# Patient Record
Sex: FEMALE | Race: WHITE | Employment: OTHER
[De-identification: names, ages, dates, MRNs, and addresses within clinical notes are randomized per-mention and may not be internally consistent; named-entity substitution may affect disease eponyms.]

---

## 2017-01-05 ENCOUNTER — OFFICE VISIT (OUTPATIENT)
Dept: CARDIOLOGY | Facility: CLINIC | Age: 77
End: 2017-01-05

## 2017-01-05 VITALS
OXYGEN SATURATION: 97 % | DIASTOLIC BLOOD PRESSURE: 80 MMHG | SYSTOLIC BLOOD PRESSURE: 120 MMHG | BODY MASS INDEX: 25.37 KG/M2 | HEART RATE: 90 BPM | WEIGHT: 162 LBS

## 2017-01-05 DIAGNOSIS — R10.30 GROIN PAIN, UNSPECIFIED LATERALITY: Primary | ICD-10-CM

## 2017-01-05 DIAGNOSIS — I25.10 CORONARY ARTERY DISEASE INVOLVING NATIVE HEART, ANGINA PRESENCE UNSPECIFIED, UNSPECIFIED VESSEL OR LESION TYPE: ICD-10-CM

## 2017-01-05 DIAGNOSIS — E55.9 VITAMIN D DEFICIENCY DISEASE: ICD-10-CM

## 2017-01-05 DIAGNOSIS — E03.8 OTHER SPECIFIED HYPOTHYROIDISM: ICD-10-CM

## 2017-01-05 DIAGNOSIS — E78.49 OTHER HYPERLIPIDEMIA: ICD-10-CM

## 2017-01-05 DIAGNOSIS — Z98.61 POST PTCA: ICD-10-CM

## 2017-01-05 DIAGNOSIS — I10 ESSENTIAL HYPERTENSION: ICD-10-CM

## 2017-01-05 PROCEDURE — G8420 CALC BMI NORM PARAMETERS: HCPCS | Performed by: INTERNAL MEDICINE

## 2017-01-05 PROCEDURE — 1123F ACP DISCUSS/DSCN MKR DOCD: CPT | Performed by: INTERNAL MEDICINE

## 2017-01-05 PROCEDURE — 1036F TOBACCO NON-USER: CPT | Performed by: INTERNAL MEDICINE

## 2017-01-05 PROCEDURE — G8598 ASA/ANTIPLAT THER USED: HCPCS | Performed by: INTERNAL MEDICINE

## 2017-01-05 PROCEDURE — G8482 FLU IMMUNIZE ORDER/ADMIN: HCPCS | Performed by: INTERNAL MEDICINE

## 2017-01-05 PROCEDURE — G8428 CUR MEDS NOT DOCUMENT: HCPCS | Performed by: INTERNAL MEDICINE

## 2017-01-05 PROCEDURE — 4040F PNEUMOC VAC/ADMIN/RCVD: CPT | Performed by: INTERNAL MEDICINE

## 2017-01-05 PROCEDURE — G8399 PT W/DXA RESULTS DOCUMENT: HCPCS | Performed by: INTERNAL MEDICINE

## 2017-01-05 PROCEDURE — 1090F PRES/ABSN URINE INCON ASSESS: CPT | Performed by: INTERNAL MEDICINE

## 2017-01-05 PROCEDURE — 99213 OFFICE O/P EST LOW 20 MIN: CPT | Performed by: INTERNAL MEDICINE

## 2017-01-05 RX ORDER — TRAMADOL HYDROCHLORIDE 50 MG/1
50 TABLET ORAL EVERY 8 HOURS PRN
Qty: 25 TABLET | Refills: 0 | Status: SHIPPED | OUTPATIENT
Start: 2017-01-05 | End: 2017-10-10 | Stop reason: SDUPTHER

## 2017-01-30 DIAGNOSIS — E78.49 OTHER HYPERLIPIDEMIA: ICD-10-CM

## 2017-01-30 DIAGNOSIS — E55.9 VITAMIN D DEFICIENCY DISEASE: ICD-10-CM

## 2017-01-30 DIAGNOSIS — I25.10 CORONARY ARTERY DISEASE INVOLVING NATIVE HEART, ANGINA PRESENCE UNSPECIFIED, UNSPECIFIED VESSEL OR LESION TYPE: Primary | ICD-10-CM

## 2017-01-30 DIAGNOSIS — Z98.61 POST PTCA: ICD-10-CM

## 2017-01-30 DIAGNOSIS — I10 ESSENTIAL HYPERTENSION: ICD-10-CM

## 2017-01-31 ENCOUNTER — OFFICE VISIT (OUTPATIENT)
Dept: CARDIOLOGY | Facility: CLINIC | Age: 77
End: 2017-01-31

## 2017-01-31 VITALS
RESPIRATION RATE: 16 BRPM | DIASTOLIC BLOOD PRESSURE: 70 MMHG | HEIGHT: 68 IN | WEIGHT: 156 LBS | OXYGEN SATURATION: 97 % | SYSTOLIC BLOOD PRESSURE: 120 MMHG | BODY MASS INDEX: 23.64 KG/M2 | HEART RATE: 108 BPM

## 2017-01-31 DIAGNOSIS — I10 ESSENTIAL HYPERTENSION: Primary | ICD-10-CM

## 2017-01-31 PROCEDURE — 1036F TOBACCO NON-USER: CPT | Performed by: INTERNAL MEDICINE

## 2017-01-31 PROCEDURE — G8598 ASA/ANTIPLAT THER USED: HCPCS | Performed by: INTERNAL MEDICINE

## 2017-01-31 PROCEDURE — G8420 CALC BMI NORM PARAMETERS: HCPCS | Performed by: INTERNAL MEDICINE

## 2017-01-31 PROCEDURE — 4040F PNEUMOC VAC/ADMIN/RCVD: CPT | Performed by: INTERNAL MEDICINE

## 2017-01-31 PROCEDURE — 1123F ACP DISCUSS/DSCN MKR DOCD: CPT | Performed by: INTERNAL MEDICINE

## 2017-01-31 PROCEDURE — G8427 DOCREV CUR MEDS BY ELIG CLIN: HCPCS | Performed by: INTERNAL MEDICINE

## 2017-01-31 PROCEDURE — 1090F PRES/ABSN URINE INCON ASSESS: CPT | Performed by: INTERNAL MEDICINE

## 2017-01-31 PROCEDURE — 99213 OFFICE O/P EST LOW 20 MIN: CPT | Performed by: INTERNAL MEDICINE

## 2017-01-31 PROCEDURE — G8482 FLU IMMUNIZE ORDER/ADMIN: HCPCS | Performed by: INTERNAL MEDICINE

## 2017-01-31 PROCEDURE — G8399 PT W/DXA RESULTS DOCUMENT: HCPCS | Performed by: INTERNAL MEDICINE

## 2017-02-03 ENCOUNTER — OFFICE VISIT (OUTPATIENT)
Dept: FAMILY MEDICINE CLINIC | Facility: CLINIC | Age: 77
End: 2017-02-03

## 2017-02-03 VITALS
BODY MASS INDEX: 24.1 KG/M2 | SYSTOLIC BLOOD PRESSURE: 120 MMHG | WEIGHT: 159 LBS | DIASTOLIC BLOOD PRESSURE: 60 MMHG | HEIGHT: 68 IN

## 2017-02-03 DIAGNOSIS — I25.10 CORONARY ARTERY DISEASE INVOLVING NATIVE CORONARY ARTERY OF NATIVE HEART WITHOUT ANGINA PECTORIS: ICD-10-CM

## 2017-02-03 DIAGNOSIS — I10 ESSENTIAL HYPERTENSION: ICD-10-CM

## 2017-02-03 DIAGNOSIS — F33.0 MAJOR DEPRESSIVE DISORDER, RECURRENT EPISODE, MILD (HCC): ICD-10-CM

## 2017-02-03 DIAGNOSIS — E03.9 ACQUIRED HYPOTHYROIDISM: Primary | ICD-10-CM

## 2017-02-03 PROCEDURE — G8420 CALC BMI NORM PARAMETERS: HCPCS | Performed by: FAMILY MEDICINE

## 2017-02-03 PROCEDURE — G8399 PT W/DXA RESULTS DOCUMENT: HCPCS | Performed by: FAMILY MEDICINE

## 2017-02-03 PROCEDURE — 1090F PRES/ABSN URINE INCON ASSESS: CPT | Performed by: FAMILY MEDICINE

## 2017-02-03 PROCEDURE — 4040F PNEUMOC VAC/ADMIN/RCVD: CPT | Performed by: FAMILY MEDICINE

## 2017-02-03 PROCEDURE — G8427 DOCREV CUR MEDS BY ELIG CLIN: HCPCS | Performed by: FAMILY MEDICINE

## 2017-02-03 PROCEDURE — 1036F TOBACCO NON-USER: CPT | Performed by: FAMILY MEDICINE

## 2017-02-03 PROCEDURE — 1123F ACP DISCUSS/DSCN MKR DOCD: CPT | Performed by: FAMILY MEDICINE

## 2017-02-03 PROCEDURE — G8482 FLU IMMUNIZE ORDER/ADMIN: HCPCS | Performed by: FAMILY MEDICINE

## 2017-02-03 PROCEDURE — 99214 OFFICE O/P EST MOD 30 MIN: CPT | Performed by: FAMILY MEDICINE

## 2017-02-03 PROCEDURE — G8598 ASA/ANTIPLAT THER USED: HCPCS | Performed by: FAMILY MEDICINE

## 2017-02-03 RX ORDER — LEVOTHYROXINE SODIUM 88 UG/1
88 TABLET ORAL DAILY
Qty: 30 TABLET | Refills: 1 | Status: SHIPPED | OUTPATIENT
Start: 2017-02-03 | End: 2017-03-03 | Stop reason: SDUPTHER

## 2017-02-03 RX ORDER — LEVOTHYROXINE SODIUM 88 UG/1
88 TABLET ORAL
Qty: 90 TABLET | Refills: 1 | Status: CANCELLED | OUTPATIENT
Start: 2017-02-03

## 2017-02-03 RX ORDER — FLUOXETINE HYDROCHLORIDE 40 MG/1
40 CAPSULE ORAL DAILY
Qty: 90 CAPSULE | Refills: 1 | Status: SHIPPED | OUTPATIENT
Start: 2017-02-03 | End: 2017-08-07 | Stop reason: SDUPTHER

## 2017-02-03 ASSESSMENT — PATIENT HEALTH QUESTIONNAIRE - PHQ9
2. FEELING DOWN, DEPRESSED OR HOPELESS: 0
SUM OF ALL RESPONSES TO PHQ9 QUESTIONS 1 & 2: 0
SUM OF ALL RESPONSES TO PHQ QUESTIONS 1-9: 0
1. LITTLE INTEREST OR PLEASURE IN DOING THINGS: 0

## 2017-02-03 ASSESSMENT — ENCOUNTER SYMPTOMS
SHORTNESS OF BREATH: 0
ORTHOPNEA: 0
BLURRED VISION: 0

## 2017-03-03 ENCOUNTER — TELEPHONE (OUTPATIENT)
Dept: FAMILY MEDICINE CLINIC | Facility: CLINIC | Age: 77
End: 2017-03-03

## 2017-03-03 RX ORDER — LEVOTHYROXINE SODIUM 0.05 MG/1
50 TABLET ORAL DAILY
Qty: 30 TABLET | Refills: 2 | Status: SHIPPED | OUTPATIENT
Start: 2017-03-03 | End: 2017-05-09 | Stop reason: SDUPTHER

## 2017-03-13 ENCOUNTER — HOSPITAL ENCOUNTER (OUTPATIENT)
Dept: CARDIAC REHAB | Age: 77
Setting detail: THERAPIES SERIES
Discharge: HOME | End: 2017-03-13
Payer: MEDICARE

## 2017-03-13 PROCEDURE — 93798 PHYS/QHP OP CAR RHAB W/ECG: CPT

## 2017-03-15 ENCOUNTER — HOSPITAL ENCOUNTER (OUTPATIENT)
Dept: CARDIAC REHAB | Age: 77
Setting detail: THERAPIES SERIES
Discharge: HOME | End: 2017-03-15
Payer: MEDICARE

## 2017-03-15 PROCEDURE — 93798 PHYS/QHP OP CAR RHAB W/ECG: CPT

## 2017-03-17 ENCOUNTER — HOSPITAL ENCOUNTER (OUTPATIENT)
Dept: CARDIAC REHAB | Age: 77
Setting detail: THERAPIES SERIES
Discharge: HOME | End: 2017-03-17
Payer: MEDICARE

## 2017-03-17 PROCEDURE — 93798 PHYS/QHP OP CAR RHAB W/ECG: CPT

## 2017-03-20 ENCOUNTER — HOSPITAL ENCOUNTER (OUTPATIENT)
Dept: CARDIAC REHAB | Age: 77
Setting detail: THERAPIES SERIES
Discharge: HOME | End: 2017-03-20
Payer: MEDICARE

## 2017-03-20 PROCEDURE — 93798 PHYS/QHP OP CAR RHAB W/ECG: CPT

## 2017-03-22 ENCOUNTER — HOSPITAL ENCOUNTER (OUTPATIENT)
Dept: CARDIAC REHAB | Age: 77
Setting detail: THERAPIES SERIES
Discharge: HOME | End: 2017-03-22
Payer: MEDICARE

## 2017-03-22 PROCEDURE — 93798 PHYS/QHP OP CAR RHAB W/ECG: CPT

## 2017-03-30 ENCOUNTER — HOSPITAL ENCOUNTER (OUTPATIENT)
Age: 77
Discharge: HOME | End: 2017-03-30
Payer: MEDICARE

## 2017-03-30 ENCOUNTER — TELEPHONE (OUTPATIENT)
Dept: FAMILY MEDICINE CLINIC | Age: 77
End: 2017-03-30

## 2017-03-30 DIAGNOSIS — R10.30 GROIN PAIN, UNSPECIFIED LATERALITY: ICD-10-CM

## 2017-03-30 DIAGNOSIS — E55.9 VITAMIN D DEFICIENCY DISEASE: ICD-10-CM

## 2017-03-30 DIAGNOSIS — Z98.61 POST PTCA: ICD-10-CM

## 2017-03-30 DIAGNOSIS — I10 ESSENTIAL HYPERTENSION: ICD-10-CM

## 2017-03-30 DIAGNOSIS — E78.49 OTHER HYPERLIPIDEMIA: ICD-10-CM

## 2017-03-30 DIAGNOSIS — E03.9 ACQUIRED HYPOTHYROIDISM: ICD-10-CM

## 2017-03-30 DIAGNOSIS — E03.8 OTHER SPECIFIED HYPOTHYROIDISM: ICD-10-CM

## 2017-03-30 DIAGNOSIS — I25.10 CORONARY ARTERY DISEASE INVOLVING NATIVE HEART, ANGINA PRESENCE UNSPECIFIED, UNSPECIFIED VESSEL OR LESION TYPE: ICD-10-CM

## 2017-03-30 LAB
ABSOLUTE EOS #: 0.3 K/UL (ref 0–0.4)
ABSOLUTE LYMPH #: 1.4 K/UL (ref 1.1–2.7)
ABSOLUTE MONO #: 0.4 K/UL (ref 0–1)
ALBUMIN SERPL-MCNC: 3.9 G/DL (ref 3.5–5.2)
ALBUMIN/GLOBULIN RATIO: ABNORMAL (ref 1–2.5)
ALP BLD-CCNC: 71 U/L (ref 35–104)
ALT SERPL-CCNC: 11 U/L (ref 5–33)
ANION GAP SERPL CALCULATED.3IONS-SCNC: 13 MMOL/L (ref 9–17)
AST SERPL-CCNC: 18 U/L
BASOPHILS # BLD: 1 % (ref 0–2)
BASOPHILS ABSOLUTE: 0 K/UL (ref 0–0.2)
BILIRUB SERPL-MCNC: 0.3 MG/DL (ref 0.3–1.2)
BUN BLDV-MCNC: 25 MG/DL (ref 8–23)
BUN/CREAT BLD: 21 (ref 9–20)
CALCIUM SERPL-MCNC: 8.9 MG/DL (ref 8.6–10.4)
CHLORIDE BLD-SCNC: 108 MMOL/L (ref 98–107)
CHOLESTEROL/HDL RATIO: 2.3
CHOLESTEROL: 219 MG/DL
CO2: 20 MMOL/L (ref 20–31)
CREAT SERPL-MCNC: 1.21 MG/DL (ref 0.5–0.9)
DIFFERENTIAL TYPE: YES
EOSINOPHILS RELATIVE PERCENT: 5 % (ref 0–5)
GFR AFRICAN AMERICAN: 52 ML/MIN
GFR NON-AFRICAN AMERICAN: 43 ML/MIN
GFR SERPL CREATININE-BSD FRML MDRD: ABNORMAL ML/MIN/{1.73_M2}
GFR SERPL CREATININE-BSD FRML MDRD: ABNORMAL ML/MIN/{1.73_M2}
GLUCOSE BLD-MCNC: 101 MG/DL (ref 70–99)
HCT VFR BLD CALC: 33.3 % (ref 36–46)
HDLC SERPL-MCNC: 94 MG/DL
HEMOGLOBIN: 10.6 G/DL (ref 12–16)
LDL CHOLESTEROL: 105 MG/DL (ref 0–130)
LYMPHOCYTES # BLD: 22 % (ref 15–40)
MAGNESIUM: 2.2 MG/DL (ref 1.6–2.6)
MCH RBC QN AUTO: 26.7 PG (ref 26–34)
MCHC RBC AUTO-ENTMCNC: 31.8 G/DL (ref 31–37)
MCV RBC AUTO: 83.7 FL (ref 80–100)
MONOCYTES # BLD: 7 % (ref 4–8)
PATIENT FASTING?: YES
PDW BLD-RTO: 17.3 % (ref 12.1–15.2)
PLATELET # BLD: 284 K/UL (ref 140–450)
PLATELET ESTIMATE: ABNORMAL
PMV BLD AUTO: ABNORMAL FL (ref 6–12)
POTASSIUM SERPL-SCNC: 4 MMOL/L (ref 3.7–5.3)
RBC # BLD: 3.97 M/UL (ref 4–5.2)
RBC # BLD: ABNORMAL 10*6/UL
SEG NEUTROPHILS: 65 % (ref 47–75)
SEGMENTED NEUTROPHILS ABSOLUTE COUNT: 4.2 K/UL (ref 2.5–7)
SODIUM BLD-SCNC: 141 MMOL/L (ref 135–144)
TOTAL PROTEIN: 6.4 G/DL (ref 6.4–8.3)
TRIGL SERPL-MCNC: 101 MG/DL
TSH SERPL DL<=0.05 MIU/L-ACNC: 3.26 MIU/L (ref 0.3–5)
VITAMIN D 25-HYDROXY: 22.1 NG/ML (ref 30–100)
VLDLC SERPL CALC-MCNC: 20 MG/DL (ref 1–30)
WBC # BLD: 6.3 K/UL (ref 3.5–11)
WBC # BLD: ABNORMAL 10*3/UL

## 2017-03-30 PROCEDURE — 82306 VITAMIN D 25 HYDROXY: CPT

## 2017-03-30 PROCEDURE — 84443 ASSAY THYROID STIM HORMONE: CPT

## 2017-03-30 PROCEDURE — 93005 ELECTROCARDIOGRAM TRACING: CPT

## 2017-03-30 PROCEDURE — 80061 LIPID PANEL: CPT

## 2017-03-30 PROCEDURE — 85025 COMPLETE CBC W/AUTO DIFF WBC: CPT

## 2017-03-30 PROCEDURE — 36415 COLL VENOUS BLD VENIPUNCTURE: CPT

## 2017-03-30 PROCEDURE — 83735 ASSAY OF MAGNESIUM: CPT

## 2017-03-30 PROCEDURE — 80053 COMPREHEN METABOLIC PANEL: CPT

## 2017-04-03 LAB
EKG ATRIAL RATE: 71 BPM
EKG P AXIS: 56 DEGREES
EKG P-R INTERVAL: 140 MS
EKG Q-T INTERVAL: 428 MS
EKG QRS DURATION: 78 MS
EKG QTC CALCULATION (BAZETT): 465 MS
EKG R AXIS: 74 DEGREES
EKG T AXIS: 58 DEGREES
EKG VENTRICULAR RATE: 71 BPM

## 2017-04-06 ENCOUNTER — OFFICE VISIT (OUTPATIENT)
Dept: CARDIOLOGY CLINIC | Age: 77
End: 2017-04-06
Payer: MEDICARE

## 2017-04-06 VITALS
DIASTOLIC BLOOD PRESSURE: 70 MMHG | BODY MASS INDEX: 24.33 KG/M2 | OXYGEN SATURATION: 98 % | WEIGHT: 160 LBS | HEART RATE: 90 BPM | SYSTOLIC BLOOD PRESSURE: 130 MMHG

## 2017-04-06 DIAGNOSIS — E03.9 ACQUIRED HYPOTHYROIDISM: ICD-10-CM

## 2017-04-06 DIAGNOSIS — I10 ESSENTIAL HYPERTENSION: ICD-10-CM

## 2017-04-06 DIAGNOSIS — E55.9 VITAMIN D DEFICIENCY DISEASE: ICD-10-CM

## 2017-04-06 DIAGNOSIS — E78.49 OTHER HYPERLIPIDEMIA: ICD-10-CM

## 2017-04-06 DIAGNOSIS — I25.10 CORONARY ARTERY DISEASE INVOLVING NATIVE CORONARY ARTERY OF NATIVE HEART WITHOUT ANGINA PECTORIS: Primary | ICD-10-CM

## 2017-04-06 DIAGNOSIS — Z98.61 POST PTCA: ICD-10-CM

## 2017-04-06 PROCEDURE — G8420 CALC BMI NORM PARAMETERS: HCPCS | Performed by: INTERNAL MEDICINE

## 2017-04-06 PROCEDURE — G8427 DOCREV CUR MEDS BY ELIG CLIN: HCPCS | Performed by: INTERNAL MEDICINE

## 2017-04-06 PROCEDURE — G8399 PT W/DXA RESULTS DOCUMENT: HCPCS | Performed by: INTERNAL MEDICINE

## 2017-04-06 PROCEDURE — 4040F PNEUMOC VAC/ADMIN/RCVD: CPT | Performed by: INTERNAL MEDICINE

## 2017-04-06 PROCEDURE — 1036F TOBACCO NON-USER: CPT | Performed by: INTERNAL MEDICINE

## 2017-04-06 PROCEDURE — 1123F ACP DISCUSS/DSCN MKR DOCD: CPT | Performed by: INTERNAL MEDICINE

## 2017-04-06 PROCEDURE — 99214 OFFICE O/P EST MOD 30 MIN: CPT | Performed by: INTERNAL MEDICINE

## 2017-04-06 PROCEDURE — 1090F PRES/ABSN URINE INCON ASSESS: CPT | Performed by: INTERNAL MEDICINE

## 2017-04-06 PROCEDURE — G8598 ASA/ANTIPLAT THER USED: HCPCS | Performed by: INTERNAL MEDICINE

## 2017-04-06 RX ORDER — POTASSIUM CHLORIDE 20 MEQ/1
20 TABLET, EXTENDED RELEASE ORAL DAILY
Qty: 90 TABLET | Refills: 3 | Status: SHIPPED | OUTPATIENT
Start: 2017-04-06 | End: 2018-03-27 | Stop reason: SDUPTHER

## 2017-04-06 RX ORDER — FUROSEMIDE 20 MG/1
20 TABLET ORAL DAILY
Qty: 90 TABLET | Refills: 3 | Status: SHIPPED | OUTPATIENT
Start: 2017-04-06 | End: 2018-03-27 | Stop reason: SDUPTHER

## 2017-04-10 ENCOUNTER — TELEPHONE (OUTPATIENT)
Dept: FAMILY MEDICINE CLINIC | Age: 77
End: 2017-04-10

## 2017-04-11 ENCOUNTER — TELEPHONE (OUTPATIENT)
Dept: FAMILY MEDICINE CLINIC | Age: 77
End: 2017-04-11

## 2017-04-11 ENCOUNTER — HOSPITAL ENCOUNTER (OUTPATIENT)
Age: 77
Setting detail: SPECIMEN
Discharge: HOME | End: 2017-04-11
Payer: MEDICARE

## 2017-04-11 ENCOUNTER — NURSE ONLY (OUTPATIENT)
Dept: FAMILY MEDICINE CLINIC | Age: 77
End: 2017-04-11
Payer: MEDICARE

## 2017-04-11 DIAGNOSIS — R30.0 DYSURIA: ICD-10-CM

## 2017-04-11 DIAGNOSIS — N30.01 ACUTE CYSTITIS WITH HEMATURIA: ICD-10-CM

## 2017-04-11 DIAGNOSIS — R30.0 DYSURIA: Primary | ICD-10-CM

## 2017-04-11 LAB
BILIRUBIN, POC: NORMAL
BLOOD URINE, POC: NORMAL
CLARITY, POC: CLEAR
COLOR, POC: YELLOW
GLUCOSE URINE, POC: NORMAL
KETONES, POC: NORMAL
LEUKOCYTE EST, POC: NORMAL
NITRITE, POC: NORMAL
PH, POC: 6
PROTEIN, POC: 100
SPECIFIC GRAVITY, POC: 1.03
UROBILINOGEN, POC: 0.2

## 2017-04-11 PROCEDURE — 87077 CULTURE AEROBIC IDENTIFY: CPT

## 2017-04-11 PROCEDURE — 81002 URINALYSIS NONAUTO W/O SCOPE: CPT | Performed by: FAMILY MEDICINE

## 2017-04-11 PROCEDURE — 87186 SC STD MICRODIL/AGAR DIL: CPT

## 2017-04-11 PROCEDURE — 87086 URINE CULTURE/COLONY COUNT: CPT

## 2017-04-11 RX ORDER — TRIMETHOPRIM 100 MG/1
100 TABLET ORAL 2 TIMES DAILY
Qty: 14 TABLET | Refills: 0 | Status: SHIPPED | OUTPATIENT
Start: 2017-04-11 | End: 2017-04-13 | Stop reason: ALTCHOICE

## 2017-04-12 LAB
CULTURE: ABNORMAL
CULTURE: ABNORMAL
Lab: ABNORMAL
ORGANISM: ABNORMAL
SPECIMEN DESCRIPTION: ABNORMAL
SPECIMEN DESCRIPTION: ABNORMAL
STATUS: ABNORMAL

## 2017-04-13 ENCOUNTER — TELEPHONE (OUTPATIENT)
Dept: FAMILY MEDICINE CLINIC | Age: 77
End: 2017-04-13

## 2017-04-13 RX ORDER — CIPROFLOXACIN 500 MG/1
500 TABLET, FILM COATED ORAL 2 TIMES DAILY
Qty: 14 TABLET | Refills: 0 | Status: SHIPPED | OUTPATIENT
Start: 2017-04-13 | End: 2017-04-13 | Stop reason: SDUPTHER

## 2017-04-13 RX ORDER — FLUCONAZOLE 150 MG/1
TABLET ORAL
Qty: 2 TABLET | Refills: 0 | Status: SHIPPED | OUTPATIENT
Start: 2017-04-13 | End: 2017-08-07 | Stop reason: ALTCHOICE

## 2017-04-13 RX ORDER — CIPROFLOXACIN 500 MG/1
500 TABLET, FILM COATED ORAL 2 TIMES DAILY
Qty: 14 TABLET | Refills: 0 | Status: SHIPPED | OUTPATIENT
Start: 2017-04-13 | End: 2017-04-20

## 2017-04-19 ENCOUNTER — OFFICE VISIT (OUTPATIENT)
Dept: FAMILY MEDICINE CLINIC | Age: 77
End: 2017-04-19
Payer: MEDICARE

## 2017-04-19 VITALS
BODY MASS INDEX: 24.25 KG/M2 | WEIGHT: 160 LBS | DIASTOLIC BLOOD PRESSURE: 72 MMHG | HEIGHT: 68 IN | SYSTOLIC BLOOD PRESSURE: 138 MMHG

## 2017-04-19 DIAGNOSIS — B02.8 HERPES ZOSTER COMPLICATED: Primary | ICD-10-CM

## 2017-04-19 PROCEDURE — G8428 CUR MEDS NOT DOCUMENT: HCPCS | Performed by: FAMILY MEDICINE

## 2017-04-19 PROCEDURE — 1123F ACP DISCUSS/DSCN MKR DOCD: CPT | Performed by: FAMILY MEDICINE

## 2017-04-19 PROCEDURE — G8399 PT W/DXA RESULTS DOCUMENT: HCPCS | Performed by: FAMILY MEDICINE

## 2017-04-19 PROCEDURE — 1036F TOBACCO NON-USER: CPT | Performed by: FAMILY MEDICINE

## 2017-04-19 PROCEDURE — G8598 ASA/ANTIPLAT THER USED: HCPCS | Performed by: FAMILY MEDICINE

## 2017-04-19 PROCEDURE — 99213 OFFICE O/P EST LOW 20 MIN: CPT | Performed by: FAMILY MEDICINE

## 2017-04-19 PROCEDURE — 4040F PNEUMOC VAC/ADMIN/RCVD: CPT | Performed by: FAMILY MEDICINE

## 2017-04-19 PROCEDURE — 1090F PRES/ABSN URINE INCON ASSESS: CPT | Performed by: FAMILY MEDICINE

## 2017-04-19 PROCEDURE — G8420 CALC BMI NORM PARAMETERS: HCPCS | Performed by: FAMILY MEDICINE

## 2017-04-19 RX ORDER — VALACYCLOVIR HYDROCHLORIDE 1 G/1
1000 TABLET, FILM COATED ORAL 3 TIMES DAILY
Qty: 21 TABLET | Refills: 0 | Status: SHIPPED | OUTPATIENT
Start: 2017-04-19 | End: 2017-04-26

## 2017-04-19 RX ORDER — TRIAMCINOLONE ACETONIDE 1 MG/G
CREAM TOPICAL
Qty: 30 G | Refills: 0 | Status: SHIPPED | OUTPATIENT
Start: 2017-04-19 | End: 2018-04-09 | Stop reason: SDUPTHER

## 2017-04-19 RX ORDER — PREDNISONE 20 MG/1
40 TABLET ORAL DAILY
Qty: 14 TABLET | Refills: 0 | Status: SHIPPED | OUTPATIENT
Start: 2017-04-19 | End: 2017-04-26

## 2017-04-19 ASSESSMENT — ENCOUNTER SYMPTOMS: GASTROINTESTINAL NEGATIVE: 1

## 2017-04-28 ENCOUNTER — TELEPHONE (OUTPATIENT)
Dept: FAMILY MEDICINE CLINIC | Age: 77
End: 2017-04-28

## 2017-04-28 RX ORDER — VALACYCLOVIR HYDROCHLORIDE 1 G/1
1000 TABLET, FILM COATED ORAL 3 TIMES DAILY
Qty: 21 TABLET | Refills: 0 | Status: SHIPPED | OUTPATIENT
Start: 2017-04-28 | End: 2017-05-05

## 2017-05-17 ENCOUNTER — OFFICE VISIT (OUTPATIENT)
Dept: FAMILY MEDICINE CLINIC | Age: 77
End: 2017-05-17
Payer: MEDICARE

## 2017-05-17 VITALS — WEIGHT: 164 LBS | BODY MASS INDEX: 24.86 KG/M2 | HEIGHT: 68 IN

## 2017-05-17 DIAGNOSIS — B02.8 HERPES ZOSTER COMPLICATED: Primary | ICD-10-CM

## 2017-05-17 PROCEDURE — 1090F PRES/ABSN URINE INCON ASSESS: CPT | Performed by: FAMILY MEDICINE

## 2017-05-17 PROCEDURE — 1123F ACP DISCUSS/DSCN MKR DOCD: CPT | Performed by: FAMILY MEDICINE

## 2017-05-17 PROCEDURE — 4040F PNEUMOC VAC/ADMIN/RCVD: CPT | Performed by: FAMILY MEDICINE

## 2017-05-17 PROCEDURE — G8420 CALC BMI NORM PARAMETERS: HCPCS | Performed by: FAMILY MEDICINE

## 2017-05-17 PROCEDURE — 1036F TOBACCO NON-USER: CPT | Performed by: FAMILY MEDICINE

## 2017-05-17 PROCEDURE — 99213 OFFICE O/P EST LOW 20 MIN: CPT | Performed by: FAMILY MEDICINE

## 2017-05-17 PROCEDURE — G8428 CUR MEDS NOT DOCUMENT: HCPCS | Performed by: FAMILY MEDICINE

## 2017-05-17 PROCEDURE — G8399 PT W/DXA RESULTS DOCUMENT: HCPCS | Performed by: FAMILY MEDICINE

## 2017-05-17 PROCEDURE — G8598 ASA/ANTIPLAT THER USED: HCPCS | Performed by: FAMILY MEDICINE

## 2017-05-17 RX ORDER — VALACYCLOVIR HYDROCHLORIDE 500 MG/1
500 TABLET, FILM COATED ORAL DAILY
Qty: 30 TABLET | Refills: 2 | Status: SHIPPED | OUTPATIENT
Start: 2017-05-17 | End: 2018-03-27 | Stop reason: ALTCHOICE

## 2017-05-17 RX ORDER — FLUCONAZOLE 150 MG/1
TABLET ORAL
Qty: 2 TABLET | Refills: 0 | Status: SHIPPED | OUTPATIENT
Start: 2017-05-17 | End: 2017-08-07 | Stop reason: ALTCHOICE

## 2017-05-21 ASSESSMENT — ENCOUNTER SYMPTOMS: RESPIRATORY NEGATIVE: 1

## 2017-06-19 ENCOUNTER — OFFICE VISIT (OUTPATIENT)
Dept: FAMILY MEDICINE CLINIC | Age: 77
End: 2017-06-19
Payer: MEDICARE

## 2017-06-19 ENCOUNTER — HOSPITAL ENCOUNTER (OUTPATIENT)
Age: 77
Discharge: HOME | End: 2017-06-19
Payer: MEDICARE

## 2017-06-19 VITALS
WEIGHT: 162 LBS | HEIGHT: 68 IN | DIASTOLIC BLOOD PRESSURE: 70 MMHG | BODY MASS INDEX: 24.55 KG/M2 | SYSTOLIC BLOOD PRESSURE: 130 MMHG

## 2017-06-19 DIAGNOSIS — D64.9 NORMOCYTIC ANEMIA: ICD-10-CM

## 2017-06-19 DIAGNOSIS — K62.5 RECTAL BLEEDING: Primary | ICD-10-CM

## 2017-06-19 DIAGNOSIS — E03.9 ACQUIRED HYPOTHYROIDISM: ICD-10-CM

## 2017-06-19 DIAGNOSIS — R06.09 DYSPNEA ON EXERTION: ICD-10-CM

## 2017-06-19 LAB
ABSOLUTE EOS #: 0.4 K/UL (ref 0–0.4)
ABSOLUTE LYMPH #: 1.5 K/UL (ref 1.1–2.7)
ABSOLUTE MONO #: 0.5 K/UL (ref 0–1)
ANION GAP SERPL CALCULATED.3IONS-SCNC: 16 MMOL/L (ref 9–17)
BASOPHILS # BLD: 1 %
BASOPHILS ABSOLUTE: 0 K/UL (ref 0–0.2)
BUN BLDV-MCNC: 22 MG/DL (ref 8–23)
BUN/CREAT BLD: 18 (ref 9–20)
CALCIUM SERPL-MCNC: 9.3 MG/DL (ref 8.6–10.4)
CHLORIDE BLD-SCNC: 107 MMOL/L (ref 98–107)
CO2: 18 MMOL/L (ref 20–31)
CREAT SERPL-MCNC: 1.22 MG/DL (ref 0.5–0.9)
DIFFERENTIAL TYPE: ABNORMAL
EOSINOPHILS RELATIVE PERCENT: 5 %
FERRITIN: 35 UG/L (ref 13–150)
FOLATE: >20 NG/ML
GFR AFRICAN AMERICAN: 52 ML/MIN
GFR NON-AFRICAN AMERICAN: 43 ML/MIN
GFR SERPL CREATININE-BSD FRML MDRD: ABNORMAL ML/MIN/{1.73_M2}
GFR SERPL CREATININE-BSD FRML MDRD: ABNORMAL ML/MIN/{1.73_M2}
GLUCOSE BLD-MCNC: 104 MG/DL (ref 70–99)
HCT VFR BLD CALC: 33 % (ref 36–46)
HEMOGLOBIN: 10.5 G/DL (ref 12–16)
IRON SATURATION: 9 % (ref 20–55)
IRON: 36 UG/DL (ref 37–145)
LYMPHOCYTES # BLD: 19 %
MCH RBC QN AUTO: 27.4 PG (ref 26–34)
MCHC RBC AUTO-ENTMCNC: 31.9 G/DL (ref 31–37)
MCV RBC AUTO: 85.9 FL (ref 80–100)
MONOCYTES # BLD: 6 %
MORPHOLOGY: ABNORMAL
PDW BLD-RTO: 19.5 % (ref 12.1–15.2)
PLATELET # BLD: 340 K/UL (ref 140–450)
PLATELET ESTIMATE: ABNORMAL
PMV BLD AUTO: ABNORMAL FL (ref 6–12)
POTASSIUM SERPL-SCNC: 4.4 MMOL/L (ref 3.7–5.3)
RBC # BLD: 3.84 M/UL (ref 4–5.2)
RBC # BLD: ABNORMAL 10*6/UL
SEG NEUTROPHILS: 69 %
SEGMENTED NEUTROPHILS ABSOLUTE COUNT: 5.5 K/UL (ref 2.5–7)
SODIUM BLD-SCNC: 141 MMOL/L (ref 135–144)
TOTAL IRON BINDING CAPACITY: 416 UG/DL (ref 250–450)
TSH SERPL DL<=0.05 MIU/L-ACNC: 1.92 MIU/L (ref 0.3–5)
UNSATURATED IRON BINDING CAPACITY: 380 UG/DL (ref 112–347)
VITAMIN B-12: >2000 PG/ML (ref 211–946)
WBC # BLD: 8 K/UL (ref 3.5–11)
WBC # BLD: ABNORMAL 10*3/UL

## 2017-06-19 PROCEDURE — G8420 CALC BMI NORM PARAMETERS: HCPCS | Performed by: FAMILY MEDICINE

## 2017-06-19 PROCEDURE — 99214 OFFICE O/P EST MOD 30 MIN: CPT | Performed by: FAMILY MEDICINE

## 2017-06-19 PROCEDURE — 82607 VITAMIN B-12: CPT

## 2017-06-19 PROCEDURE — 36415 COLL VENOUS BLD VENIPUNCTURE: CPT

## 2017-06-19 PROCEDURE — 82728 ASSAY OF FERRITIN: CPT

## 2017-06-19 PROCEDURE — 1090F PRES/ABSN URINE INCON ASSESS: CPT | Performed by: FAMILY MEDICINE

## 2017-06-19 PROCEDURE — G8598 ASA/ANTIPLAT THER USED: HCPCS | Performed by: FAMILY MEDICINE

## 2017-06-19 PROCEDURE — 83540 ASSAY OF IRON: CPT

## 2017-06-19 PROCEDURE — 82746 ASSAY OF FOLIC ACID SERUM: CPT

## 2017-06-19 PROCEDURE — 1036F TOBACCO NON-USER: CPT | Performed by: FAMILY MEDICINE

## 2017-06-19 PROCEDURE — 1123F ACP DISCUSS/DSCN MKR DOCD: CPT | Performed by: FAMILY MEDICINE

## 2017-06-19 PROCEDURE — 84443 ASSAY THYROID STIM HORMONE: CPT

## 2017-06-19 PROCEDURE — G8399 PT W/DXA RESULTS DOCUMENT: HCPCS | Performed by: FAMILY MEDICINE

## 2017-06-19 PROCEDURE — 85025 COMPLETE CBC W/AUTO DIFF WBC: CPT

## 2017-06-19 PROCEDURE — 4040F PNEUMOC VAC/ADMIN/RCVD: CPT | Performed by: FAMILY MEDICINE

## 2017-06-19 PROCEDURE — G8427 DOCREV CUR MEDS BY ELIG CLIN: HCPCS | Performed by: FAMILY MEDICINE

## 2017-06-19 PROCEDURE — 83550 IRON BINDING TEST: CPT

## 2017-06-19 PROCEDURE — 80048 BASIC METABOLIC PNL TOTAL CA: CPT

## 2017-06-19 RX ORDER — FLUCONAZOLE 150 MG/1
TABLET ORAL
Qty: 2 TABLET | Refills: 0 | Status: SHIPPED | OUTPATIENT
Start: 2017-06-19 | End: 2017-10-10 | Stop reason: ALTCHOICE

## 2017-06-19 ASSESSMENT — ENCOUNTER SYMPTOMS
COUGH: 0
SHORTNESS OF BREATH: 1

## 2017-06-21 ENCOUNTER — INITIAL CONSULT (OUTPATIENT)
Dept: SURGERY | Age: 77
End: 2017-06-21
Payer: MEDICARE

## 2017-06-21 VITALS
HEART RATE: 88 BPM | WEIGHT: 163 LBS | HEIGHT: 68 IN | DIASTOLIC BLOOD PRESSURE: 70 MMHG | RESPIRATION RATE: 18 BRPM | BODY MASS INDEX: 24.71 KG/M2 | SYSTOLIC BLOOD PRESSURE: 128 MMHG

## 2017-06-21 DIAGNOSIS — K62.5 RECTAL BLEEDING: Primary | ICD-10-CM

## 2017-06-21 DIAGNOSIS — D50.9 IRON DEFICIENCY ANEMIA, UNSPECIFIED IRON DEFICIENCY ANEMIA TYPE: ICD-10-CM

## 2017-06-21 PROCEDURE — 4040F PNEUMOC VAC/ADMIN/RCVD: CPT | Performed by: SURGERY

## 2017-06-21 PROCEDURE — G8399 PT W/DXA RESULTS DOCUMENT: HCPCS | Performed by: SURGERY

## 2017-06-21 PROCEDURE — 1123F ACP DISCUSS/DSCN MKR DOCD: CPT | Performed by: SURGERY

## 2017-06-21 PROCEDURE — 99204 OFFICE O/P NEW MOD 45 MIN: CPT | Performed by: SURGERY

## 2017-06-21 PROCEDURE — 1036F TOBACCO NON-USER: CPT | Performed by: SURGERY

## 2017-06-21 PROCEDURE — G8598 ASA/ANTIPLAT THER USED: HCPCS | Performed by: SURGERY

## 2017-06-21 PROCEDURE — 1090F PRES/ABSN URINE INCON ASSESS: CPT | Performed by: SURGERY

## 2017-06-21 PROCEDURE — G8419 CALC BMI OUT NRM PARAM NOF/U: HCPCS | Performed by: SURGERY

## 2017-06-21 PROCEDURE — G8427 DOCREV CUR MEDS BY ELIG CLIN: HCPCS | Performed by: SURGERY

## 2017-06-24 ASSESSMENT — ENCOUNTER SYMPTOMS
COUGH: 0
CHOKING: 0
BLOOD IN STOOL: 0
ANAL BLEEDING: 1
SHORTNESS OF BREATH: 0
BACK PAIN: 0
NAUSEA: 0
ABDOMINAL PAIN: 0
TROUBLE SWALLOWING: 0
VOMITING: 0
SORE THROAT: 0

## 2017-07-10 ENCOUNTER — TELEPHONE (OUTPATIENT)
Dept: CARDIOLOGY CLINIC | Age: 77
End: 2017-07-10

## 2017-08-07 DIAGNOSIS — F33.0 MAJOR DEPRESSIVE DISORDER, RECURRENT EPISODE, MILD (HCC): ICD-10-CM

## 2017-08-07 RX ORDER — FLUOXETINE HYDROCHLORIDE 40 MG/1
40 CAPSULE ORAL DAILY
Qty: 90 CAPSULE | Refills: 1 | Status: SHIPPED | OUTPATIENT
Start: 2017-08-07 | End: 2017-10-10 | Stop reason: SDUPTHER

## 2017-08-31 RX ORDER — LEVOTHYROXINE SODIUM 0.05 MG/1
TABLET ORAL
Qty: 90 TABLET | Refills: 1 | Status: SHIPPED | OUTPATIENT
Start: 2017-08-31 | End: 2017-10-10 | Stop reason: SDUPTHER

## 2017-10-02 RX ORDER — CLOPIDOGREL BISULFATE 75 MG/1
TABLET ORAL
Qty: 90 TABLET | Refills: 3 | Status: SHIPPED | OUTPATIENT
Start: 2017-10-02 | End: 2018-03-27 | Stop reason: SDUPTHER

## 2017-10-10 ENCOUNTER — HOSPITAL ENCOUNTER (OUTPATIENT)
Age: 77
Discharge: HOME | End: 2017-10-10
Payer: MEDICARE

## 2017-10-10 ENCOUNTER — OFFICE VISIT (OUTPATIENT)
Dept: FAMILY MEDICINE CLINIC | Age: 77
End: 2017-10-10
Payer: MEDICARE

## 2017-10-10 VITALS
WEIGHT: 156 LBS | DIASTOLIC BLOOD PRESSURE: 70 MMHG | HEIGHT: 68 IN | BODY MASS INDEX: 23.64 KG/M2 | SYSTOLIC BLOOD PRESSURE: 114 MMHG

## 2017-10-10 DIAGNOSIS — M54.50 CHRONIC BILATERAL LOW BACK PAIN WITHOUT SCIATICA: ICD-10-CM

## 2017-10-10 DIAGNOSIS — G89.29 CHRONIC BILATERAL LOW BACK PAIN WITHOUT SCIATICA: ICD-10-CM

## 2017-10-10 DIAGNOSIS — D50.9 IRON DEFICIENCY ANEMIA, UNSPECIFIED IRON DEFICIENCY ANEMIA TYPE: ICD-10-CM

## 2017-10-10 DIAGNOSIS — E03.9 ACQUIRED HYPOTHYROIDISM: ICD-10-CM

## 2017-10-10 DIAGNOSIS — E03.9 ACQUIRED HYPOTHYROIDISM: Primary | ICD-10-CM

## 2017-10-10 DIAGNOSIS — L98.9 SKIN LESION: ICD-10-CM

## 2017-10-10 LAB
ABSOLUTE EOS #: 0.2 K/UL (ref 0–0.4)
ABSOLUTE LYMPH #: 1.4 K/UL (ref 1–4.8)
ABSOLUTE MONO #: 0.5 K/UL (ref 0–1)
BASOPHILS # BLD: 0 %
BASOPHILS ABSOLUTE: 0 K/UL (ref 0–0.2)
DIFFERENTIAL TYPE: YES
EOSINOPHILS RELATIVE PERCENT: 3 %
HCT VFR BLD CALC: 34.5 % (ref 36–46)
HEMOGLOBIN: 10.6 G/DL (ref 12–16)
LYMPHOCYTES # BLD: 16 %
MCH RBC QN AUTO: 25.1 PG (ref 26–34)
MCHC RBC AUTO-ENTMCNC: 30.7 G/DL (ref 31–37)
MCV RBC AUTO: 81.8 FL (ref 80–100)
MONOCYTES # BLD: 6 %
PDW BLD-RTO: 16.6 % (ref 12.1–15.2)
PLATELET # BLD: 363 K/UL (ref 140–450)
PLATELET ESTIMATE: ABNORMAL
PMV BLD AUTO: ABNORMAL FL (ref 6–12)
RBC # BLD: 4.22 M/UL (ref 4–5.2)
RBC # BLD: ABNORMAL 10*6/UL
SEG NEUTROPHILS: 75 %
SEGMENTED NEUTROPHILS ABSOLUTE COUNT: 6.5 K/UL (ref 2.5–7)
TSH SERPL DL<=0.05 MIU/L-ACNC: 0.78 MIU/L (ref 0.3–5)
WBC # BLD: 8.7 K/UL (ref 3.5–11)
WBC # BLD: ABNORMAL 10*3/UL

## 2017-10-10 PROCEDURE — 1123F ACP DISCUSS/DSCN MKR DOCD: CPT | Performed by: FAMILY MEDICINE

## 2017-10-10 PROCEDURE — 4040F PNEUMOC VAC/ADMIN/RCVD: CPT | Performed by: FAMILY MEDICINE

## 2017-10-10 PROCEDURE — G8482 FLU IMMUNIZE ORDER/ADMIN: HCPCS | Performed by: FAMILY MEDICINE

## 2017-10-10 PROCEDURE — G8427 DOCREV CUR MEDS BY ELIG CLIN: HCPCS | Performed by: FAMILY MEDICINE

## 2017-10-10 PROCEDURE — 99214 OFFICE O/P EST MOD 30 MIN: CPT | Performed by: FAMILY MEDICINE

## 2017-10-10 PROCEDURE — G8399 PT W/DXA RESULTS DOCUMENT: HCPCS | Performed by: FAMILY MEDICINE

## 2017-10-10 PROCEDURE — 85025 COMPLETE CBC W/AUTO DIFF WBC: CPT

## 2017-10-10 PROCEDURE — G8598 ASA/ANTIPLAT THER USED: HCPCS | Performed by: FAMILY MEDICINE

## 2017-10-10 PROCEDURE — 1090F PRES/ABSN URINE INCON ASSESS: CPT | Performed by: FAMILY MEDICINE

## 2017-10-10 PROCEDURE — 84443 ASSAY THYROID STIM HORMONE: CPT

## 2017-10-10 PROCEDURE — G8420 CALC BMI NORM PARAMETERS: HCPCS | Performed by: FAMILY MEDICINE

## 2017-10-10 PROCEDURE — 1036F TOBACCO NON-USER: CPT | Performed by: FAMILY MEDICINE

## 2017-10-10 PROCEDURE — 36415 COLL VENOUS BLD VENIPUNCTURE: CPT

## 2017-10-10 RX ORDER — METOPROLOL SUCCINATE 25 MG/1
25 TABLET, EXTENDED RELEASE ORAL DAILY
Qty: 90 TABLET | Refills: 1 | Status: SHIPPED | OUTPATIENT
Start: 2017-10-10 | End: 2018-03-05 | Stop reason: SDUPTHER

## 2017-10-10 RX ORDER — LEVOTHYROXINE SODIUM 0.07 MG/1
TABLET ORAL
Qty: 90 TABLET | Refills: 1 | Status: SHIPPED | OUTPATIENT
Start: 2017-10-10 | End: 2018-03-05 | Stop reason: SDUPTHER

## 2017-10-10 RX ORDER — OMEPRAZOLE 20 MG/1
20 CAPSULE, DELAYED RELEASE ORAL DAILY PRN
Qty: 90 CAPSULE | Refills: 1 | Status: SHIPPED | OUTPATIENT
Start: 2017-10-10 | End: 2018-03-05 | Stop reason: SDUPTHER

## 2017-10-10 RX ORDER — MELOXICAM 7.5 MG/1
7.5 TABLET ORAL DAILY PRN
Qty: 90 TABLET | Refills: 1 | Status: SHIPPED | OUTPATIENT
Start: 2017-10-10 | End: 2018-05-15

## 2017-10-10 RX ORDER — TRAMADOL HYDROCHLORIDE 50 MG/1
50 TABLET ORAL EVERY 8 HOURS PRN
Qty: 30 TABLET | Refills: 0 | Status: SHIPPED | OUTPATIENT
Start: 2017-10-10 | End: 2018-03-27 | Stop reason: ALTCHOICE

## 2017-10-10 RX ORDER — FLUOXETINE HYDROCHLORIDE 40 MG/1
40 CAPSULE ORAL DAILY
Qty: 90 CAPSULE | Refills: 1 | Status: SHIPPED | OUTPATIENT
Start: 2017-10-10 | End: 2018-03-05 | Stop reason: SDUPTHER

## 2017-10-10 ASSESSMENT — ENCOUNTER SYMPTOMS
GASTROINTESTINAL NEGATIVE: 1
RESPIRATORY NEGATIVE: 1

## 2017-10-10 NOTE — PROGRESS NOTES
Name: Fartun Argueta  : 1940         Chief Complaint:     Chief Complaint   Patient presents with    Hypertension    Hypothyroidism     patient was changed to levothyroxine 50 mcg. she has increased herself to 75 mcg. History of Present Illness:      Fartun Argueta is a 76 y.o.  female who presents with Hypertension and Hypothyroidism (patient was changed to levothyroxine 50 mcg. she has increased herself to 75 mcg. )      HPI     Pt was feeling sluggish, gaining weight, hair falling out, so increased synthroid from 50 to 75 mcg on her own and is feeling better. In the past pt had been on 88 mcg and also on estrogen per [facility]. Was put on progestin then and started having vag bleeding, so then went off HRT altogether. Was taking 100 mcg at this time and felt really well. Started feeling \"crummy\" after decreasing dose to 50 mcg daily. Chronic SOB seems better - figured out it was d/t tight bras. Something painful beneath L ear that swells up at times, tender to the touch. Present for perhaps a month. L ear gets a little crusty at times but the lesion itself hasn't drained pus or fluid. Pt believes it's an infection deep to the skin. No fever or chills. Had had rectal bleeding and saw Dr Shital Simon, considered colonoscopy but was recommended not to go off antiplatelets for any procedure. No recent rectal bleeding. If she feels like hemorrhoid is starting to bother her, uses a little anusol which helps. Has 2 rods in back as placed by Dr Marene Lombard some yrs ago. Having pain in low back when she sits a lot. Pt hasn't taken any tramadol for a long time, but  thinks she could take it without it hurting her. She had taken a few pills a while ago while doing cardiac rehab.      Past Medical History:     Past Medical History:   Diagnosis Date    CAD (coronary artery disease)     Chronic back pain     Hyperlipidemia     Hypertension     Hypothyroidism     Post PTCA     Transfusion history         Past Surgical History:     Past Surgical History:   Procedure Laterality Date    ANGIOPLASTY  12/07/2016    Dr. Amber Medrano @ [facility] x 1     COLONOSCOPY  6/11/2013    S[facility] GERONIMO    CORONARY ANGIOPLASTY WITH STENT PLACEMENT  2006,11/14/2012    CORONARY ARTERY BYPASS GRAFT  2006    HEMORRHOID SURGERY      OVARY REMOVAL Right     SPINE SURGERY  2010    laminectomy    UPPER GASTROINTESTINAL ENDOSCOPY  6/11/2013        Medications:       Prior to Admission medications    Medication Sig Start Date End Date Taking? Authorizing Provider   FLUoxetine (PROZAC) 40 MG capsule Take 1 capsule by mouth daily 10/10/17  Yes Charli Reusing, DO   omeprazole (PRILOSEC) 20 MG delayed release capsule Take 1 capsule by mouth daily as needed (GERD) 10/10/17  Yes Charli Reusing, DO   levothyroxine (SYNTHROID) 75 MCG tablet Take 1 by mouth daily 10/10/17  Yes Charli Reusing, DO   metoprolol succinate (TOPROL XL) 25 MG extended release tablet Take 1 tablet by mouth daily 10/10/17  Yes Charli Reusing, DO   meloxicam (MOBIC) 7.5 MG tablet Take 1 tablet by mouth daily as needed for Pain 10/10/17  Yes Charli Reusing, DO   traMADol (ULTRAM) 50 MG tablet Take 1 tablet by mouth every 8 hours as needed for Pain 10/10/17  Yes Charli Reusing, DO   clopidogrel (PLAVIX) 75 MG tablet Take 1 tablet by mouth daily One daily 10/2/17  Yes Cortes Hubbard MD   hydrocortisone (ANUSOL-HC) 2.5 % rectal cream Place rectally 2 times daily for up to a week 6/19/17  Yes Charli Reusing, DO   valACYclovir (VALTREX) 500 MG tablet Take 1 tablet by mouth daily 5/17/17  Yes Charli Reusing, DO   triamcinolone (KENALOG) 0.1 % cream Apply topically 2 times daily.  4/19/17  Yes Charli Reusing, DO   furosemide (LASIX) 20 MG tablet Take 1 tablet by mouth daily 4/6/17  Yes Cortes Hubbard MD   potassium chloride (KLOR-CON M) 20 MEQ extended release tablet Take 1 tablet by mouth daily 4/6/17  Yes Cortes Hubbard MD   pitavastatin (LIVALO) 4 MG TABS tablet Take 2 mg by mouth nightly   Yes Historical Provider, MD   aspirin 81 MG tablet Take 81 mg by mouth daily   Yes Historical Provider, MD   Handicap Placard MISC Expiration date four years from application date. 36/0/60  Yes Estrellita Roberson NP   Vitamin D (CHOLECALCIFEROL) 1000 UNITS CAPS capsule Take 1,000 Units by mouth daily. Yes Historical Provider, MD   b complex vitamins capsule Take 1 capsule by mouth daily. Yes Historical Provider, MD   Cyanocobalamin (VITAMIN B-12 CR) 1000 MCG TBCR Take  by mouth daily. Yes Historical Provider, MD   folic acid (FOLVITE) 562 MCG tablet Take 800 mcg by mouth daily. Yes Historical Provider, MD   bisacodyl (DULCOLAX) 5 MG EC tablet Take 5 mg by mouth daily as needed. Take three daily      Yes Historical Provider, MD   Calcium Carbonate-Vitamin D (CALCIUM + D) 600-200 MG-UNIT TABS Take  by mouth daily. Yes Historical Provider, MD        Allergies:       Sulfa antibiotics    Social History:     Tobacco:    reports that she has quit smoking. She has never used smokeless tobacco.  Alcohol:      reports that she does not drink alcohol. Drug Use:  reports that she does not use drugs. Family History:     Family History   Problem Relation Age of Onset    Heart Disease Mother     Cancer Father      prostate    Dementia Father        Review of Systems:     Positive and Negative as described in HPI    Review of Systems   Constitutional: Negative. Respiratory: Negative. Gastrointestinal: Negative. Physical Exam:     Vitals:  /70   Ht 5' 7.5\" (1.715 m)   Wt 156 lb (70.8 kg)   BMI 24.07 kg/m²   Physical Exam   Constitutional: She is oriented to person, place, and time. She appears well-developed and well-nourished. No distress. HENT:   Head: Normocephalic and atraumatic. Eyes: Conjunctivae and EOM are normal.   Cardiovascular: Normal rate, regular rhythm and normal heart sounds. No peripheral edema.    Pulmonary/Chest:

## 2017-10-11 ENCOUNTER — TELEPHONE (OUTPATIENT)
Dept: FAMILY MEDICINE CLINIC | Age: 77
End: 2017-10-11

## 2017-10-11 ENCOUNTER — PATIENT MESSAGE (OUTPATIENT)
Dept: FAMILY MEDICINE CLINIC | Age: 77
End: 2017-10-11

## 2017-10-11 RX ORDER — PREDNISONE 20 MG/1
40 TABLET ORAL DAILY
Qty: 10 TABLET | Refills: 0 | Status: SHIPPED | OUTPATIENT
Start: 2017-10-11 | End: 2017-11-10 | Stop reason: SDUPTHER

## 2017-11-10 ENCOUNTER — TELEPHONE (OUTPATIENT)
Dept: FAMILY MEDICINE CLINIC | Age: 77
End: 2017-11-10

## 2017-11-10 RX ORDER — PREDNISONE 20 MG/1
40 TABLET ORAL DAILY
Qty: 10 TABLET | Refills: 0 | Status: SHIPPED | OUTPATIENT
Start: 2017-11-10 | End: 2017-11-15

## 2018-03-05 RX ORDER — LEVOTHYROXINE SODIUM 0.07 MG/1
TABLET ORAL
Qty: 90 TABLET | Refills: 1 | Status: SHIPPED | OUTPATIENT
Start: 2018-03-05 | End: 2019-02-05 | Stop reason: SDUPTHER

## 2018-03-05 RX ORDER — FLUOXETINE HYDROCHLORIDE 40 MG/1
40 CAPSULE ORAL DAILY
Qty: 90 CAPSULE | Refills: 1 | Status: SHIPPED | OUTPATIENT
Start: 2018-03-05 | End: 2018-10-11 | Stop reason: SDUPTHER

## 2018-03-05 RX ORDER — METOPROLOL SUCCINATE 25 MG/1
25 TABLET, EXTENDED RELEASE ORAL DAILY
Qty: 90 TABLET | Refills: 1 | Status: SHIPPED | OUTPATIENT
Start: 2018-03-05 | End: 2018-11-26 | Stop reason: SDUPTHER

## 2018-03-05 RX ORDER — OMEPRAZOLE 20 MG/1
20 CAPSULE, DELAYED RELEASE ORAL DAILY PRN
Qty: 90 CAPSULE | Refills: 1 | Status: SHIPPED | OUTPATIENT
Start: 2018-03-05 | End: 2018-09-14 | Stop reason: SDUPTHER

## 2018-03-05 NOTE — TELEPHONE ENCOUNTER
From: Alessio Koehler  Sent: 3/4/2018 10:28 AM EST  Subject: Medication Renewal Request    Megan Smith would like a refill of the following medications:     omeprazole (PRILOSEC) 20 MG delayed release capsule Sherif Elizondo DO]     levothyroxine (SYNTHROID) 75 MCG tablet Sherif Elizondo DO]     metoprolol succinate (TOPROL XL) 25 MG extended release tablet Sherif Elizondo DO]    Preferred pharmacy: [facility] Jarad Veloz, [address] [place] [address] [phone] - [phone]    Comment:

## 2018-03-19 ENCOUNTER — HOSPITAL ENCOUNTER (OUTPATIENT)
Age: 78
Discharge: HOME | End: 2018-03-21
Payer: MEDICARE

## 2018-03-19 ENCOUNTER — HOSPITAL ENCOUNTER (OUTPATIENT)
Dept: GENERAL RADIOLOGY | Age: 78
Discharge: HOME | End: 2018-03-21
Payer: MEDICARE

## 2018-03-19 ENCOUNTER — HOSPITAL ENCOUNTER (OUTPATIENT)
Age: 78
Discharge: HOME | End: 2018-03-19
Payer: MEDICARE

## 2018-03-19 DIAGNOSIS — E03.9 ACQUIRED HYPOTHYROIDISM: ICD-10-CM

## 2018-03-19 DIAGNOSIS — I10 ESSENTIAL HYPERTENSION: ICD-10-CM

## 2018-03-19 DIAGNOSIS — E78.5 HYPERLIPIDEMIA, UNSPECIFIED: ICD-10-CM

## 2018-03-19 DIAGNOSIS — Z98.61 POST PTCA: ICD-10-CM

## 2018-03-19 DIAGNOSIS — I25.10 CORONARY ARTERY DISEASE INVOLVING NATIVE CORONARY ARTERY OF NATIVE HEART WITHOUT ANGINA PECTORIS: ICD-10-CM

## 2018-03-19 DIAGNOSIS — E55.9 VITAMIN D DEFICIENCY DISEASE: ICD-10-CM

## 2018-03-19 LAB
ABSOLUTE EOS #: 0.4 K/UL (ref 0–0.4)
ABSOLUTE IMMATURE GRANULOCYTE: ABNORMAL K/UL (ref 0–0.3)
ABSOLUTE LYMPH #: 1.5 K/UL (ref 1–4.8)
ABSOLUTE MONO #: 0.4 K/UL (ref 0–1)
ALBUMIN SERPL-MCNC: 4.1 G/DL (ref 3.5–5.2)
ALBUMIN/GLOBULIN RATIO: ABNORMAL (ref 1–2.5)
ALP BLD-CCNC: 81 U/L (ref 35–104)
ALT SERPL-CCNC: 11 U/L (ref 5–33)
ANION GAP SERPL CALCULATED.3IONS-SCNC: 15 MMOL/L (ref 9–17)
AST SERPL-CCNC: 18 U/L
BASOPHILS # BLD: 1 % (ref 0–2)
BASOPHILS ABSOLUTE: 0 K/UL (ref 0–0.2)
BILIRUB SERPL-MCNC: 0.25 MG/DL (ref 0.3–1.2)
BUN BLDV-MCNC: 29 MG/DL (ref 8–23)
BUN/CREAT BLD: 24 (ref 9–20)
CALCIUM SERPL-MCNC: 9.4 MG/DL (ref 8.6–10.4)
CHLORIDE BLD-SCNC: 104 MMOL/L (ref 98–107)
CHOLESTEROL/HDL RATIO: 2.7
CHOLESTEROL: 228 MG/DL
CO2: 21 MMOL/L (ref 20–31)
CREAT SERPL-MCNC: 1.19 MG/DL (ref 0.5–0.9)
DIFFERENTIAL TYPE: YES
EKG ATRIAL RATE: 88 BPM
EKG P AXIS: 77 DEGREES
EKG P-R INTERVAL: 144 MS
EKG Q-T INTERVAL: 392 MS
EKG QRS DURATION: 78 MS
EKG QTC CALCULATION (BAZETT): 474 MS
EKG R AXIS: 82 DEGREES
EKG T AXIS: 11 DEGREES
EKG VENTRICULAR RATE: 88 BPM
EOSINOPHILS RELATIVE PERCENT: 5 % (ref 0–5)
GFR AFRICAN AMERICAN: 53 ML/MIN
GFR NON-AFRICAN AMERICAN: 44 ML/MIN
GFR SERPL CREATININE-BSD FRML MDRD: ABNORMAL ML/MIN/{1.73_M2}
GFR SERPL CREATININE-BSD FRML MDRD: ABNORMAL ML/MIN/{1.73_M2}
GLUCOSE BLD-MCNC: 97 MG/DL (ref 70–99)
HCT VFR BLD CALC: 33.1 % (ref 36–46)
HDLC SERPL-MCNC: 85 MG/DL
HEMOGLOBIN: 10.2 G/DL (ref 12–16)
IMMATURE GRANULOCYTES: ABNORMAL %
LDL CHOLESTEROL: 124 MG/DL (ref 0–130)
LYMPHOCYTES # BLD: 22 % (ref 15–40)
MAGNESIUM: 2.2 MG/DL (ref 1.6–2.6)
MCH RBC QN AUTO: 24.6 PG (ref 26–34)
MCHC RBC AUTO-ENTMCNC: 31 G/DL (ref 31–37)
MCV RBC AUTO: 79.3 FL (ref 80–100)
MONOCYTES # BLD: 6 % (ref 4–8)
NRBC AUTOMATED: ABNORMAL PER 100 WBC
PATIENT FASTING?: YES
PDW BLD-RTO: 17.8 % (ref 12.1–15.2)
PLATELET # BLD: 385 K/UL (ref 140–450)
PLATELET ESTIMATE: ABNORMAL
PMV BLD AUTO: ABNORMAL FL (ref 6–12)
POTASSIUM SERPL-SCNC: 3.5 MMOL/L (ref 3.7–5.3)
RBC # BLD: 4.17 M/UL (ref 4–5.2)
RBC # BLD: ABNORMAL 10*6/UL
SEG NEUTROPHILS: 66 % (ref 47–75)
SEGMENTED NEUTROPHILS ABSOLUTE COUNT: 4.5 K/UL (ref 2.5–7)
SODIUM BLD-SCNC: 140 MMOL/L (ref 135–144)
TOTAL PROTEIN: 6.6 G/DL (ref 6.4–8.3)
TRIGL SERPL-MCNC: 94 MG/DL
TSH SERPL DL<=0.05 MIU/L-ACNC: 0.39 MIU/L (ref 0.3–5)
VITAMIN D 25-HYDROXY: 45.1 NG/ML (ref 30–100)
VLDLC SERPL CALC-MCNC: ABNORMAL MG/DL (ref 1–30)
WBC # BLD: 6.7 K/UL (ref 3.5–11)
WBC # BLD: ABNORMAL 10*3/UL

## 2018-03-19 PROCEDURE — 84443 ASSAY THYROID STIM HORMONE: CPT

## 2018-03-19 PROCEDURE — 71046 X-RAY EXAM CHEST 2 VIEWS: CPT

## 2018-03-19 PROCEDURE — 36415 COLL VENOUS BLD VENIPUNCTURE: CPT

## 2018-03-19 PROCEDURE — 80061 LIPID PANEL: CPT

## 2018-03-19 PROCEDURE — 85025 COMPLETE CBC W/AUTO DIFF WBC: CPT

## 2018-03-19 PROCEDURE — 93005 ELECTROCARDIOGRAM TRACING: CPT

## 2018-03-19 PROCEDURE — 80053 COMPREHEN METABOLIC PANEL: CPT

## 2018-03-19 PROCEDURE — 83735 ASSAY OF MAGNESIUM: CPT

## 2018-03-19 PROCEDURE — 82306 VITAMIN D 25 HYDROXY: CPT

## 2018-03-27 ENCOUNTER — OFFICE VISIT (OUTPATIENT)
Dept: CARDIOLOGY CLINIC | Age: 78
End: 2018-03-27
Payer: MEDICARE

## 2018-03-27 VITALS
DIASTOLIC BLOOD PRESSURE: 70 MMHG | SYSTOLIC BLOOD PRESSURE: 130 MMHG | BODY MASS INDEX: 24.07 KG/M2 | OXYGEN SATURATION: 100 % | WEIGHT: 156 LBS | HEART RATE: 107 BPM

## 2018-03-27 DIAGNOSIS — E03.9 ACQUIRED HYPOTHYROIDISM: ICD-10-CM

## 2018-03-27 DIAGNOSIS — E78.49 OTHER HYPERLIPIDEMIA: ICD-10-CM

## 2018-03-27 DIAGNOSIS — E55.9 VITAMIN D DEFICIENCY DISEASE: ICD-10-CM

## 2018-03-27 DIAGNOSIS — I10 ESSENTIAL HYPERTENSION: ICD-10-CM

## 2018-03-27 DIAGNOSIS — I25.10 CORONARY ARTERY DISEASE INVOLVING NATIVE CORONARY ARTERY OF NATIVE HEART WITHOUT ANGINA PECTORIS: Primary | ICD-10-CM

## 2018-03-27 PROCEDURE — 1036F TOBACCO NON-USER: CPT | Performed by: INTERNAL MEDICINE

## 2018-03-27 PROCEDURE — 1123F ACP DISCUSS/DSCN MKR DOCD: CPT | Performed by: INTERNAL MEDICINE

## 2018-03-27 PROCEDURE — 99214 OFFICE O/P EST MOD 30 MIN: CPT | Performed by: INTERNAL MEDICINE

## 2018-03-27 PROCEDURE — G8598 ASA/ANTIPLAT THER USED: HCPCS | Performed by: INTERNAL MEDICINE

## 2018-03-27 PROCEDURE — G8482 FLU IMMUNIZE ORDER/ADMIN: HCPCS | Performed by: INTERNAL MEDICINE

## 2018-03-27 PROCEDURE — G8420 CALC BMI NORM PARAMETERS: HCPCS | Performed by: INTERNAL MEDICINE

## 2018-03-27 PROCEDURE — G8427 DOCREV CUR MEDS BY ELIG CLIN: HCPCS | Performed by: INTERNAL MEDICINE

## 2018-03-27 PROCEDURE — G8399 PT W/DXA RESULTS DOCUMENT: HCPCS | Performed by: INTERNAL MEDICINE

## 2018-03-27 PROCEDURE — 4040F PNEUMOC VAC/ADMIN/RCVD: CPT | Performed by: INTERNAL MEDICINE

## 2018-03-27 PROCEDURE — 1090F PRES/ABSN URINE INCON ASSESS: CPT | Performed by: INTERNAL MEDICINE

## 2018-03-27 RX ORDER — POTASSIUM CHLORIDE 20 MEQ/1
20 TABLET, EXTENDED RELEASE ORAL DAILY
Qty: 90 TABLET | Refills: 3 | Status: SHIPPED | OUTPATIENT
Start: 2018-03-27 | End: 2019-03-25 | Stop reason: SDUPTHER

## 2018-03-27 RX ORDER — FUROSEMIDE 20 MG/1
20 TABLET ORAL DAILY
Qty: 90 TABLET | Refills: 3 | Status: SHIPPED | OUTPATIENT
Start: 2018-03-27 | End: 2018-07-05 | Stop reason: SDUPTHER

## 2018-03-27 RX ORDER — CLOPIDOGREL BISULFATE 75 MG/1
TABLET ORAL
Qty: 90 TABLET | Refills: 3 | Status: SHIPPED | OUTPATIENT
Start: 2018-03-27 | End: 2018-10-11 | Stop reason: ALTCHOICE

## 2018-04-09 RX ORDER — TRIAMCINOLONE ACETONIDE 1 MG/G
CREAM TOPICAL
Qty: 30 G | Refills: 0 | Status: SHIPPED | OUTPATIENT
Start: 2018-04-09 | End: 2018-07-05 | Stop reason: SDUPTHER

## 2018-05-15 ENCOUNTER — HOSPITAL ENCOUNTER (OUTPATIENT)
Dept: GENERAL RADIOLOGY | Age: 78
Discharge: HOME | End: 2018-05-17
Payer: MEDICARE

## 2018-05-15 ENCOUNTER — HOSPITAL ENCOUNTER (OUTPATIENT)
Age: 78
Discharge: HOME | End: 2018-05-15
Payer: MEDICARE

## 2018-05-15 ENCOUNTER — HOSPITAL ENCOUNTER (OUTPATIENT)
Age: 78
Discharge: HOME | End: 2018-05-17
Payer: MEDICARE

## 2018-05-15 ENCOUNTER — OFFICE VISIT (OUTPATIENT)
Dept: FAMILY MEDICINE CLINIC | Age: 78
End: 2018-05-15
Payer: MEDICARE

## 2018-05-15 VITALS
BODY MASS INDEX: 24.25 KG/M2 | DIASTOLIC BLOOD PRESSURE: 70 MMHG | SYSTOLIC BLOOD PRESSURE: 128 MMHG | HEART RATE: 75 BPM | HEIGHT: 68 IN | OXYGEN SATURATION: 98 % | WEIGHT: 160 LBS

## 2018-05-15 DIAGNOSIS — D50.9 MICROCYTIC ANEMIA: ICD-10-CM

## 2018-05-15 DIAGNOSIS — M19.049 HAND ARTHRITIS: ICD-10-CM

## 2018-05-15 DIAGNOSIS — N18.30 CKD (CHRONIC KIDNEY DISEASE), STAGE III (HCC): ICD-10-CM

## 2018-05-15 DIAGNOSIS — M25.50 POLYARTHRALGIA: Primary | ICD-10-CM

## 2018-05-15 DIAGNOSIS — M25.50 POLYARTHRALGIA: ICD-10-CM

## 2018-05-15 DIAGNOSIS — M16.12 PRIMARY OSTEOARTHRITIS OF LEFT HIP: ICD-10-CM

## 2018-05-15 LAB
ABSOLUTE EOS #: 0.7 K/UL (ref 0–0.4)
ABSOLUTE IMMATURE GRANULOCYTE: ABNORMAL K/UL (ref 0–0.3)
ABSOLUTE LYMPH #: 1.8 K/UL (ref 1–4.8)
ABSOLUTE MONO #: 0.6 K/UL (ref 0–1)
ANION GAP SERPL CALCULATED.3IONS-SCNC: 14 MMOL/L (ref 9–17)
BASOPHILS # BLD: 1 % (ref 0–2)
BASOPHILS ABSOLUTE: 0 K/UL (ref 0–0.2)
BUN BLDV-MCNC: 22 MG/DL (ref 8–23)
BUN/CREAT BLD: 20 (ref 9–20)
CALCIUM SERPL-MCNC: 9.1 MG/DL (ref 8.6–10.4)
CHLORIDE BLD-SCNC: 105 MMOL/L (ref 98–107)
CO2: 20 MMOL/L (ref 20–31)
CREAT SERPL-MCNC: 1.11 MG/DL (ref 0.5–0.9)
DIFFERENTIAL TYPE: YES
EOSINOPHILS RELATIVE PERCENT: 8 % (ref 0–5)
GFR AFRICAN AMERICAN: 57 ML/MIN
GFR NON-AFRICAN AMERICAN: 47 ML/MIN
GFR SERPL CREATININE-BSD FRML MDRD: ABNORMAL ML/MIN/{1.73_M2}
GFR SERPL CREATININE-BSD FRML MDRD: ABNORMAL ML/MIN/{1.73_M2}
GLUCOSE BLD-MCNC: 100 MG/DL (ref 70–99)
HCT VFR BLD CALC: 31.9 % (ref 36–46)
HEMOGLOBIN: 9.9 G/DL (ref 12–16)
IMMATURE GRANULOCYTES: ABNORMAL %
IRON: 47 UG/DL (ref 37–145)
LYMPHOCYTES # BLD: 21 % (ref 15–40)
MCH RBC QN AUTO: 24.7 PG (ref 26–34)
MCHC RBC AUTO-ENTMCNC: 31.2 G/DL (ref 31–37)
MCV RBC AUTO: 79.1 FL (ref 80–100)
MONOCYTES # BLD: 7 % (ref 4–8)
NRBC AUTOMATED: ABNORMAL PER 100 WBC
PATIENT FASTING?: YES
PDW BLD-RTO: 18.7 % (ref 12.1–15.2)
PLATELET # BLD: 419 K/UL (ref 140–450)
PLATELET ESTIMATE: ABNORMAL
PMV BLD AUTO: ABNORMAL FL (ref 6–12)
POTASSIUM SERPL-SCNC: 4.4 MMOL/L (ref 3.7–5.3)
RBC # BLD: 4.03 M/UL (ref 4–5.2)
RBC # BLD: ABNORMAL 10*6/UL
SEDIMENTATION RATE, ERYTHROCYTE: 10 MM (ref 0–30)
SEG NEUTROPHILS: 63 % (ref 47–75)
SEGMENTED NEUTROPHILS ABSOLUTE COUNT: 5.7 K/UL (ref 2.5–7)
SODIUM BLD-SCNC: 139 MMOL/L (ref 135–144)
WBC # BLD: 8.9 K/UL (ref 3.5–11)
WBC # BLD: ABNORMAL 10*3/UL

## 2018-05-15 PROCEDURE — 99214 OFFICE O/P EST MOD 30 MIN: CPT | Performed by: FAMILY MEDICINE

## 2018-05-15 PROCEDURE — 85651 RBC SED RATE NONAUTOMATED: CPT

## 2018-05-15 PROCEDURE — 1123F ACP DISCUSS/DSCN MKR DOCD: CPT | Performed by: FAMILY MEDICINE

## 2018-05-15 PROCEDURE — 73130 X-RAY EXAM OF HAND: CPT

## 2018-05-15 PROCEDURE — G8420 CALC BMI NORM PARAMETERS: HCPCS | Performed by: FAMILY MEDICINE

## 2018-05-15 PROCEDURE — 36415 COLL VENOUS BLD VENIPUNCTURE: CPT

## 2018-05-15 PROCEDURE — 83540 ASSAY OF IRON: CPT

## 2018-05-15 PROCEDURE — 1036F TOBACCO NON-USER: CPT | Performed by: FAMILY MEDICINE

## 2018-05-15 PROCEDURE — 85025 COMPLETE CBC W/AUTO DIFF WBC: CPT

## 2018-05-15 PROCEDURE — 1090F PRES/ABSN URINE INCON ASSESS: CPT | Performed by: FAMILY MEDICINE

## 2018-05-15 PROCEDURE — G8427 DOCREV CUR MEDS BY ELIG CLIN: HCPCS | Performed by: FAMILY MEDICINE

## 2018-05-15 PROCEDURE — 80048 BASIC METABOLIC PNL TOTAL CA: CPT

## 2018-05-15 PROCEDURE — 4040F PNEUMOC VAC/ADMIN/RCVD: CPT | Performed by: FAMILY MEDICINE

## 2018-05-15 PROCEDURE — G8598 ASA/ANTIPLAT THER USED: HCPCS | Performed by: FAMILY MEDICINE

## 2018-05-15 PROCEDURE — G8399 PT W/DXA RESULTS DOCUMENT: HCPCS | Performed by: FAMILY MEDICINE

## 2018-05-15 RX ORDER — PREDNISONE 20 MG/1
20 TABLET ORAL DAILY PRN
Qty: 30 TABLET | Refills: 0 | Status: SHIPPED | OUTPATIENT
Start: 2018-05-15 | End: 2018-12-11 | Stop reason: SDUPTHER

## 2018-05-15 ASSESSMENT — PATIENT HEALTH QUESTIONNAIRE - PHQ9
SUM OF ALL RESPONSES TO PHQ QUESTIONS 1-9: 0
2. FEELING DOWN, DEPRESSED OR HOPELESS: 0
1. LITTLE INTEREST OR PLEASURE IN DOING THINGS: 0
SUM OF ALL RESPONSES TO PHQ9 QUESTIONS 1 & 2: 0

## 2018-05-16 ENCOUNTER — TELEPHONE (OUTPATIENT)
Dept: FAMILY MEDICINE CLINIC | Age: 78
End: 2018-05-16

## 2018-05-16 DIAGNOSIS — D50.9 MICROCYTIC ANEMIA: ICD-10-CM

## 2018-05-16 DIAGNOSIS — M19.049 HAND ARTHRITIS: ICD-10-CM

## 2018-05-16 DIAGNOSIS — M16.12 PRIMARY LOCALIZED OSTEOARTHRITIS OF LEFT HIP: Primary | ICD-10-CM

## 2018-05-19 ASSESSMENT — ENCOUNTER SYMPTOMS: GASTROINTESTINAL NEGATIVE: 1

## 2018-06-14 ENCOUNTER — HOSPITAL ENCOUNTER (OUTPATIENT)
Dept: GENERAL RADIOLOGY | Age: 78
Discharge: HOME | End: 2018-06-16
Payer: MEDICARE

## 2018-06-14 ENCOUNTER — HOSPITAL ENCOUNTER (OUTPATIENT)
Age: 78
Discharge: HOME | End: 2018-06-16
Payer: MEDICARE

## 2018-06-14 DIAGNOSIS — M16.12 OSTEOARTHRITIS OF LEFT HIP, UNSPECIFIED OSTEOARTHRITIS TYPE: ICD-10-CM

## 2018-06-14 PROCEDURE — 73502 X-RAY EXAM HIP UNI 2-3 VIEWS: CPT

## 2018-06-14 PROCEDURE — 72110 X-RAY EXAM L-2 SPINE 4/>VWS: CPT

## 2018-07-05 ENCOUNTER — HOSPITAL ENCOUNTER (OUTPATIENT)
Dept: MRI IMAGING | Age: 78
Discharge: HOME | End: 2018-07-07
Payer: MEDICARE

## 2018-07-05 ENCOUNTER — HOSPITAL ENCOUNTER (OUTPATIENT)
Dept: BONE DENSITY | Age: 78
Discharge: HOME | End: 2018-07-07
Payer: MEDICARE

## 2018-07-05 DIAGNOSIS — M54.9 CHRONIC BACK PAIN: ICD-10-CM

## 2018-07-05 DIAGNOSIS — M51.36 DEGENERATION OF INTERVERTEBRAL DISC BETWEEN FOURTH AND FIFTH LUMBAR VERTEBRAE: ICD-10-CM

## 2018-07-05 DIAGNOSIS — M81.0 SENILE OSTEOPOROSIS: ICD-10-CM

## 2018-07-05 DIAGNOSIS — Z78.0 ASYMPTOMATIC AGE-RELATED POSTMENOPAUSAL STATE: ICD-10-CM

## 2018-07-05 DIAGNOSIS — M47.816 SPONDYLOSIS OF LUMBAR REGION WITHOUT MYELOPATHY OR RADICULOPATHY: ICD-10-CM

## 2018-07-05 DIAGNOSIS — G89.29 CHRONIC BACK PAIN: ICD-10-CM

## 2018-07-05 PROCEDURE — 77080 DXA BONE DENSITY AXIAL: CPT

## 2018-07-05 PROCEDURE — 72148 MRI LUMBAR SPINE W/O DYE: CPT

## 2018-07-05 RX ORDER — TRIAMCINOLONE ACETONIDE 1 MG/G
CREAM TOPICAL
Qty: 30 G | Refills: 2 | Status: SHIPPED | OUTPATIENT
Start: 2018-07-05 | End: 2018-10-08 | Stop reason: SDUPTHER

## 2018-07-05 RX ORDER — FUROSEMIDE 20 MG/1
20 TABLET ORAL DAILY
Qty: 90 TABLET | Refills: 3 | Status: SHIPPED | OUTPATIENT
Start: 2018-07-05 | End: 2019-03-25 | Stop reason: SDUPTHER

## 2018-07-05 NOTE — TELEPHONE ENCOUNTER
From: Jesu Guy  Sent: 7/4/2018 12:24 PM EDT  Subject: Medication Renewal Request    Megan Smith would like a refill of the following medications:     Handicap Placard MISC Jakub JIMÉNEZ, JUAN - CNP]    Preferred pharmacy: [facility], [address] [facility] - F [phone]        Medication renewals requested in this message routed separately:     triamcinolone (KENALOG) 0.1 % cream [Jefferson Saul, DO]   Patient Comment: [facility]       furosemide (LASIX) 20 MG tablet Sabina Escobar MD]   Patient Comment: I do better taking 2 a day [facility]

## 2018-08-22 ENCOUNTER — HOSPITAL ENCOUNTER (OUTPATIENT)
Age: 78
Discharge: HOME | End: 2018-08-22
Payer: MEDICARE

## 2018-08-22 DIAGNOSIS — D50.9 MICROCYTIC ANEMIA: ICD-10-CM

## 2018-08-22 LAB
ABSOLUTE EOS #: 1 K/UL (ref 0–0.4)
ABSOLUTE IMMATURE GRANULOCYTE: ABNORMAL K/UL (ref 0–0.3)
ABSOLUTE LYMPH #: 1.8 K/UL (ref 1–4.8)
ABSOLUTE MONO #: 0.6 K/UL (ref 0–1)
BASOPHILS # BLD: 1 % (ref 0–2)
BASOPHILS ABSOLUTE: 0.1 K/UL (ref 0–0.2)
DIFFERENTIAL TYPE: ABNORMAL
EOSINOPHILS RELATIVE PERCENT: 10 % (ref 0–5)
HCT VFR BLD CALC: 34.8 % (ref 36–46)
HEMOGLOBIN: 10.8 G/DL (ref 12–16)
IMMATURE GRANULOCYTES: ABNORMAL %
LYMPHOCYTES # BLD: 17 % (ref 15–40)
MCH RBC QN AUTO: 24.3 PG (ref 26–34)
MCHC RBC AUTO-ENTMCNC: 31 G/DL (ref 31–37)
MCV RBC AUTO: 78.5 FL (ref 80–100)
MONOCYTES # BLD: 6 % (ref 4–8)
MORPHOLOGY: ABNORMAL
NRBC AUTOMATED: ABNORMAL PER 100 WBC
PDW BLD-RTO: 19.1 % (ref 12.1–15.2)
PLATELET # BLD: 415 K/UL (ref 140–450)
PLATELET ESTIMATE: ABNORMAL
PMV BLD AUTO: ABNORMAL FL (ref 6–12)
RBC # BLD: 4.43 M/UL (ref 4–5.2)
RBC # BLD: ABNORMAL 10*6/UL
SEG NEUTROPHILS: 66 % (ref 47–75)
SEGMENTED NEUTROPHILS ABSOLUTE COUNT: 6.8 K/UL (ref 2.5–7)
WBC # BLD: 10.3 K/UL (ref 3.5–11)
WBC # BLD: ABNORMAL 10*3/UL

## 2018-08-22 PROCEDURE — 36415 COLL VENOUS BLD VENIPUNCTURE: CPT

## 2018-08-22 PROCEDURE — 85025 COMPLETE CBC W/AUTO DIFF WBC: CPT

## 2018-08-23 ENCOUNTER — TELEPHONE (OUTPATIENT)
Dept: FAMILY MEDICINE CLINIC | Age: 78
End: 2018-08-23

## 2018-08-23 DIAGNOSIS — D64.9 ANEMIA, UNSPECIFIED TYPE: Primary | ICD-10-CM

## 2018-08-28 ENCOUNTER — APPOINTMENT (OUTPATIENT)
Dept: GENERAL RADIOLOGY | Age: 78
End: 2018-08-28
Attending: ANESTHESIOLOGY
Payer: MEDICARE

## 2018-08-28 ENCOUNTER — HOSPITAL ENCOUNTER (OUTPATIENT)
Age: 78
Setting detail: OUTPATIENT SURGERY
Discharge: HOME | End: 2018-08-28
Attending: ANESTHESIOLOGY | Admitting: ANESTHESIOLOGY
Payer: MEDICARE

## 2018-08-28 VITALS
RESPIRATION RATE: 18 BRPM | HEIGHT: 68 IN | OXYGEN SATURATION: 99 % | SYSTOLIC BLOOD PRESSURE: 133 MMHG | TEMPERATURE: 97.7 F | DIASTOLIC BLOOD PRESSURE: 71 MMHG | HEART RATE: 73 BPM | BODY MASS INDEX: 22.73 KG/M2 | WEIGHT: 150 LBS

## 2018-08-28 PROBLEM — M96.1 POSTLAMINECTOMY SYNDROME OF LUMBAR REGION: Chronic | Status: ACTIVE | Noted: 2018-08-28

## 2018-08-28 PROCEDURE — 2500000003 HC RX 250 WO HCPCS: Performed by: ANESTHESIOLOGY

## 2018-08-28 PROCEDURE — 3600000002 HC SURGERY LEVEL 2 BASE: Performed by: ANESTHESIOLOGY

## 2018-08-28 PROCEDURE — 6360000002 HC RX W HCPCS: Performed by: ANESTHESIOLOGY

## 2018-08-28 PROCEDURE — 6360000004 HC RX CONTRAST MEDICATION: Performed by: ANESTHESIOLOGY

## 2018-08-28 PROCEDURE — 2580000003 HC RX 258: Performed by: ANESTHESIOLOGY

## 2018-08-28 PROCEDURE — 3209999900 FLUORO FOR SURGICAL PROCEDURES

## 2018-08-28 RX ORDER — BUPIVACAINE HYDROCHLORIDE 2.5 MG/ML
INJECTION, SOLUTION EPIDURAL; INFILTRATION; INTRACAUDAL PRN
Status: DISCONTINUED | OUTPATIENT
Start: 2018-08-28 | End: 2018-08-28 | Stop reason: HOSPADM

## 2018-08-28 RX ORDER — SODIUM CHLORIDE 0.9 % (FLUSH) 0.9 %
10 SYRINGE (ML) INJECTION PRN
Status: DISCONTINUED | OUTPATIENT
Start: 2018-08-28 | End: 2018-08-28 | Stop reason: HOSPADM

## 2018-08-28 RX ORDER — SODIUM CHLORIDE, SODIUM LACTATE, POTASSIUM CHLORIDE, CALCIUM CHLORIDE 600; 310; 30; 20 MG/100ML; MG/100ML; MG/100ML; MG/100ML
INJECTION, SOLUTION INTRAVENOUS CONTINUOUS
Status: DISCONTINUED | OUTPATIENT
Start: 2018-08-28 | End: 2018-08-28 | Stop reason: HOSPADM

## 2018-08-28 RX ORDER — SODIUM CHLORIDE 0.9 % (FLUSH) 0.9 %
10 SYRINGE (ML) INJECTION EVERY 12 HOURS SCHEDULED
Status: DISCONTINUED | OUTPATIENT
Start: 2018-08-28 | End: 2018-08-28 | Stop reason: HOSPADM

## 2018-08-28 RX ORDER — METHYLPREDNISOLONE ACETATE 40 MG/ML
INJECTION, SUSPENSION INTRA-ARTICULAR; INTRALESIONAL; INTRAMUSCULAR; SOFT TISSUE PRN
Status: DISCONTINUED | OUTPATIENT
Start: 2018-08-28 | End: 2018-08-28 | Stop reason: HOSPADM

## 2018-08-28 RX ORDER — LIDOCAINE HYDROCHLORIDE 10 MG/ML
INJECTION, SOLUTION EPIDURAL; INFILTRATION; INTRACAUDAL; PERINEURAL PRN
Status: DISCONTINUED | OUTPATIENT
Start: 2018-08-28 | End: 2018-08-28 | Stop reason: HOSPADM

## 2018-08-28 RX ORDER — DEXAMETHASONE SODIUM PHOSPHATE 4 MG/ML
INJECTION, SOLUTION INTRA-ARTICULAR; INTRALESIONAL; INTRAMUSCULAR; INTRAVENOUS; SOFT TISSUE PRN
Status: DISCONTINUED | OUTPATIENT
Start: 2018-08-28 | End: 2018-08-28 | Stop reason: HOSPADM

## 2018-08-28 ASSESSMENT — PAIN - FUNCTIONAL ASSESSMENT: PAIN_FUNCTIONAL_ASSESSMENT: 0-10

## 2018-08-28 NOTE — OP NOTE
Procedure Note    Patient Name: Dat Sequeira   YOB: 1940  Room/Bed: 80 [facility]  Medical Record Number: [id number]  Date: 8/28/2018       Mallampati Airway Assessment:  Mallampati Class II - (soft palate, fauces & uvula are visible)    ASA Classification:  Class 2 - A normal healthy patient with mild systemic disease      Preoperative Diagnosis:  Postlaminectomy syndrome lumbar spine. Postoperative Diagnosis: Same as pre-op diagnosis. Procedure Performed: Caudal epidural steroid injection under fluoroscopy guidance  without IV sedation    Indication for the Procedure:  Patient failed conservative management for pain in low back radiating to lower extremities. As the patient is not responding to conservative management and interfering with activities of daily living we decided to proceed with caudal epidural steroid injection. The procedure and risks were discussed with the patient and an informed consent was obtained    Procedure: The patient's vital signs including BP, EKG and SaO2 were monitored by the RN and they remained stable during the procedure. A meaningful communication was kept up with the patient throughout  the procedure. The patient is placed in prone position. The skin over the back and buttock was prepped and draped in sterile manner. Then using fluoroscopy in a lateral position, the sacral hiatus was observed, and the skin and deep tissues in the midline area were  infiltrated with 5 ml of 2% lidocaine. The #23-gauge 3-1/2 inch spinal needle with slightly curved tip was inserted through the skin wheal and passed through the sacral hiatus into the sacral canal.  This was confirmed with AP and lateral views using fluoroscopy after injecting about 1 ml of Omnipaque and observing the spread of the contrast in the epidural space.  Then after negative aspiration a total of 40 mg of Depo-Medrol, 4 mg of Dexamethasone, 2 mL of 0.25% Bupivacaine preservative free with 6 ml of normal saline was injected into the epidural space. The needle is removed and a Band-Aid was placed over the needle insertion site. Patient's vital signs remained stable and the patient tolerated the procedure well. Patient was discharged home in stable condition and will be followed in the pain clinic in the next few weeks for further planning.     EBL: None

## 2018-08-28 NOTE — PROGRESS NOTES
Discharge instructions given to patient and patients . Both verbalize understanding and denies any questions at this time. Pt had no sedation.

## 2018-08-29 ENCOUNTER — NURSE ONLY (OUTPATIENT)
Dept: FAMILY MEDICINE CLINIC | Age: 78
End: 2018-08-29
Payer: MEDICARE

## 2018-08-29 ENCOUNTER — TELEPHONE (OUTPATIENT)
Dept: FAMILY MEDICINE CLINIC | Age: 78
End: 2018-08-29

## 2018-08-29 DIAGNOSIS — R35.0 FREQUENCY OF URINATION: Primary | ICD-10-CM

## 2018-08-29 LAB
BILIRUBIN, POC: NORMAL
BLOOD URINE, POC: NORMAL
CLARITY, POC: CLEAR
COLOR, POC: YELLOW
GLUCOSE URINE, POC: NORMAL
KETONES, POC: NORMAL
LEUKOCYTE EST, POC: NORMAL
NITRITE, POC: NORMAL
PH, POC: 5.5
PROTEIN, POC: NORMAL
SPECIFIC GRAVITY, POC: 1.01
UROBILINOGEN, POC: 0.2

## 2018-08-29 PROCEDURE — 81002 URINALYSIS NONAUTO W/O SCOPE: CPT | Performed by: FAMILY MEDICINE

## 2018-08-29 NOTE — PROGRESS NOTES
Pt called in and asked to bring urine   Urine dipped in office and result sent to provider  Patient c/o urinary frequency but then when she does go she only gets a few drops   Pt states that she had to bring in a urine sample because if she would have done it in the office she would have not gotten enough for a test

## 2018-08-29 NOTE — TELEPHONE ENCOUNTER
Ok to drop off and please get history re specific symptoms
disease), stage III     Postlaminectomy syndrome of lumbar region

## 2018-09-14 RX ORDER — OMEPRAZOLE 20 MG/1
CAPSULE, DELAYED RELEASE ORAL
Qty: 90 CAPSULE | Refills: 1 | Status: SHIPPED | OUTPATIENT
Start: 2018-09-14 | End: 2019-02-05 | Stop reason: SDUPTHER

## 2018-10-08 RX ORDER — TRIAMCINOLONE ACETONIDE 1 MG/G
CREAM TOPICAL
Qty: 30 G | Refills: 2 | Status: SHIPPED | OUTPATIENT
Start: 2018-10-08 | End: 2019-03-25 | Stop reason: ALTCHOICE

## 2018-10-11 ENCOUNTER — HOSPITAL ENCOUNTER (OUTPATIENT)
Age: 78
Discharge: HOME | End: 2018-10-11
Payer: MEDICARE

## 2018-10-11 ENCOUNTER — OFFICE VISIT (OUTPATIENT)
Dept: FAMILY MEDICINE CLINIC | Age: 78
End: 2018-10-11
Payer: MEDICARE

## 2018-10-11 ENCOUNTER — TELEPHONE (OUTPATIENT)
Dept: FAMILY MEDICINE CLINIC | Age: 78
End: 2018-10-11

## 2018-10-11 VITALS
TEMPERATURE: 98.1 F | OXYGEN SATURATION: 96 % | BODY MASS INDEX: 22.43 KG/M2 | SYSTOLIC BLOOD PRESSURE: 110 MMHG | HEART RATE: 86 BPM | HEIGHT: 68 IN | WEIGHT: 148 LBS | DIASTOLIC BLOOD PRESSURE: 62 MMHG

## 2018-10-11 DIAGNOSIS — R50.9 FEBRILE ILLNESS: ICD-10-CM

## 2018-10-11 DIAGNOSIS — J98.8 RESPIRATORY INFECTION: ICD-10-CM

## 2018-10-11 DIAGNOSIS — R34 DECREASED URINE OUTPUT: ICD-10-CM

## 2018-10-11 DIAGNOSIS — R34 DECREASED URINE OUTPUT: Primary | ICD-10-CM

## 2018-10-11 LAB
ABSOLUTE EOS #: 0.9 K/UL (ref 0–0.4)
ABSOLUTE IMMATURE GRANULOCYTE: ABNORMAL K/UL (ref 0–0.3)
ABSOLUTE LYMPH #: 1.5 K/UL (ref 1–4.8)
ABSOLUTE MONO #: 0.5 K/UL (ref 0–1)
ANION GAP SERPL CALCULATED.3IONS-SCNC: 13 MMOL/L (ref 9–17)
BASOPHILS # BLD: 1 % (ref 0–2)
BASOPHILS ABSOLUTE: 0 K/UL (ref 0–0.2)
BILIRUBIN, POC: NORMAL
BLOOD URINE, POC: NORMAL
BUN BLDV-MCNC: 23 MG/DL (ref 8–23)
BUN/CREAT BLD: 17 (ref 9–20)
CALCIUM SERPL-MCNC: 9.2 MG/DL (ref 8.6–10.4)
CHLORIDE BLD-SCNC: 108 MMOL/L (ref 98–107)
CLARITY, POC: CLEAR
CO2: 20 MMOL/L (ref 20–31)
COLOR, POC: NORMAL
CREAT SERPL-MCNC: 1.36 MG/DL (ref 0.5–0.9)
DIFFERENTIAL TYPE: ABNORMAL
EOSINOPHILS RELATIVE PERCENT: 12 % (ref 0–5)
GFR AFRICAN AMERICAN: 45 ML/MIN
GFR NON-AFRICAN AMERICAN: 37 ML/MIN
GFR SERPL CREATININE-BSD FRML MDRD: ABNORMAL ML/MIN/{1.73_M2}
GFR SERPL CREATININE-BSD FRML MDRD: ABNORMAL ML/MIN/{1.73_M2}
GLUCOSE BLD-MCNC: 99 MG/DL (ref 70–99)
GLUCOSE URINE, POC: NORMAL
HCT VFR BLD CALC: 33.7 % (ref 36–46)
HEMOGLOBIN: 10.6 G/DL (ref 12–16)
IMMATURE GRANULOCYTES: ABNORMAL %
KETONES, POC: NORMAL
LEUKOCYTE EST, POC: NORMAL
LYMPHOCYTES # BLD: 22 % (ref 15–40)
MCH RBC QN AUTO: 24.6 PG (ref 26–34)
MCHC RBC AUTO-ENTMCNC: 31.3 G/DL (ref 31–37)
MCV RBC AUTO: 78.6 FL (ref 80–100)
MONOCYTES # BLD: 7 % (ref 4–8)
MORPHOLOGY: SLIGHT
NITRITE, POC: NORMAL
NRBC AUTOMATED: ABNORMAL PER 100 WBC
PDW BLD-RTO: 19.6 % (ref 12.1–15.2)
PH, POC: 5.5
PLATELET # BLD: 336 K/UL (ref 140–450)
PLATELET ESTIMATE: ABNORMAL
PMV BLD AUTO: ABNORMAL FL (ref 6–12)
POTASSIUM SERPL-SCNC: 3.5 MMOL/L (ref 3.7–5.3)
PROTEIN, POC: NORMAL
RBC # BLD: 4.29 M/UL (ref 4–5.2)
RBC # BLD: ABNORMAL 10*6/UL
SEG NEUTROPHILS: 58 % (ref 47–75)
SEGMENTED NEUTROPHILS ABSOLUTE COUNT: 4.2 K/UL (ref 2.5–7)
SODIUM BLD-SCNC: 141 MMOL/L (ref 135–144)
SPECIFIC GRAVITY, POC: 1.02
UROBILINOGEN, POC: 0.2
WBC # BLD: 7.1 K/UL (ref 3.5–11)
WBC # BLD: ABNORMAL 10*3/UL

## 2018-10-11 PROCEDURE — 99214 OFFICE O/P EST MOD 30 MIN: CPT | Performed by: FAMILY MEDICINE

## 2018-10-11 PROCEDURE — 81002 URINALYSIS NONAUTO W/O SCOPE: CPT | Performed by: FAMILY MEDICINE

## 2018-10-11 PROCEDURE — G8484 FLU IMMUNIZE NO ADMIN: HCPCS | Performed by: FAMILY MEDICINE

## 2018-10-11 PROCEDURE — 36415 COLL VENOUS BLD VENIPUNCTURE: CPT

## 2018-10-11 PROCEDURE — 1090F PRES/ABSN URINE INCON ASSESS: CPT | Performed by: FAMILY MEDICINE

## 2018-10-11 PROCEDURE — G8598 ASA/ANTIPLAT THER USED: HCPCS | Performed by: FAMILY MEDICINE

## 2018-10-11 PROCEDURE — 1036F TOBACCO NON-USER: CPT | Performed by: FAMILY MEDICINE

## 2018-10-11 PROCEDURE — G8427 DOCREV CUR MEDS BY ELIG CLIN: HCPCS | Performed by: FAMILY MEDICINE

## 2018-10-11 PROCEDURE — 1123F ACP DISCUSS/DSCN MKR DOCD: CPT | Performed by: FAMILY MEDICINE

## 2018-10-11 PROCEDURE — 85025 COMPLETE CBC W/AUTO DIFF WBC: CPT

## 2018-10-11 PROCEDURE — 80048 BASIC METABOLIC PNL TOTAL CA: CPT

## 2018-10-11 PROCEDURE — 4040F PNEUMOC VAC/ADMIN/RCVD: CPT | Performed by: FAMILY MEDICINE

## 2018-10-11 PROCEDURE — G8420 CALC BMI NORM PARAMETERS: HCPCS | Performed by: FAMILY MEDICINE

## 2018-10-11 PROCEDURE — 1101F PT FALLS ASSESS-DOCD LE1/YR: CPT | Performed by: FAMILY MEDICINE

## 2018-10-11 PROCEDURE — G8399 PT W/DXA RESULTS DOCUMENT: HCPCS | Performed by: FAMILY MEDICINE

## 2018-10-11 RX ORDER — LEVOFLOXACIN 750 MG/1
750 TABLET ORAL
Qty: 5 TABLET | Refills: 0 | Status: SHIPPED | OUTPATIENT
Start: 2018-10-11 | End: 2018-10-21

## 2018-10-11 RX ORDER — FLUOXETINE HYDROCHLORIDE 40 MG/1
40 CAPSULE ORAL DAILY
Qty: 90 CAPSULE | Refills: 1 | Status: SHIPPED | OUTPATIENT
Start: 2018-10-11 | End: 2019-06-03 | Stop reason: SDUPTHER

## 2018-10-11 ASSESSMENT — PATIENT HEALTH QUESTIONNAIRE - PHQ9
1. LITTLE INTEREST OR PLEASURE IN DOING THINGS: 0
2. FEELING DOWN, DEPRESSED OR HOPELESS: 0
SUM OF ALL RESPONSES TO PHQ9 QUESTIONS 1 & 2: 0
SUM OF ALL RESPONSES TO PHQ QUESTIONS 1-9: 0
SUM OF ALL RESPONSES TO PHQ QUESTIONS 1-9: 0

## 2018-10-11 NOTE — PROGRESS NOTES
Diagnosis Orders   1. Decreased urine output  CBC Auto Differential    Basic Metabolic Panel    POCT Urinalysis no Micro   2. Febrile illness     3. Respiratory infection     decreased urine outpt (only gave a couple drops for UA) and may/may not have UTI. Just small leuk on UA and insufficient amt to culture. Also has had resp infection, clear breath sounds, but appears ill and has had fever. Treating with levaquin for empiric coverage of UTI, upper and lower resp bacteria. Checking labs for renal dosing of levaquin, kasey with complaint of the decreased urine output. If GFR WNL will consider urology referral.      Requested Prescriptions     Signed Prescriptions Disp Refills    FLUoxetine (PROZAC) 40 MG capsule 90 capsule 1     Sig: Take 1 capsule by mouth daily    levofloxacin (LEVAQUIN) 750 MG tablet 5 tablet 0     Sig: Take 1 tablet by mouth every 48 hours for 10 days         Patient Instructions     SURVEY:    You may be receiving a survey from [employer] regarding your visit today. Please complete the survey to enable us to provide the highest quality of care to you and your family. If you cannot score us as very good on any question, please call the office to discuss how we could have made your experience exceptional.     Thank you. Lala Marlow received counseling on the following healthy behaviors: medication adherence  Reviewed prior labs and health maintenance. Continue current medications, diet and exercise. Discussed use, benefit, and side effects of prescribed medications. Barriers to medication compliance addressed. Patient given educational materials - see patient instructions. All patient questions answered. Patient voiced understanding.        Electronically signed by Samson Long DO on 10/12/2018 at 9:40 PM  [address]  [address] [place] [address] [address] [place] [id number]  Dept: [phone]

## 2018-10-11 NOTE — TELEPHONE ENCOUNTER
----- Message from Mago Ramirez DO sent at 10/11/2018 10:02 AM EDT -----  And please culture urine if we have a sufficient amount to culture.

## 2018-10-11 NOTE — PATIENT INSTRUCTIONS
SURVEY:    You may be receiving a survey from [employer] regarding your visit today. Please complete the survey to enable us to provide the highest quality of care to you and your family. If you cannot score us as very good on any question, please call the office to discuss how we could have made your experience exceptional.     Thank you.

## 2018-10-12 ASSESSMENT — ENCOUNTER SYMPTOMS: GASTROINTESTINAL NEGATIVE: 1

## 2018-10-17 ENCOUNTER — PATIENT MESSAGE (OUTPATIENT)
Dept: FAMILY MEDICINE CLINIC | Age: 78
End: 2018-10-17

## 2018-10-17 RX ORDER — FLUCONAZOLE 150 MG/1
TABLET ORAL
Qty: 2 TABLET | Refills: 0 | Status: SHIPPED | OUTPATIENT
Start: 2018-10-17 | End: 2018-12-12 | Stop reason: ALTCHOICE

## 2018-10-29 NOTE — TELEPHONE ENCOUNTER
From: Caro Wang  To: Deirdre Stubbs DO  Sent: 10/11/2017 7:41 AM EDT  Subject: RE:Thank you for your visit! My lower back is really hurting. Is it possible for you to give me a cortisone shot? I read in my chart that the shot could sometimes help? My visit to your office was pleasant. Thank George Medrano  ----- Message -----  From: Deirdre Stubbs DO  Sent: 10/10/2017 9:47 PM EDT  To: Keiko Smith  Subject: Thank you for your visit!  10/10/2017   Deirdre Stubbs DO   [place] [id number]  Dept: [phone]  Dept Fax: [phone]     Dear Keiko Adamson,  Thank you for your recent visit. We at [facility] are committed to providing amazing patient care, and would like to make sure we were successful in providing you a great experience at our office. In the coming days, you may receive a survey via mail or email about your experience with my office. We ask that you please take a couple of minutes to complete and return it. We use our patients feedback to make improvements within the office that will make the experience even better for all of our patients. Thank you, and be well!   Deirdre Stubbs DO
0 = independent

## 2018-11-26 RX ORDER — METOPROLOL SUCCINATE 25 MG/1
25 TABLET, EXTENDED RELEASE ORAL DAILY
Qty: 90 TABLET | Refills: 1 | Status: SHIPPED | OUTPATIENT
Start: 2018-11-26 | End: 2019-06-03 | Stop reason: SDUPTHER

## 2018-12-11 RX ORDER — PREDNISONE 20 MG/1
20 TABLET ORAL DAILY PRN
Qty: 30 TABLET | Refills: 0 | Status: SHIPPED | OUTPATIENT
Start: 2018-12-11 | End: 2019-06-03 | Stop reason: SDUPTHER

## 2018-12-11 NOTE — TELEPHONE ENCOUNTER
Requesting prednisone      Last visit:  10/11/2018  Next Visit Date:  Future Appointments  Date Time Provider Joshua Patricki   3/25/2019 10:00 AM MD López Cabrera [facility] [place]     Last Med refill:    Medication List:  Prior to Admission medications    Medication Sig Start Date End Date Taking? Authorizing Provider   metoprolol succinate (TOPROL XL) 25 MG extended release tablet TAKE 1 TABLET BY MOUTH DAILY 11/26/18   Spenser Levi DO   fluconazole (DIFLUCAN) 150 MG tablet One tab po at onset of symptoms. Repeat in 3 days if symptoms persist. 10/17/18   Spenser Levi DO   FLUoxetine (PROZAC) 40 MG capsule Take 1 capsule by mouth daily 10/11/18   Spenser Levi DO   triamcinolone (KENALOG) 0.1 % cream Apply topically 2 times daily. 10/8/18   Spenser Levi DO   omeprazole (PRILOSEC) 20 MG delayed release capsule TAKE 1 CAPSULE BY MOUTH DAILY AS NEEDED gerd 9/14/18   Spenser Levi DO   furosemide (LASIX) 20 MG tablet Take 1 tablet by mouth daily 7/5/18   Bhavani Garcia MD   predniSONE (DELTASONE) 20 MG tablet Take 1 tablet by mouth daily as needed (arthritis) 5/15/18   Spenser Levi DO   potassium chloride (KLOR-CON M) 20 MEQ extended release tablet Take 1 tablet by mouth daily 3/27/18   Bhavani Garcia MD   levothyroxine (SYNTHROID) 75 MCG tablet Take 1 by mouth daily  Patient taking differently: Take 75 mcg by mouth daily Take 1 by mouth daily 3/5/18   Spenser Levi DO   hydrocortisone (ANUSOL-HC) 2.5 % rectal cream Place rectally 2 times daily for up to a week 6/19/17   Spenser Levi DO   aspirin 81 MG tablet Take 81 mg by mouth daily    Historical Provider, MD   Handicap Placard MISC Expiration date four years from application date. 57/6/03   Katy Roberson, APRN - CNP   b complex vitamins capsule Take 1 capsule by mouth daily. Historical Provider, MD   folic acid (FOLVITE) 093 MCG tablet Take 800 mcg by mouth daily.       Historical Provider, MD   bisacodyl (DULCOLAX) 5 MG EC tablet Take 5 mg by mouth daily as needed. Take three daily       Historical Provider, MD   Calcium Carbonate-Vitamin D (CALCIUM + D) 600-200 MG-UNIT TABS Take  by mouth daily.       Historical Provider, MD       Allergies:  Sulfa antibiotics    No results found for: LABA1C          ( goal A1C is < 7)   No results found for: LABMICR  LDL Cholesterol (mg/dL)   Date Value   03/19/2018 124   03/30/2017 105       (goal LDL is <100)   AST (U/L)   Date Value   03/19/2018 18     ALT (U/L)   Date Value   03/19/2018 11     BUN (mg/dL)   Date Value   10/11/2018 23     BP Readings from Last 3 Encounters:   10/11/18 110/62   08/28/18 133/71   05/15/18 128/70          (goal 120/80)

## 2018-12-12 ENCOUNTER — TELEPHONE (OUTPATIENT)
Dept: FAMILY MEDICINE CLINIC | Age: 78
End: 2018-12-12

## 2018-12-12 ENCOUNTER — HOSPITAL ENCOUNTER (OUTPATIENT)
Age: 78
Discharge: HOME | End: 2018-12-12
Payer: MEDICARE

## 2018-12-12 ENCOUNTER — OFFICE VISIT (OUTPATIENT)
Dept: FAMILY MEDICINE CLINIC | Age: 78
End: 2018-12-12
Payer: MEDICARE

## 2018-12-12 ENCOUNTER — HOSPITAL ENCOUNTER (OUTPATIENT)
Dept: CT IMAGING | Age: 78
Discharge: HOME | End: 2018-12-14
Payer: MEDICARE

## 2018-12-12 VITALS
WEIGHT: 162 LBS | DIASTOLIC BLOOD PRESSURE: 64 MMHG | BODY MASS INDEX: 24.55 KG/M2 | HEIGHT: 68 IN | SYSTOLIC BLOOD PRESSURE: 120 MMHG

## 2018-12-12 DIAGNOSIS — K11.8 PAROTID MASS: ICD-10-CM

## 2018-12-12 DIAGNOSIS — Z01.812 PRE-PROCEDURE LAB EXAM: ICD-10-CM

## 2018-12-12 DIAGNOSIS — R68.84 JAW PAIN: Primary | ICD-10-CM

## 2018-12-12 DIAGNOSIS — K11.8 PAROTID MASS: Primary | ICD-10-CM

## 2018-12-12 LAB
BUN BLDV-MCNC: 31 MG/DL (ref 8–23)
CREAT SERPL-MCNC: 1.15 MG/DL (ref 0.5–0.9)
GFR AFRICAN AMERICAN: 55 ML/MIN
GFR NON-AFRICAN AMERICAN: 45 ML/MIN
GFR SERPL CREATININE-BSD FRML MDRD: ABNORMAL ML/MIN/{1.73_M2}
GFR SERPL CREATININE-BSD FRML MDRD: ABNORMAL ML/MIN/{1.73_M2}

## 2018-12-12 PROCEDURE — G8399 PT W/DXA RESULTS DOCUMENT: HCPCS | Performed by: FAMILY MEDICINE

## 2018-12-12 PROCEDURE — 4040F PNEUMOC VAC/ADMIN/RCVD: CPT | Performed by: FAMILY MEDICINE

## 2018-12-12 PROCEDURE — 1101F PT FALLS ASSESS-DOCD LE1/YR: CPT | Performed by: FAMILY MEDICINE

## 2018-12-12 PROCEDURE — 99213 OFFICE O/P EST LOW 20 MIN: CPT | Performed by: FAMILY MEDICINE

## 2018-12-12 PROCEDURE — 84520 ASSAY OF UREA NITROGEN: CPT

## 2018-12-12 PROCEDURE — 6360000004 HC RX CONTRAST MEDICATION: Performed by: FAMILY MEDICINE

## 2018-12-12 PROCEDURE — G8484 FLU IMMUNIZE NO ADMIN: HCPCS | Performed by: FAMILY MEDICINE

## 2018-12-12 PROCEDURE — 82565 ASSAY OF CREATININE: CPT

## 2018-12-12 PROCEDURE — G8420 CALC BMI NORM PARAMETERS: HCPCS | Performed by: FAMILY MEDICINE

## 2018-12-12 PROCEDURE — 36415 COLL VENOUS BLD VENIPUNCTURE: CPT

## 2018-12-12 PROCEDURE — G8598 ASA/ANTIPLAT THER USED: HCPCS | Performed by: FAMILY MEDICINE

## 2018-12-12 PROCEDURE — 70492 CT SFT TSUE NCK W/O & W/DYE: CPT

## 2018-12-12 PROCEDURE — 1123F ACP DISCUSS/DSCN MKR DOCD: CPT | Performed by: FAMILY MEDICINE

## 2018-12-12 PROCEDURE — G8427 DOCREV CUR MEDS BY ELIG CLIN: HCPCS | Performed by: FAMILY MEDICINE

## 2018-12-12 PROCEDURE — 1036F TOBACCO NON-USER: CPT | Performed by: FAMILY MEDICINE

## 2018-12-12 PROCEDURE — 1090F PRES/ABSN URINE INCON ASSESS: CPT | Performed by: FAMILY MEDICINE

## 2018-12-12 RX ADMIN — IOPAMIDOL 50 ML: 755 INJECTION, SOLUTION INTRAVENOUS at 11:17

## 2018-12-12 NOTE — PROGRESS NOTES
Name: Wally Quinteros  : 1940         Chief Complaint:     Chief Complaint   Patient presents with    Mass     left side under ear. getting larger. painful to open mouth and eat. History of Present Illness:      Wally Quinteros is a [de-identified] y.o.  female who presents with Mass (left side under ear. getting larger. painful to open mouth and eat.)      HPI     Pt c/o growth of lump on L posterior jaw. This has been bothering her for about a year, but it's gotten much larger recently and is causing more pain. She cannot open her mouth the whole way due to the pain. No difficulty swallowing or trouble with her teeth. Past Medical History:     Past Medical History:   Diagnosis Date    CAD (coronary artery disease)     Chronic back pain     Hyperlipidemia     Hypertension     Hypothyroidism     Post PTCA     Transfusion history         Past Surgical History:     Past Surgical History:   Procedure Laterality Date    ANGIOPLASTY  2016    Dr. Pedro Sánchez @ [facility] 19 x 1     COLONOSCOPY  2013    S[facility] GERONIMO    CORONARY ANGIOPLASTY WITH STENT PLACEMENT  ,2012    CORONARY ARTERY BYPASS GRAFT  2006    HEMORRHOID SURGERY      OVARY REMOVAL Right     MO Ace Gray [place] 82 DX/THER SBST INTRLMNR LMBR/SAC W/IMG GDN N/A 2018    EPIDURAL STEROID INJECTION-CUADAL performed by Jagruti Jimenez MD at [facility]      laminectomy    UPPER GASTROINTESTINAL ENDOSCOPY  2013        Medications:       Prior to Admission medications    Medication Sig Start Date End Date Taking?  Authorizing Provider   predniSONE (DELTASONE) 20 MG tablet Take 1 tablet by mouth daily as needed (arthritis) 18  Yes Li Morales, DO   metoprolol succinate (TOPROL XL) 25 MG extended release tablet TAKE 1 TABLET BY MOUTH DAILY 18  Yes Li Brisk, DO   FLUoxetine (PROZAC) 40 MG capsule Take 1 capsule by mouth daily 10/11/18  Yes Li Morales, DO   omeprazole (PRILOSEC) 20 MG delayed

## 2018-12-13 ASSESSMENT — ENCOUNTER SYMPTOMS: RESPIRATORY NEGATIVE: 1

## 2019-02-05 RX ORDER — LEVOTHYROXINE SODIUM 0.07 MG/1
TABLET ORAL
Qty: 90 TABLET | Refills: 1 | Status: SHIPPED | OUTPATIENT
Start: 2019-02-05 | End: 2019-08-27 | Stop reason: SDUPTHER

## 2019-02-05 RX ORDER — OMEPRAZOLE 20 MG/1
CAPSULE, DELAYED RELEASE ORAL
Qty: 90 CAPSULE | Refills: 1 | Status: SHIPPED | OUTPATIENT
Start: 2019-02-05 | End: 2019-08-27 | Stop reason: SDUPTHER

## 2019-03-18 ENCOUNTER — HOSPITAL ENCOUNTER (OUTPATIENT)
Age: 79
Discharge: HOME | End: 2019-03-18
Payer: MEDICARE

## 2019-03-18 ENCOUNTER — HOSPITAL ENCOUNTER (OUTPATIENT)
Age: 79
Discharge: HOME | End: 2019-03-20
Payer: MEDICARE

## 2019-03-18 ENCOUNTER — HOSPITAL ENCOUNTER (OUTPATIENT)
Dept: GENERAL RADIOLOGY | Age: 79
Discharge: HOME | End: 2019-03-20
Payer: MEDICARE

## 2019-03-18 DIAGNOSIS — I10 ESSENTIAL HYPERTENSION: ICD-10-CM

## 2019-03-18 DIAGNOSIS — E03.9 ACQUIRED HYPOTHYROIDISM: ICD-10-CM

## 2019-03-18 DIAGNOSIS — I25.10 CORONARY ARTERY DISEASE INVOLVING NATIVE CORONARY ARTERY OF NATIVE HEART WITHOUT ANGINA PECTORIS: ICD-10-CM

## 2019-03-18 DIAGNOSIS — E55.9 VITAMIN D DEFICIENCY DISEASE: ICD-10-CM

## 2019-03-18 DIAGNOSIS — E78.49 OTHER HYPERLIPIDEMIA: ICD-10-CM

## 2019-03-18 LAB
ABSOLUTE EOS #: 0.2 K/UL (ref 0–0.4)
ABSOLUTE IMMATURE GRANULOCYTE: ABNORMAL K/UL (ref 0–0.3)
ABSOLUTE LYMPH #: 1.5 K/UL (ref 1–4.8)
ABSOLUTE MONO #: 0.5 K/UL (ref 0–1)
ALBUMIN SERPL-MCNC: 4.5 G/DL (ref 3.5–5.2)
ALBUMIN/GLOBULIN RATIO: ABNORMAL (ref 1–2.5)
ALP BLD-CCNC: 64 U/L (ref 35–104)
ALT SERPL-CCNC: 11 U/L (ref 5–33)
ANION GAP SERPL CALCULATED.3IONS-SCNC: 12 MMOL/L (ref 9–17)
AST SERPL-CCNC: 15 U/L
BASOPHILS # BLD: 0 % (ref 0–2)
BASOPHILS ABSOLUTE: 0 K/UL (ref 0–0.2)
BILIRUB SERPL-MCNC: 0.24 MG/DL (ref 0.3–1.2)
BUN BLDV-MCNC: 21 MG/DL (ref 8–23)
BUN/CREAT BLD: 17 (ref 9–20)
CALCIUM SERPL-MCNC: 9 MG/DL (ref 8.6–10.4)
CHLORIDE BLD-SCNC: 108 MMOL/L (ref 98–107)
CHOLESTEROL/HDL RATIO: 2.7
CHOLESTEROL: 255 MG/DL
CO2: 21 MMOL/L (ref 20–31)
CREAT SERPL-MCNC: 1.21 MG/DL (ref 0.5–0.9)
DIFFERENTIAL TYPE: YES
EOSINOPHILS RELATIVE PERCENT: 3 % (ref 0–5)
GFR AFRICAN AMERICAN: 52 ML/MIN
GFR NON-AFRICAN AMERICAN: 43 ML/MIN
GFR SERPL CREATININE-BSD FRML MDRD: ABNORMAL ML/MIN/{1.73_M2}
GFR SERPL CREATININE-BSD FRML MDRD: ABNORMAL ML/MIN/{1.73_M2}
GLUCOSE BLD-MCNC: 102 MG/DL (ref 70–99)
HCT VFR BLD CALC: 33.6 % (ref 36–46)
HDLC SERPL-MCNC: 93 MG/DL
HEMOGLOBIN: 10.4 G/DL (ref 12–16)
IMMATURE GRANULOCYTES: ABNORMAL %
LDL CHOLESTEROL: 141 MG/DL (ref 0–130)
LYMPHOCYTES # BLD: 23 % (ref 15–40)
MAGNESIUM: 2.2 MG/DL (ref 1.6–2.6)
MCH RBC QN AUTO: 24.9 PG (ref 26–34)
MCHC RBC AUTO-ENTMCNC: 30.9 G/DL (ref 31–37)
MCV RBC AUTO: 80.7 FL (ref 80–100)
MONOCYTES # BLD: 7 % (ref 4–8)
NRBC AUTOMATED: ABNORMAL PER 100 WBC
PATIENT FASTING?: YES
PDW BLD-RTO: 17.9 % (ref 12.1–15.2)
PLATELET # BLD: 342 K/UL (ref 140–450)
PLATELET ESTIMATE: ABNORMAL
PMV BLD AUTO: ABNORMAL FL (ref 6–12)
POTASSIUM SERPL-SCNC: 3.8 MMOL/L (ref 3.7–5.3)
RBC # BLD: 4.16 M/UL (ref 4–5.2)
RBC # BLD: ABNORMAL 10*6/UL
SEG NEUTROPHILS: 67 % (ref 47–75)
SEGMENTED NEUTROPHILS ABSOLUTE COUNT: 4.4 K/UL (ref 2.5–7)
SODIUM BLD-SCNC: 141 MMOL/L (ref 135–144)
TOTAL PROTEIN: 6.7 G/DL (ref 6.4–8.3)
TRIGL SERPL-MCNC: 103 MG/DL
TSH SERPL DL<=0.05 MIU/L-ACNC: 1.72 MIU/L (ref 0.3–5)
VITAMIN D 25-HYDROXY: 40.4 NG/ML (ref 30–100)
VLDLC SERPL CALC-MCNC: ABNORMAL MG/DL (ref 1–30)
WBC # BLD: 6.6 K/UL (ref 3.5–11)
WBC # BLD: ABNORMAL 10*3/UL

## 2019-03-18 PROCEDURE — 83735 ASSAY OF MAGNESIUM: CPT

## 2019-03-18 PROCEDURE — 71046 X-RAY EXAM CHEST 2 VIEWS: CPT

## 2019-03-18 PROCEDURE — 36415 COLL VENOUS BLD VENIPUNCTURE: CPT

## 2019-03-18 PROCEDURE — 82306 VITAMIN D 25 HYDROXY: CPT

## 2019-03-18 PROCEDURE — 80061 LIPID PANEL: CPT

## 2019-03-18 PROCEDURE — 84443 ASSAY THYROID STIM HORMONE: CPT

## 2019-03-18 PROCEDURE — 93005 ELECTROCARDIOGRAM TRACING: CPT

## 2019-03-18 PROCEDURE — 80053 COMPREHEN METABOLIC PANEL: CPT

## 2019-03-18 PROCEDURE — 85025 COMPLETE CBC W/AUTO DIFF WBC: CPT

## 2019-03-19 LAB
EKG ATRIAL RATE: 73 BPM
EKG P AXIS: 71 DEGREES
EKG P-R INTERVAL: 138 MS
EKG Q-T INTERVAL: 406 MS
EKG QRS DURATION: 82 MS
EKG QTC CALCULATION (BAZETT): 447 MS
EKG R AXIS: 74 DEGREES
EKG T AXIS: 37 DEGREES
EKG VENTRICULAR RATE: 73 BPM

## 2019-03-25 ENCOUNTER — OFFICE VISIT (OUTPATIENT)
Dept: CARDIOLOGY CLINIC | Age: 79
End: 2019-03-25
Payer: MEDICARE

## 2019-03-25 VITALS
DIASTOLIC BLOOD PRESSURE: 60 MMHG | BODY MASS INDEX: 24.18 KG/M2 | SYSTOLIC BLOOD PRESSURE: 130 MMHG | OXYGEN SATURATION: 97 % | HEART RATE: 110 BPM | WEIGHT: 159 LBS

## 2019-03-25 DIAGNOSIS — I25.10 CORONARY ARTERY DISEASE INVOLVING NATIVE CORONARY ARTERY OF NATIVE HEART WITHOUT ANGINA PECTORIS: ICD-10-CM

## 2019-03-25 DIAGNOSIS — I10 ESSENTIAL HYPERTENSION: Primary | ICD-10-CM

## 2019-03-25 DIAGNOSIS — E78.49 OTHER HYPERLIPIDEMIA: ICD-10-CM

## 2019-03-25 DIAGNOSIS — E03.9 ACQUIRED HYPOTHYROIDISM: ICD-10-CM

## 2019-03-25 DIAGNOSIS — E55.9 VITAMIN D DEFICIENCY DISEASE: ICD-10-CM

## 2019-03-25 PROCEDURE — G8420 CALC BMI NORM PARAMETERS: HCPCS | Performed by: INTERNAL MEDICINE

## 2019-03-25 PROCEDURE — G8484 FLU IMMUNIZE NO ADMIN: HCPCS | Performed by: INTERNAL MEDICINE

## 2019-03-25 PROCEDURE — G8598 ASA/ANTIPLAT THER USED: HCPCS | Performed by: INTERNAL MEDICINE

## 2019-03-25 PROCEDURE — 1123F ACP DISCUSS/DSCN MKR DOCD: CPT | Performed by: INTERNAL MEDICINE

## 2019-03-25 PROCEDURE — G8427 DOCREV CUR MEDS BY ELIG CLIN: HCPCS | Performed by: INTERNAL MEDICINE

## 2019-03-25 PROCEDURE — 1090F PRES/ABSN URINE INCON ASSESS: CPT | Performed by: INTERNAL MEDICINE

## 2019-03-25 PROCEDURE — G8399 PT W/DXA RESULTS DOCUMENT: HCPCS | Performed by: INTERNAL MEDICINE

## 2019-03-25 PROCEDURE — 4040F PNEUMOC VAC/ADMIN/RCVD: CPT | Performed by: INTERNAL MEDICINE

## 2019-03-25 PROCEDURE — 1101F PT FALLS ASSESS-DOCD LE1/YR: CPT | Performed by: INTERNAL MEDICINE

## 2019-03-25 PROCEDURE — 99214 OFFICE O/P EST MOD 30 MIN: CPT | Performed by: INTERNAL MEDICINE

## 2019-03-25 PROCEDURE — 1036F TOBACCO NON-USER: CPT | Performed by: INTERNAL MEDICINE

## 2019-03-25 RX ORDER — MELOXICAM 7.5 MG/1
7.5 TABLET ORAL DAILY
COMMUNITY
End: 2019-09-06 | Stop reason: ALTCHOICE

## 2019-03-25 RX ORDER — FUROSEMIDE 20 MG/1
20 TABLET ORAL DAILY
Qty: 90 TABLET | Refills: 3 | Status: SHIPPED | OUTPATIENT
Start: 2019-03-25 | End: 2019-06-03 | Stop reason: SDUPTHER

## 2019-03-25 RX ORDER — POTASSIUM CHLORIDE 20 MEQ/1
20 TABLET, EXTENDED RELEASE ORAL DAILY
Qty: 90 TABLET | Refills: 3 | Status: SHIPPED | OUTPATIENT
Start: 2019-03-25 | End: 2019-11-12 | Stop reason: SDUPTHER

## 2019-04-02 ENCOUNTER — TELEPHONE (OUTPATIENT)
Dept: FAMILY MEDICINE CLINIC | Age: 79
End: 2019-04-02

## 2019-04-08 RX ORDER — FLUCONAZOLE 150 MG/1
TABLET ORAL
Qty: 2 TABLET | Refills: 0 | Status: SHIPPED | OUTPATIENT
Start: 2019-04-08 | End: 2019-05-14 | Stop reason: SDUPTHER

## 2019-05-14 RX ORDER — FLUCONAZOLE 150 MG/1
TABLET ORAL
Qty: 2 TABLET | Refills: 0 | Status: SHIPPED | OUTPATIENT
Start: 2019-05-14 | End: 2019-09-06 | Stop reason: ALTCHOICE

## 2019-05-14 NOTE — PROGRESS NOTES
Last visit:  12/12/2018  Next Visit Date:    Future Appointments   Date Time Provider Joshua Patricki   3/16/2020 10:00 AM MD Madeline Almazan [facility]     Last Med refill:    Medication List:  Prior to Admission medications    Medication Sig Start Date End Date Taking? Authorizing Provider   fluconazole (DIFLUCAN) 150 MG tablet One tab po at onset of symptoms. Repeat in 3 days if symptoms persist. 4/8/19   Juan Diego Ysabel,    meloxicam (MOBIC) 7.5 MG tablet Take 7.5 mg by mouth daily    Historical Provider, MD   potassium chloride (KLOR-CON M) 20 MEQ extended release tablet Take 1 tablet by mouth daily 3/25/19   George Schroeder MD   furosemide (LASIX) 20 MG tablet Take 1 tablet by mouth daily 3/25/19   George Schroeder MD   omeprazole (PRILOSEC) 20 MG delayed release capsule TAKE 1 CAPSULE BY MOUTH EVERY DAY AS NEEDED for gerd 2/5/19   [facility], DO   levothyroxine (SYNTHROID) 75 MCG tablet TAKE 1 TABLET BY MOUTH DAILY 2/5/19   [facility], DO   predniSONE (DELTASONE) 20 MG tablet Take 1 tablet by mouth daily as needed (arthritis) 12/11/18   [facility], DO   metoprolol succinate (TOPROL XL) 25 MG extended release tablet TAKE 1 TABLET BY MOUTH DAILY 11/26/18   [facility], DO   FLUoxetine (PROZAC) 40 MG capsule Take 1 capsule by mouth daily 10/11/18   [facility], DO   aspirin 81 MG tablet Take 81 mg by mouth daily    Historical Provider, MD   Handicap Placard MISC Expiration date four years from application date. 22/5/98   JUAN Rock - CNP   b complex vitamins capsule Take 1 capsule by mouth daily. Historical Provider, MD   bisacodyl (DULCOLAX) 5 MG EC tablet Take 5 mg by mouth daily as needed. Take three daily       Historical Provider, MD   Calcium Carbonate-Vitamin D (CALCIUM + D) 600-200 MG-UNIT TABS Take  by mouth daily.       Historical Provider, MD       Allergies:  Sulfa antibiotics    No results found for: LABA1C          ( goal A1C is < 7)   No

## 2019-06-03 RX ORDER — FUROSEMIDE 20 MG/1
20 TABLET ORAL DAILY
Qty: 90 TABLET | Refills: 3 | Status: SHIPPED | OUTPATIENT
Start: 2019-06-03 | End: 2020-03-16 | Stop reason: SDUPTHER

## 2019-06-03 RX ORDER — FLUOXETINE HYDROCHLORIDE 40 MG/1
40 CAPSULE ORAL DAILY
Qty: 90 CAPSULE | Refills: 1 | Status: SHIPPED | OUTPATIENT
Start: 2019-06-03 | End: 2019-12-02 | Stop reason: SDUPTHER

## 2019-06-03 RX ORDER — METOPROLOL SUCCINATE 25 MG/1
25 TABLET, EXTENDED RELEASE ORAL DAILY
Qty: 90 TABLET | Refills: 1 | Status: SHIPPED | OUTPATIENT
Start: 2019-06-03 | End: 2019-12-02 | Stop reason: SDUPTHER

## 2019-06-03 RX ORDER — PREDNISONE 20 MG/1
20 TABLET ORAL DAILY PRN
Qty: 30 TABLET | Refills: 0 | Status: SHIPPED | OUTPATIENT
Start: 2019-06-03 | End: 2019-08-27 | Stop reason: SDUPTHER

## 2019-06-03 NOTE — TELEPHONE ENCOUNTER
Last OV: 12/12/2018  Last RX: 11/26/18   Next scheduled apt: Visit date not found  Medication pending

## 2019-08-27 RX ORDER — PREDNISONE 20 MG/1
20 TABLET ORAL DAILY PRN
Qty: 30 TABLET | Refills: 0 | Status: CANCELLED | OUTPATIENT
Start: 2019-08-27

## 2019-08-27 RX ORDER — LEVOTHYROXINE SODIUM 0.07 MG/1
75 TABLET ORAL DAILY
Qty: 90 TABLET | Refills: 1 | Status: SHIPPED | OUTPATIENT
Start: 2019-08-27 | End: 2020-05-29 | Stop reason: SDUPTHER

## 2019-08-27 RX ORDER — PREDNISONE 20 MG/1
20 TABLET ORAL DAILY PRN
Qty: 30 TABLET | Refills: 0 | Status: SHIPPED | OUTPATIENT
Start: 2019-08-27 | End: 2019-12-18 | Stop reason: SDUPTHER

## 2019-08-27 RX ORDER — OMEPRAZOLE 20 MG/1
CAPSULE, DELAYED RELEASE ORAL
Qty: 90 CAPSULE | Refills: 1 | Status: CANCELLED | OUTPATIENT
Start: 2019-08-27

## 2019-08-27 RX ORDER — LEVOTHYROXINE SODIUM 0.07 MG/1
TABLET ORAL
Qty: 90 TABLET | Refills: 1 | Status: CANCELLED | OUTPATIENT
Start: 2019-08-27

## 2019-08-27 RX ORDER — OMEPRAZOLE 20 MG/1
20 CAPSULE, DELAYED RELEASE ORAL DAILY
Qty: 90 CAPSULE | Refills: 1 | Status: SHIPPED | OUTPATIENT
Start: 2019-08-27 | End: 2020-02-17 | Stop reason: SDUPTHER

## 2019-09-03 LAB
T3 TOTAL: 0.97
TSH SERPL DL<=0.05 MIU/L-ACNC: 0.61 UIU/ML

## 2019-09-06 ENCOUNTER — OFFICE VISIT (OUTPATIENT)
Dept: FAMILY MEDICINE CLINIC | Age: 79
End: 2019-09-06
Payer: MEDICARE

## 2019-09-06 VITALS
DIASTOLIC BLOOD PRESSURE: 70 MMHG | BODY MASS INDEX: 22.58 KG/M2 | HEIGHT: 68 IN | WEIGHT: 149 LBS | SYSTOLIC BLOOD PRESSURE: 120 MMHG | HEART RATE: 72 BPM

## 2019-09-06 DIAGNOSIS — C07 PRIMARY PAROTID GLAND SQUAMOUS CELL CARCINOMA (HCC): ICD-10-CM

## 2019-09-06 DIAGNOSIS — F34.1 DYSTHYMIA: ICD-10-CM

## 2019-09-06 DIAGNOSIS — E03.9 ACQUIRED HYPOTHYROIDISM: ICD-10-CM

## 2019-09-06 DIAGNOSIS — R53.82 CHRONIC FATIGUE: Primary | ICD-10-CM

## 2019-09-06 DIAGNOSIS — N18.30 CHRONIC KIDNEY DISEASE, STAGE III (MODERATE) (HCC): ICD-10-CM

## 2019-09-06 DIAGNOSIS — R73.09 ABNORMAL GLUCOSE: ICD-10-CM

## 2019-09-06 PROCEDURE — 99214 OFFICE O/P EST MOD 30 MIN: CPT | Performed by: FAMILY MEDICINE

## 2019-09-06 PROCEDURE — 1090F PRES/ABSN URINE INCON ASSESS: CPT | Performed by: FAMILY MEDICINE

## 2019-09-06 PROCEDURE — 1036F TOBACCO NON-USER: CPT | Performed by: FAMILY MEDICINE

## 2019-09-06 PROCEDURE — G8598 ASA/ANTIPLAT THER USED: HCPCS | Performed by: FAMILY MEDICINE

## 2019-09-06 PROCEDURE — G8427 DOCREV CUR MEDS BY ELIG CLIN: HCPCS | Performed by: FAMILY MEDICINE

## 2019-09-06 PROCEDURE — G8399 PT W/DXA RESULTS DOCUMENT: HCPCS | Performed by: FAMILY MEDICINE

## 2019-09-06 PROCEDURE — 4040F PNEUMOC VAC/ADMIN/RCVD: CPT | Performed by: FAMILY MEDICINE

## 2019-09-06 PROCEDURE — G8420 CALC BMI NORM PARAMETERS: HCPCS | Performed by: FAMILY MEDICINE

## 2019-09-06 PROCEDURE — 1123F ACP DISCUSS/DSCN MKR DOCD: CPT | Performed by: FAMILY MEDICINE

## 2019-09-06 RX ORDER — BUPROPION HYDROCHLORIDE 150 MG/1
150 TABLET ORAL EVERY MORNING
Qty: 30 TABLET | Refills: 1 | Status: SHIPPED | OUTPATIENT
Start: 2019-09-06 | End: 2019-10-08 | Stop reason: SDUPTHER

## 2019-09-06 RX ORDER — ACETAMINOPHEN AND CODEINE PHOSPHATE 300; 30 MG/1; MG/1
TABLET ORAL
Refills: 1 | COMMUNITY
Start: 2019-08-23 | End: 2021-05-07

## 2019-09-06 NOTE — PROGRESS NOTES
Name: Guillermina Camacho  : 1940         Chief Complaint:     Chief Complaint   Patient presents with    Fatigue       History of Present Illness:      Guillermina Camacho is a 78 y.o.  female who presents with Fatigue      HPI     C/o fatigue, kasey since finishing radiation mid  for L parotid cancer. Tired and wants to sleep all the time. Feels her mood is down also. She does continue Prozac 40 mg daily, which has typically helped with her mood but does not seem to be enough anymore. Patient frequently falls asleep on the couch and then comes to bed later.  has observed her sleeping peacefully and says she does not appear to snore or have apneic episodes. Feels really well for a couple days after taking prednisone. Takes maybe once every 2 wks. Last took it a couple wks ago, took one so she'd have energy to keep up with family member who was visiting. No history of diabetes. Patient cannot give a great history regarding how easily she gets worn out with activity. Parotid cancer, again status post surgery and radiation. She is very happy with the facial strength that she has regained. He does notice that on waking in the morning L side of face is swollen and L eye is swollen shut. Clears up after being up and around for a while and taking her daily lasix. She does not think that she sleeps on the left side, does not need to. Hypothyroid present for many years, had not been checked in quite a while after surgery but was checked a few days ago by ENT. Continue Synthroid 75 mcg daily. Complains of extensive hair loss, still wearing a wig.     Past Medical History:     Past Medical History:   Diagnosis Date    CAD (coronary artery disease)     Chronic back pain     Hyperlipidemia     Hypertension     Hypothyroidism     Post PTCA     Transfusion history         Past Surgical History:     Past Surgical History:   Procedure Laterality Date    ANGIOPLASTY  2016    Dr. Delroy Roman @ [facility] x 1     COLONOSCOPY  6/11/2013    [facility] GERONIMO    CORONARY ANGIOPLASTY WITH STENT PLACEMENT  2006,11/14/2012    CORONARY ARTERY BYPASS GRAFT  2006    HEMORRHOID SURGERY      OVARY REMOVAL Right     PA Ace Gray [place] 84 DX/THER SBST INTRLMNR LMBR/SAC W/IMG GDN N/A 8/28/2018    EPIDURAL STEROID INJECTION-CUADAL performed by Merlene Siegel MD at [facility]  2010    laminectomy    UPPER GASTROINTESTINAL ENDOSCOPY  6/11/2013        Medications:       Prior to Admission medications    Medication Sig Start Date End Date Taking? Authorizing Provider   buPROPion (WELLBUTRIN XL) 150 MG extended release tablet Take 1 tablet by mouth every morning 9/6/19  Yes Alondra Rik, DO   acetaminophen-codeine (TYLENOL #3) 300-30 MG per tablet TAKE 1 TABLET BY MOUTH EVERY 4 TO 6 HOURS AS NEEDED FOR PAIN 8/23/19   Historical Provider, MD   omeprazole (PRILOSEC) 20 MG delayed release capsule Take 1 capsule by mouth Daily 8/27/19   Alondra Rik, DO   levothyroxine (SYNTHROID) 75 MCG tablet Take 1 tablet by mouth Daily 8/27/19   Alondra Rik, DO   predniSONE (DELTASONE) 20 MG tablet Take 1 tablet by mouth daily as needed (arthritis) 8/27/19   Alondra Rik, DO   FLUoxetine (PROZAC) 40 MG capsule Take 1 capsule by mouth daily 6/3/19   Alondra Rik, DO   metoprolol succinate (TOPROL XL) 25 MG extended release tablet Take 1 tablet by mouth daily 6/3/19   Alondra [place], DO   furosemide (LASIX) 20 MG tablet Take 1 tablet by mouth daily 6/3/19   Lady Nichols MD   potassium chloride (KLOR-CON M) 20 MEQ extended release tablet Take 1 tablet by mouth daily 3/25/19   Lady Nichols MD   aspirin 81 MG tablet Take 81 mg by mouth daily    Historical Provider, MD   b complex vitamins capsule Take 1 capsule by mouth daily. Historical Provider, MD   bisacodyl (DULCOLAX) 5 MG EC tablet Take 5 mg by mouth daily as needed.  Take three daily       Historical Provider, MD   Calcium Carbonate-Vitamin D (CALCIUM + D) exam.  No particular patches of alopecia. Psychiatric: She has a normal mood and affect. Judgment normal.   Nursing note and vitals reviewed. Data:     Lab Results   Component Value Date     03/18/2019    K 3.8 03/18/2019     03/18/2019    CO2 21 03/18/2019    BUN 21 03/18/2019    CREATININE 1.21 03/18/2019    GLUCOSE 102 03/18/2019    PROT 6.7 03/18/2019    LABALBU 4.5 03/18/2019    BILITOT 0.24 03/18/2019    ALKPHOS 64 03/18/2019    AST 15 03/18/2019    ALT 11 03/18/2019     Lab Results   Component Value Date    WBC 6.6 03/18/2019    RBC 4.16 03/18/2019    HGB 10.4 03/18/2019    HCT 33.6 03/18/2019    MCV 80.7 03/18/2019    MCH 24.9 03/18/2019    MCHC 30.9 03/18/2019    RDW 17.9 03/18/2019     03/18/2019    MPV NOT REPORTED 03/18/2019     Lab Results   Component Value Date    TSH 0.61 09/03/2019     Lab Results   Component Value Date    CHOL 255 03/18/2019    HDL 93 03/18/2019         Assessment & Plan:        Diagnosis Orders   1. Chronic fatigue  Hemoglobin L6G    Basic Metabolic Panel    CBC Auto Differential    Cortisol   2. Primary parotid gland squamous cell carcinoma (HCC)     3. Dysthymia     4. Chronic kidney disease, stage III (moderate) (HCC)     5. Abnormal glucose  Hemoglobin A1C   6. Acquired hypothyroidism     Fatigue for the last several months, differential diagnosis malignancy related fatigue, adrenal insufficiency, effects of depression, sleep disorder, coronary ischemia. She does respond very well to prednisone. Checking labs as above, advised to wait about a week before getting them drawn, since she did take prednisone within the last couple of weeks. 8 AM labs. Depression is uncontrolled, so I am adding Wellbutrin to Prozac. CKD has been stable, rechecking. Continue good hydration. Left parotid cancer status post surgery and radiation, actually doing quite well. Continue following with ENT. Hypothyroid controlled per recent labs.   Fatigue differential

## 2019-09-09 PROBLEM — C07: Status: ACTIVE | Noted: 2019-09-09

## 2019-09-09 PROBLEM — F34.1 DYSTHYMIA: Status: ACTIVE | Noted: 2019-09-09

## 2019-09-09 ASSESSMENT — ENCOUNTER SYMPTOMS
GASTROINTESTINAL NEGATIVE: 1
RESPIRATORY NEGATIVE: 1

## 2019-09-12 ENCOUNTER — HOSPITAL ENCOUNTER (OUTPATIENT)
Age: 79
Discharge: HOME | End: 2019-09-12
Payer: MEDICARE

## 2019-09-12 DIAGNOSIS — E87.6 HYPOKALEMIA: Primary | ICD-10-CM

## 2019-09-12 DIAGNOSIS — R53.82 CHRONIC FATIGUE: ICD-10-CM

## 2019-09-12 DIAGNOSIS — R73.09 ABNORMAL GLUCOSE: ICD-10-CM

## 2019-09-12 LAB
ABSOLUTE EOS #: 0.3 K/UL (ref 0–0.4)
ABSOLUTE IMMATURE GRANULOCYTE: ABNORMAL K/UL (ref 0–0.3)
ABSOLUTE LYMPH #: 0.8 K/UL (ref 1–4.8)
ABSOLUTE MONO #: 0.4 K/UL (ref 0–1)
ANION GAP SERPL CALCULATED.3IONS-SCNC: 12 MMOL/L (ref 9–17)
BASOPHILS # BLD: 0 % (ref 0–2)
BASOPHILS ABSOLUTE: 0 K/UL (ref 0–0.2)
BUN BLDV-MCNC: 15 MG/DL (ref 8–23)
BUN/CREAT BLD: 13 (ref 9–20)
CALCIUM SERPL-MCNC: 9.6 MG/DL (ref 8.6–10.4)
CHLORIDE BLD-SCNC: 110 MMOL/L (ref 98–107)
CO2: 22 MMOL/L (ref 20–31)
CORTISOL COLLECTION INFO: 800
CORTISOL: 17.6 UG/DL (ref 2.7–18.4)
CREAT SERPL-MCNC: 1.13 MG/DL (ref 0.5–0.9)
DIFFERENTIAL TYPE: YES
EOSINOPHILS RELATIVE PERCENT: 5 % (ref 0–5)
ESTIMATED AVERAGE GLUCOSE: 117 MG/DL
GFR AFRICAN AMERICAN: 56 ML/MIN
GFR NON-AFRICAN AMERICAN: 46 ML/MIN
GFR SERPL CREATININE-BSD FRML MDRD: ABNORMAL ML/MIN/{1.73_M2}
GFR SERPL CREATININE-BSD FRML MDRD: ABNORMAL ML/MIN/{1.73_M2}
GLUCOSE BLD-MCNC: 118 MG/DL (ref 70–99)
HBA1C MFR BLD: 5.7 % (ref 4.8–5.9)
HCT VFR BLD CALC: 36.3 % (ref 36–46)
HEMOGLOBIN: 11.8 G/DL (ref 12–16)
IMMATURE GRANULOCYTES: ABNORMAL %
LYMPHOCYTES # BLD: 15 % (ref 15–40)
MCH RBC QN AUTO: 30.2 PG (ref 26–34)
MCHC RBC AUTO-ENTMCNC: 32.6 G/DL (ref 31–37)
MCV RBC AUTO: 92.7 FL (ref 80–100)
MONOCYTES # BLD: 7 % (ref 4–8)
NRBC AUTOMATED: ABNORMAL PER 100 WBC
PDW BLD-RTO: 14.9 % (ref 12.1–15.2)
PLATELET # BLD: 298 K/UL (ref 140–450)
PLATELET ESTIMATE: ABNORMAL
PMV BLD AUTO: ABNORMAL FL (ref 6–12)
POTASSIUM SERPL-SCNC: 3.2 MMOL/L (ref 3.7–5.3)
RBC # BLD: 3.92 M/UL (ref 4–5.2)
RBC # BLD: ABNORMAL 10*6/UL
SEG NEUTROPHILS: 73 % (ref 47–75)
SEGMENTED NEUTROPHILS ABSOLUTE COUNT: 4 K/UL (ref 2.5–7)
SODIUM BLD-SCNC: 144 MMOL/L (ref 135–144)
WBC # BLD: 5.4 K/UL (ref 3.5–11)
WBC # BLD: ABNORMAL 10*3/UL

## 2019-09-12 PROCEDURE — 83036 HEMOGLOBIN GLYCOSYLATED A1C: CPT

## 2019-09-12 PROCEDURE — 85025 COMPLETE CBC W/AUTO DIFF WBC: CPT

## 2019-09-12 PROCEDURE — 80048 BASIC METABOLIC PNL TOTAL CA: CPT

## 2019-09-12 PROCEDURE — 82533 TOTAL CORTISOL: CPT

## 2019-09-12 PROCEDURE — 36415 COLL VENOUS BLD VENIPUNCTURE: CPT

## 2019-09-12 RX ORDER — FLUCONAZOLE 150 MG/1
TABLET ORAL
Qty: 2 TABLET | Refills: 0 | Status: SHIPPED | OUTPATIENT
Start: 2019-09-12 | End: 2020-05-29 | Stop reason: ALTCHOICE

## 2019-09-12 RX ORDER — TRIAMCINOLONE ACETONIDE 1 MG/G
CREAM TOPICAL
Qty: 30 G | Refills: 0 | Status: SHIPPED | OUTPATIENT
Start: 2019-09-12 | End: 2019-12-18 | Stop reason: SDUPTHER

## 2019-09-12 RX ORDER — POTASSIUM CHLORIDE 20 MEQ/1
40 TABLET, EXTENDED RELEASE ORAL DAILY
Qty: 14 TABLET | Refills: 0 | Status: SHIPPED | OUTPATIENT
Start: 2019-09-12 | End: 2019-09-17

## 2019-09-13 ENCOUNTER — NURSE ONLY (OUTPATIENT)
Dept: FAMILY MEDICINE CLINIC | Age: 79
End: 2019-09-13
Payer: MEDICARE

## 2019-09-13 DIAGNOSIS — R30.0 DYSURIA: Primary | ICD-10-CM

## 2019-09-13 PROCEDURE — 81002 URINALYSIS NONAUTO W/O SCOPE: CPT | Performed by: FAMILY MEDICINE

## 2019-09-17 ENCOUNTER — TELEPHONE (OUTPATIENT)
Dept: FAMILY MEDICINE CLINIC | Age: 79
End: 2019-09-17

## 2019-09-17 ENCOUNTER — HOSPITAL ENCOUNTER (OUTPATIENT)
Age: 79
Discharge: HOME | End: 2019-09-17
Payer: MEDICARE

## 2019-09-17 DIAGNOSIS — R35.0 URINE FREQUENCY: ICD-10-CM

## 2019-09-17 DIAGNOSIS — M54.50 ACUTE BILATERAL LOW BACK PAIN WITHOUT SCIATICA: Primary | ICD-10-CM

## 2019-09-17 DIAGNOSIS — E87.6 HYPOKALEMIA: ICD-10-CM

## 2019-09-17 LAB — POTASSIUM SERPL-SCNC: 4 MMOL/L (ref 3.7–5.3)

## 2019-09-17 PROCEDURE — 84132 ASSAY OF SERUM POTASSIUM: CPT

## 2019-09-17 PROCEDURE — 36415 COLL VENOUS BLD VENIPUNCTURE: CPT

## 2019-09-17 RX ORDER — POTASSIUM CHLORIDE 20MEQ/15ML
40 LIQUID (ML) ORAL DAILY
Qty: 900 ML | Refills: 2 | Status: SHIPPED | OUTPATIENT
Start: 2019-09-17 | End: 2019-11-12 | Stop reason: CLARIF

## 2019-09-17 NOTE — TELEPHONE ENCOUNTER
----- Message from Alondra Jolly DO sent at 9/17/2019 10:52 AM EDT -----  K normal, cont same supplementation

## 2019-09-20 ENCOUNTER — HOSPITAL ENCOUNTER (OUTPATIENT)
Age: 79
Discharge: HOME | End: 2019-09-20
Payer: MEDICARE

## 2019-09-20 ENCOUNTER — TELEPHONE (OUTPATIENT)
Dept: FAMILY MEDICINE CLINIC | Age: 79
End: 2019-09-20

## 2019-09-20 DIAGNOSIS — M54.50 ACUTE BILATERAL LOW BACK PAIN WITHOUT SCIATICA: ICD-10-CM

## 2019-09-20 DIAGNOSIS — R35.0 URINE FREQUENCY: ICD-10-CM

## 2019-09-20 LAB
-: ABNORMAL
AMORPHOUS: ABNORMAL
BACTERIA: ABNORMAL
BILIRUBIN URINE: NEGATIVE
CASTS UA: ABNORMAL /LPF
COLOR: YELLOW
COMMENT UA: ABNORMAL
CRYSTALS, UA: ABNORMAL /HPF
EPITHELIAL CELLS UA: ABNORMAL /HPF
GLUCOSE URINE: NEGATIVE
KETONES, URINE: NEGATIVE
LEUKOCYTE ESTERASE, URINE: NEGATIVE
MUCUS: ABNORMAL
NITRITE, URINE: NEGATIVE
OTHER OBSERVATIONS UA: ABNORMAL
PH UA: 5 (ref 5–8)
PROTEIN UA: NEGATIVE
RBC UA: ABNORMAL /HPF (ref 0–2)
RENAL EPITHELIAL, UA: ABNORMAL /HPF
SPECIFIC GRAVITY UA: 1.01 (ref 1–1.03)
TRICHOMONAS: ABNORMAL
TURBIDITY: CLEAR
URINE HGB: ABNORMAL
UROBILINOGEN, URINE: NORMAL
WBC UA: ABNORMAL /HPF
YEAST: ABNORMAL

## 2019-09-20 PROCEDURE — 81001 URINALYSIS AUTO W/SCOPE: CPT

## 2019-09-20 PROCEDURE — 87086 URINE CULTURE/COLONY COUNT: CPT

## 2019-09-20 RX ORDER — CEPHALEXIN 500 MG/1
500 CAPSULE ORAL 2 TIMES DAILY
Qty: 14 CAPSULE | Refills: 0 | Status: SHIPPED | OUTPATIENT
Start: 2019-09-20 | End: 2019-09-27

## 2019-09-20 NOTE — TELEPHONE ENCOUNTER
----- Message from Kemar Zambrano DO sent at 9/20/2019 10:37 AM EDT -----  Small chance it could be a uti.  May try keflex 500 bid x 7 days

## 2019-09-21 LAB
CULTURE: NORMAL
Lab: NORMAL
SPECIMEN DESCRIPTION: NORMAL

## 2019-10-08 RX ORDER — BUPROPION HYDROCHLORIDE 150 MG/1
150 TABLET ORAL EVERY MORNING
Qty: 90 TABLET | Refills: 1 | Status: SHIPPED | OUTPATIENT
Start: 2019-10-08 | End: 2020-05-04 | Stop reason: SDUPTHER

## 2019-11-12 ENCOUNTER — TELEPHONE (OUTPATIENT)
Dept: FAMILY MEDICINE CLINIC | Age: 79
End: 2019-11-12

## 2019-11-12 DIAGNOSIS — E87.6 HYPOKALEMIA: Primary | ICD-10-CM

## 2019-11-12 RX ORDER — POTASSIUM CHLORIDE 20 MEQ/1
40 TABLET, EXTENDED RELEASE ORAL 2 TIMES DAILY
Qty: 360 TABLET | Refills: 3 | Status: SHIPPED | OUTPATIENT
Start: 2019-11-12 | End: 2020-11-18

## 2019-11-18 ENCOUNTER — HOSPITAL ENCOUNTER (OUTPATIENT)
Age: 79
Discharge: HOME | End: 2019-11-18
Payer: MEDICARE

## 2019-11-18 DIAGNOSIS — E87.6 HYPOKALEMIA: ICD-10-CM

## 2019-11-18 LAB — POTASSIUM SERPL-SCNC: 4.2 MMOL/L (ref 3.7–5.3)

## 2019-11-18 PROCEDURE — 36415 COLL VENOUS BLD VENIPUNCTURE: CPT

## 2019-11-18 PROCEDURE — 84132 ASSAY OF SERUM POTASSIUM: CPT

## 2019-12-02 RX ORDER — FLUOXETINE HYDROCHLORIDE 40 MG/1
40 CAPSULE ORAL DAILY
Qty: 90 CAPSULE | Refills: 1 | Status: SHIPPED | OUTPATIENT
Start: 2019-12-02 | End: 2020-05-29 | Stop reason: SDUPTHER

## 2019-12-02 RX ORDER — METOPROLOL SUCCINATE 25 MG/1
25 TABLET, EXTENDED RELEASE ORAL DAILY
Qty: 90 TABLET | Refills: 1 | Status: SHIPPED | OUTPATIENT
Start: 2019-12-02 | End: 2020-05-29 | Stop reason: SDUPTHER

## 2019-12-18 RX ORDER — TRIAMCINOLONE ACETONIDE 1 MG/G
CREAM TOPICAL
Qty: 30 G | Refills: 0 | Status: SHIPPED | OUTPATIENT
Start: 2019-12-18 | End: 2020-05-04 | Stop reason: SDUPTHER

## 2019-12-18 RX ORDER — PREDNISONE 20 MG/1
20 TABLET ORAL DAILY PRN
Qty: 30 TABLET | Refills: 0 | Status: SHIPPED | OUTPATIENT
Start: 2019-12-18 | End: 2020-05-04 | Stop reason: SDUPTHER

## 2020-02-17 RX ORDER — OMEPRAZOLE 20 MG/1
20 CAPSULE, DELAYED RELEASE ORAL DAILY
Qty: 90 CAPSULE | Refills: 1 | Status: SHIPPED | OUTPATIENT
Start: 2020-02-17 | End: 2020-05-29 | Stop reason: SDUPTHER

## 2020-03-09 ENCOUNTER — HOSPITAL ENCOUNTER (OUTPATIENT)
Age: 80
Discharge: HOME | End: 2020-03-11
Payer: MEDICARE

## 2020-03-09 ENCOUNTER — HOSPITAL ENCOUNTER (OUTPATIENT)
Age: 80
Discharge: HOME | End: 2020-03-09
Payer: MEDICARE

## 2020-03-09 ENCOUNTER — HOSPITAL ENCOUNTER (OUTPATIENT)
Dept: GENERAL RADIOLOGY | Age: 80
Discharge: HOME | End: 2020-03-11
Payer: MEDICARE

## 2020-03-09 LAB
ABSOLUTE EOS #: 0.6 K/UL (ref 0–0.4)
ABSOLUTE IMMATURE GRANULOCYTE: ABNORMAL K/UL (ref 0–0.3)
ABSOLUTE LYMPH #: 1.1 K/UL (ref 1–4.8)
ABSOLUTE MONO #: 0.5 K/UL (ref 0–1)
ALBUMIN SERPL-MCNC: 4.1 G/DL (ref 3.5–5.2)
ALBUMIN/GLOBULIN RATIO: ABNORMAL (ref 1–2.5)
ALP BLD-CCNC: 77 U/L (ref 35–104)
ALT SERPL-CCNC: 19 U/L (ref 5–33)
ANION GAP SERPL CALCULATED.3IONS-SCNC: 11 MMOL/L (ref 9–17)
AST SERPL-CCNC: 21 U/L
BASOPHILS # BLD: 1 % (ref 0–2)
BASOPHILS ABSOLUTE: 0 K/UL (ref 0–0.2)
BILIRUB SERPL-MCNC: 0.23 MG/DL (ref 0.3–1.2)
BUN BLDV-MCNC: 23 MG/DL (ref 8–23)
BUN/CREAT BLD: 18 (ref 9–20)
CALCIUM SERPL-MCNC: 9.8 MG/DL (ref 8.6–10.4)
CHLORIDE BLD-SCNC: 111 MMOL/L (ref 98–107)
CHOLESTEROL/HDL RATIO: 2.7
CHOLESTEROL: 228 MG/DL
CO2: 21 MMOL/L (ref 20–31)
CREAT SERPL-MCNC: 1.31 MG/DL (ref 0.5–0.9)
DIFFERENTIAL TYPE: YES
EOSINOPHILS RELATIVE PERCENT: 8 % (ref 0–5)
GFR AFRICAN AMERICAN: 47 ML/MIN
GFR NON-AFRICAN AMERICAN: 39 ML/MIN
GFR SERPL CREATININE-BSD FRML MDRD: ABNORMAL ML/MIN/{1.73_M2}
GFR SERPL CREATININE-BSD FRML MDRD: ABNORMAL ML/MIN/{1.73_M2}
GLUCOSE BLD-MCNC: 102 MG/DL (ref 70–99)
HCT VFR BLD CALC: 37.7 % (ref 36–46)
HDLC SERPL-MCNC: 85 MG/DL
HEMOGLOBIN: 12.1 G/DL (ref 12–16)
IMMATURE GRANULOCYTES: ABNORMAL %
LDL CHOLESTEROL: 125 MG/DL (ref 0–130)
LYMPHOCYTES # BLD: 17 % (ref 15–40)
MAGNESIUM: 2 MG/DL (ref 1.6–2.6)
MCH RBC QN AUTO: 29.8 PG (ref 26–34)
MCHC RBC AUTO-ENTMCNC: 32.1 G/DL (ref 31–37)
MCV RBC AUTO: 92.7 FL (ref 80–100)
MONOCYTES # BLD: 8 % (ref 4–8)
NRBC AUTOMATED: ABNORMAL PER 100 WBC
PATIENT FASTING?: YES
PDW BLD-RTO: 16.3 % (ref 12.1–15.2)
PLATELET # BLD: 333 K/UL (ref 140–450)
PLATELET ESTIMATE: ABNORMAL
PMV BLD AUTO: ABNORMAL FL (ref 6–12)
POTASSIUM SERPL-SCNC: 4.3 MMOL/L (ref 3.7–5.3)
RBC # BLD: 4.07 M/UL (ref 4–5.2)
RBC # BLD: ABNORMAL 10*6/UL
SEG NEUTROPHILS: 66 % (ref 47–75)
SEGMENTED NEUTROPHILS ABSOLUTE COUNT: 4.4 K/UL (ref 2.5–7)
SODIUM BLD-SCNC: 143 MMOL/L (ref 135–144)
TOTAL PROTEIN: 6.5 G/DL (ref 6.4–8.3)
TRIGL SERPL-MCNC: 89 MG/DL
TSH SERPL DL<=0.05 MIU/L-ACNC: 2.27 MIU/L (ref 0.3–5)
VITAMIN D 25-HYDROXY: 51.6 NG/ML (ref 30–100)
VLDLC SERPL CALC-MCNC: ABNORMAL MG/DL (ref 1–30)
WBC # BLD: 6.7 K/UL (ref 3.5–11)
WBC # BLD: ABNORMAL 10*3/UL

## 2020-03-09 PROCEDURE — 85025 COMPLETE CBC W/AUTO DIFF WBC: CPT

## 2020-03-09 PROCEDURE — 93005 ELECTROCARDIOGRAM TRACING: CPT

## 2020-03-09 PROCEDURE — 80061 LIPID PANEL: CPT

## 2020-03-09 PROCEDURE — 82306 VITAMIN D 25 HYDROXY: CPT

## 2020-03-09 PROCEDURE — 36415 COLL VENOUS BLD VENIPUNCTURE: CPT

## 2020-03-09 PROCEDURE — 80053 COMPREHEN METABOLIC PANEL: CPT

## 2020-03-09 PROCEDURE — 71046 X-RAY EXAM CHEST 2 VIEWS: CPT

## 2020-03-09 PROCEDURE — 83735 ASSAY OF MAGNESIUM: CPT

## 2020-03-09 PROCEDURE — 84443 ASSAY THYROID STIM HORMONE: CPT

## 2020-03-10 LAB
EKG ATRIAL RATE: 71 BPM
EKG P AXIS: 64 DEGREES
EKG P-R INTERVAL: 134 MS
EKG Q-T INTERVAL: 392 MS
EKG QRS DURATION: 76 MS
EKG QTC CALCULATION (BAZETT): 425 MS
EKG R AXIS: 74 DEGREES
EKG T AXIS: 37 DEGREES
EKG VENTRICULAR RATE: 71 BPM

## 2020-03-10 PROCEDURE — 93010 ELECTROCARDIOGRAM REPORT: CPT | Performed by: INTERNAL MEDICINE

## 2020-03-16 ENCOUNTER — OFFICE VISIT (OUTPATIENT)
Dept: CARDIOLOGY CLINIC | Age: 80
End: 2020-03-16
Payer: MEDICARE

## 2020-03-16 VITALS
DIASTOLIC BLOOD PRESSURE: 80 MMHG | OXYGEN SATURATION: 98 % | HEART RATE: 83 BPM | WEIGHT: 142 LBS | SYSTOLIC BLOOD PRESSURE: 150 MMHG | BODY MASS INDEX: 21.59 KG/M2

## 2020-03-16 PROCEDURE — G8428 CUR MEDS NOT DOCUMENT: HCPCS | Performed by: INTERNAL MEDICINE

## 2020-03-16 PROCEDURE — 1090F PRES/ABSN URINE INCON ASSESS: CPT | Performed by: INTERNAL MEDICINE

## 2020-03-16 PROCEDURE — G8420 CALC BMI NORM PARAMETERS: HCPCS | Performed by: INTERNAL MEDICINE

## 2020-03-16 PROCEDURE — G8482 FLU IMMUNIZE ORDER/ADMIN: HCPCS | Performed by: INTERNAL MEDICINE

## 2020-03-16 PROCEDURE — 1123F ACP DISCUSS/DSCN MKR DOCD: CPT | Performed by: INTERNAL MEDICINE

## 2020-03-16 PROCEDURE — 1036F TOBACCO NON-USER: CPT | Performed by: INTERNAL MEDICINE

## 2020-03-16 PROCEDURE — G8399 PT W/DXA RESULTS DOCUMENT: HCPCS | Performed by: INTERNAL MEDICINE

## 2020-03-16 PROCEDURE — 99214 OFFICE O/P EST MOD 30 MIN: CPT | Performed by: INTERNAL MEDICINE

## 2020-03-16 PROCEDURE — 4040F PNEUMOC VAC/ADMIN/RCVD: CPT | Performed by: INTERNAL MEDICINE

## 2020-03-16 RX ORDER — FUROSEMIDE 20 MG/1
20 TABLET ORAL DAILY
Qty: 90 TABLET | Refills: 3 | Status: SHIPPED | OUTPATIENT
Start: 2020-03-16 | End: 2020-05-29 | Stop reason: SDUPTHER

## 2020-03-16 NOTE — LETTER
Torie Mann M.D. [address] [place] [address]  [phone]          2020          Jeanne Alvarado, DO  [address] [address]      RE:   Aurelio Lundy. Nirajr  :  1940      Dear Dr. Davian Reed:    CHIEF COMPLAINT:  1. Coronary artery disease. 2.  Hypertension. HISTORY OF PRESENT ILLNESS:  I had the pleasure of seeing Mrs. Kristine Sen in our office on 2020. She is a pleasant 42-year-old female who had chest pain and a catheterization by Dr. Stafford in  that showed 80% disease in the ostial right coronary artery with unremarkable  circumflex, LAD had 80% disease, EF was 45% to 50%. She had subsequent open heart surgery by Dr. Constantino Cabral on 2006, with a LIMA to the LAD, a vein graft to the diagonal, and a vein graft to the right coronary artery. In 2006, she had a catheterization that showed EF of 50%, with an occluded vein graft to the right coronary artery, occluded vein graft to the diagonal, the LIMA was patent to the LAD, and we dilated her right coronary artery placing a 3.0 x 16 mm drug-eluting Taxus stent. I did a catheterization on 2012 that showed diminutive left internal mammary artery to the LAD with 90% disease in the LAD and diagonal bifurcation, with an FFR of 0.79, and 90% disease in the ostium of the right coronary artery, with 40% circumflex, the vein grafts were all occluded, EF of 60%. We did staged angioplasty and stenting of the LAD and diagonal, placing a 3.5 x 24 mm Promus stent in the proximal LAD and dilated the diagonal through the stent struts. We brought her back on 2012, and did complex angioplasty of the ostium of the right coronary artery, placing a 3.0 x 20 mm Promus stent.     On 11/15/2016, she had shortness of breath and loss of energy and we tried conservative management but this continued to worsen, and therefore, I did a catheterization on 12/07/2016, that showed 95% to 99% disease in the proximal right coronary artery, which we dilated with a  3.25 x 15 mm Trek balloon within the stent, and 90% disease in the LAD beyond the stent, in which I placed a 2.75 x 18 mm Xience stent. She had 70% disease in the OM branch of the circumflex, EF was 50% to 55%. She gets myalgias with all statins and gets nausea with Livalo, and therefore, she does not tolerate any statins and her hyperlipidemia is untreated. 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 This was followed by radiation therapy. She has done well since that time. There is no evidence of reoccurrence. She has had no PND, orthopnea or pedal edema. No syncope or near syncope. Denies any unusual chest pain or any unusual shortness of breath. She really has no complaints as I see her today. Her appetite is slowly improving and her weight is improving. RISK FACTORS FOR CORONARY ARTERY DISEASE:  Known CAD:  Positive. Hypertension:  Positive. Hyperlipidemia:  Positive. PTCA:  Positive. Bypass Surgery:  Positive. Other Family Members:  Positive. Peripheral Vascular Disease:  Positive. Diabetes:  Negative. MEDICATIONS AT HOME:  She is currently on aspirin 81 mg daily, Wellbutrin  mg daily, calcium daily, Diflucan 150 mg p.r.n., Prozac 40 mg daily, Lasix 20 mg daily, Synthroid 75 mcg daily, Toprol-XL 25 mg daily, Prilosec 20 mg daily, potassium 20 mEq b.i.d., Deltasone 20 mg p.r.n. for arthritis. PAST MEDICAL HISTORY:  1.  Back surgery and spinal fusion on 10/01/2010.  2.  Peripheral vascular disease with 40% disease in the right internal carotid artery and with no disease in the left internal carotid artery. 3.  Severe arthritis. 4.  Status post oophorectomy. 5.  Depression. 6.  Euthyroid, on treatment. 7.  Cardiac as described above. 8.  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 9.  Bypass surgery and stenting, as stated previously. FAMILY HISTORY:  Mother had MI. Father had MI.    SOCIAL HISTORY:  She is 78years old, 2 children. Does not smoke or drink alcohol. She and her  lived in [place] for 20 years where he was stationed at [employer] base and came back 26 years ago. They have 2 sons, one in [place] and one in [place]. She and her  would usually go to [place] in winter, which they did not do this year. She remains active. REVIEW OF SYSTEMS:  Cardiac as above. Other systems reviewed including constitutional, eyes, ears, nose and throat, cardiovascular, respiratory, GI, , musculoskeletal, integumentary, neurologic, endocrine, hematologic and allergic/immunologic are negative except for what is described above. No weight loss or weight gain. No change in bowel habits. No blood in stools. No fevers, sweats or chills. PHYSICAL EXAMINATION:  VITAL SIGNS:  Her blood pressure was 150/80 with a heart rate of 83 and regular. Respiratory rate 18. O2 saturation 98%. Weight 142 pounds. GENERAL:  She is a pleasant 22-year-old female. Denied pain. She was oriented to person, place and time. Answered questions appropriately. SKIN:  No unusual skin changes. HEENT:  The pupils are equally round and intact. Mucous membranes were dry. NECK:  No JVD. Good carotid pulses. No carotid bruits. No lymphadenopathy or thyromegaly. CARDIOVASCULAR EXAM:  S1 and S2 were normal.  No S3 or S4. Soft systolic blowing type murmur. No diastolic murmur. PMI was normal.  No lift, thrust, or pericardial friction rub. LUNGS:  Quite clear to auscultation and percussion. ABDOMEN:  Soft and nontender. Good bowel sounds. EXTREMITIES:  Good femoral pulses. Good pedal pulses. No pedal edema. Skin was warm and dry. No calf tenderness. Nail beds pink. Good cap refill. PULSES:  Bilateral symmetrical radial, brachial and carotid pulses. No carotid bruits. Good femoral and pedal pulses. NEUROLOGIC EXAM:  Within normal limits. PSYCHIATRIC EXAM:  Within normal limits. LABORATORY DATA:  From 03/09/2020, sodium was 143, potassium 4.3, BUN 23, creatinine 1.3. Her GFR was 39. Cholesterol 228 with an HDL of 85, , triglycerides 89. ALT was 19, AST was 21. TSH 2.27. Vitamin D was 51.6. White count 6.7, hemoglobin 12.1 with a platelet count of 589,861. EKG showed normal sinus rhythm and was normal.    Chest x-ray was unremarkable. Bedside echocardiogram showed normal LV function, EF in the 50% range. IMPRESSION:  1. Coronary artery disease. 2.  Asymptomatic from a cardiac standpoint. 3.  Catheterization in 2005 showing 80% LAD and diagonal, unremarkable circumflex, 80% right coronary artery, EF of 45% to 50%. 4.  Open heart surgery on 05/12/2006 by Dr. Jhon Wynn, with a LIMA to the LAD, a vein graft to the diagonal, and a vein graft to the right coronary artery. 5.  Catheterization in 07/2006, showing occluded vein graft to the right coronary artery and diagonal, patent LIMA to the LAD, with angioplasty of the ostium of the right coronary artery, placing a 3.0 x 16 mm Taxus stent. 6.  Catheterization on 11/07/2012, showing nonfunctional LIMA to the LAD with occluded vein grafts, with 90% disease in the LAD and 90% ostium of the right coronary artery with in-stent stenosis, mild disease in the circumflex, EF of 60%, with angioplasty of the LAD placing a 3.5 x 24 mm Promus stent. 7.  Staged angioplasty of the right coronary artery on 11/14/2012, placing a 3.0 x 20 mm Promus stent in the ostium.   8.  Last catheterization on 12/07/2016 after angina that showed 95% disease in the proximal right coronary artery with in-stent stenosis that was intervened upon with angioplasty alone, using 3.25 x 15 mm Trek balloon and 90% disease in the proximal LAD beyond the previous stent, which was stented with a 2.75 x 18 mm Xience stent, EF of 55%. 9.  Hyperlipidemia, unable to take any statins, untreated, with Praluent being not feasible financially. 10.  Peripheral vascular disease with 40% to 60% disease in the right internal carotid artery. 11.  Chronic renal insufficiency with a creatinine at 1.3, about at baseline. 12.  Euthyroid, on treatment. 13.  Squamous cell carcinoma of the parotid gland, status post left parotidectomy along with neck dissection, with no reoccurrence. PLAN:  1. See in 1 year. 2.  Keep same medications. DISCUSSION:  Mrs. Mariah Angela is doing amazingly well. She has made a good recovery from her surgery and there is no evidence that there has been any reoccurrence of her squamous cell carcinoma. I made no change in medications. It is reasonable for me to see her in a year. If she would start developing any unusual shortness of breath, loss of energy, of course, I would want to see her earlier. At this time, she is doing well. Thank you very much for allowing me the privilege of seeing Mrs. Smith. If you have any questions on my thoughts, please do not hesitate to contact me.      Sincerely,        Virgie Jarrell    D: 03/16/2020 10:29:25     T: 03/16/2020 10:33:50     [id number]  Job#: [id number]   Doc#: [id number]

## 2020-03-16 NOTE — PROGRESS NOTES
Ov Dr. Blanca Ferrell for one year follow up  No chest pain heaviness or palpitations  No sob  Has had radiation for cancer left check   Done now and doing better   Dr. Maggie Callaway in [place] [facility]   Lost 25 pounds since jaw surgery   Bedside echo done    No changes  Follow up in one year

## 2020-05-04 RX ORDER — BUPROPION HYDROCHLORIDE 150 MG/1
150 TABLET ORAL EVERY MORNING
Qty: 90 TABLET | Refills: 1 | Status: SHIPPED | OUTPATIENT
Start: 2020-05-04 | End: 2020-10-23 | Stop reason: SDUPTHER

## 2020-05-04 RX ORDER — PREDNISONE 20 MG/1
20 TABLET ORAL DAILY PRN
Qty: 30 TABLET | Refills: 0 | Status: SHIPPED | OUTPATIENT
Start: 2020-05-04 | End: 2021-01-01 | Stop reason: SDUPTHER

## 2020-05-04 RX ORDER — TRIAMCINOLONE ACETONIDE 1 MG/G
CREAM TOPICAL
Qty: 30 G | Refills: 0 | Status: SHIPPED | OUTPATIENT
Start: 2020-05-04 | End: 2021-07-30

## 2020-05-29 ENCOUNTER — OFFICE VISIT (OUTPATIENT)
Dept: FAMILY MEDICINE CLINIC | Age: 80
End: 2020-05-29
Payer: MEDICARE

## 2020-05-29 VITALS
DIASTOLIC BLOOD PRESSURE: 78 MMHG | SYSTOLIC BLOOD PRESSURE: 132 MMHG | BODY MASS INDEX: 21.67 KG/M2 | HEIGHT: 68 IN | WEIGHT: 143 LBS | HEART RATE: 64 BPM | OXYGEN SATURATION: 99 %

## 2020-05-29 PROCEDURE — G0438 PPPS, INITIAL VISIT: HCPCS | Performed by: FAMILY MEDICINE

## 2020-05-29 PROCEDURE — 4040F PNEUMOC VAC/ADMIN/RCVD: CPT | Performed by: FAMILY MEDICINE

## 2020-05-29 PROCEDURE — 1123F ACP DISCUSS/DSCN MKR DOCD: CPT | Performed by: FAMILY MEDICINE

## 2020-05-29 RX ORDER — HYDROXYZINE PAMOATE 25 MG/1
CAPSULE ORAL
COMMUNITY
Start: 2020-04-21 | End: 2021-05-07

## 2020-05-29 RX ORDER — FLUOXETINE HYDROCHLORIDE 40 MG/1
40 CAPSULE ORAL DAILY
Qty: 90 CAPSULE | Refills: 1 | Status: SHIPPED | OUTPATIENT
Start: 2020-05-29 | End: 2020-10-26

## 2020-05-29 RX ORDER — OMEPRAZOLE 20 MG/1
20 CAPSULE, DELAYED RELEASE ORAL DAILY
Qty: 90 CAPSULE | Refills: 1 | Status: SHIPPED | OUTPATIENT
Start: 2020-05-29 | End: 2020-11-18

## 2020-05-29 RX ORDER — FAMCICLOVIR 500 MG/1
TABLET, FILM COATED ORAL
COMMUNITY
Start: 2020-04-21 | End: 2021-05-07

## 2020-05-29 RX ORDER — LEVOTHYROXINE SODIUM 0.07 MG/1
75 TABLET ORAL DAILY
Qty: 90 TABLET | Refills: 1 | Status: SHIPPED | OUTPATIENT
Start: 2020-05-29 | End: 2020-10-26

## 2020-05-29 RX ORDER — METOPROLOL SUCCINATE 25 MG/1
25 TABLET, EXTENDED RELEASE ORAL DAILY
Qty: 90 TABLET | Refills: 1 | Status: SHIPPED | OUTPATIENT
Start: 2020-05-29 | End: 2020-10-26

## 2020-05-29 RX ORDER — FUROSEMIDE 20 MG/1
20 TABLET ORAL DAILY
Qty: 90 TABLET | Refills: 1 | Status: SHIPPED | OUTPATIENT
Start: 2020-05-29 | End: 2020-10-26

## 2020-05-29 ASSESSMENT — PATIENT HEALTH QUESTIONNAIRE - PHQ9
SUM OF ALL RESPONSES TO PHQ QUESTIONS 1-9: 0
SUM OF ALL RESPONSES TO PHQ QUESTIONS 1-9: 0

## 2020-05-29 ASSESSMENT — LIFESTYLE VARIABLES: HOW OFTEN DO YOU HAVE A DRINK CONTAINING ALCOHOL: 0

## 2020-05-29 NOTE — PROGRESS NOTES
by mouth daily. Yes Historical Provider, MD   bisacodyl (DULCOLAX) 5 MG EC tablet Take 5 mg by mouth daily as needed. Take three daily    Yes Historical Provider, MD   Calcium Carbonate-Vitamin D (CALCIUM + D) 600-200 MG-UNIT TABS Take  by mouth daily. Yes Historical Provider, MD   hydrOXYzine (VISTARIL) 25 MG capsule TAKE 1 CAPSULE BY MOUTH EVERY 8 HOURS AS NEEDED  Historical Provider, MD   famciclovir (FAMVIR) 500 MG tablet TAKE 1 TABLET BY MOUTH THREE TIMES DAILY FOR 7 DAYS  Historical Provider, MD       Past Medical History:   Diagnosis Date    CAD (coronary artery disease)     Chronic back pain     Hyperlipidemia     Hypertension     Hypothyroidism     Post PTCA     Transfusion history        Past Surgical History:   Procedure Laterality Date    ANGIOPLASTY  12/07/2016    [facility] Madisyn Collado @ [facility] x 1     COLONOSCOPY  6/11/2013    [facility] GERONIMO    CORONARY ANGIOPLASTY WITH STENT PLACEMENT  2006,11/14/2012    CORONARY ARTERY BYPASS GRAFT  2006    HEMORRHOID SURGERY      OVARY REMOVAL Right     NV NJX DX/THER SBST INTRLMNR LMBR/SAC W/IMG GDN N/A 8/28/2018    EPIDURAL STEROID INJECTION-CUADAL performed by Olivia Tejada MD at [address] [place] [address]    laminectomy    UPPER GASTROINTESTINAL ENDOSCOPY  6/11/2013       Family History   Problem Relation Age of Onset    Heart Disease Mother     Cancer Father         prostate    Dementia Father        CareTeam (Including outside providers/suppliers regularly involved in providing care):   Patient Care Team:  India Arzate DO as PCP - [address] [place] [address] as PCP - [facility] Empaneled Provider    Wt Readings from Last 3 Encounters:   05/29/20 143 lb (64.9 kg)   03/16/20 142 lb (64.4 kg)   09/06/19 149 lb (67.6 kg)     Vitals:    05/29/20 1004   BP: 132/78   Pulse: 64   SpO2: 99%   Weight: 143 lb (64.9 kg)   Height: 5' 8\" (1.727 m)     Body mass index is 21.74 kg/m².     Based upon direct observation of the patient, evaluation of cognition 09/13/2019    Pneumococcal Conjugate 13-valent ([id number]) 10/27/2015    Pneumococcal Polysaccharide ([id number]) 10/08/2012    Tdap (Boostrix, Adacel) 10/30/2012    Zoster Live (Zostavax) 05/21/2013    Zoster Recombinant (Shingrix) 04/12/2018, 06/26/2018        Health Maintenance   Topic Date Due    Annual Wellness Visit (AWV)  06/19/2019    TSH testing  03/09/2021    Potassium monitoring  03/09/2021    Creatinine monitoring  03/09/2021    DTaP/Tdap/Td vaccine (2 - Td) 10/30/2022    DEXA (modify frequency per FRAX score)  Completed    Flu vaccine  Completed    Shingles Vaccine  Completed    Pneumococcal 65+ years Vaccine  Completed    Hepatitis A vaccine  Aged Out    Hepatitis B vaccine  Aged Out    Hib vaccine  Aged Out    Meningococcal (ACWY) vaccine  Aged Out     Recommendations for Carma Due: see orders and patient instructions/AVS.  . Recommended screening schedule for the next 5-10 years is provided to the patient in written form: see Patient Opal Shah was seen today for medicare awv. Diagnoses and all orders for this visit:    Routine general medical examination at a health care facility          Still takes prednisone here and there if arthritic pains get really bad. No nsaid's. Declines mammogram. Interested in colonoscopy. Reviewed records showing she had EGD and colonoscopy June 2013 and no polyps were found. Advised we can consider this in 6461 or certainly if she starts having any problems.

## 2020-05-29 NOTE — PROGRESS NOTES
reveals {MEMORY:41299}. {OPTIONAL FOR THIS VISIT TYPE - GENERAL PHYSICAL EXAM (THIS WILL AUTO-DELETE IF NOT IVQW):[id number]}    Patient's complete Health Risk Assessment and screening values have been reviewed and are found in Flowsheets. The following problems were reviewed today and where indicated follow up appointments were made and/or referrals ordered. Positive Risk Factor Screenings with Interventions:     No Positive Risk Factors identified today. Personalized Preventive Plan   Current Health Maintenance Status  Immunization History   Administered Date(s) Administered    Influenza Vaccine, unspecified formulation 09/19/2018    Influenza Virus Vaccine 09/20/2012, 09/19/2013, 09/17/2015, 10/04/2016, 09/27/2017, 09/13/2019    Influenza Whole 09/19/2013, 09/17/2015    Influenza, High Dose (Fluzone 65 yrs and older) 10/04/2016, 09/27/2017, 09/14/2018, 09/13/2019    Pneumococcal Conjugate 13-valent ([id number]) 10/27/2015    Pneumococcal Polysaccharide ([id number]) 10/08/2012    Tdap (Boostrix, Adacel) 10/30/2012    Zoster Live (Zostavax) 05/21/2013    Zoster Recombinant (Shingrix) 04/12/2018, 06/26/2018        Health Maintenance   Topic Date Due    Annual Wellness Visit (AWV)  06/19/2019    TSH testing  03/09/2021    Potassium monitoring  03/09/2021    Creatinine monitoring  03/09/2021    DTaP/Tdap/Td vaccine (2 - Td) 10/30/2022    DEXA (modify frequency per FRAX score)  Completed    Flu vaccine  Completed    Shingles Vaccine  Completed    Pneumococcal 65+ years Vaccine  Completed    Hepatitis A vaccine  Aged Out    Hepatitis B vaccine  Aged Out    Hib vaccine  Aged Out    Meningococcal (ACWY) vaccine  Aged Out     Recommendations for [employer] Due: see orders and patient instructions/AVS.  .   Recommended screening schedule for the next 5-10 years is provided to the patient in written form: see Patient Malina Woods was seen today for medicare awv.    Diagnoses and all orders for this visit:    Routine general medical examination at a health care facility

## 2020-10-23 RX ORDER — BUPROPION HYDROCHLORIDE 150 MG/1
150 TABLET ORAL EVERY MORNING
Qty: 90 TABLET | Refills: 1 | Status: SHIPPED | OUTPATIENT
Start: 2020-10-23 | End: 2021-02-17

## 2020-10-26 RX ORDER — FUROSEMIDE 20 MG/1
TABLET ORAL
Qty: 90 TABLET | Refills: 1 | Status: SHIPPED | OUTPATIENT
Start: 2020-10-26 | End: 2021-02-12

## 2020-10-26 RX ORDER — FLUOXETINE HYDROCHLORIDE 40 MG/1
CAPSULE ORAL
Qty: 90 CAPSULE | Refills: 1 | Status: SHIPPED | OUTPATIENT
Start: 2020-10-26 | End: 2021-02-12

## 2020-10-26 RX ORDER — LEVOTHYROXINE SODIUM 0.07 MG/1
TABLET ORAL
Qty: 90 TABLET | Refills: 1 | Status: SHIPPED | OUTPATIENT
Start: 2020-10-26 | End: 2021-02-12

## 2020-10-26 RX ORDER — METOPROLOL SUCCINATE 25 MG/1
TABLET, EXTENDED RELEASE ORAL
Qty: 90 TABLET | Refills: 1 | Status: SHIPPED | OUTPATIENT
Start: 2020-10-26 | End: 2021-02-12

## 2020-10-26 NOTE — TELEPHONE ENCOUNTER
Last OV: 5/29/2020 Medicare annual wellness    Next scheduled apt: Visit date not found    Patient is requesting refill of Lasix, Prozac,Levothyroxine, and Metoprolol tartrate thru [facility].        Rx's Pending

## 2020-11-18 RX ORDER — POTASSIUM CHLORIDE 20 MEQ/1
TABLET, EXTENDED RELEASE ORAL
Qty: 360 TABLET | Refills: 3 | Status: SHIPPED | OUTPATIENT
Start: 2020-11-18 | End: 2022-01-24

## 2020-11-18 RX ORDER — OMEPRAZOLE 20 MG/1
CAPSULE, DELAYED RELEASE ORAL
Qty: 90 CAPSULE | Refills: 1 | Status: SHIPPED | OUTPATIENT
Start: 2020-11-18 | End: 2021-02-08 | Stop reason: SDUPTHER

## 2020-11-18 NOTE — TELEPHONE ENCOUNTER
Last visit:  5/29/2020  Next Visit Date:    Future Appointments   Date Time Provider Joshua Aguirre   3/15/2021 10:00 AM MD Samara Hodges [facility] [[email or web address]]     Last Med refill:    Medication List:  Prior to Admission medications    Medication Sig Start Date End Date Taking? Authorizing Provider   metoprolol succinate (TOPROL XL) 25 MG extended release tablet TAKE 1 TABLET BY MOUTH EVERY DAY 10/26/20   Kirstin Meléndez, DO   levothyroxine (SYNTHROID) 75 MCG tablet TAKE 1 TABLET BY MOUTH EVERY DAY 10/26/20   Kirstin Meléndez, DO   FLUoxetine (PROZAC) 40 MG capsule TAKE 1 CAPSULE BY MOUTH EVERY DAY 10/26/20   Kirstin Meléndez, DO   furosemide (LASIX) 20 MG tablet TAKE 1 TABLET BY MOUTH EVERY DAY 10/26/20   Kirstin Meléndez, DO   buPROPion (WELLBUTRIN XL) 150 MG extended release tablet Take 1 tablet by mouth every morning 10/23/20   Kirstin Meléndez, DO   omeprazole (PRILOSEC) 20 MG delayed release capsule Take 1 capsule by mouth Daily 5/29/20   Kirstin Melédnez, DO   hydrOXYzine (VISTARIL) 25 MG capsule TAKE 1 CAPSULE BY MOUTH EVERY 8 HOURS AS NEEDED 4/21/20   Historical Provider, MD   famciclovir (FAMVIR) 500 MG tablet TAKE 1 TABLET BY MOUTH THREE TIMES DAILY FOR 7 DAYS 4/21/20   Historical Provider, MD   predniSONE (DELTASONE) 20 MG tablet Take 1 tablet by mouth daily as needed (arthritis) 5/4/20   Kirstin Meléndez, DO   triamcinolone (KENALOG) 0.1 % cream Apply topically 2 times daily. 5/4/20   Kirstin Meléndez, DO   potassium chloride (KLOR-CON M) 20 MEQ extended release tablet Take 2 tablets by mouth 2 times daily  Patient taking differently: Take 20 mEq by mouth 2 times daily  11/12/19   Kirstin Meléndez DO   acetaminophen-codeine (TYLENOL #3) 300-30 MG per tablet TAKE 1 TABLET BY MOUTH EVERY 4 TO 6 HOURS AS NEEDED FOR PAIN 8/23/19   Historical Provider, MD   aspirin 81 MG tablet Take 81 mg by mouth daily    Historical Provider, MD   b complex vitamins capsule Take 1 capsule by mouth daily. Historical Provider, MD   bisacodyl (DULCOLAX) 5 MG EC tablet Take 5 mg by mouth daily as needed. Take three daily       Historical Provider, MD   Calcium Carbonate-Vitamin D (CALCIUM + D) 600-200 MG-UNIT TABS Take  by mouth daily.       Historical Provider, MD       Allergies:  Sulfa antibiotics    Hemoglobin A1C (%)   Date Value   09/12/2019 5.7             ( goal A1C is < 7)   No results found for: LABMICR  LDL Cholesterol (mg/dL)   Date Value   03/09/2020 125   03/18/2019 141 (H)       (goal LDL is <100)   AST (U/L)   Date Value   03/09/2020 21     ALT (U/L)   Date Value   03/09/2020 19     BUN (mg/dL)   Date Value   03/09/2020 23     BP Readings from Last 3 Encounters:   05/29/20 132/78   03/16/20 (!) 150/80   09/06/19 120/70          (goal 120/80)

## 2021-01-04 RX ORDER — PREDNISONE 20 MG/1
20 TABLET ORAL DAILY PRN
Qty: 30 TABLET | Refills: 0 | Status: SHIPPED | OUTPATIENT
Start: 2021-01-04 | End: 2021-02-09

## 2021-01-06 RX ORDER — PREDNISONE 20 MG/1
TABLET ORAL
Qty: 30 TABLET | Refills: 0 | OUTPATIENT
Start: 2021-01-06

## 2021-02-09 RX ORDER — PREDNISONE 20 MG/1
TABLET ORAL
Qty: 30 TABLET | Refills: 0 | Status: SHIPPED | OUTPATIENT
Start: 2021-02-09 | End: 2021-05-07

## 2021-02-17 RX ORDER — BUPROPION HYDROCHLORIDE 150 MG/1
150 TABLET ORAL EVERY MORNING
Qty: 90 TABLET | Refills: 1 | Status: CANCELLED | OUTPATIENT
Start: 2021-02-17

## 2021-02-17 RX ORDER — BUPROPION HYDROCHLORIDE 150 MG/1
TABLET ORAL
Qty: 90 TABLET | Refills: 0 | Status: SHIPPED | OUTPATIENT
Start: 2021-02-17 | End: 2021-06-01 | Stop reason: SDUPTHER

## 2021-03-08 ENCOUNTER — HOSPITAL ENCOUNTER (OUTPATIENT)
Age: 81
Discharge: HOME | End: 2021-03-08
Payer: MEDICARE

## 2021-03-08 ENCOUNTER — HOSPITAL ENCOUNTER (OUTPATIENT)
Age: 81
Discharge: HOME | End: 2021-03-10
Payer: MEDICARE

## 2021-03-08 ENCOUNTER — HOSPITAL ENCOUNTER (OUTPATIENT)
Dept: GENERAL RADIOLOGY | Age: 81
Discharge: HOME | End: 2021-03-10
Payer: MEDICARE

## 2021-03-08 DIAGNOSIS — E78.49 OTHER HYPERLIPIDEMIA: ICD-10-CM

## 2021-03-08 DIAGNOSIS — I25.10 CORONARY ARTERY DISEASE INVOLVING NATIVE CORONARY ARTERY OF NATIVE HEART WITHOUT ANGINA PECTORIS: ICD-10-CM

## 2021-03-08 DIAGNOSIS — I10 ESSENTIAL HYPERTENSION: ICD-10-CM

## 2021-03-08 DIAGNOSIS — E55.9 VITAMIN D DEFICIENCY DISEASE: ICD-10-CM

## 2021-03-08 LAB
ABSOLUTE EOS #: 0.3 K/UL (ref 0–0.4)
ABSOLUTE IMMATURE GRANULOCYTE: ABNORMAL K/UL (ref 0–0.3)
ABSOLUTE LYMPH #: 1 K/UL (ref 1–4.8)
ABSOLUTE MONO #: 0.5 K/UL (ref 0–1)
ALBUMIN SERPL-MCNC: 4.3 G/DL (ref 3.5–5.2)
ALBUMIN/GLOBULIN RATIO: ABNORMAL (ref 1–2.5)
ALP BLD-CCNC: 79 U/L (ref 35–104)
ALT SERPL-CCNC: 15 U/L (ref 5–33)
ANION GAP SERPL CALCULATED.3IONS-SCNC: 10 MMOL/L (ref 9–17)
AST SERPL-CCNC: 20 U/L
BASOPHILS # BLD: 1 % (ref 0–2)
BASOPHILS ABSOLUTE: 0 K/UL (ref 0–0.2)
BILIRUB SERPL-MCNC: 0.22 MG/DL (ref 0.3–1.2)
BUN BLDV-MCNC: 22 MG/DL (ref 8–23)
BUN/CREAT BLD: 18 (ref 9–20)
CALCIUM SERPL-MCNC: 9.2 MG/DL (ref 8.6–10.4)
CHLORIDE BLD-SCNC: 110 MMOL/L (ref 98–107)
CHOLESTEROL/HDL RATIO: 3
CHOLESTEROL: 233 MG/DL
CO2: 23 MMOL/L (ref 20–31)
CREAT SERPL-MCNC: 1.24 MG/DL (ref 0.5–0.9)
DIFFERENTIAL TYPE: YES
EKG ATRIAL RATE: 68 BPM
EKG P AXIS: 57 DEGREES
EKG P-R INTERVAL: 144 MS
EKG Q-T INTERVAL: 422 MS
EKG QRS DURATION: 84 MS
EKG QTC CALCULATION (BAZETT): 448 MS
EKG R AXIS: 79 DEGREES
EKG T AXIS: 68 DEGREES
EKG VENTRICULAR RATE: 68 BPM
EOSINOPHILS RELATIVE PERCENT: 6 % (ref 0–5)
GFR AFRICAN AMERICAN: 50 ML/MIN
GFR NON-AFRICAN AMERICAN: 41 ML/MIN
GFR SERPL CREATININE-BSD FRML MDRD: ABNORMAL ML/MIN/{1.73_M2}
GFR SERPL CREATININE-BSD FRML MDRD: ABNORMAL ML/MIN/{1.73_M2}
GLUCOSE BLD-MCNC: 96 MG/DL (ref 70–99)
HCT VFR BLD CALC: 39.8 % (ref 36–46)
HDLC SERPL-MCNC: 77 MG/DL
HEMOGLOBIN: 12.9 G/DL (ref 12–16)
IMMATURE GRANULOCYTES: ABNORMAL %
LDL CHOLESTEROL: 138 MG/DL (ref 0–130)
LYMPHOCYTES # BLD: 16 % (ref 15–40)
MAGNESIUM: 2 MG/DL (ref 1.6–2.6)
MCH RBC QN AUTO: 30.9 PG (ref 26–34)
MCHC RBC AUTO-ENTMCNC: 32.5 G/DL (ref 31–37)
MCV RBC AUTO: 95.1 FL (ref 80–100)
MONOCYTES # BLD: 8 % (ref 4–8)
NRBC AUTOMATED: ABNORMAL PER 100 WBC
PATIENT FASTING?: YES
PDW BLD-RTO: 15 % (ref 12.1–15.2)
PLATELET # BLD: 272 K/UL (ref 140–450)
PLATELET ESTIMATE: ABNORMAL
PMV BLD AUTO: ABNORMAL FL (ref 6–12)
POTASSIUM SERPL-SCNC: 4.3 MMOL/L (ref 3.7–5.3)
RBC # BLD: 4.19 M/UL (ref 4–5.2)
RBC # BLD: ABNORMAL 10*6/UL
SEG NEUTROPHILS: 69 % (ref 47–75)
SEGMENTED NEUTROPHILS ABSOLUTE COUNT: 4.3 K/UL (ref 2.5–7)
SODIUM BLD-SCNC: 143 MMOL/L (ref 135–144)
TOTAL PROTEIN: 6.4 G/DL (ref 6.4–8.3)
TRIGL SERPL-MCNC: 88 MG/DL
TSH SERPL DL<=0.05 MIU/L-ACNC: 2.57 MIU/L (ref 0.3–5)
VITAMIN D 25-HYDROXY: 59.1 NG/ML (ref 30–100)
VLDLC SERPL CALC-MCNC: ABNORMAL MG/DL (ref 1–30)
WBC # BLD: 6.2 K/UL (ref 3.5–11)
WBC # BLD: ABNORMAL 10*3/UL

## 2021-03-08 PROCEDURE — 84443 ASSAY THYROID STIM HORMONE: CPT

## 2021-03-08 PROCEDURE — 36415 COLL VENOUS BLD VENIPUNCTURE: CPT

## 2021-03-08 PROCEDURE — 71046 X-RAY EXAM CHEST 2 VIEWS: CPT

## 2021-03-08 PROCEDURE — 83735 ASSAY OF MAGNESIUM: CPT

## 2021-03-08 PROCEDURE — 82306 VITAMIN D 25 HYDROXY: CPT

## 2021-03-08 PROCEDURE — 93010 ELECTROCARDIOGRAM REPORT: CPT | Performed by: INTERNAL MEDICINE

## 2021-03-08 PROCEDURE — 93005 ELECTROCARDIOGRAM TRACING: CPT

## 2021-03-08 PROCEDURE — 85025 COMPLETE CBC W/AUTO DIFF WBC: CPT

## 2021-03-08 PROCEDURE — 80061 LIPID PANEL: CPT

## 2021-03-08 PROCEDURE — 80053 COMPREHEN METABOLIC PANEL: CPT

## 2021-03-15 ENCOUNTER — OFFICE VISIT (OUTPATIENT)
Dept: CARDIOLOGY CLINIC | Age: 81
End: 2021-03-15
Payer: MEDICARE

## 2021-03-15 VITALS
OXYGEN SATURATION: 98 % | DIASTOLIC BLOOD PRESSURE: 80 MMHG | SYSTOLIC BLOOD PRESSURE: 140 MMHG | BODY MASS INDEX: 21.9 KG/M2 | HEART RATE: 84 BPM | WEIGHT: 144 LBS

## 2021-03-15 DIAGNOSIS — E55.9 VITAMIN D DEFICIENCY DISEASE: ICD-10-CM

## 2021-03-15 DIAGNOSIS — E78.49 OTHER HYPERLIPIDEMIA: ICD-10-CM

## 2021-03-15 DIAGNOSIS — I25.10 CORONARY ARTERY DISEASE INVOLVING NATIVE CORONARY ARTERY OF NATIVE HEART WITHOUT ANGINA PECTORIS: ICD-10-CM

## 2021-03-15 DIAGNOSIS — I10 ESSENTIAL HYPERTENSION: Primary | ICD-10-CM

## 2021-03-15 PROCEDURE — G8399 PT W/DXA RESULTS DOCUMENT: HCPCS | Performed by: INTERNAL MEDICINE

## 2021-03-15 PROCEDURE — 99213 OFFICE O/P EST LOW 20 MIN: CPT | Performed by: INTERNAL MEDICINE

## 2021-03-15 PROCEDURE — G8427 DOCREV CUR MEDS BY ELIG CLIN: HCPCS | Performed by: INTERNAL MEDICINE

## 2021-03-15 PROCEDURE — G8420 CALC BMI NORM PARAMETERS: HCPCS | Performed by: INTERNAL MEDICINE

## 2021-03-15 PROCEDURE — 1090F PRES/ABSN URINE INCON ASSESS: CPT | Performed by: INTERNAL MEDICINE

## 2021-03-15 PROCEDURE — 1036F TOBACCO NON-USER: CPT | Performed by: INTERNAL MEDICINE

## 2021-03-15 PROCEDURE — G8484 FLU IMMUNIZE NO ADMIN: HCPCS | Performed by: INTERNAL MEDICINE

## 2021-03-15 PROCEDURE — 1123F ACP DISCUSS/DSCN MKR DOCD: CPT | Performed by: INTERNAL MEDICINE

## 2021-03-15 PROCEDURE — 4040F PNEUMOC VAC/ADMIN/RCVD: CPT | Performed by: INTERNAL MEDICINE

## 2021-03-15 NOTE — LETTER
Kandis Clay M.D. [address] [place] [address]  [phone]        March 15, 2021        Shira Crocker DO [address]  [place] [address]    RE:   Idalmis Eli  :  1940    Dear Dr. Mica Gallegos:    CHIEF COMPLAINT:  1. Coronary artery disease. 2.  Status post open heart surgery on 2006. 3.  Status post angioplasties with the last angioplasty on 2016. HISTORY OF PRESENT ILLNESS:  I had the pleasure of seeing Mrs. Ajit Perez in our office on 03/15/2021. She is a very pleasant 25-year-old female who has severe coronary artery disease. She had a catheterization by Dr. Sulema Price in  that showed 80% disease in the ostium of the right coronary artery with unremarkable circumflex, the LAD had 80% disease, EF was 45% to 50%. She had open heart surgery by Dr. Tahir Almonte on 2006 with a LIMA to the LAD, a vein graft to the diagonal, and a vein graft to the right coronary artery. In 2006, she had a catheterization that showed an EF of 50% with an occluded vein graft to the right coronary artery, occluded vein graft to the diagonal with a patent LIMA to the LAD. We stented her right coronary artery placing a 3.0 x 16 mm drug-eluting Taxus stent. On 2012, she had a catheterization that showed diminutive left internal mammary artery to the LAD with 90% disease in the LAD and diagonal bifurcation with an FFR of 0.79, 90% disease in the ostium of the right coronary artery, 40% circumflex, the vein grafts were all occluded, EF 60%. We did staged angioplasty and stents in the LAD and diagonal, placing a 3.5 x 24 mm Promus stent in the proximal LAD, dilating the diagonal through the stent struts. We brought her back on 2012 and did complex angioplasty of the ostium of the right coronary artery placing a 3.0 x 20 mm Promus stent. On 11/15/2016, she had shortness of breath.   We did proceed with no reoccurrence after radiation therapy. FAMILY HISTORY:  Mother had MI. Father had MI.    SOCIAL HISTORY:  She is 78years old, 2 children. Does not smoke or drink alcohol. She and her  lived in [facility] and came back 27 years ago. Two sons, one in [facility] and one in [place]. She remains active within the limits of her arthritis. REVIEW OF SYSTEMS:  Cardiac as above. Other systems reviewed including constitutional, eyes, ears, nose and throat, cardiovascular, respiratory, GI, , musculoskeletal, integumentary, neurologic, endocrine, hematologic and allergic/immunologic are negative except for what is described above. No weight loss or weight gain. No change in bowel habits. No blood in stools. No fevers, sweats or chills. PHYSICAL EXAMINATION:  VITAL SIGNS:  Her blood pressure was 140/80 with a heart rate of 84 and regular. Respiratory rate 18. O2 saturation 98%. Weight 144 pounds. GENERAL:  She is a pleasant 78-year-old female. Denied pain. She was oriented to person, place and time. Answered questions appropriately. SKIN:  No unusual skin changes. HEENT:  The pupils are equally round and reactive to light and accommodation. Extraocular movements were intact. Mucous membranes were dry. NECK:  No JVD. Good carotid pulses. No carotid bruits. No lymphadenopathy or thyromegaly. CARDIOVASCULAR EXAM:  S1 and S2 were normal.  No S3 or S4. Soft systolic blowing type murmur. No diastolic murmur. PMI was normal.  No lift, thrust, or pericardial friction rub. LUNGS:  Fairly clear to auscultation and percussion. ABDOMEN:  Soft and nontender. Good bowel sounds. EXTREMITIES:  Good femoral pulses. Good pedal pulses. No pedal edema. Skin was warm and dry. No calf tenderness. Nail beds pink. Good cap refill. PULSES:  Bilateral symmetrical radial, brachial and carotid pulses. No carotid bruits. Good femoral and pedal pulses. NEUROLOGIC EXAM:  Within normal limits. PSYCHIATRIC EXAM:  Within normal limits. LABORATORY DATA:  Sodium was 143, potassium 4.3, BUN 22, creatinine 1.24. GFR was 41. Cholesterol 233 with an HDL of 77, , triglycerides 88. ALT was 15, AST was 20. TSH 2.57. Vitamin D 59.1. White count 6.2, hemoglobin 12.9 with a platelet count of 085,575. EKG showed normal sinus rhythm with PACs, otherwise normal.    Chest x-ray was stable. IMPRESSION:  1. Coronary artery disease. 2.  Catheterization in 2005 showing 80% LAD and diagonal, unremarkable circumflex, 80% RCA with an EF of 45% to 50%. 3.  Open heart surgery on 05/12/2006 by Dr. Florencio Garcia, with a LIMA to the LAD, a vein graft to the diagonal, and a vein graft to the right coronary artery. 4.  Catheterization in 07/2006, showing occluded vein graft to the right coronary artery and diagonal, patent LIMA to the LAD, with angioplasty of the ostium of the right coronary artery, placing a 3.0 x 16 mm Taxus stent. 5.  Catheterization on 11/07/2012, showing nonfunctional LIMA to the LAD with occluded vein grafts, with 90% disease in the LAD and 90% disease in the ostium of the right coronary artery with in-stent stenosis, mild disease in the circumflex, EF of 60%, with angioplasty of the LAD placing a 3.5 x 24 mm Promus stent. 6.  Staged angioplasty of the right coronary artery on 11/14/2012, placing a 3.0 x 20 mm Promus stent in the ostium. 7.  Last catheterization on 12/07/2016 that showed 95% disease in the proximal right coronary artery with in-stent stenosis, with angioplasty alone without stent, using a 3.25 x 15 mm Trek balloon and 90% disease in the proximal LAD beyond the previous stent, which was stented with a 2.75 x 18 mm Promus stent, EF of 55%. 8.  Hyperlipidemia, unable to take any statins and Praluent being not feasible financially. 9.  Peripheral vascular disease with 40% to 60% disease in the right internal carotid artery.   10.  Chronic renal insufficiency with a creatinine at 1.3 to 1.4, about at baseline with a GFR of 41.  11.  Euthyroid, on treatment. 12.  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 PLAN:  1. No change in medications. 2.  See in 1 year. DISCUSSION:  Mrs. Amelia Montano overall is doing well. She stays active within the limits of her arthritis. There is no reason to do any testing such as stress test, etc.    Her risks are as modified as it can be. She is unable to take any statins including Livalo, and the PCSK9 inhibitors are financially not feasible at this time. We will watch for symptoms. If she would have any chest pain or chest discomfort, we will try medical therapy first and do catheterization only if we cannot control her symptoms. Thank you very much for allowing me the privilege of seeing Mrs. Smith. If you have any questions on my thoughts, please do not hesitate to contact me.     Sincerely,        Shamir Limon    D: 03/15/2021 10:31:46     T: 03/15/2021 10:36:17     [id number]  Job#: [id number]   Doc#: [id number]

## 2021-03-16 NOTE — PROGRESS NOTES
Suzan Madison M.D. [address] [place] [address]  [phone]        March 15, 2021        Lisbeth Antunez [place] [address]    RE:   Luis Fernando Bragg  :  1940    Dear Dr. Alvena Mcardle:    CHIEF COMPLAINT:  1. Coronary artery disease. 2.  Status post open heart surgery on 2006. 3.  Status post angioplasties with the last angioplasty on 2016. HISTORY OF PRESENT ILLNESS:  I had the pleasure of seeing Mrs. Amelia Montano in our office on 03/15/2021. She is a very pleasant 78-year-old female who has severe coronary artery disease. She had a catheterization by Dr. Amadou Roldan in  that showed 80% disease in the ostium of the right coronary artery with unremarkable circumflex, the LAD had 80% disease, EF was 45% to 50%. She had open heart surgery by Dr. Jared Rico on 2006 with a LIMA to the LAD, a vein graft to the diagonal, and a vein graft to the right coronary artery. In 2006, she had a catheterization that showed an EF of 50% with an occluded vein graft to the right coronary artery, occluded vein graft to the diagonal with a patent LIMA to the LAD. We stented her right coronary artery placing a 3.0 x 16 mm drug-eluting Taxus stent. On 2012, she had a catheterization that showed diminutive left internal mammary artery to the LAD with 90% disease in the LAD and diagonal bifurcation with an FFR of 0.79, 90% disease in the ostium of the right coronary artery, 40% circumflex, the vein grafts were all occluded, EF 60%. We did staged angioplasty and stents in the LAD and diagonal, placing a 3.5 x 24 mm Promus stent in the proximal LAD, dilating the diagonal through the stent struts. We brought her back on 2012 and did complex angioplasty of the ostium of the right coronary artery placing a 3.0 x 20 mm Promus stent. On 11/15/2016, she had shortness of breath.   We did proceed with no reoccurrence after radiation therapy. FAMILY HISTORY:  Mother had MI. Father had MI.    SOCIAL HISTORY:  She is 78years old, 2 children. Does not smoke or drink alcohol. She and her  lived in [facility] and came back 27 years ago. Two sons, one in [facility] and one in [place]. She remains active within the limits of her arthritis. REVIEW OF SYSTEMS:  Cardiac as above. Other systems reviewed including constitutional, eyes, ears, nose and throat, cardiovascular, respiratory, GI, , musculoskeletal, integumentary, neurologic, endocrine, hematologic and allergic/immunologic are negative except for what is described above. No weight loss or weight gain. No change in bowel habits. No blood in stools. No fevers, sweats or chills. PHYSICAL EXAMINATION:  VITAL SIGNS:  Her blood pressure was 140/80 with a heart rate of 84 and regular. Respiratory rate 18. O2 saturation 98%. Weight 144 pounds. GENERAL:  She is a pleasant 52-year-old female. Denied pain. She was oriented to person, place and time. Answered questions appropriately. SKIN:  No unusual skin changes. HEENT:  The pupils are equally round and reactive to light and accommodation. Extraocular movements were intact. Mucous membranes were dry. NECK:  No JVD. Good carotid pulses. No carotid bruits. No lymphadenopathy or thyromegaly. CARDIOVASCULAR EXAM:  S1 and S2 were normal.  No S3 or S4. Soft systolic blowing type murmur. No diastolic murmur. PMI was normal.  No lift, thrust, or pericardial friction rub. LUNGS:  Fairly clear to auscultation and percussion. ABDOMEN:  Soft and nontender. Good bowel sounds. EXTREMITIES:  Good femoral pulses. Good pedal pulses. No pedal edema. Skin was warm and dry. No calf tenderness. Nail beds pink. Good cap refill. PULSES:  Bilateral symmetrical radial, brachial and carotid pulses. No carotid bruits. Good femoral and pedal pulses.   NEUROLOGIC EXAM:  Within normal limits. PSYCHIATRIC EXAM:  Within normal limits. LABORATORY DATA:  Sodium was 143, potassium 4.3, BUN 22, creatinine 1.24. GFR was 41. Cholesterol 233 with an HDL of 77, , triglycerides 88. ALT was 15, AST was 20. TSH 2.57. Vitamin D 59.1. White count 6.2, hemoglobin 12.9 with a platelet count of 473,808. EKG showed normal sinus rhythm with PACs, otherwise normal.    Chest x-ray was stable. IMPRESSION:  1. Coronary artery disease. 2.  Catheterization in 2005 showing 80% LAD and diagonal, unremarkable circumflex, 80% RCA with an EF of 45% to 50%. 3.  Open heart surgery on 05/12/2006 by Dr. Sylvie Maya, with a LIMA to the LAD, a vein graft to the diagonal, and a vein graft to the right coronary artery. 4.  Catheterization in 07/2006, showing occluded vein graft to the right coronary artery and diagonal, patent LIMA to the LAD, with angioplasty of the ostium of the right coronary artery, placing a 3.0 x 16 mm Taxus stent. 5.  Catheterization on 11/07/2012, showing nonfunctional LIMA to the LAD with occluded vein grafts, with 90% disease in the LAD and 90% disease in the ostium of the right coronary artery with in-stent stenosis, mild disease in the circumflex, EF of 60%, with angioplasty of the LAD placing a 3.5 x 24 mm Promus stent. 6.  Staged angioplasty of the right coronary artery on 11/14/2012, placing a 3.0 x 20 mm Promus stent in the ostium. 7.  Last catheterization on 12/07/2016 that showed 95% disease in the proximal right coronary artery with in-stent stenosis, with angioplasty alone without stent, using a 3.25 x 15 mm Trek balloon and 90% disease in the proximal LAD beyond the previous stent, which was stented with a 2.75 x 18 mm Promus stent, EF of 55%. 8.  Hyperlipidemia, unable to take any statins and Praluent being not feasible financially. 9.  Peripheral vascular disease with 40% to 60% disease in the right internal carotid artery.   10.  Chronic renal insufficiency with a creatinine at 1.3 to 1.4, about at baseline with a GFR of 41.  11.  Euthyroid, on treatment. 12.  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 PLAN:  1. No change in medications. 2.  See in 1 year. DISCUSSION:  Mrs. Reynaldo Rosa overall is doing well. She stays active within the limits of her arthritis. There is no reason to do any testing such as stress test, etc.    Her risks are as modified as it can be. She is unable to take any statins including Livalo, and the PCSK9 inhibitors are financially not feasible at this time. We will watch for symptoms. If she would have any chest pain or chest discomfort, we will try medical therapy first and do catheterization only if we cannot control her symptoms. Thank you very much for allowing me the privilege of seeing Mrs. Smith. If you have any questions on my thoughts, please do not hesitate to contact me.     Sincerely,        Joya Ham    D: 03/15/2021 10:31:46     T: 03/15/2021 10:36:17     [id number]  Job#: [id number]   Doc#: [id number]

## 2021-05-07 ENCOUNTER — HOSPITAL ENCOUNTER (OUTPATIENT)
Age: 81
Discharge: HOME | End: 2021-05-09
Payer: MEDICARE

## 2021-05-07 ENCOUNTER — HOSPITAL ENCOUNTER (OUTPATIENT)
Dept: GENERAL RADIOLOGY | Age: 81
Discharge: HOME | End: 2021-05-09
Payer: MEDICARE

## 2021-05-07 ENCOUNTER — OFFICE VISIT (OUTPATIENT)
Dept: FAMILY MEDICINE CLINIC | Age: 81
End: 2021-05-07
Payer: MEDICARE

## 2021-05-07 VITALS
OXYGEN SATURATION: 97 % | HEIGHT: 68 IN | DIASTOLIC BLOOD PRESSURE: 80 MMHG | WEIGHT: 149 LBS | BODY MASS INDEX: 22.58 KG/M2 | HEART RATE: 77 BPM | SYSTOLIC BLOOD PRESSURE: 130 MMHG

## 2021-05-07 DIAGNOSIS — E03.9 ACQUIRED HYPOTHYROIDISM: ICD-10-CM

## 2021-05-07 DIAGNOSIS — M25.562 POSTERIOR KNEE PAIN, LEFT: Primary | ICD-10-CM

## 2021-05-07 DIAGNOSIS — C07 PRIMARY PAROTID GLAND SQUAMOUS CELL CARCINOMA (HCC): ICD-10-CM

## 2021-05-07 DIAGNOSIS — M25.562 POSTERIOR KNEE PAIN, LEFT: ICD-10-CM

## 2021-05-07 DIAGNOSIS — I25.10 CORONARY ARTERY DISEASE INVOLVING NATIVE CORONARY ARTERY OF NATIVE HEART WITHOUT ANGINA PECTORIS: ICD-10-CM

## 2021-05-07 PROCEDURE — 1090F PRES/ABSN URINE INCON ASSESS: CPT | Performed by: FAMILY MEDICINE

## 2021-05-07 PROCEDURE — G8399 PT W/DXA RESULTS DOCUMENT: HCPCS | Performed by: FAMILY MEDICINE

## 2021-05-07 PROCEDURE — G8420 CALC BMI NORM PARAMETERS: HCPCS | Performed by: FAMILY MEDICINE

## 2021-05-07 PROCEDURE — 1123F ACP DISCUSS/DSCN MKR DOCD: CPT | Performed by: FAMILY MEDICINE

## 2021-05-07 PROCEDURE — 99214 OFFICE O/P EST MOD 30 MIN: CPT | Performed by: FAMILY MEDICINE

## 2021-05-07 PROCEDURE — G8427 DOCREV CUR MEDS BY ELIG CLIN: HCPCS | Performed by: FAMILY MEDICINE

## 2021-05-07 PROCEDURE — 73564 X-RAY EXAM KNEE 4 OR MORE: CPT

## 2021-05-07 PROCEDURE — 1036F TOBACCO NON-USER: CPT | Performed by: FAMILY MEDICINE

## 2021-05-07 PROCEDURE — 4040F PNEUMOC VAC/ADMIN/RCVD: CPT | Performed by: FAMILY MEDICINE

## 2021-05-07 SDOH — ECONOMIC STABILITY: TRANSPORTATION INSECURITY
IN THE PAST 12 MONTHS, HAS LACK OF TRANSPORTATION KEPT YOU FROM MEETINGS, WORK, OR FROM GETTING THINGS NEEDED FOR DAILY LIVING?: NOT ASKED

## 2021-05-07 NOTE — PROGRESS NOTES
Name: Roberto Cantu  : 1940         Chief Complaint:     Chief Complaint   Patient presents with    Knee Pain     left knee pain and swelling, pain behind knee       History of Present Illness:      Roberto Cantu is a 78 y.o.  female who presents with Knee Pain (left knee pain and swelling, pain behind knee)      HPI    Patient complains of left knee pain and swelling for the past few weeks. Puffy spot on the proximal shin, swelling present posteriorly but pain is all around the knee. Bothers with standing, walking. No catching, locking, or giving out. No recent injury. Patient has been feeling well otherwise, taking meds as directed. 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 Medical History:     Patient Active Problem List   Diagnosis    CAD (coronary artery disease)    Chronic back pain    Hypothyroidism    Hypertension    Post PTCA    Hot flashes, menopausal    Hyperlipidemia    CKD (chronic kidney disease), stage III    Postlaminectomy syndrome of lumbar region    Primary parotid gland squamous cell carcinoma (HCC)    Dysthymia       Medications:       Prior to Admission medications    Medication Sig Start Date End Date Taking?  Authorizing Provider   buPROPion (WELLBUTRIN XL) 150 MG extended release tablet TAKE 1 TABLET BY MOUTH EVERY DAY IN THE MORNING 21  Yes Mana Mesa,    metoprolol succinate (TOPROL XL) 25 MG extended release tablet TAKE 1 TABLET BY MOUTH EVERY DAY 21  Yes Mana Mesa DO   furosemide (LASIX) 20 MG tablet TAKE 1 TABLET BY MOUTH EVERY DAY 21  Yes Mana Mesa DO   FLUoxetine (PROZAC) 40 MG capsule TAKE 1 CAPSULE BY MOUTH EVERY DAY 21  Yes Mnaa Mesa DO   levothyroxine (SYNTHROID) 75 MCG tablet TAKE 1 TABLET BY MOUTH EVERY DAY 21  Yes Mana Mesa DO   omeprazole (PRILOSEC) 20 MG delayed release capsule Take 1 capsule by mouth Daily 21  Yes Mana Mesa, DO   potassium chloride (KLOR-CON M) 20 MEQ extended release tablet TAKE 2 TABLETS BY MOUTH TWICE DAILY 11/18/20  Yes Bandar Cr, DO   triamcinolone (KENALOG) 0.1 % cream Apply topically 2 times daily. 5/4/20   Bandar Shaye DO   aspirin 81 MG tablet Take 81 mg by mouth daily    Historical Provider, MD   b complex vitamins capsule Take 1 capsule by mouth daily. Historical Provider, MD   bisacodyl (DULCOLAX) 5 MG EC tablet Take 5 mg by mouth daily as needed. Take three daily       Historical Provider, MD   Calcium Carbonate-Vitamin D (CALCIUM + D) 600-200 MG-UNIT TABS Take  by mouth daily. Historical Provider, MD        Allergies:       Sulfa antibiotics    Review ofSystems:     Positive and Negative as described in HPI    Review of Systems    Physical Exam:     Vitals:  /80   Pulse 77   Ht 5' 8\" (1.727 m)   Wt 149 lb (67.6 kg)   SpO2 97%   BMI 22.66 kg/m²   Physical Exam  Constitutional:       Appearance: Normal appearance. She is not ill-appearing. Cardiovascular:      Rate and Rhythm: Normal rate. Rhythm irregular. Heart sounds: No murmur. Comments: Premature beat after every 3rd beat  Pulmonary:      Effort: Pulmonary effort is normal.      Breath sounds: Normal breath sounds. Musculoskeletal:      Comments: L knee moderate varus deformity. Soft tissue prominence prox anterior tibia. Swelling posteriorly, fluctuant but nontender, no overlying skin change. Negative anterior and posterior drawer, varus and valgus testing, meniscal testing. Medial and lateral joint line tenderness present.    Psychiatric:         Mood and Affect: Mood normal.         Behavior: Behavior normal.         Data:     Lab Results   Component Value Date     03/08/2021    K 4.3 03/08/2021     03/08/2021    CO2 23 03/08/2021    BUN 22 03/08/2021    CREATININE 1.24 03/08/2021    GLUCOSE 96 03/08/2021    PROT 6.4 03/08/2021    LABALBU 4.3 03/08/2021    BILITOT 0.22 03/08/2021    ALKPHOS 79 03/08/2021    AST 20 03/08/2021 ALT 15 03/08/2021     Lab Results   Component Value Date    WBC 6.2 03/08/2021    RBC 4.19 03/08/2021    HGB 12.9 03/08/2021    HCT 39.8 03/08/2021    MCV 95.1 03/08/2021    MCH 30.9 03/08/2021    MCHC 32.5 03/08/2021    RDW 15.0 03/08/2021     03/08/2021    MPV NOT REPORTED 03/08/2021     Lab Results   Component Value Date    TSH 2.57 03/08/2021     Lab Results   Component Value Date    CHOL 233 03/08/2021    HDL 77 03/08/2021    LABA1C 5.7 09/12/2019         Assessment & Plan:        Diagnosis Orders   1. Posterior knee pain, left  XR KNEE LEFT (MIN 4 VIEWS)   2. Primary parotid gland squamous cell carcinoma (HCC)     3. Coronary artery disease involving native coronary artery of native heart without angina pectoris     4. Acquired hypothyroidism     L knee pain with varus deformity, posterior swelling and pain. Suspect OA with baker's cyst. xr ordered. Advised exercises as below regardless. Non-pitting edema just distal to the knee which appears benign, nonspecific. 2. Parotid cancer in remission after surgery and radiation. Reviewed records showing it was stage III. Continue surveillance with ENT and radiation oncology. CAD and hypothyroid stable, continue current Rx. Follow-up for wellness visit in the near future. Requested Prescriptions      No prescriptions requested or ordered in this encounter         Patient Instructions     SURVEY:    You may be receiving a survey from [employer] regarding your visit today. You may get this in the mail, through your MyChart or in your email. Please complete the survey to enable us to provide the highest quality of care to you and your family. If you cannot score us as very good ( 5 Stars) on any question, please feel free to call the office to discuss how we could have made your experience exceptional.     Thank you.     Clinical Care Team:  Dr. Kael Zimmerman, DO Rosi Mcmillan, [address] [place] [address] Team: [facility]    Patient Education        Knee: Exercises  Introduction  Here are some examples of exercises for you to try. The exercises may be suggested for a condition or for rehabilitation. Start each exercise slowly. Ease off the exercises if you start to have pain. You will be told when to start these exercises and which ones will work best for you. How to do the exercises  Quad sets   1. Sit with your leg straight and supported on the floor or a firm bed. (If you feel discomfort in the front or back of your knee, place a small towel roll under your knee.)  2. Tighten the muscles on top of your thigh by pressing the back of your knee flat down to the floor. (If you feel discomfort under your kneecap, place a small towel roll under your knee.)  3. Hold for about 6 seconds, then rest for up to 10 seconds. 4. Do 8 to 12 repetitions several times a day. Straight-leg raises to the front   1. Lie on your back with your good knee bent so that your foot rests flat on the floor. Your injured leg should be straight. Make sure that your low back has a normal curve. You should be able to slip your flat hand in between the floor and the small of your back, with your palm touching the floor and your back touching the back of your hand. 2. Tighten the thigh muscles in the injured leg by pressing the back of your knee flat down to the floor. Hold your knee straight. 3. Keeping the thigh muscles tight, lift your injured leg up so that your heel is about 12 inches off the floor. Hold for about 6 seconds and then lower slowly. 4. Do 8 to 12 repetitions, 3 times a day. Straight-leg raises to the outside   1. Lie on your side, with your injured leg on top. 2. Tighten the front thigh muscles of your injured leg to keep your knee straight.   3. Keep your hip and your leg straight in line with the rest of your body, and keep your knee pointing forward. Do not drop your hip back. 4. Lift your injured leg straight up toward the ceiling, about 12 inches off the floor. Hold for about 6 seconds, then slowly lower your leg. 5. Do 8 to 12 repetitions. Straight-leg raises to the back   1. Lie on your stomach, and lift your leg straight up behind you (toward the ceiling). 2. Lift your toes about 6 inches off the floor, hold for about 6 seconds, then lower slowly. 3. Do 8 to 12 repetitions. Straight-leg raises to the inside   1. Lie on the side of your body with the injured leg. 2. You can either prop your other (good) leg up on a chair, or you can bend your good knee and put that foot in front of your injured knee. Do not drop your hip back. 3. Tighten the muscles on the front of your thigh to straighten your injured knee. 4. Keep your kneecap pointing forward, and lift your whole leg up toward the ceiling about 6 inches. Hold for about 6 seconds, then lower slowly. 5. Do 8 to 12 repetitions. Heel dig bridging   1. Lie on your back with both knees bent and your ankles bent so that only your heels are digging into the floor. Your knees should be bent about 90 degrees. 2. Then push your heels into the floor, squeeze your buttocks, and lift your hips off the floor until your shoulders, hips, and knees are all in a straight line. 3. Hold for about 6 seconds as you continue to breathe normally, and then slowly lower your hips back down to the floor and rest for up to 10 seconds. 4. Do 8 to 12 repetitions. Hamstring curls   1. Lie on your stomach with your knees straight. If your kneecap is uncomfortable, roll up a washcloth and put it under your leg just above your kneecap. 2. Lift the foot of your injured leg by bending the knee so that you bring the foot up toward your buttock. If this motion hurts, try it without bending your knee quite as far. This may help you avoid any painful motion. 3. Slowly lower your leg back to the floor.   4. Do 8 to 12 repetitions. 5. With permission from your doctor or physical therapist, you may also want to add a cuff weight to your ankle (not more than 5 pounds). With weight, you do not have to lift your leg more than 12 inches to get a hamstring workout. Shallow standing knee bends   Do this exercise only if you have very little pain; if you have no clicking, locking, or giving way if you have an injured knee; and if it does not hurt while you are doing 8 to 12 repetitions. 1. Stand with your hands lightly resting on a counter or chair in front of you. Put your feet shoulder-width apart. 2. Slowly bend your knees so that you squat down like you are going to sit in a chair. Make sure your knees do not go in front of your toes. 3. Lower yourself about 6 inches. Your heels should remain on the floor at all times. 4. Rise slowly to a standing position. Heel raises   1. Stand with your feet a few inches apart, with your hands lightly resting on a counter or chair in front of you. 2. Slowly raise your heels off the floor while keeping your knees straight. 3. Hold for about 6 seconds, then slowly lower your heels to the floor. 4. Do 8 to 12 repetitions several times during the day. Follow-up care is a key part of your treatment and safety. Be sure to make and go to all appointments, and call your doctor if you are having problems. It's also a good idea to know your test results and keep a list of the medicines you take. Where can you learn more? Go to https://VauntearieXunlei.[employer]. org and sign in to your [employer] account. Enter C000 in the [facility] box to learn more about \"Knee: Exercises. \"     If you do not have an account, please click on the \"Sign Up Now\" link. Current as of: November 16, 2020               Content Version: 12.8  © 3982-1519 Healthwise, Incorporated. Care instructions adapted under license by [facility] ([facility]).  If you have questions about a medical condition or this instruction, always ask your healthcare professional. [place] [address] any warranty or liability for your use of this information. Anne Olsen received counseling on the following healthy behaviors: medication adherence  Reviewed prior labs and health maintenance. Continue current medications, diet and exercise. Discussed use, benefit, and side effects of prescribed medications. Barriers to medication compliance addressed. Patient given educational materials - see patient instructions. All patient questions answered. Patient voiced understanding.        Electronically signed by William Alexander DO on 5/10/2021 at 5:50 PM  [address]  Dept: [phone]

## 2021-05-07 NOTE — PATIENT INSTRUCTIONS
injured leg by pressing the back of your knee flat down to the floor. Hold your knee straight. 3. Keeping the thigh muscles tight, lift your injured leg up so that your heel is about 12 inches off the floor. Hold for about 6 seconds and then lower slowly. 4. Do 8 to 12 repetitions, 3 times a day. Straight-leg raises to the outside   1. Lie on your side, with your injured leg on top. 2. Tighten the front thigh muscles of your injured leg to keep your knee straight. 3. Keep your hip and your leg straight in line with the rest of your body, and keep your knee pointing forward. Do not drop your hip back. 4. Lift your injured leg straight up toward the ceiling, about 12 inches off the floor. Hold for about 6 seconds, then slowly lower your leg. 5. Do 8 to 12 repetitions. Straight-leg raises to the back   1. Lie on your stomach, and lift your leg straight up behind you (toward the ceiling). 2. Lift your toes about 6 inches off the floor, hold for about 6 seconds, then lower slowly. 3. Do 8 to 12 repetitions. Straight-leg raises to the inside   1. Lie on the side of your body with the injured leg. 2. You can either prop your other (good) leg up on a chair, or you can bend your good knee and put that foot in front of your injured knee. Do not drop your hip back. 3. Tighten the muscles on the front of your thigh to straighten your injured knee. 4. Keep your kneecap pointing forward, and lift your whole leg up toward the ceiling about 6 inches. Hold for about 6 seconds, then lower slowly. 5. Do 8 to 12 repetitions. Heel dig bridging   1. Lie on your back with both knees bent and your ankles bent so that only your heels are digging into the floor. Your knees should be bent about 90 degrees. 2. Then push your heels into the floor, squeeze your buttocks, and lift your hips off the floor until your shoulders, hips, and knees are all in a straight line.   3. Hold for about 6 seconds as you continue to breathe normally, and then slowly lower your hips back down to the floor and rest for up to 10 seconds. 4. Do 8 to 12 repetitions. Hamstring curls   1. Lie on your stomach with your knees straight. If your kneecap is uncomfortable, roll up a washcloth and put it under your leg just above your kneecap. 2. Lift the foot of your injured leg by bending the knee so that you bring the foot up toward your buttock. If this motion hurts, try it without bending your knee quite as far. This may help you avoid any painful motion. 3. Slowly lower your leg back to the floor. 4. Do 8 to 12 repetitions. 5. With permission from your doctor or physical therapist, you may also want to add a cuff weight to your ankle (not more than 5 pounds). With weight, you do not have to lift your leg more than 12 inches to get a hamstring workout. Shallow standing knee bends   Do this exercise only if you have very little pain; if you have no clicking, locking, or giving way if you have an injured knee; and if it does not hurt while you are doing 8 to 12 repetitions. 1. Stand with your hands lightly resting on a counter or chair in front of you. Put your feet shoulder-width apart. 2. Slowly bend your knees so that you squat down like you are going to sit in a chair. Make sure your knees do not go in front of your toes. 3. Lower yourself about 6 inches. Your heels should remain on the floor at all times. 4. Rise slowly to a standing position. Heel raises   1. Stand with your feet a few inches apart, with your hands lightly resting on a counter or chair in front of you. 2. Slowly raise your heels off the floor while keeping your knees straight. 3. Hold for about 6 seconds, then slowly lower your heels to the floor. 4. Do 8 to 12 repetitions several times during the day. Follow-up care is a key part of your treatment and safety. Be sure to make and go to all appointments, and call your doctor if you are having problems.  It's also a good idea to know your test results and keep a list of the medicines you take. Where can you learn more? Go to https://[email or web address]. org and sign in to your [employer] account. Enter K892 in the [facility] box to learn more about \"Knee: Exercises. \"     If you do not have an account, please click on the \"Sign Up Now\" link. Current as of: November 16, 2020               Content Version: 12.8  © 2006-2021 Healthwise, Incorporated. Care instructions adapted under license by [facility] ([facility]). If you have questions about a medical condition or this instruction, always ask your healthcare professional. [address] any warranty or liability for your use of this information.

## 2021-05-11 RX ORDER — OMEPRAZOLE 20 MG/1
CAPSULE, DELAYED RELEASE ORAL
Qty: 90 CAPSULE | Refills: 1 | Status: SHIPPED | OUTPATIENT
Start: 2021-05-11 | End: 2021-10-29

## 2021-05-11 NOTE — TELEPHONE ENCOUNTER
Last OV 5/7/21 for knee pain, check up  Requesting refill on omeprazole thru [facility] script  Next OV 6/1/21  You gave her omeprazole 20mg #90 x 1 on 2/8/21

## 2021-05-13 ENCOUNTER — OFFICE VISIT (OUTPATIENT)
Dept: FAMILY MEDICINE CLINIC | Age: 81
End: 2021-05-13
Payer: MEDICARE

## 2021-05-13 VITALS — HEIGHT: 68 IN | WEIGHT: 149 LBS | BODY MASS INDEX: 22.58 KG/M2

## 2021-05-13 DIAGNOSIS — M17.12 PRIMARY OSTEOARTHRITIS OF LEFT KNEE: Primary | ICD-10-CM

## 2021-05-13 PROCEDURE — 20610 DRAIN/INJ JOINT/BURSA W/O US: CPT | Performed by: FAMILY MEDICINE

## 2021-05-13 RX ORDER — PREDNISONE 20 MG/1
TABLET ORAL
Qty: 30 TABLET | Refills: 1 | Status: SHIPPED | OUTPATIENT
Start: 2021-05-13 | End: 2021-08-05

## 2021-05-13 RX ORDER — TRIAMCINOLONE ACETONIDE 40 MG/ML
40 INJECTION, SUSPENSION INTRA-ARTICULAR; INTRAMUSCULAR ONCE
Status: COMPLETED | OUTPATIENT
Start: 2021-05-13 | End: 2021-05-13

## 2021-05-13 RX ADMIN — TRIAMCINOLONE ACETONIDE 40 MG: 40 INJECTION, SUSPENSION INTRA-ARTICULAR; INTRAMUSCULAR at 11:32

## 2021-05-13 NOTE — PROGRESS NOTES
Patient presents for left knee steroid injection. I had seen her 5/7 for left knee pain. X-ray after the visit showed severe arthritis without any significant joint effusion. Offered her steroid injection and she does wish to proceed with that. She has been taking prednisone intermittently to help with pain. She takes it perhaps once or twice a week and is aware that she has been recommended not to take it every day. Verbal consent obtained for steroid injection. Patient in seated position with feet dangling from exam table. Area identified and marked for anterolateral approach. Prepped with Betadine and alcohol. Injected Kenalog 40 mg and 2 mL 1% plain lidocaine using 25-gauge, 1.5 inch needle. Tolerated well, no complications.

## 2021-06-01 ENCOUNTER — OFFICE VISIT (OUTPATIENT)
Dept: FAMILY MEDICINE CLINIC | Age: 81
End: 2021-06-01
Payer: MEDICARE

## 2021-06-01 VITALS
HEIGHT: 68 IN | HEART RATE: 70 BPM | DIASTOLIC BLOOD PRESSURE: 68 MMHG | OXYGEN SATURATION: 98 % | BODY MASS INDEX: 21.98 KG/M2 | SYSTOLIC BLOOD PRESSURE: 128 MMHG | WEIGHT: 145 LBS

## 2021-06-01 DIAGNOSIS — Z00.00 ROUTINE GENERAL MEDICAL EXAMINATION AT A HEALTH CARE FACILITY: Primary | ICD-10-CM

## 2021-06-01 PROCEDURE — 1123F ACP DISCUSS/DSCN MKR DOCD: CPT | Performed by: FAMILY MEDICINE

## 2021-06-01 PROCEDURE — 4040F PNEUMOC VAC/ADMIN/RCVD: CPT | Performed by: FAMILY MEDICINE

## 2021-06-01 PROCEDURE — G0439 PPPS, SUBSEQ VISIT: HCPCS | Performed by: FAMILY MEDICINE

## 2021-06-01 RX ORDER — BUPROPION HYDROCHLORIDE 150 MG/1
TABLET ORAL
Qty: 90 TABLET | Refills: 1 | Status: SHIPPED | OUTPATIENT
Start: 2021-06-01 | End: 2021-08-05

## 2021-06-01 ASSESSMENT — LIFESTYLE VARIABLES: HOW OFTEN DO YOU HAVE A DRINK CONTAINING ALCOHOL: 0

## 2021-06-01 ASSESSMENT — PATIENT HEALTH QUESTIONNAIRE - PHQ9
SUM OF ALL RESPONSES TO PHQ9 QUESTIONS 1 & 2: 0
2. FEELING DOWN, DEPRESSED OR HOPELESS: 0
SUM OF ALL RESPONSES TO PHQ QUESTIONS 1-9: 0
1. LITTLE INTEREST OR PLEASURE IN DOING THINGS: 0
SUM OF ALL RESPONSES TO PHQ QUESTIONS 1-9: 0
SUM OF ALL RESPONSES TO PHQ QUESTIONS 1-9: 0

## 2021-06-01 NOTE — PATIENT INSTRUCTIONS
SURVEY:    You may be receiving a survey from [employer] regarding your visit today. You may get this in the mail, through your MyChart or in your email. Please complete the survey to enable us to provide the highest quality of care to you and your family. If you cannot score us as very good ( 5 Stars) on any question, please feel free to call the office to discuss how we could have made your experience exceptional.     Thank you. Clinical Care Team:  Dr. Eze Rogers, DO Rosana Cope, [address] [place] [address] Team:  [address] [place] [address]    Personalized Preventive Plan for Kelsey Matthieu - 6/1/2021  Medicare offers a range of preventive health benefits. Some of the tests and screenings are paid in full while other may be subject to a deductible, co-insurance, and/or copay. Some of these benefits include a comprehensive review of your medical history including lifestyle, illnesses that may run in your family, and various assessments and screenings as appropriate. After reviewing your medical record and screening and assessments performed today your provider may have ordered immunizations, labs, imaging, and/or referrals for you. A list of these orders (if applicable) as well as your Preventive Care list are included within your After Visit Summary for your review. Other Preventive Recommendations:    · A preventive eye exam performed by an eye specialist is recommended every 1-2 years to screen for glaucoma; cataracts, macular degeneration, and other eye disorders. · A preventive dental visit is recommended every 6 months. · Try to get at least 150 minutes of exercise per week or 10,000 steps per day on a pedometer . · Order or download the FREE \"Exercise & Physical Activity: Your Everyday Guide\" from [employer].  Call 3-923.125.2365 or search [facility] [employer] online. · You need 5380-3477 mg of calcium and 3767-7824 IU of vitamin D per day. It is possible to meet your calcium requirement with diet alone, but a vitamin D supplement is usually necessary to meet this goal.  · When exposed to the sun, use a sunscreen that protects against both UVA and UVB radiation with an SPF of 30 or greater. Reapply every 2 to 3 hours or after sweating, drying off with a towel, or swimming. · Always wear a seat belt when traveling in a car. Always wear a helmet when riding a bicycle or motorcycle.

## 2021-06-01 NOTE — PROGRESS NOTES
normal bilaterally, nose without deformity, nasal mucosa and turbinates normal without polyps  Neck: supple and non-tender without mass, no thyromegaly or thyroid nodules, no cervical lymphadenopathy  Pulmonary/Chest: clear to auscultation bilaterally- no wheezes, rales or rhonchi, normal air movement, no respiratory distress  Cardiovascular: normal rate, regular rhythm, normal S1 and S2, no murmurs, rubs, clicks, or gallops, distal pulses intact, no carotid bruits  Abdomen: soft, non-tender, non-distended, normal bowel sounds, no masses or organomegaly  Extremities: no cyanosis, clubbing or edema  Musculoskeletal: normal range of motion, no joint swelling, deformity or tenderness  Neurologic: reflexes normal and symmetric, no cranial nerve deficit, gait, coordination and speech normal    Patient's complete Health Risk Assessment and screening values have been reviewed and are found in Flowsheets. The following problems were reviewed today and where indicated follow up appointments were made and/or referrals ordered. Positive Risk Factor Screenings with Interventions:      Cognitive: Words recalled: 2 Words Recalled  Clock Drawing Test (CDT) Score: Normal  Total Score Interpretation: Positive Mini-Cog  Did the patient refuse to take the cognition test?: No  Cognitive Impairment Interventions:  · Patient declines any further evaluation/treatment for cognitive impairment       General Health and ACP:  General  In general, how would you say your health is?: Very Good  In the past 7 days, have you experienced any of the following?  New or Increased Pain, New or Increased Fatigue, Loneliness, Social Isolation, Stress or Anger?: None of These  Do you get the social and emotional support that you need?: Yes  Do you have a Living Will?: Yes  Advance Directives     Power of [address] [place] [address] Will ACP-Advance Directive ACP-Power of     Not on File Not on File Not on File Not on [facility] Interventions:  · No Living Will: ACP documents already completed- patient asked to provide copy to the office     Hearing/Vision:  No exam data present  Hearing/Vision  Do you or your family notice any trouble with your hearing that hasn't been managed with hearing aids?: (!) Yes  Do you have difficulty driving, watching TV, or doing any of your daily activities because of your eyesight?: No  Have you had an eye exam within the past year?: Yes  Hearing/Vision Interventions:  · Hearing concerns:  patient declines any further evaluation/treatment for hearing issues      Personalized Preventive Plan   Current Health Maintenance Status  Immunization History   Administered Date(s) Administered    COVID-19, Mora Peter, PF, 30mcg/0.3mL 01/22/2021, 02/12/2021    Influenza Vaccine, unspecified formulation 09/19/2018    Influenza Virus Vaccine 09/20/2012, 09/19/2013, 09/17/2015, 10/04/2016, 09/27/2017, 09/13/2019, 09/15/2020    Influenza Whole 09/19/2013, 09/17/2015    Influenza, High Dose (Fluzone 65 yrs and older) 10/04/2016, 09/27/2017, 09/14/2018, 09/13/2019    Pneumococcal Conjugate 13-valent ([id number]) 10/27/2015    Pneumococcal Polysaccharide ([id number]) 10/08/2012    Tdap (Boostrix, Adacel) 10/30/2012    Zoster Live (Zostavax) 05/21/2013    Zoster Recombinant (Shingrix) 04/12/2018, 06/26/2018        Health Maintenance   Topic Date Due    Annual Wellness Visit (AWV)  Never done    TSH testing  03/08/2022    Potassium monitoring  03/08/2022    Creatinine monitoring  03/08/2022    DTaP/Tdap/Td vaccine (2 - Td) 10/30/2022    DEXA (modify frequency per FRAX score)  Completed    Flu vaccine  Completed    Shingles Vaccine  Completed    Pneumococcal 65+ years Vaccine  Completed    COVID-19 Vaccine  Completed    Hepatitis A vaccine  Aged Out    Hepatitis B vaccine  Aged Out    Hib vaccine  Aged Out    Meningococcal (ACWY) vaccine  Aged Out     Recommendations for [employer] Due: see orders and patient instructions/AVS.  . Recommended screening schedule for the next 5-10 years is provided to the patient in written form: see Patient Instructions/AVS.    There are no diagnoses linked to this encounter.

## 2021-07-29 NOTE — TELEPHONE ENCOUNTER
Last OV: 6/1/2021 medicare  Last RX:   Next scheduled apt: Visit date not found      Sure scripts request    RX pending

## 2021-07-30 RX ORDER — TRIAMCINOLONE ACETONIDE 1 MG/G
CREAM TOPICAL
Qty: 30 G | Refills: 0 | Status: SHIPPED | OUTPATIENT
Start: 2021-07-30 | End: 2021-12-01 | Stop reason: SDUPTHER

## 2021-08-05 RX ORDER — BUPROPION HYDROCHLORIDE 150 MG/1
TABLET ORAL
Qty: 90 TABLET | Refills: 1 | Status: SHIPPED | OUTPATIENT
Start: 2021-08-05 | End: 2022-01-28

## 2021-08-05 RX ORDER — FLUOXETINE HYDROCHLORIDE 40 MG/1
CAPSULE ORAL
Qty: 90 CAPSULE | Refills: 1 | Status: SHIPPED | OUTPATIENT
Start: 2021-08-05 | End: 2022-01-28

## 2021-08-05 RX ORDER — LEVOTHYROXINE SODIUM 0.07 MG/1
TABLET ORAL
Qty: 90 TABLET | Refills: 1 | Status: SHIPPED | OUTPATIENT
Start: 2021-08-05 | End: 2022-01-28

## 2021-08-05 RX ORDER — FUROSEMIDE 20 MG/1
TABLET ORAL
Qty: 90 TABLET | Refills: 1 | Status: SHIPPED | OUTPATIENT
Start: 2021-08-05 | End: 2022-01-28

## 2021-08-05 RX ORDER — PREDNISONE 20 MG/1
TABLET ORAL
Qty: 30 TABLET | Refills: 1 | Status: SHIPPED | OUTPATIENT
Start: 2021-08-05 | End: 2022-05-10

## 2021-08-05 RX ORDER — METOPROLOL SUCCINATE 25 MG/1
TABLET, EXTENDED RELEASE ORAL
Qty: 90 TABLET | Refills: 1 | Status: SHIPPED | OUTPATIENT
Start: 2021-08-05 | End: 2022-01-28

## 2021-10-04 ENCOUNTER — TELEPHONE (OUTPATIENT)
Dept: CARDIOLOGY CLINIC | Age: 81
End: 2021-10-04

## 2021-10-04 DIAGNOSIS — E55.9 VITAMIN D DEFICIENCY DISEASE: ICD-10-CM

## 2021-10-04 DIAGNOSIS — E78.49 OTHER HYPERLIPIDEMIA: ICD-10-CM

## 2021-10-04 DIAGNOSIS — I10 ESSENTIAL HYPERTENSION: Primary | ICD-10-CM

## 2021-10-04 DIAGNOSIS — I25.10 CORONARY ARTERY DISEASE INVOLVING NATIVE CORONARY ARTERY OF NATIVE HEART WITHOUT ANGINA PECTORIS: ICD-10-CM

## 2021-10-04 RX ORDER — ISOSORBIDE MONONITRATE 30 MG/1
30 TABLET, EXTENDED RELEASE ORAL DAILY
Qty: 30 TABLET | Refills: 11 | Status: SHIPPED | OUTPATIENT
Start: 2021-10-04 | End: 2021-11-01 | Stop reason: DRUGHIGH

## 2021-10-04 NOTE — TELEPHONE ENCOUNTER
called c/o she is having sob with exertion   1) Will ADD Imdur 30mg one daily   2) will get labs/ekg/cxr   3) will review test   4) call in one week with up date since starting Imdur   5) will follow up after testing   PER Dr Baldemar Lopez

## 2021-10-05 ENCOUNTER — HOSPITAL ENCOUNTER (OUTPATIENT)
Age: 81
Discharge: HOME | End: 2021-10-05
Payer: MEDICARE

## 2021-10-05 ENCOUNTER — HOSPITAL ENCOUNTER (OUTPATIENT)
Dept: GENERAL RADIOLOGY | Age: 81
Discharge: HOME | End: 2021-10-07
Payer: MEDICARE

## 2021-10-05 ENCOUNTER — HOSPITAL ENCOUNTER (OUTPATIENT)
Age: 81
Discharge: HOME | End: 2021-10-07
Payer: MEDICARE

## 2021-10-05 DIAGNOSIS — E78.49 OTHER HYPERLIPIDEMIA: ICD-10-CM

## 2021-10-05 DIAGNOSIS — E55.9 VITAMIN D DEFICIENCY DISEASE: ICD-10-CM

## 2021-10-05 DIAGNOSIS — I25.10 CORONARY ARTERY DISEASE INVOLVING NATIVE CORONARY ARTERY OF NATIVE HEART WITHOUT ANGINA PECTORIS: ICD-10-CM

## 2021-10-05 DIAGNOSIS — I10 ESSENTIAL HYPERTENSION: ICD-10-CM

## 2021-10-05 LAB
ABSOLUTE EOS #: 0.4 K/UL (ref 0–0.4)
ABSOLUTE IMMATURE GRANULOCYTE: ABNORMAL K/UL (ref 0–0.3)
ABSOLUTE LYMPH #: 1 K/UL (ref 1–4.8)
ABSOLUTE MONO #: 0.4 K/UL (ref 0–1)
ALBUMIN SERPL-MCNC: 3.8 G/DL (ref 3.5–5.2)
ALBUMIN/GLOBULIN RATIO: ABNORMAL (ref 1–2.5)
ALP BLD-CCNC: 88 U/L (ref 35–104)
ALT SERPL-CCNC: 16 U/L (ref 5–33)
ANION GAP SERPL CALCULATED.3IONS-SCNC: 10 MMOL/L (ref 9–17)
AST SERPL-CCNC: 18 U/L
BASOPHILS # BLD: 1 % (ref 0–2)
BASOPHILS ABSOLUTE: 0 K/UL (ref 0–0.2)
BILIRUB SERPL-MCNC: 0.2 MG/DL (ref 0.3–1.2)
BUN BLDV-MCNC: 21 MG/DL (ref 8–23)
BUN/CREAT BLD: 18 (ref 9–20)
CALCIUM SERPL-MCNC: 8.9 MG/DL (ref 8.6–10.4)
CHLORIDE BLD-SCNC: 109 MMOL/L (ref 98–107)
CHOLESTEROL/HDL RATIO: 3.1
CHOLESTEROL: 222 MG/DL
CO2: 20 MMOL/L (ref 20–31)
CREAT SERPL-MCNC: 1.19 MG/DL (ref 0.5–0.9)
DIFFERENTIAL TYPE: YES
EKG ATRIAL RATE: 72 BPM
EKG P AXIS: 75 DEGREES
EKG P-R INTERVAL: 140 MS
EKG Q-T INTERVAL: 418 MS
EKG QRS DURATION: 84 MS
EKG QTC CALCULATION (BAZETT): 457 MS
EKG R AXIS: 83 DEGREES
EKG T AXIS: 68 DEGREES
EKG VENTRICULAR RATE: 72 BPM
EOSINOPHILS RELATIVE PERCENT: 8 % (ref 0–5)
GFR AFRICAN AMERICAN: 53 ML/MIN
GFR NON-AFRICAN AMERICAN: 43 ML/MIN
GFR SERPL CREATININE-BSD FRML MDRD: ABNORMAL ML/MIN/{1.73_M2}
GFR SERPL CREATININE-BSD FRML MDRD: ABNORMAL ML/MIN/{1.73_M2}
GLUCOSE BLD-MCNC: 103 MG/DL (ref 70–99)
HCT VFR BLD CALC: 38.3 % (ref 36–46)
HDLC SERPL-MCNC: 72 MG/DL
HEMOGLOBIN: 12.6 G/DL (ref 12–16)
IMMATURE GRANULOCYTES: ABNORMAL %
LDL CHOLESTEROL: 130 MG/DL (ref 0–130)
LYMPHOCYTES # BLD: 21 % (ref 15–40)
MAGNESIUM: 2.2 MG/DL (ref 1.6–2.6)
MCH RBC QN AUTO: 30.9 PG (ref 26–34)
MCHC RBC AUTO-ENTMCNC: 33 G/DL (ref 31–37)
MCV RBC AUTO: 93.6 FL (ref 80–100)
MONOCYTES # BLD: 9 % (ref 4–8)
NRBC AUTOMATED: ABNORMAL PER 100 WBC
PATIENT FASTING?: YES
PDW BLD-RTO: 13.8 % (ref 12.1–15.2)
PLATELET # BLD: 265 K/UL (ref 140–450)
PLATELET ESTIMATE: ABNORMAL
PMV BLD AUTO: ABNORMAL FL (ref 6–12)
POTASSIUM SERPL-SCNC: 3.6 MMOL/L (ref 3.7–5.3)
RBC # BLD: 4.09 M/UL (ref 4–5.2)
RBC # BLD: ABNORMAL 10*6/UL
SEG NEUTROPHILS: 61 % (ref 47–75)
SEGMENTED NEUTROPHILS ABSOLUTE COUNT: 2.8 K/UL (ref 2.5–7)
SODIUM BLD-SCNC: 139 MMOL/L (ref 135–144)
TOTAL PROTEIN: 6.2 G/DL (ref 6.4–8.3)
TRIGL SERPL-MCNC: 101 MG/DL
TSH SERPL DL<=0.05 MIU/L-ACNC: 3.3 MIU/L (ref 0.3–5)
VITAMIN D 25-HYDROXY: 53 NG/ML (ref 30–100)
VLDLC SERPL CALC-MCNC: ABNORMAL MG/DL (ref 1–30)
WBC # BLD: 4.6 K/UL (ref 3.5–11)
WBC # BLD: ABNORMAL 10*3/UL

## 2021-10-05 PROCEDURE — 80053 COMPREHEN METABOLIC PANEL: CPT

## 2021-10-05 PROCEDURE — 36415 COLL VENOUS BLD VENIPUNCTURE: CPT

## 2021-10-05 PROCEDURE — 80061 LIPID PANEL: CPT

## 2021-10-05 PROCEDURE — 83735 ASSAY OF MAGNESIUM: CPT

## 2021-10-05 PROCEDURE — 82306 VITAMIN D 25 HYDROXY: CPT

## 2021-10-05 PROCEDURE — 84443 ASSAY THYROID STIM HORMONE: CPT

## 2021-10-05 PROCEDURE — 71046 X-RAY EXAM CHEST 2 VIEWS: CPT

## 2021-10-05 PROCEDURE — 93005 ELECTROCARDIOGRAM TRACING: CPT

## 2021-10-05 PROCEDURE — 85025 COMPLETE CBC W/AUTO DIFF WBC: CPT

## 2021-10-05 PROCEDURE — 93010 ELECTROCARDIOGRAM REPORT: CPT | Performed by: INTERNAL MEDICINE

## 2021-10-29 RX ORDER — OMEPRAZOLE 20 MG/1
CAPSULE, DELAYED RELEASE ORAL
Qty: 90 CAPSULE | Refills: 1 | Status: SHIPPED | OUTPATIENT
Start: 2021-10-29 | End: 2022-04-25

## 2021-11-01 ENCOUNTER — OFFICE VISIT (OUTPATIENT)
Dept: CARDIOLOGY CLINIC | Age: 81
End: 2021-11-01
Payer: MEDICARE

## 2021-11-01 VITALS — DIASTOLIC BLOOD PRESSURE: 70 MMHG | SYSTOLIC BLOOD PRESSURE: 160 MMHG | WEIGHT: 145 LBS | BODY MASS INDEX: 22.05 KG/M2

## 2021-11-01 DIAGNOSIS — R07.9 CHEST PAIN, UNSPECIFIED TYPE: Primary | ICD-10-CM

## 2021-11-01 DIAGNOSIS — R06.02 SOB (SHORTNESS OF BREATH): ICD-10-CM

## 2021-11-01 PROCEDURE — 1036F TOBACCO NON-USER: CPT | Performed by: INTERNAL MEDICINE

## 2021-11-01 PROCEDURE — 99214 OFFICE O/P EST MOD 30 MIN: CPT | Performed by: INTERNAL MEDICINE

## 2021-11-01 PROCEDURE — G8484 FLU IMMUNIZE NO ADMIN: HCPCS | Performed by: INTERNAL MEDICINE

## 2021-11-01 PROCEDURE — G8399 PT W/DXA RESULTS DOCUMENT: HCPCS | Performed by: INTERNAL MEDICINE

## 2021-11-01 PROCEDURE — 1090F PRES/ABSN URINE INCON ASSESS: CPT | Performed by: INTERNAL MEDICINE

## 2021-11-01 PROCEDURE — G8428 CUR MEDS NOT DOCUMENT: HCPCS | Performed by: INTERNAL MEDICINE

## 2021-11-01 PROCEDURE — 1123F ACP DISCUSS/DSCN MKR DOCD: CPT | Performed by: INTERNAL MEDICINE

## 2021-11-01 PROCEDURE — G8420 CALC BMI NORM PARAMETERS: HCPCS | Performed by: INTERNAL MEDICINE

## 2021-11-01 PROCEDURE — 4040F PNEUMOC VAC/ADMIN/RCVD: CPT | Performed by: INTERNAL MEDICINE

## 2021-11-01 RX ORDER — CLOPIDOGREL BISULFATE 75 MG/1
75 TABLET ORAL DAILY
COMMUNITY
End: 2021-11-01 | Stop reason: SDUPTHER

## 2021-11-01 RX ORDER — AMLODIPINE BESYLATE 2.5 MG/1
2.5 TABLET ORAL DAILY
Qty: 30 TABLET | Refills: 11 | Status: SHIPPED | OUTPATIENT
Start: 2021-11-01 | End: 2021-12-09 | Stop reason: SDUPTHER

## 2021-11-01 RX ORDER — ISOSORBIDE MONONITRATE 30 MG/1
30 TABLET, EXTENDED RELEASE ORAL 2 TIMES DAILY
COMMUNITY
End: 2021-11-01 | Stop reason: SDUPTHER

## 2021-11-01 RX ORDER — AMLODIPINE BESYLATE 2.5 MG/1
2.5 TABLET ORAL DAILY
COMMUNITY
End: 2021-11-01 | Stop reason: SDUPTHER

## 2021-11-01 RX ORDER — ISOSORBIDE MONONITRATE 30 MG/1
30 TABLET, EXTENDED RELEASE ORAL 2 TIMES DAILY
Qty: 60 TABLET | Refills: 11 | Status: SHIPPED | OUTPATIENT
Start: 2021-11-01 | End: 2021-12-09 | Stop reason: SDUPTHER

## 2021-11-01 RX ORDER — CLOPIDOGREL BISULFATE 75 MG/1
75 TABLET ORAL DAILY
Qty: 30 TABLET | Refills: 11 | Status: SHIPPED | OUTPATIENT
Start: 2021-11-01 | End: 2021-12-09 | Stop reason: SDUPTHER

## 2021-11-01 NOTE — PROGRESS NOTES
Ov Dr. Walter Edmondson for follow up   C/o sob with activity x 2 months  Wife states no help with Imdur    thinks helped a little  C/o back pain comes and goes   No syncope   No edema  Wt same      Will ADD Norvasc 2.5 mg one daily     Will INCREASE Imdur 30 mg to one tablet TWICE a day     Will ADD Plavix 75 mg one tablet daily     Will set up for Lexiscan stress test    Will follow up in 4-6 weeks

## 2021-11-01 NOTE — LETTER
Salty Miranda M.D. [address] [place] [address]  [phone]        2021        Kya Enrique, DO  [address] [facility] [address]    RE:   Francesca Pope  :  1940    Dear Dr. Jazmin Ngo:    CHIEF COMPLAINT:  1.  Marked increase in shortness of breath with activity over the last 2 months consistent with her previous angina. 2.  Status post open heart surgery on 2006. 3.  Status post angioplasty with the last one being 2016, where she had 95% to 99% disease in the proximal right coronary artery, which was stented and 90% disease in the LAD beyond the previous stent, which was also stented. HISTORY OF PRESENT ILLNESS:  I had the pleasure of seeing Mrs. Smith in the office on 2021. She is a pleasant 47-year-old female who had severe CAD. She had a catheterization by Dr. Rosina Dugan in  that showed 80% disease in the ostium of the right coronary artery with unremarkable circumflex, the LAD had 80% disease, EF was 45% to 50%. She had open heart surgery by Dr. Ralph Thorpe on 2006 with LIMA to the LAD, a vein graft to the diagonal, and a vein graft to the right coronary artery. In 2006, she had a catheterization that showed EF of 50% with occluded vein graft to the right coronary artery, occluded vein graft to the diagonal with a patent LIMA to the LAD and we stented her right coronary artery with a 3.0 x 16 mm drug-eluting Taxus stent. On 2012, she had a catheterization that showed diminutive left internal mammary artery to the LAD with 90% disease in the LAD and diagonal bifurcation with an FFR of 0.79, with 90% disease in the ostium of the right coronary artery, 40% circumflex, the vein grafts were all occluded, EF 60%.   We did staged angioplasty and stents in the LAD and diagonal, placing a 3.5 x 24 mm Promus stent in the LAD, dilating the diagonal through the stent MEDICAL AND SURGICAL HISTORY:  1.  Back surgery and spinal fusion on 10/01/2010.  2.  Peripheral vascular disease with 40% disease in the right internal carotid artery with no disease in the left internal carotid artery. 3.  Severe arthritis. 4.  Oophorectomy. 5.  Depression. 6.  Euthyroid, on treatment. 7.  Cardiac as described above. 8.  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 FAMILY HISTORY:  Mother had MI. Father had MI.    SOCIAL HISTORY:  She is 78years old, 2 children. Does not smoke or drink alcohol. She and her  lived in [facility] [place] and came back 28 years ago. Two sons, one in [place] and one in [place]. She remains active, although is limited in her activity because of shortness of breath. REVIEW OF SYSTEMS:  Cardiac as above. Other systems reviewed including constitutional, eyes, ears, nose and throat, cardiovascular, respiratory, GI, , musculoskeletal, integumentary, neurologic, endocrine, hematologic and allergic/immunologic are negative except for what is described above. No weight loss or weight gain. No change in bowel habits. No blood in stools. No fevers, sweats or chills. PHYSICAL EXAMINATION:  VITAL SIGNS:  Her blood pressure was 160/70 with a heart rate of 70 and regular. Respiratory rate 18. O2 saturation 97%. Weight 145 pounds. GENERAL:  She is a pleasant 75-year-old female. Denied pain. She was oriented to person, place and time. Answered questions appropriately. SKIN:  No unusual skin changes. HEENT:  The pupils are equally round and intact. Mucous membranes were dry. NECK:  No JVD. Good carotid pulses. No carotid bruits. No lymphadenopathy or thyromegaly. CARDIOVASCULAR EXAM:  S1 and S2 were normal.  No S3 or S4. Soft systolic blowing type murmur. No diastolic murmur. PMI was normal.  No lift, thrust, or pericardial friction rub.   LUNGS:  Quite clear to auscultation and percussion. ABDOMEN:  Soft and nontender. Good bowel sounds. EXTREMITIES:  Good femoral pulses. Good pedal pulses. No pedal edema. Skin was warm and dry. No calf tenderness. Nail beds pink. Good cap refill. PULSES:  Bilateral symmetrical radial, brachial and carotid pulses. No carotid bruits. Good femoral and pedal pulses. NEUROLOGIC EXAM:  Within normal limits. PSYCHIATRIC EXAM:  Within normal limits. LABORATORY DATA:  On 10/05/2021, sodium 139, potassium 3.6, BUN 21, creatinine 1.19. GFR was 43. Calcium was 8.9. Cholesterol 222 with an HDL of 72, LDL of 130, triglycerides 101. ALT was 16, AST was 18. Glucose 103. TSH was 3.30. Vitamin D 53. White count 4.6, hemoglobin 12.6 with a platelet count 890,672. EKG showed sinus rhythm and was normal.    Chest x-ray was unremarkable. Bedside echocardiogram showed preserved LV function. EF in the 50% to 55% range. IMPRESSION:  1.  New onset of shortness of breath with exertion 2 months ago consistent with her previous angina. 2.  Catheterization in 2005 showing 80% LAD and diagonal, unremarkable circumflex, 80% RCA with an EF of 45% to 50%. 3.  Open heart surgery on 05/12/2006 by Dr. Anali Martínez, with a LIMA to the LAD, vein graft to the diagonal, and a vein graft to the right coronary artery. 4.  Catheterization in 07/2006, showing occluded vein graft to the right coronary artery and diagonal, patent LIMA to the LAD, with angioplasty of the ostium of the right coronary artery, placing a 3.0 x 16 mm Taxus stent. 5.  Catheterization on 11/07/2012, showing nonfunctional LIMA to the LAD with occluded vein grafts, 90% disease in the LAD and 90% disease in the ostium of the right coronary artery with in-stent stenosis, mild disease in the circumflex, EF of 60%, with angioplasty of the LAD placing a 3.5 x 24 mm Promus stent.   6.  Staged angioplasty of the right coronary artery on 11/14/2012, placing a 3.0 x 20 mm Promus stent in the ostium. 7.  Last catheterization on 12/07/2016 that showed 95% disease in the proximal right coronary artery with in-stent stenosis, with angioplasty alone without a stent, using a 3.25 x 15 mm Trek balloon and 90% in the proximal LAD beyond the previous stent, which was stented with a 2.75 x 18 mm Promus stent, EF of 55%. 8.  Hyperlipidemia, unable to take any statins and Praluent being financially not feasible. 9.  Peripheral vascular disease with 40% to 60% disease in the right internal carotid artery. 10.  Chronic renal insufficiency, about her baseline, GFR of 43.  11.  Euthyroid, on treatment. 12.  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 PLAN:  1. Increase Imdur to 30 mg b.i.d.  2.  Add Norvasc 2.5 mg daily. 3.  Lexiscan stress test.  4.  Reevaluate to review her symptoms in 6 weeks. 5.  If she is still having shortness of breath unchanged, would consider proceeding with another cardiac catheterization. DISCUSSION:  Mrs. Magalie Ruiz has redeveloped her shortness of breath with activity which is concerning for her previous angina. We placed her on Imdur 30 mg with some improvement, although she is still quite short of breath. We did walk her in the hallway and her O2 saturation remains above 90%. We will increase her Imdur again to 30 mg b.i.d. and place her on Norvasc 2.5 mg daily for full medical therapy. We will then do a Lexiscan stress test and bring her back for reassessment. If she is still having her discomfort, then may consider a repeat cardiac catheterization. Thank you very much for allowing me the privilege of seeing Magalie Ruiz. If you have any questions on my thoughts, please do not hesitate to contact me.     Sincerely,        Fern Aase    D: 11/01/2021 8:15:18     T: 11/01/2021 13:17:36     [email or web address]  Job#: [id number]   Doc#: [id number]      <>

## 2021-11-01 NOTE — PATIENT INSTRUCTIONS
Will ADD Norvasc 2.5 mg one daily     Will INCREASE Imdur 30 mg to one tablet TWICE a day     Will ADD Plavix 75 mg one tablet daily     Will set up for Lexiscan stress test    Will follow up in 4-6 weeks

## 2021-11-03 NOTE — PROGRESS NOTES
Audie Rocha M.D. [address] [place] [address]  [phone]        2021        Luci Giles DO  [address] [facility] [address]    RE:   Robin Fajardo  :  1940    Dear Dr. Almas Garrett:    CHIEF COMPLAINT:  1.  Marked increase in shortness of breath with activity over the last 2 months consistent with her previous angina. 2.  Status post open heart surgery on 2006. 3.  Status post angioplasty with the last one being 2016, where she had 95% to 99% disease in the proximal right coronary artery, which was stented and 90% disease in the LAD beyond the previous stent, which was also stented. HISTORY OF PRESENT ILLNESS:  I had the pleasure of seeing Mrs. Smith in the office on 2021. She is a pleasant 68-year-old female who had severe CAD. She had a catheterization by Dr. Suzette Arias in  that showed 80% disease in the ostium of the right coronary artery with unremarkable circumflex, the LAD had 80% disease, EF was 45% to 50%. She had open heart surgery by Dr. Erlin Swartz on 2006 with LIMA to the LAD, a vein graft to the diagonal, and a vein graft to the right coronary artery. In 2006, she had a catheterization that showed EF of 50% with occluded vein graft to the right coronary artery, occluded vein graft to the diagonal with a patent LIMA to the LAD and we stented her right coronary artery with a 3.0 x 16 mm drug-eluting Taxus stent. On 2012, she had a catheterization that showed diminutive left internal mammary artery to the LAD with 90% disease in the LAD and diagonal bifurcation with an FFR of 0.79, with 90% disease in the ostium of the right coronary artery, 40% circumflex, the vein grafts were all occluded, EF 60%.   We did staged angioplasty and stents in the LAD and diagonal, placing a 3.5 x 24 mm Promus stent in the LAD, dilating the diagonal through the stent struts. We brought her back on 11/14/2012 and did complex angioplasty of the right coronary artery placing a 3.0 x 20 mm Promus stent. On 11/15/2016, she had shortness of breath. We did a catheterization on 12/07/2016, that showed 95% to 99% disease in the proximal right coronary artery, which we dilated with a balloon alone using a 3.25 x 15 mm Trek stent. She had 90% disease in the LAD beyond the stent in which I placed a 2.75 x 18 mm Xience stent. She had 70% disease in the OM branch of the circumflex and EF of 50% to 55%. She has had no further cardiac catheterization since that time. She gets myalgias with all statins including Livalo and therefore her hyperlipidemia is untreated. She does not wish to do PCSK9 inhibitor. She did have parotidectomy with neck dissection for primary squamous cell carcinoma of the left parotid in 03/2019, followed by radiation therapy with no reoccurrence. Two months ago, she began developing increasing shortness of breath. It was identical to her previous angina which was shortness of breath with no chest pain. We gave her Imdur 30 mg daily and there has been some improvement, although she still has shortness of breath with activity. She does have some back pain that comes and goes, not related to activity. She has had no syncope or near syncope, lightheadedness or dizziness. She has had no hospitalizations or procedures since I last saw her in 03/2021. CARDIAC RISK FACTORS:  Known CAD:  Positive. Hypertension:  Positive. PTCA:  Positive. Bypass Surgery:  Positive. Hyperlipidemia:  Positive. Other Family Members:  Positive. Peripheral Vascular Disease:  Positive. Diabetes:  Negative. MEDICATIONS AT THIS TIME:  She is on aspirin 81 mg daily, Wellbutrin  mg daily, Prozac 40 mg daily, Imdur 30 mg daily, Synthroid 75 mcg daily, Toprol-XL 25 mg daily, Prilosec 20 mg daily, potassium 20 mEq 2 tablets b.i.d., prednisone 20 mg p.r.n.     PAST clear to auscultation and percussion. ABDOMEN:  Soft and nontender. Good bowel sounds. EXTREMITIES:  Good femoral pulses. Good pedal pulses. No pedal edema. Skin was warm and dry. No calf tenderness. Nail beds pink. Good cap refill. PULSES:  Bilateral symmetrical radial, brachial and carotid pulses. No carotid bruits. Good femoral and pedal pulses. NEUROLOGIC EXAM:  Within normal limits. PSYCHIATRIC EXAM:  Within normal limits. LABORATORY DATA:  On 10/05/2021, sodium 139, potassium 3.6, BUN 21, creatinine 1.19. GFR was 43. Calcium was 8.9. Cholesterol 222 with an HDL of 72, LDL of 130, triglycerides 101. ALT was 16, AST was 18. Glucose 103. TSH was 3.30. Vitamin D 53. White count 4.6, hemoglobin 12.6 with a platelet count 832,567. EKG showed sinus rhythm and was normal.    Chest x-ray was unremarkable. Bedside echocardiogram showed preserved LV function. EF in the 50% to 55% range. IMPRESSION:  1.  New onset of shortness of breath with exertion 2 months ago consistent with her previous angina. 2.  Catheterization in 2005 showing 80% LAD and diagonal, unremarkable circumflex, 80% RCA with an EF of 45% to 50%. 3.  Open heart surgery on 05/12/2006 by Dr. Nilam Worthy, with a LIMA to the LAD, vein graft to the diagonal, and a vein graft to the right coronary artery. 4.  Catheterization in 07/2006, showing occluded vein graft to the right coronary artery and diagonal, patent LIMA to the LAD, with angioplasty of the ostium of the right coronary artery, placing a 3.0 x 16 mm Taxus stent. 5.  Catheterization on 11/07/2012, showing nonfunctional LIMA to the LAD with occluded vein grafts, 90% disease in the LAD and 90% disease in the ostium of the right coronary artery with in-stent stenosis, mild disease in the circumflex, EF of 60%, with angioplasty of the LAD placing a 3.5 x 24 mm Promus stent.   6.  Staged angioplasty of the right coronary artery on 11/14/2012, placing a 3.0 x 20 mm Promus stent in the ostium. 7.  Last catheterization on 12/07/2016 that showed 95% disease in the proximal right coronary artery with in-stent stenosis, with angioplasty alone without a stent, using a 3.25 x 15 mm Trek balloon and 90% in the proximal LAD beyond the previous stent, which was stented with a 2.75 x 18 mm Promus stent, EF of 55%. 8.  Hyperlipidemia, unable to take any statins and Praluent being financially not feasible. 9.  Peripheral vascular disease with 40% to 60% disease in the right internal carotid artery. 10.  Chronic renal insufficiency, about her baseline, GFR of 43.  11.  Euthyroid, on treatment. 12.  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 PLAN:  1. Increase Imdur to 30 mg b.i.d.  2.  Add Norvasc 2.5 mg daily. 3.  Lexiscan stress test.  4.  Reevaluate to review her symptoms in 6 weeks. 5.  If she is still having shortness of breath unchanged, would consider proceeding with another cardiac catheterization. DISCUSSION:  Mrs. Danika Cullen has redeveloped her shortness of breath with activity which is concerning for her previous angina. We placed her on Imdur 30 mg with some improvement, although she is still quite short of breath. We did walk her in the hallway and her O2 saturation remains above 90%. We will increase her Imdur again to 30 mg b.i.d. and place her on Norvasc 2.5 mg daily for full medical therapy. We will then do a Lexiscan stress test and bring her back for reassessment. If she is still having her discomfort, then may consider a repeat cardiac catheterization. Thank you very much for allowing me the privilege of seeing Danika Cullen. If you have any questions on my thoughts, please do not hesitate to contact me.     Sincerely,        Faiza Peña    D: 11/01/2021 8:15:18     T: 11/01/2021 13:17:36     [email or web address]  Job#: [id number]   Doc#: [id number]      <>

## 2021-11-04 ENCOUNTER — HOSPITAL ENCOUNTER (OUTPATIENT)
Dept: NUCLEAR MEDICINE | Age: 81
Discharge: HOME | End: 2021-11-06
Payer: MEDICARE

## 2021-11-04 ENCOUNTER — HOSPITAL ENCOUNTER (OUTPATIENT)
Dept: NON INVASIVE DIAGNOSTICS | Age: 81
Discharge: HOME | End: 2021-11-04
Payer: MEDICARE

## 2021-11-04 VITALS — HEART RATE: 73 BPM | DIASTOLIC BLOOD PRESSURE: 74 MMHG | SYSTOLIC BLOOD PRESSURE: 156 MMHG

## 2021-11-04 DIAGNOSIS — R06.02 SOB (SHORTNESS OF BREATH): ICD-10-CM

## 2021-11-04 DIAGNOSIS — R07.9 CHEST PAIN, UNSPECIFIED TYPE: ICD-10-CM

## 2021-11-04 PROCEDURE — 2580000003 HC RX 258: Performed by: INTERNAL MEDICINE

## 2021-11-04 PROCEDURE — 3430000000 HC RX DIAGNOSTIC RADIOPHARMACEUTICAL: Performed by: INTERNAL MEDICINE

## 2021-11-04 PROCEDURE — 93017 CV STRESS TEST TRACING ONLY: CPT

## 2021-11-04 PROCEDURE — 6360000002 HC RX W HCPCS: Performed by: INTERNAL MEDICINE

## 2021-11-04 PROCEDURE — 78452 HT MUSCLE IMAGE SPECT MULT: CPT

## 2021-11-04 PROCEDURE — A9500 TC99M SESTAMIBI: HCPCS | Performed by: INTERNAL MEDICINE

## 2021-11-04 RX ORDER — AMINOPHYLLINE DIHYDRATE 25 MG/ML
50 INJECTION, SOLUTION INTRAVENOUS
Status: COMPLETED | OUTPATIENT
Start: 2021-11-04 | End: 2021-11-04

## 2021-11-04 RX ORDER — AMINOPHYLLINE DIHYDRATE 25 MG/ML
100 INJECTION, SOLUTION INTRAVENOUS
Status: COMPLETED | OUTPATIENT
Start: 2021-11-04 | End: 2021-11-04

## 2021-11-04 RX ORDER — 0.9 % SODIUM CHLORIDE 0.9 %
10 VIAL (ML) INJECTION PRN
Status: DISCONTINUED | OUTPATIENT
Start: 2021-11-04 | End: 2021-11-05 | Stop reason: HOSPADM

## 2021-11-04 RX ADMIN — TETRAKIS(2-METHOXYISOBUTYLISOCYANIDE)COPPER(I) TETRAFLUOROBORATE 10 MILLICURIE: 1 INJECTION, POWDER, LYOPHILIZED, FOR SOLUTION INTRAVENOUS at 09:39

## 2021-11-04 RX ADMIN — REGADENOSON 0.4 MG: 0.08 INJECTION, SOLUTION INTRAVENOUS at 10:19

## 2021-11-04 RX ADMIN — AMINOPHYLLINE 50 MG: 25 INJECTION, SOLUTION INTRAVENOUS at 10:31

## 2021-11-04 RX ADMIN — TETRAKIS(2-METHOXYISOBUTYLISOCYANIDE)COPPER(I) TETRAFLUOROBORATE 30 MILLICURIE: 1 INJECTION, POWDER, LYOPHILIZED, FOR SOLUTION INTRAVENOUS at 09:39

## 2021-11-04 RX ADMIN — SODIUM CHLORIDE, PRESERVATIVE FREE 10 ML: 5 INJECTION INTRAVENOUS at 10:20

## 2021-11-04 NOTE — PROGRESS NOTES
Dr. Tim Enrique consulted during stress test and comes into room. RN t give reversal medication. Patient states, she feels much better after reversal. Patient ok to go to waiting room.

## 2021-11-04 NOTE — PROCEDURES
[place] [id number]                             CARDIAC STRESS TEST    PATIENT NAME: Bj Brito                   :         1940  MED REC NO:   [id number]                              ROOM:  ACCOUNT NO:   [de-identified]                           ADMIT DATE:  2021  PROVIDER:     Malgorzata Betancourt      DATE OF STUDY:  2021      Cardiovascular Diagnostics Department    Ordering Provider:  Lorna Jon MD    Primary Care Provider:  Elisa Arthur DO    Interpreting Physician:  Malgorzata Betancourt MD    MYOCARDIAL PERFUSION STRESS IMAGING    The stress ECG results are reported separately. NUCLEAR IMAGING RESULTS:  The overall quality of the study is  excellent. Mild attenuation artifact was seen. There is not evidence  of abnormal lung uptake. Additionally, the right ventricle appears  normal.  The left ventricular cavity is noted to be normal in size on  stress images. There is not evidence of transient ischemic dilatation  (TID) of the left ventricle. Gated SPECT imaging reveals normal myocardial thickening and wall  motion with a calculated left ventricular ejection fraction (EF) of  58%. The rest images demonstrated a small perfusion abnormality of mild  intensity in the inferolateral region which is most likely due to  artifact. On stress imaging, a small perfusion abnormality of mild intensity was  noted in the anterolateral region which is most likely due to  artifact. IMPRESSION:  1. Largely normal myocardial perfusion imaging with soft tissue  artifact, but without evidence of significant myocardial ischemia or  infarction. 2.  Global left ventricular systolic function was normal without  regional wall motion abnormalities. Overall these results are most consistent with a low risk for  significant coronary artery disease.                   Brodie Ndiaye CRISTIAN    D: 11/04/2021 12:52:57       T: 11/04/2021 14:01:15     [email or web address]  Job#: [id number]     Doc#: Unknown    CC:  Matteo Pappas DO

## 2021-11-08 NOTE — PROCEDURES
[place] [address]                              CARDIAC STRESS TEST    PATIENT NAME: Olesya Mckenna                   :        1940  MED REC NO:   [id number]                              ROOM:  ACCOUNT NO:   [de-identified]                           ADMIT DATE: 2021  PROVIDER:     Markell Causey      DATE OF STUDY:  2021    LEXISCAN CARDIOLITE STRESS TEST:    INDICATION:  Chest pain. IMPRESSION:  1. We gave 0.4 mg of Lexiscan intravenously. 2.  This was followed in 20 seconds by Cardiolite infusion. 3.  There was no chest pain. 4.  There was no ST depression. 5.  It was an overall negative Lexiscan stress test.  6.  Cardiolite to follow.         Manoj [place]    D: 2021 6:44:54       T: 2021 7:48:28     [email or web address]  Job#: [id number]     Doc#: [id number]    CC:  Sue Rosado DO

## 2021-12-01 RX ORDER — TRIAMCINOLONE ACETONIDE 1 MG/G
CREAM TOPICAL
Qty: 30 G | Refills: 2 | Status: SHIPPED | OUTPATIENT
Start: 2021-12-01

## 2021-12-01 NOTE — TELEPHONE ENCOUNTER
Renew Kenalog cream      Last visit:  6/1/2021  Next Visit Date:    Future Appointments   Date Time Provider Joshua Gaitanisti   12/9/2021 10:00 AM MD Brian Felix [facility]   3/14/2022 10:00 AM MD Brian Felix [facility]         Medication List:  Prior to Admission medications    Medication Sig Start Date End Date Taking? Authorizing Provider   isosorbide mononitrate (IMDUR) 30 MG extended release tablet Take 1 tablet by mouth 2 times daily 11/1/21   Celestino Littlejohn MD   clopidogrel (PLAVIX) 75 MG tablet Take 1 tablet by mouth daily 11/1/21   Celestino Littlejohn MD   amLODIPine (NORVASC) 2.5 MG tablet Take 1 tablet by mouth daily 11/1/21   Celestino Littlejohn MD   omeprazole (PRILOSEC) 20 MG delayed release capsule TAKE 1 CAPSULE BY MOUTH EVERY DAY 10/29/21   Steph Amen, DO   metoprolol succinate (TOPROL XL) 25 MG extended release tablet TAKE 1 TABLET BY MOUTH EVERY DAY 8/5/21   Steph Amen, DO   FLUoxetine (PROZAC) 40 MG capsule TAKE 1 CAPSULE BY MOUTH EVERY DAY 8/5/21   Steph Amen, DO   predniSONE (DELTASONE) 20 MG tablet TAKE 1 TABLET BY MOUTH DAILY AS NEEDED FOR ARTHRITIS 8/5/21   Steph Amen, DO   furosemide (LASIX) 20 MG tablet TAKE 1 TABLET BY MOUTH EVERY DAY 8/5/21   Steph Amen, DO   levothyroxine (SYNTHROID) 75 MCG tablet TAKE 1 TABLET BY MOUTH EVERY DAY 8/5/21   Steph Amen, DO   buPROPion (WELLBUTRIN XL) 150 MG extended release tablet TAKE 1 TABLET BY MOUTH EVERY DAY IN THE MORNING 8/5/21   Steph Amen, DO   triamcinolone (KENALOG) 0.1 % cream APPLY topically TWICE DAILY 7/30/21   Steph Amen, DO   potassium chloride (KLOR-CON M) 20 MEQ extended release tablet TAKE 2 TABLETS BY MOUTH TWICE DAILY 11/18/20   Steph Amen, DO   aspirin 81 MG tablet Take 81 mg by mouth daily    Historical Provider, MD   b complex vitamins capsule Take 1 capsule by mouth daily.       Historical Provider, MD   bisacodyl (DULCOLAX) 5 MG EC tablet Take 5 mg by mouth

## 2021-12-09 ENCOUNTER — OFFICE VISIT (OUTPATIENT)
Dept: CARDIOLOGY CLINIC | Age: 81
End: 2021-12-09
Payer: MEDICARE

## 2021-12-09 VITALS
DIASTOLIC BLOOD PRESSURE: 60 MMHG | SYSTOLIC BLOOD PRESSURE: 120 MMHG | BODY MASS INDEX: 22.2 KG/M2 | HEART RATE: 83 BPM | OXYGEN SATURATION: 96 % | WEIGHT: 146 LBS

## 2021-12-09 DIAGNOSIS — R07.9 CHEST PAIN, UNSPECIFIED TYPE: Primary | ICD-10-CM

## 2021-12-09 PROCEDURE — G8428 CUR MEDS NOT DOCUMENT: HCPCS | Performed by: INTERNAL MEDICINE

## 2021-12-09 PROCEDURE — 1036F TOBACCO NON-USER: CPT | Performed by: INTERNAL MEDICINE

## 2021-12-09 PROCEDURE — 1090F PRES/ABSN URINE INCON ASSESS: CPT | Performed by: INTERNAL MEDICINE

## 2021-12-09 PROCEDURE — G8484 FLU IMMUNIZE NO ADMIN: HCPCS | Performed by: INTERNAL MEDICINE

## 2021-12-09 PROCEDURE — 4040F PNEUMOC VAC/ADMIN/RCVD: CPT | Performed by: INTERNAL MEDICINE

## 2021-12-09 PROCEDURE — G8420 CALC BMI NORM PARAMETERS: HCPCS | Performed by: INTERNAL MEDICINE

## 2021-12-09 PROCEDURE — 1123F ACP DISCUSS/DSCN MKR DOCD: CPT | Performed by: INTERNAL MEDICINE

## 2021-12-09 PROCEDURE — G8399 PT W/DXA RESULTS DOCUMENT: HCPCS | Performed by: INTERNAL MEDICINE

## 2021-12-09 PROCEDURE — 99214 OFFICE O/P EST MOD 30 MIN: CPT | Performed by: INTERNAL MEDICINE

## 2021-12-09 RX ORDER — AMLODIPINE BESYLATE 2.5 MG/1
2.5 TABLET ORAL DAILY
Qty: 90 TABLET | Refills: 3 | Status: SHIPPED | OUTPATIENT
Start: 2021-12-09 | End: 2022-10-17

## 2021-12-09 RX ORDER — CLOPIDOGREL BISULFATE 75 MG/1
75 TABLET ORAL DAILY
Qty: 90 TABLET | Refills: 3 | Status: SHIPPED | OUTPATIENT
Start: 2021-12-09 | End: 2022-10-17

## 2021-12-09 RX ORDER — ISOSORBIDE MONONITRATE 30 MG/1
30 TABLET, EXTENDED RELEASE ORAL 2 TIMES DAILY
Qty: 180 TABLET | Refills: 3 | Status: SHIPPED | OUTPATIENT
Start: 2021-12-09 | End: 2022-07-27

## 2021-12-09 NOTE — LETTER
Selena Rodriguez M.D. [address] [place] [address]  [phone]          2021          Julianalisette Abdalla DO  [place] [address]      RE:   Izabel Key  :  1940      Dear Dr. Stanley Ada:    CHIEF COMPLAINT:  1. Shortness of breath with activity consistent with previous angina. 2.  On full medical therapy with improvement at this time. HISTORY OF PRESENT ILLNESS:  I had the pleasure of seeing Mrs. Izabel Key in the office on 2021. I trust you received my full H and P from 2021. At that time, she was having, what sounds like, increased angina for the last several months. We increased her Imdur to 30 mg b.i.d. and added Norvasc 2.5 mg daily. We also ordered a Lexiscan stress test.  I brought her back today for reevaluation of her symptoms. Her stress test actually looked good and it was read as largely normal with no evidence of any significant ischemia. She states that she is improved. She can do more at home, feels less fatigued. She has less shortness of breath and this has been noticed also by her . She has had no PND or orthopnea, no pedal edema. She has been pleased with how she has been feeling so far. MEDICATIONS:  Her medications are Norvasc 2.5 mg daily, aspirin 81 mg daily, Wellbutrin  mg daily, Plavix 75 mg daily, Prozac 40 mg daily, Lasix 40 mg daily, Imdur 30 mg b.i.d., Synthroid 75 mcg daily, Toprol-XL 25 mg daily, Prilosec 20 mg daily, potassium 20 mEq two tablets b.i.d., prednisone 20 mg p.r.n. PHYSICAL EXAMINATION:  VITAL SIGNS:  Her blood pressure was 120/70, heart rate was 70 and regular. Her exam was unremarkable. IMPRESSION:  1. Angina, which has improved since increasing her Imdur and adding Norvasc 2.5 mg daily. 2.  Largely normal cardiac stress test with no evidence of any large area of ischemia. PLAN:  1.   We will continue same medications. 2.  I will see her in two months in followup. 3.  If she would develop more shortness of breath, chest pain, or loss of energy, I would increase the dose of her Norvasc as we do have significant room for dosage adjustment. 4.  We will hold off on a cardiac catheterization unless her symptoms would worsen unrelenting with medications. Thank you very much for allowing me the privilege of seeing Mrs. Smith. If you have any questions on my thoughts, please do not hesitate to contact me.     Sincerely,        Wolf Hutson    D: 12/09/2021 10:31:44     T: 12/09/2021 10:35:00     [email or web address]  Job#: [id number]   Doc#: [id number]

## 2021-12-09 NOTE — PATIENT INSTRUCTIONS
No changes in medication  See in 2 months no testing  Will send Rx's for 90 days  To [facility] in [facility]

## 2021-12-09 NOTE — PROGRESS NOTES
Patient here to follow up   Stress test 1 month prior  No hospitalizations since last seen  When she wears tight clothes she  Feels chest pain-tightness but in her  Back directly behind her heart --like her lungs--feels better when with loose clothing  Some SOB when doing housework  Starts sweating only on head not on  Body---this has been going on a long time  SOB when walking-then when rested feels better  Isosorbide and amlodipine has made her feel better  Not as tired  No edema  Doing more activities  Stress test looked good  No heart cath needed   No changes in medication

## 2021-12-12 NOTE — PROGRESS NOTES
Lindsey Segura M.D. [address] [place] [address] [address] [facility], [place] [address]  [phone]          2021          Mariel Barnett DO  [facility] [address]      RE:   Ezequiel Maddox  :  1940      Dear Dr. Sulema Barrera:    CHIEF COMPLAINT:  1. Shortness of breath with activity consistent with previous angina. 2.  On full medical therapy with improvement at this time. HISTORY OF PRESENT ILLNESS:  I had the pleasure of seeing Mrs. Ezequiel Maddox in the office on 2021. I trust you received my full H and P from 2021. At that time, she was having, what sounds like, increased angina for the last several months. We increased her Imdur to 30 mg b.i.d. and added Norvasc 2.5 mg daily. We also ordered a Lexiscan stress test.  I brought her back today for reevaluation of her symptoms. Her stress test actually looked good and it was read as largely normal with no evidence of any significant ischemia. She states that she is improved. She can do more at home, feels less fatigued. She has less shortness of breath and this has been noticed also by her . She has had no PND or orthopnea, no pedal edema. She has been pleased with how she has been feeling so far. MEDICATIONS:  Her medications are Norvasc 2.5 mg daily, aspirin 81 mg daily, Wellbutrin  mg daily, Plavix 75 mg daily, Prozac 40 mg daily, Lasix 40 mg daily, Imdur 30 mg b.i.d., Synthroid 75 mcg daily, Toprol-XL 25 mg daily, Prilosec 20 mg daily, potassium 20 mEq two tablets b.i.d., prednisone 20 mg p.r.n. PHYSICAL EXAMINATION:  VITAL SIGNS:  Her blood pressure was 120/70, heart rate was 70 and regular. Her exam was unremarkable. IMPRESSION:  1. Angina, which has improved since increasing her Imdur and adding Norvasc 2.5 mg daily. 2.  Largely normal cardiac stress test with no evidence of any large area of ischemia. PLAN:  1.   We will continue same medications. 2.  I will see her in two months in followup. 3.  If she would develop more shortness of breath, chest pain, or loss of energy, I would increase the dose of her Norvasc as we do have significant room for dosage adjustment. 4.  We will hold off on a cardiac catheterization unless her symptoms would worsen unrelenting with medications. Thank you very much for allowing me the privilege of seeing Mrs. Smith. If you have any questions on my thoughts, please do not hesitate to contact me.     Sincerely,        Simin Myles    D: 12/09/2021 10:31:44     T: 12/09/2021 10:35:00     [email or web address]  Job#: [id number]   Doc#: [id number]

## 2022-01-24 RX ORDER — POTASSIUM CHLORIDE 20 MEQ/1
TABLET, EXTENDED RELEASE ORAL
Qty: 360 TABLET | Refills: 3 | Status: SHIPPED | OUTPATIENT
Start: 2022-01-24 | End: 2022-03-14 | Stop reason: DRUGHIGH

## 2022-01-24 NOTE — TELEPHONE ENCOUNTER
Last OV: 6/1/2021  Last RX:    Next scheduled apt: Visit date not found       Rx refill requested through [facility] for:  Potassium chloride 20meq ER    Rx pending.

## 2022-01-28 RX ORDER — FLUOXETINE HYDROCHLORIDE 40 MG/1
CAPSULE ORAL
Qty: 90 CAPSULE | Refills: 1 | Status: SHIPPED | OUTPATIENT
Start: 2022-01-28 | End: 2022-06-02 | Stop reason: SDUPTHER

## 2022-01-28 RX ORDER — METOPROLOL SUCCINATE 25 MG/1
TABLET, EXTENDED RELEASE ORAL
Qty: 90 TABLET | Refills: 1 | Status: SHIPPED | OUTPATIENT
Start: 2022-01-28 | End: 2022-06-02 | Stop reason: SDUPTHER

## 2022-01-28 RX ORDER — BUPROPION HYDROCHLORIDE 150 MG/1
TABLET ORAL
Qty: 90 TABLET | Refills: 1 | Status: SHIPPED | OUTPATIENT
Start: 2022-01-28 | End: 2022-06-02 | Stop reason: SDUPTHER

## 2022-01-28 RX ORDER — FUROSEMIDE 20 MG/1
TABLET ORAL
Qty: 90 TABLET | Refills: 1 | Status: SHIPPED | OUTPATIENT
Start: 2022-01-28 | End: 2022-06-02 | Stop reason: SDUPTHER

## 2022-01-28 RX ORDER — LEVOTHYROXINE SODIUM 0.07 MG/1
TABLET ORAL
Qty: 90 TABLET | Refills: 1 | Status: SHIPPED | OUTPATIENT
Start: 2022-01-28 | End: 2022-06-02 | Stop reason: SDUPTHER

## 2022-01-28 NOTE — TELEPHONE ENCOUNTER
Last visit:  6/1/2021  Next Visit Date:    Future Appointments   Date Time Provider Joshua Patricki   3/14/2022 10:00 AM MD Manju Griffin [facility]         Medication List:  Prior to Admission medications    Medication Sig Start Date End Date Taking? Authorizing Provider   potassium chloride (KLOR-CON M) 20 MEQ extended release tablet TAKE 2 TABLETS BY MOUTH TWICE DAILY 1/24/22   Lizzieleesa Wyatt DO   amLODIPine (NORVASC) 2.5 MG tablet Take 1 tablet by mouth daily 12/9/21   Bradley Dela Cruz MD   clopidogrel (PLAVIX) 75 MG tablet Take 1 tablet by mouth daily 12/9/21   Bradley Dela Cruz MD   isosorbide mononitrate (IMDUR) 30 MG extended release tablet Take 1 tablet by mouth 2 times daily 12/9/21   Bradley Dela Cruz MD   triamcinolone (KENALOG) 0.1 % cream APPLY topically TWICE DAILY 12/1/21   [facility],    omeprazole (PRILOSEC) 20 MG delayed release capsule TAKE 1 CAPSULE BY MOUTH EVERY DAY 10/29/21   [facility], DO   metoprolol succinate (TOPROL XL) 25 MG extended release tablet TAKE 1 TABLET BY MOUTH EVERY DAY 8/5/21   [facility], DO   FLUoxetine (PROZAC) 40 MG capsule TAKE 1 CAPSULE BY MOUTH EVERY DAY 8/5/21   [facility], DO   predniSONE (DELTASONE) 20 MG tablet TAKE 1 TABLET BY MOUTH DAILY AS NEEDED FOR ARTHRITIS 8/5/21   [facility], DO   furosemide (LASIX) 20 MG tablet TAKE 1 TABLET BY MOUTH EVERY DAY 8/5/21   [facility], DO   levothyroxine (SYNTHROID) 75 MCG tablet TAKE 1 TABLET BY MOUTH EVERY DAY 8/5/21   [facility], DO   buPROPion (WELLBUTRIN XL) 150 MG extended release tablet TAKE 1 TABLET BY MOUTH EVERY DAY IN THE MORNING 8/5/21   [facility], DO   aspirin 81 MG tablet Take 81 mg by mouth daily    Historical Provider, MD   b complex vitamins capsule Take 1 capsule by mouth daily. Historical Provider, MD   bisacodyl (DULCOLAX) 5 MG EC tablet Take 5 mg by mouth daily as needed.  Take three daily       Historical Provider, MD   Calcium Carbonate-Vitamin D (CALCIUM + D) 600-200 MG-UNIT TABS Take  by mouth daily.       Historical Provider, MD

## 2022-03-14 ENCOUNTER — OFFICE VISIT (OUTPATIENT)
Dept: CARDIOLOGY CLINIC | Age: 82
End: 2022-03-14
Payer: MEDICARE

## 2022-03-14 VITALS
SYSTOLIC BLOOD PRESSURE: 120 MMHG | WEIGHT: 143 LBS | HEART RATE: 94 BPM | OXYGEN SATURATION: 96 % | BODY MASS INDEX: 21.74 KG/M2 | DIASTOLIC BLOOD PRESSURE: 60 MMHG

## 2022-03-14 DIAGNOSIS — I25.10 CORONARY ARTERY DISEASE INVOLVING NATIVE CORONARY ARTERY OF NATIVE HEART WITHOUT ANGINA PECTORIS: ICD-10-CM

## 2022-03-14 DIAGNOSIS — E55.9 VITAMIN D DEFICIENCY DISEASE: ICD-10-CM

## 2022-03-14 DIAGNOSIS — E78.49 OTHER HYPERLIPIDEMIA: ICD-10-CM

## 2022-03-14 DIAGNOSIS — I10 ESSENTIAL HYPERTENSION: Primary | ICD-10-CM

## 2022-03-14 PROCEDURE — 99214 OFFICE O/P EST MOD 30 MIN: CPT | Performed by: INTERNAL MEDICINE

## 2022-03-14 PROCEDURE — G8399 PT W/DXA RESULTS DOCUMENT: HCPCS | Performed by: INTERNAL MEDICINE

## 2022-03-14 PROCEDURE — G8420 CALC BMI NORM PARAMETERS: HCPCS | Performed by: INTERNAL MEDICINE

## 2022-03-14 PROCEDURE — G8484 FLU IMMUNIZE NO ADMIN: HCPCS | Performed by: INTERNAL MEDICINE

## 2022-03-14 PROCEDURE — 1036F TOBACCO NON-USER: CPT | Performed by: INTERNAL MEDICINE

## 2022-03-14 PROCEDURE — 4040F PNEUMOC VAC/ADMIN/RCVD: CPT | Performed by: INTERNAL MEDICINE

## 2022-03-14 PROCEDURE — G8428 CUR MEDS NOT DOCUMENT: HCPCS | Performed by: INTERNAL MEDICINE

## 2022-03-14 PROCEDURE — 1090F PRES/ABSN URINE INCON ASSESS: CPT | Performed by: INTERNAL MEDICINE

## 2022-03-14 PROCEDURE — 1123F ACP DISCUSS/DSCN MKR DOCD: CPT | Performed by: INTERNAL MEDICINE

## 2022-03-14 NOTE — PATIENT INSTRUCTIONS
No changes    Follow up in 6 months     You may be receiving a survey from [employer] regarding your visit today. Please complete the survey to enable us to provide the highest quality of care to you and your family. If you cannot score us a very good on any question, please call the office to discuss how we could have made your experience a very good one.       Loral Landau, LPN Beth Ciro Scales, [place], LPN Dr.Gregory Elaina Irons

## 2022-03-14 NOTE — LETTER
Corey Rosales MD  [facility]  [address], [place] [address]  [phone]      2022      DO Hernandez [address]      RE:   Zaid Ma  :  1940      Dear Dr. James Gaona:    CHIEF COMPLAINT:  1. Shortness of breath. 2.  Status post open heart surgery on 2006. 3.  Status post angioplasty on 2016.  4.  Functional class I at this stage. HISTORY OF PRESENT ILLNESS:  I had the pleasure of seeing Mrs. Janessa Murillo in our office on 2022. She is a pleasant 57-year-old female who had a cardiac catheterization by Dr. Rob Small in  that showed 80% disease in the ostium of the right coronary artery, with unremarkable circumflex, the LAD had 80% disease, EF was 45% to 50%. She had subsequent open heart surgery by Dr. Edilson Knapp on 2006, with a LIMA to the LAD, vein graft to the diagonal, and vein graft to the right coronary artery. In 2006, she had a repeat catheterization that showed EF of 50%, with occluded vein graft to the right coronary artery, occluded vein graft to the diagonal, patent LIMA to the LAD and we stented her right coronary artery with a 3.0 x 16 mm drug-eluting Taxus stent. On 2012, a catheterization showed diminutive LIMA to the LAD with 90% disease in the LAD and diagonal at the bifurcation and 90% disease in the ostium of the right coronary artery, 40% circumflex, the vein grafts were all occluded, EF of 60%. We did staged angioplasty and stents in the LAD and diagonal placing a 3.5 x 24 mm Promus stent in the LAD, dilating the diagonal through the stent struts. We brought her back on 2012 and did complex angioplasty of the right coronary artery, placing a 3.0 x 20 mm Promus stent. On 2016, we did a catheterization because of shortness of breath.   This showed 95% to 99% disease in the proximal right coronary artery, which we dilated with a balloon alone using a 3.25 x 15 mm Trek balloon. She had 90% disease in the LAD beyond the stent in which I placed a 2.75 x 18 mm Xience stent. She also had 70% disease in the OM branch of the circumflex and an EF of 50% to 55% and she has had no further cardiac catheterizations. She is not treated for hyperlipidemia. She is intolerant to all statins, including Livalo and does not wish to do PCSK9 inhibitor. I saw her on 11/01/2021, and at that time, she was having more angina with chest pain and shortness of breath. Her echocardiogram showed an EF in the 50% to 55% range. I placed her on full medical therapy and did a Lexiscan Cardiolite stress test.    When I saw her on 12/09/2021, she was better with less chest pain. Her stress test was read as largely normal.  She had less fatigue, was doing more at home. Therefore, we continued the same medications and I am seeing her today in followup. She does complain of being fatigued. She has a 12-room [employer] and has difficulty wanting to clean it. She does get short of breath when attempting to clean her house. However, she states her symptoms really have not changed from December. This is also echoed by her . Again, she is having no chest pain or chest discomfort. She has had no syncope or near syncope, lightheadedness or dizziness. Denies any palpitations. CARDIAC RISK FACTORS:  Known CAD:  Positive. Hypertension:  Positive. PTCA:  Positive. Bypass Surgery:  Positive. Hyperlipidemia:  Positive. Other Family Members:  Positive. Peripheral Vascular Disease:  Positive. Diabetes:  Negative.     MEDICATIONS AT HOME:  She is currently on Norvasc 2.5 mg daily, aspirin 81 mg daily, Dulcolax p.r.n., Wellbutrin  mg daily, Plavix 75 mg daily, Prozac 40 mg daily, Lasix 20 mg daily, Imdur 30 mg b.i.d., Synthroid 75 mcg daily, Toprol-XL 25 mg daily, Prilosec 20 mg daily, prednisone 20 mg p.r.n. for arthritis, potassium 20 mEq two in the morning and one in the afternoon. PAST MEDICAL AND SURGICAL HISTORY:  1.  Back surgery and spinal fusion on 10/01/2010.  2.  Peripheral vascular disease, with 40% disease in the right internal carotid artery with no disease in the left internal carotid artery. 3.  Severe arthritis. 4.  Oophorectomy. 5.  Depression. 6.  Euthyroid, on treatment. 7.  Cardiac as above. 8.  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 FAMILY HISTORY:  Mother had MI. Father had MI.    SOCIAL HISTORY:  She is 78years old. Two children. Does not smoke or drink alcohol. She and her  lived in [place], came back 28 years ago. Two sons, one in [facility] and one in [place]. She is quite limited in her activity now because of arthritis. REVIEW OF SYSTEMS:  Cardiac as above. Other systems reviewed including constitutional, eyes, ears, nose and throat, cardiovascular, respiratory, GI, , musculoskeletal, integumentary, neurologic, endocrine, hematologic and allergic/immunologic are negative except for what is described above. No weight loss or weight gain. No change in bowel habits. No blood in stools. No fevers, sweats or chills. PHYSICAL EXAMINATION:  VITAL SIGNS:  Her blood pressure was 120/60 with a heart rate of 94 and regular. Respiratory rate 18. O2 sat 96%. Weight 143 pounds. GENERAL:  She is a pleasant 78-year-old female. Denied pain. She was oriented to person, place and time. Answered questions appropriately. SKIN:  No unusual skin changes. HEENT:  The pupils are equally round and intact. Mucous membranes were dry. NECK:  No JVD. Good carotid pulses. No carotid bruits. No lymphadenopathy or thyromegaly. CARDIOVASCULAR EXAM:  S1 and S2 were normal.  No S3 or S4. Soft systolic blowing type murmur. No diastolic murmur.   PMI was normal.  No lift, thrust, or pericardial friction rub. LUNGS:  Clear to auscultation and percussion. ABDOMEN:  Soft and nontender. Good bowel sounds. EXTREMITIES:  Good femoral pulses. Good pedal pulses. No pedal edema. Skin was warm and dry. No calf tenderness. Nail beds pink. Good cap refill. PULSES:  Bilateral symmetrical radial, brachial and carotid pulses. No carotid bruits. Good femoral and pedal pulses. NEUROLOGIC EXAM:  Within normal limits. PSYCHIATRIC EXAM:  Within normal limits. IMPRESSION:  1. Severe coronary artery disease although functional class I at this time, on full medical therapy. 2.  Catheterization in 2005 showing 80% LAD and diagonal, unremarkable circumflex, 80% RCA, with an EF of 45% to 50%. 3.  Open heart surgery on 05/12/2006 by Dr. Raya Whittaker, with a LIMA to the LAD, vein graft to the diagonal, vein graft to the right coronary artery. 4.  Catheterization in 07/2006 showed occluded vein graft to the right coronary artery and diagonal, patent LIMA to the LAD, with angioplasty of the ostium of the right coronary artery placing a 3.0 x 16 mm Taxus stent. 5.  Catheterization on 11/07/2012, showing nonfunctional LIMA to the LAD, with occluded vein grafts, 90% disease in the LAD, 90% disease in the ostium of the right coronary artery with in-stent stenosis, mild disease in the circumflex, EF of 60%, with angioplasty of the LAD placing a 3.5 x 24 mm Promus stent. 6.  Staged angioplasty of the right coronary artery on 11/14/2012, placing a 3.0 x 20 mm Promus stent in the ostium. 7.  Last catheterization on 12/07/2016, that showed 95% disease in the proximal right coronary artery, with in-stent stenosis with angioplasty alone without a stent using a 3.25 x 15 mm Trek balloon. She had 90% disease in the proximal LAD beyond the previous stent, which was stented with a 2.75 x 18 mm Promus stent with an EF of 55%. 8.  Hyperlipidemia, not taking any statins and not wanting to take a PCSK9 inhibitor.   9.  Peripheral vascular disease, with 40% to 60% disease in the right internal carotid artery. 10.  Chronic renal insufficiency with a GFR of 43.  11.  Euthyroid, on treatment. 12.  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 PLAN:  1. No change in medications. 2.  See in 6 months. 3.  If she would have any worsening fatigue, shortness of breath, loss of energy or chest pain, I would want to see her, and we would try full medical therapy first.  4.  Would only do cardiac catheterization if we cannot control her symptoms with medical therapy. DISCUSSION:  Mrs. Cathy Baptiste does have severe coronary artery disease. She was having angina but at this point is quite stable. I think she is functional class I. She is fatigued and has shortness of breath that I think is noncardiac. It sounds like there might be an element of depression also as she does not \"feel like doing anything. \"    I have asked her to see Dr. Orlando Garcia for an evaluation concerning her depression. From a cardiac standpoint, I think she is stable and I made no change in medications. Obviously, if she would develop more symptoms, we would attempt just medical therapy and would not do a catheterization unless we cannot control her symptoms. Thank you very much for allowing me the privilege of seeing Mrs. Smith. If you have any questions on my thoughts, please do not hesitate to contact me.     Sincerely,        Judie Daniels    D: 03/14/2022 10:20:59     T: 03/14/2022 10:25:58     [email or web address]  Job#: [id number]   Doc#: [id number]

## 2022-03-14 NOTE — PROGRESS NOTES
Ov Dr. April Morse for 3 month follow up  C/o fatigued  C/o sob   Having to force herself to do anything   Sx same not any worse   No chest pain or discomfort       No changes    Follow up in 6 months

## 2022-03-15 NOTE — PROGRESS NOTES
Chad Ragsdale MD  [facility]  [address], [place] [address]  [phone]      2022      DO Hernandez Beck  [place], [place] [address]      RE:   Magdaleno Akers  :  1940      Dear Dr. Roney Prieto:    CHIEF COMPLAINT:  1. Shortness of breath. 2.  Status post open heart surgery on 2006. 3.  Status post angioplasty on 2016.  4.  Functional class I at this stage. HISTORY OF PRESENT ILLNESS:  I had the pleasure of seeing Mrs. Maynor Garcia in our office on 2022. She is a pleasant 64-year-old female who had a cardiac catheterization by Dr. Selina Wynne in  that showed 80% disease in the ostium of the right coronary artery, with unremarkable circumflex, the LAD had 80% disease, EF was 45% to 50%. She had subsequent open heart surgery by Dr. Arnie Munoz on 2006, with a LIMA to the LAD, vein graft to the diagonal, and vein graft to the right coronary artery. In 2006, she had a repeat catheterization that showed EF of 50%, with occluded vein graft to the right coronary artery, occluded vein graft to the diagonal, patent LIMA to the LAD and we stented her right coronary artery with a 3.0 x 16 mm drug-eluting Taxus stent. On 2012, a catheterization showed diminutive LIMA to the LAD with 90% disease in the LAD and diagonal at the bifurcation and 90% disease in the ostium of the right coronary artery, 40% circumflex, the vein grafts were all occluded, EF of 60%. We did staged angioplasty and stents in the LAD and diagonal placing a 3.5 x 24 mm Promus stent in the LAD, dilating the diagonal through the stent struts. We brought her back on 2012 and did complex angioplasty of the right coronary artery, placing a 3.0 x 20 mm Promus stent. On 2016, we did a catheterization because of shortness of breath.   This showed 95% to 99% disease in the proximal right coronary artery, which we dilated with a balloon alone using a 3.25 x 15 mm Trek balloon. She had 90% disease in the LAD beyond the stent in which I placed a 2.75 x 18 mm Xience stent. She also had 70% disease in the OM branch of the circumflex and an EF of 50% to 55% and she has had no further cardiac catheterizations. She is not treated for hyperlipidemia. She is intolerant to all statins, including Livalo and does not wish to do PCSK9 inhibitor. I saw her on 11/01/2021, and at that time, she was having more angina with chest pain and shortness of breath. Her echocardiogram showed an EF in the 50% to 55% range. I placed her on full medical therapy and did a Lexiscan Cardiolite stress test.    When I saw her on 12/09/2021, she was better with less chest pain. Her stress test was read as largely normal.  She had less fatigue, was doing more at home. Therefore, we continued the same medications and I am seeing her today in followup. She does complain of being fatigued. She has a 12-room [employer] and has difficulty wanting to clean it. She does get short of breath when attempting to clean her house. However, she states her symptoms really have not changed from December. This is also echoed by her . Again, she is having no chest pain or chest discomfort. She has had no syncope or near syncope, lightheadedness or dizziness. Denies any palpitations. CARDIAC RISK FACTORS:  Known CAD:  Positive. Hypertension:  Positive. PTCA:  Positive. Bypass Surgery:  Positive. Hyperlipidemia:  Positive. Other Family Members:  Positive. Peripheral Vascular Disease:  Positive. Diabetes:  Negative.     MEDICATIONS AT HOME:  She is currently on Norvasc 2.5 mg daily, aspirin 81 mg daily, Dulcolax p.r.n., Wellbutrin  mg daily, Plavix 75 mg daily, Prozac 40 mg daily, Lasix 20 mg daily, Imdur 30 mg b.i.d., Synthroid 75 mcg daily, Toprol-XL 25 mg daily, Prilosec 20 mg daily, prednisone 20 mg p.r.n. for arthritis, potassium 20 mEq two in the morning and one in the afternoon. PAST MEDICAL AND SURGICAL HISTORY:  1.  Back surgery and spinal fusion on 10/01/2010.  2.  Peripheral vascular disease, with 40% disease in the right internal carotid artery with no disease in the left internal carotid artery. 3.  Severe arthritis. 4.  Oophorectomy. 5.  Depression. 6.  Euthyroid, on treatment. 7.  Cardiac as above. 8.  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 FAMILY HISTORY:  Mother had MI. Father had MI.    SOCIAL HISTORY:  She is 78years old. Two children. Does not smoke or drink alcohol. She and her  lived in [place], came back 28 years ago. Two sons, one in [facility] and one in [place]. She is quite limited in her activity now because of arthritis. REVIEW OF SYSTEMS:  Cardiac as above. Other systems reviewed including constitutional, eyes, ears, nose and throat, cardiovascular, respiratory, GI, , musculoskeletal, integumentary, neurologic, endocrine, hematologic and allergic/immunologic are negative except for what is described above. No weight loss or weight gain. No change in bowel habits. No blood in stools. No fevers, sweats or chills. PHYSICAL EXAMINATION:  VITAL SIGNS:  Her blood pressure was 120/60 with a heart rate of 94 and regular. Respiratory rate 18. O2 sat 96%. Weight 143 pounds. GENERAL:  She is a pleasant 78-year-old female. Denied pain. She was oriented to person, place and time. Answered questions appropriately. SKIN:  No unusual skin changes. HEENT:  The pupils are equally round and intact. Mucous membranes were dry. NECK:  No JVD. Good carotid pulses. No carotid bruits. No lymphadenopathy or thyromegaly. CARDIOVASCULAR EXAM:  S1 and S2 were normal.  No S3 or S4. Soft systolic blowing type murmur. No diastolic murmur.   PMI was normal.  No lift, thrust, or pericardial vascular disease, with 40% to 60% disease in the right internal carotid artery. 10.  Chronic renal insufficiency with a GFR of 43.  11.  Euthyroid, on treatment. 12.  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 PLAN:  1. No change in medications. 2.  See in 6 months. 3.  If she would have any worsening fatigue, shortness of breath, loss of energy or chest pain, I would want to see her, and we would try full medical therapy first.  4.  Would only do cardiac catheterization if we cannot control her symptoms with medical therapy. DISCUSSION:  Mrs. Justina Grant does have severe coronary artery disease. She was having angina but at this point is quite stable. I think she is functional class I. She is fatigued and has shortness of breath that I think is noncardiac. It sounds like there might be an element of depression also as she does not \"feel like doing anything. \"    I have asked her to see Dr. Leopold Coss for an evaluation concerning her depression. From a cardiac standpoint, I think she is stable and I made no change in medications. Obviously, if she would develop more symptoms, we would attempt just medical therapy and would not do a catheterization unless we cannot control her symptoms. Thank you very much for allowing me the privilege of seeing Mrs. Smith. If you have any questions on my thoughts, please do not hesitate to contact me.     Sincerely,        Jeffrey Ross    D: 03/14/2022 10:20:59     T: 03/14/2022 10:25:58     [id number]  Job#: [id number]   Doc#: [id number]

## 2022-04-25 RX ORDER — OMEPRAZOLE 20 MG/1
CAPSULE, DELAYED RELEASE ORAL
Qty: 90 CAPSULE | Refills: 0 | Status: SHIPPED | OUTPATIENT
Start: 2022-04-25 | End: 2022-07-27

## 2022-05-10 ENCOUNTER — HOSPITAL ENCOUNTER (EMERGENCY)
Age: 82
Discharge: HOME | End: 2022-05-10
Attending: FAMILY MEDICINE
Payer: MEDICARE

## 2022-05-10 ENCOUNTER — TELEPHONE (OUTPATIENT)
Dept: CARDIOLOGY CLINIC | Age: 82
End: 2022-05-10

## 2022-05-10 VITALS
DIASTOLIC BLOOD PRESSURE: 84 MMHG | TEMPERATURE: 98 F | SYSTOLIC BLOOD PRESSURE: 156 MMHG | RESPIRATION RATE: 20 BRPM | HEART RATE: 96 BPM | OXYGEN SATURATION: 98 %

## 2022-05-10 DIAGNOSIS — Z79.01 LONG TERM (CURRENT) USE OF ANTICOAGULANTS: ICD-10-CM

## 2022-05-10 DIAGNOSIS — K62.5 BLOOD PER RECTUM: Primary | ICD-10-CM

## 2022-05-10 LAB
ABSOLUTE EOS #: 0.5 K/UL (ref 0–0.4)
ABSOLUTE LYMPH #: 1.1 K/UL (ref 1–4.8)
ABSOLUTE MONO #: 0.5 K/UL (ref 0–1)
ALBUMIN SERPL-MCNC: 4.3 G/DL (ref 3.5–5.2)
ALP BLD-CCNC: 80 U/L (ref 35–104)
ALT SERPL-CCNC: 13 U/L (ref 5–33)
ANION GAP SERPL CALCULATED.3IONS-SCNC: 14 MMOL/L (ref 9–17)
AST SERPL-CCNC: 17 U/L
BASOPHILS # BLD: 1 % (ref 0–2)
BASOPHILS ABSOLUTE: 0 K/UL (ref 0–0.2)
BILIRUB SERPL-MCNC: 0.18 MG/DL (ref 0.3–1.2)
BUN BLDV-MCNC: 23 MG/DL (ref 8–23)
BUN/CREAT BLD: 18 (ref 9–20)
CALCIUM SERPL-MCNC: 9.2 MG/DL (ref 8.6–10.4)
CHLORIDE BLD-SCNC: 110 MMOL/L (ref 98–107)
CHP ED QC CHECK: YES
CO2: 17 MMOL/L (ref 20–31)
CREAT SERPL-MCNC: 1.31 MG/DL (ref 0.5–0.9)
DIFFERENTIAL TYPE: YES
EOSINOPHILS RELATIVE PERCENT: 8 % (ref 0–5)
GFR AFRICAN AMERICAN: 47 ML/MIN
GFR NON-AFRICAN AMERICAN: 39 ML/MIN
GFR SERPL CREATININE-BSD FRML MDRD: ABNORMAL ML/MIN/{1.73_M2}
GLUCOSE BLD-MCNC: 103 MG/DL (ref 70–99)
HCT VFR BLD CALC: 35.2 % (ref 36–46)
HEMOCCULT STL QL: NORMAL
HEMOGLOBIN: 11.1 G/DL (ref 12–16)
INR BLD: 1
LYMPHOCYTES # BLD: 17 % (ref 15–40)
MCH RBC QN AUTO: 27.7 PG (ref 26–34)
MCHC RBC AUTO-ENTMCNC: 31.5 G/DL (ref 31–37)
MCV RBC AUTO: 87.8 FL (ref 80–100)
MONOCYTES # BLD: 8 % (ref 4–8)
PDW BLD-RTO: 16.2 % (ref 12.1–15.2)
PLATELET # BLD: 362 K/UL (ref 140–450)
POTASSIUM SERPL-SCNC: 4 MMOL/L (ref 3.7–5.3)
PROTHROMBIN TIME: 12.9 SEC (ref 11.5–14.2)
RBC # BLD: 4.01 M/UL (ref 4–5.2)
SEG NEUTROPHILS: 66 % (ref 47–75)
SEGMENTED NEUTROPHILS ABSOLUTE COUNT: 4.3 K/UL (ref 2.5–7)
SODIUM BLD-SCNC: 141 MMOL/L (ref 135–144)
TOTAL PROTEIN: 6.4 G/DL (ref 6.4–8.3)
WBC # BLD: 6.5 K/UL (ref 3.5–11)

## 2022-05-10 PROCEDURE — 85025 COMPLETE CBC W/AUTO DIFF WBC: CPT

## 2022-05-10 PROCEDURE — 80053 COMPREHEN METABOLIC PANEL: CPT

## 2022-05-10 PROCEDURE — 99283 EMERGENCY DEPT VISIT LOW MDM: CPT

## 2022-05-10 PROCEDURE — 85610 PROTHROMBIN TIME: CPT

## 2022-05-10 ASSESSMENT — PAIN SCALES - GENERAL: PAINLEVEL_OUTOF10: 0

## 2022-05-10 ASSESSMENT — PAIN - FUNCTIONAL ASSESSMENT: PAIN_FUNCTIONAL_ASSESSMENT: 0-10

## 2022-05-10 NOTE — ED PROVIDER NOTES
[facility]  eMERGENCY dEPARTMENT eNCOUnter          [facility]       Chief Complaint   Patient presents with    Rectal Bleeding     started last night       Nurses Notes reviewed and I agree except as noted in the HPI. HISTORY OF PRESENT ILLNESS    Meagan Carver is a 78 y.o. female who presents to the emergency room via private vehicle with , patient complaining of bright red blood per rectum. Patient states first had an event last night after a bowel movement, thought she may have broke a hemorrhoid, and again had an event overnight and once this morning, again describing bright red blood per rectum. Patient denies any abdominal pain, denies any lightheadedness or dizziness, denies any nausea denies any palpitations. Patient denies any trauma or falls. Patient states she often has problems with constipation though denies any excessive hard bowel movements. She contacted her cardiology office this morning, who eventually advised to go to the emergency room for evaluation. PCP: Vaibhav Smith  Cards: [employer]    REVIEW OF SYSTEMS     Review of Systems   All other systems reviewed and are negative. PAST MEDICAL HISTORY    has a past medical history of CAD (coronary artery disease), Chronic back pain, Hyperlipidemia, Hypertension, Hypothyroidism, Post PTCA, and Transfusion history. SURGICAL HISTORY      has a past surgical history that includes Coronary artery bypass graft (2006); Spine surgery (2010); Coronary angioplasty with stent (2006,11/14/2012); Hemorrhoid surgery; Ovary removal (Right); Upper gastrointestinal endoscopy (6/11/2013); angioplasty (12/07/2016); Colonoscopy (6/11/2013); and pr njx dx/ther sbst intrlmnr lmbr/sac w/img gdn (N/A, 8/28/2018).     CURRENT MEDICATIONS       Current Discharge Medication List      CONTINUE these medications which have NOT CHANGED    Details   omeprazole (PRILOSEC) 20 MG delayed release capsule TAKE 1 CAPSULE BY MOUTH EVERY DAY  Qty: 90 capsule, Refills: 0    Comments: We are requesting this refill to have on file for next month s order. Potassium Chloride Azucena ER (KLOR-CON M20 PO) Take 20 mEq by mouth three times daily Taking 2 in am and 1 in afternoon      levothyroxine (SYNTHROID) 75 MCG tablet TAKE 1 TABLET BY MOUTH EVERY DAY  Qty: 90 tablet, Refills: 1    Comments: We are requesting this refill to have on file for next month s order. buPROPion (WELLBUTRIN XL) 150 MG extended release tablet TAKE 1 TABLET BY MOUTH IN THE MORNING  Qty: 90 tablet, Refills: 1    Comments: We are requesting this refill to have on file for next month s order. furosemide (LASIX) 20 MG tablet TAKE 1 TABLET BY MOUTH EVERY DAY  Qty: 90 tablet, Refills: 1    Comments: We are requesting this refill to have on file for next month s order. FLUoxetine (PROZAC) 40 MG capsule TAKE 1 CAPSULE BY MOUTH EVERY DAY  Qty: 90 capsule, Refills: 1    Comments: We are requesting this refill to have on file for next month s order. metoprolol succinate (TOPROL XL) 25 MG extended release tablet TAKE 1 TABLET BY MOUTH EVERY DAY  Qty: 90 tablet, Refills: 1    Comments: We are requesting this refill to have on file for next month s order. amLODIPine (NORVASC) 2.5 MG tablet Take 1 tablet by mouth daily  Qty: 90 tablet, Refills: 3      clopidogrel (PLAVIX) 75 MG tablet Take 1 tablet by mouth daily  Qty: 90 tablet, Refills: 3      isosorbide mononitrate (IMDUR) 30 MG extended release tablet Take 1 tablet by mouth 2 times daily  Qty: 180 tablet, Refills: 3      triamcinolone (KENALOG) 0.1 % cream APPLY topically TWICE DAILY  Qty: 30 g, Refills: 2      aspirin 81 MG tablet Take 81 mg by mouth daily      b complex vitamins capsule Take 1 capsule by mouth daily. bisacodyl (DULCOLAX) 5 MG EC tablet Take 5 mg by mouth daily as needed. Take three daily         Calcium Carbonate-Vitamin D (CALCIUM + D) 600-200 MG-UNIT TABS Take  by mouth daily. ALLERGIES     is allergic to sulfa antibiotics. FAMILY HISTORY     She indicated that her mother is . She indicated that her father is . family history includes Cancer in her father; Dementia in her father; Heart Disease in her mother. SOCIAL HISTORY      reports that she quit smoking about 33 years ago. Her smoking use included cigarettes. She has a 20.00 pack-year smoking history. She has never used smokeless tobacco. She reports that she does not drink alcohol and does not use drugs. PHYSICAL EXAM     INITIAL VITALS:  axillary temperature is 98 °F (36.7 °C). Her blood pressure is 156/84 (abnormal) and her pulse is 96. Her respiration is 20 and oxygen saturation is 98%. Physical Exam   Constitutional: Patient is oriented to person, place, and time. Patient appears well-developed and well-nourished. Patient is active and cooperative. HENT:   Head: Normocephalic and atraumatic. Head is without contusion. Right Ear:external ear normal. No drainage. Left Ear:  external ear normal. No drainage. Nose: Nose normal. No nasal deformity. No epistaxis. Mouth/Throat: Mucous membranes are not dry. Eyes: EOMI. Conjunctivae, sclera, and lids are normal. Right eye exhibits no discharge. Left eye exhibits no discharge. Neck: Full passive range of motion without pain and phonation normal.   Cardiovascular:  Normal rate, regular rhythm and intact distal pulses. Pulses: Right radial pulse  2+   Pulmonary/Chest: Effort normal. No tachypnea and no bradypnea. No wheezes, rhonchi, or rales. Abdominal: Soft.  Patient without distension or tenderness  Rectal: Single small external hemorrhoid versus tag, no abrasions or fissures, LINNETTE shows trace amount of mucousy bloody product, no gross masses on exam, fecal occult blood test positive with normal controls  Musculoskeletal:   Negative acute trauma or deformity,  apparent full range of motion and normal strength all extremities appropriate to age. Neurological: Patient is alert and oriented to person, place, and time. patient displays no tremor. Patient displays no seizure activity. Skin: Skin is warm and dry. Patient is not diaphoretic. Psychiatric: Patient has a normal mood and affect.  Patient speech is normal and behavior is normal. Cognition and memory are normal.    DIFFERENTIAL DIAGNOSIS:   GI bleed NOS, internal hemorrhoid bleed,    DIAGNOSTIC RESULTS           RADIOLOGY: non-plain film images(s) such as CT, Ultrasound and MRI are read by the radiologist.  No orders to display       LABS:   Labs Reviewed   CBC WITH AUTO DIFFERENTIAL - Abnormal; Notable for the following components:       Result Value    Hemoglobin 11.1 (*)     Hematocrit 35.2 (*)     RDW 16.2 (*)     Eosinophils % 8 (*)     Absolute Eos # 0.50 (*)     All other components within normal limits   COMPREHENSIVE METABOLIC PANEL W/ REFLEX TO MG FOR LOW K - Abnormal; Notable for the following components:    Glucose 103 (*)     CREATININE 1.31 (*)     Chloride 110 (*)     CO2 17 (*)     Total Bilirubin 0.18 (*)     GFR Non- 39 (*)     GFR  47 (*)     All other components within normal limits   POCT OCCULT BLOOD STOOL NON CA SCREEN - Normal   PROTIME-INR       EMERGENCY DEPARTMENT COURSE:   Vitals:    Vitals:    05/10/22 0854   BP: (!) 156/84   Pulse: 96   Resp: 20   Temp: 98 °F (36.7 °C)   TempSrc: Axillary   SpO2: 98%     Patient history and physical exam taken at bedside with  present, discussed patient symptoms and exam findings, discussed will get IV access and blood work, will do a rectal exam shortly, patient sitting semi-Fowlers in bed, acknowledged    With PRIYANKA Evans present, rectal exam was performed, as per PE above    Lab work-up reviewed, WB 6.5 with normal differential, H&H 11.1/35.2, , INR 1.0, CMP pending    CMP reviewed, showing SCR 1.31, BUN 23, , K4.0, normal liver enzymes and bilirubins    Case was discussed with Dr. Rachlee Holman, cardiology, regarding patient's Plavix and aspirin, would advise holding the Plavix and aspirin for 5 days, then that patient only restart the Plavix afterwards. Discussed with patient and patient's  overall work-up, at this time I feel we can safely discharge patient home, will refer for outpatient colonoscopy Dr. Frank Pereyra, otherwise follow-up with PCP and or cardiology as needed, hold Plavix and aspirin for 5 days and then restart the Plavix only as per conversation with Dr. Rachele Holman,, patient and  acknowledge. FINAL IMPRESSION      1. Blood per rectum          DISPOSITION/PLAN   D/c    PATIENT REFERRED TO:  Linda Alejandro MD  [address] [address] [phone]    Call       DO Lizbet Massey [address]  [place] [id number] [phone]    Call   As needed    Syl Mohr MD  [address] [place] [address]  [phone]    Call   As needed    [facility]  [address] [address] [phone]    As needed, If symptoms worsen      DISCHARGE MEDICATIONS:  Current Discharge Medication List              Summation      Patient Course: d/c    ED Medications administered this visit:  Medications - No data to display    New Prescriptions from this visit:    Current Discharge Medication List          Follow-up:  Linda Alejandro MD  [address] [id number] [phone]    Call       DO Lizbet Massey [address]  [place] [id number] [phone]    Call   As needed    Syl Mohr MD  [address] [place] [address]  [phone]    Call   As needed    [facility]  [address] [address] [phone]    As needed, If symptoms worsen        Final Impression:   1.  Blood per rectum               (Please note that portions of this note were completed with a voice recognition program.  Efforts were made to edit the dictations but occasionally words are mis-transcribed.)    Sheryle Junior, MD Sheryle Junior, MD  05/10/22 9281

## 2022-05-10 NOTE — TELEPHONE ENCOUNTER
called stating she is having rectal bleeding started last night continues to this am.  Spoke with Dr. Patricia Hernández- advised to get cbc/cmp and stop plavix and asa- needs to bring medication bottles for us to update med list  Upon talking to -he states \"she says it is a lot\" wife in back ground stating \"probably need surgery\"   Spoke to wife who states \" is a lot - just coming out\"   Pt states she has had hemorrhoids in past that have   \"busted\" and she thinks it is that again - wife keeps repeating \" its a lot\" advised her she needs to go to ER.    Wife and  verbalized understanding and will go to ER( they will not take plavix or asa this am )

## 2022-06-02 ENCOUNTER — OFFICE VISIT (OUTPATIENT)
Dept: FAMILY MEDICINE CLINIC | Age: 82
End: 2022-06-02
Payer: MEDICARE

## 2022-06-02 VITALS
HEART RATE: 74 BPM | DIASTOLIC BLOOD PRESSURE: 72 MMHG | WEIGHT: 146 LBS | SYSTOLIC BLOOD PRESSURE: 126 MMHG | OXYGEN SATURATION: 98 % | HEIGHT: 68 IN | BODY MASS INDEX: 22.13 KG/M2

## 2022-06-02 DIAGNOSIS — K62.5 RECTAL BLEEDING: ICD-10-CM

## 2022-06-02 DIAGNOSIS — H91.13 PRESBYCUSIS OF BOTH EARS: ICD-10-CM

## 2022-06-02 DIAGNOSIS — C07 PRIMARY PAROTID GLAND SQUAMOUS CELL CARCINOMA (HCC): ICD-10-CM

## 2022-06-02 DIAGNOSIS — R06.09 DYSPNEA ON EXERTION: ICD-10-CM

## 2022-06-02 DIAGNOSIS — Z00.00 MEDICARE ANNUAL WELLNESS VISIT, SUBSEQUENT: Primary | ICD-10-CM

## 2022-06-02 DIAGNOSIS — N18.32 STAGE 3B CHRONIC KIDNEY DISEASE (HCC): ICD-10-CM

## 2022-06-02 DIAGNOSIS — F34.1 DYSTHYMIA: ICD-10-CM

## 2022-06-02 PROBLEM — N18.30 CHRONIC RENAL DISEASE, STAGE III (HCC): Status: ACTIVE | Noted: 2022-06-02

## 2022-06-02 PROCEDURE — G8399 PT W/DXA RESULTS DOCUMENT: HCPCS | Performed by: FAMILY MEDICINE

## 2022-06-02 PROCEDURE — G8420 CALC BMI NORM PARAMETERS: HCPCS | Performed by: FAMILY MEDICINE

## 2022-06-02 PROCEDURE — 1036F TOBACCO NON-USER: CPT | Performed by: FAMILY MEDICINE

## 2022-06-02 PROCEDURE — G8427 DOCREV CUR MEDS BY ELIG CLIN: HCPCS | Performed by: FAMILY MEDICINE

## 2022-06-02 PROCEDURE — G0439 PPPS, SUBSEQ VISIT: HCPCS | Performed by: FAMILY MEDICINE

## 2022-06-02 PROCEDURE — 1090F PRES/ABSN URINE INCON ASSESS: CPT | Performed by: FAMILY MEDICINE

## 2022-06-02 PROCEDURE — 1123F ACP DISCUSS/DSCN MKR DOCD: CPT | Performed by: FAMILY MEDICINE

## 2022-06-02 PROCEDURE — 99214 OFFICE O/P EST MOD 30 MIN: CPT | Performed by: FAMILY MEDICINE

## 2022-06-02 RX ORDER — METOPROLOL SUCCINATE 25 MG/1
TABLET, EXTENDED RELEASE ORAL
Qty: 90 TABLET | Refills: 1 | Status: SHIPPED | OUTPATIENT
Start: 2022-06-02

## 2022-06-02 RX ORDER — FLUOXETINE HYDROCHLORIDE 40 MG/1
CAPSULE ORAL
Qty: 90 CAPSULE | Refills: 1 | Status: SHIPPED | OUTPATIENT
Start: 2022-06-02

## 2022-06-02 RX ORDER — BUPROPION HYDROCHLORIDE 150 MG/1
TABLET ORAL
Qty: 90 TABLET | Refills: 1 | Status: SHIPPED | OUTPATIENT
Start: 2022-06-02

## 2022-06-02 RX ORDER — PREDNISONE 20 MG/1
TABLET ORAL
Qty: 30 TABLET | Refills: 1 | Status: SHIPPED | OUTPATIENT
Start: 2022-06-02

## 2022-06-02 RX ORDER — FUROSEMIDE 20 MG/1
TABLET ORAL
Qty: 90 TABLET | Refills: 1 | Status: SHIPPED | OUTPATIENT
Start: 2022-06-02

## 2022-06-02 RX ORDER — LEVOTHYROXINE SODIUM 0.07 MG/1
TABLET ORAL
Qty: 90 TABLET | Refills: 1 | Status: SHIPPED | OUTPATIENT
Start: 2022-06-02

## 2022-06-02 SDOH — ECONOMIC STABILITY: FOOD INSECURITY: WITHIN THE PAST 12 MONTHS, YOU WORRIED THAT YOUR FOOD WOULD RUN OUT BEFORE YOU GOT MONEY TO BUY MORE.: NEVER TRUE

## 2022-06-02 SDOH — ECONOMIC STABILITY: FOOD INSECURITY: WITHIN THE PAST 12 MONTHS, THE FOOD YOU BOUGHT JUST DIDN'T LAST AND YOU DIDN'T HAVE MONEY TO GET MORE.: NEVER TRUE

## 2022-06-02 SDOH — HEALTH STABILITY: PHYSICAL HEALTH: ON AVERAGE, HOW MANY DAYS PER WEEK DO YOU ENGAGE IN MODERATE TO STRENUOUS EXERCISE (LIKE A BRISK WALK)?: 0 DAYS

## 2022-06-02 ASSESSMENT — LIFESTYLE VARIABLES
HOW OFTEN DO YOU HAVE A DRINK CONTAINING ALCOHOL: NEVER
HOW OFTEN DO YOU HAVE A DRINK CONTAINING ALCOHOL: 1

## 2022-06-02 ASSESSMENT — PATIENT HEALTH QUESTIONNAIRE - PHQ9
SUM OF ALL RESPONSES TO PHQ QUESTIONS 1-9: 0
SUM OF ALL RESPONSES TO PHQ QUESTIONS 1-9: 0
8. MOVING OR SPEAKING SO SLOWLY THAT OTHER PEOPLE COULD HAVE NOTICED. OR THE OPPOSITE, BEING SO FIGETY OR RESTLESS THAT YOU HAVE BEEN MOVING AROUND A LOT MORE THAN USUAL: 0
SUM OF ALL RESPONSES TO PHQ9 QUESTIONS 1 & 2: 0
1. LITTLE INTEREST OR PLEASURE IN DOING THINGS: 0
10. IF YOU CHECKED OFF ANY PROBLEMS, HOW DIFFICULT HAVE THESE PROBLEMS MADE IT FOR YOU TO DO YOUR WORK, TAKE CARE OF THINGS AT HOME, OR GET ALONG WITH OTHER PEOPLE: 0
2. FEELING DOWN, DEPRESSED OR HOPELESS: 0
7. TROUBLE CONCENTRATING ON THINGS, SUCH AS READING THE NEWSPAPER OR WATCHING TELEVISION: 0
9. THOUGHTS THAT YOU WOULD BE BETTER OFF DEAD, OR OF HURTING YOURSELF: 0
6. FEELING BAD ABOUT YOURSELF - OR THAT YOU ARE A FAILURE OR HAVE LET YOURSELF OR YOUR FAMILY DOWN: 0
4. FEELING TIRED OR HAVING LITTLE ENERGY: 0
3. TROUBLE FALLING OR STAYING ASLEEP: 0
SUM OF ALL RESPONSES TO PHQ QUESTIONS 1-9: 0
SUM OF ALL RESPONSES TO PHQ QUESTIONS 1-9: 0
5. POOR APPETITE OR OVEREATING: 0

## 2022-06-02 ASSESSMENT — SOCIAL DETERMINANTS OF HEALTH (SDOH): HOW HARD IS IT FOR YOU TO PAY FOR THE VERY BASICS LIKE FOOD, HOUSING, MEDICAL CARE, AND HEATING?: NOT HARD AT ALL

## 2022-06-02 NOTE — PROGRESS NOTES
Medicare Annual Wellness Visit    Sherrie Woo is here for Medicare AWV, Hypertension, and Foreign Body in Ear (hearing aid tip)    Assessment & Plan   Medicare annual wellness visit, subsequent      Recommendations for Preventive Services Due: see orders and patient instructions/AVS.  Recommended screening schedule for the next 5-10 years is provided to the patient in written form: see Patient Instructions/AVS.     Return for Medicare Annual Wellness Visit in 1 year. Subjective       Patient's complete Health Risk Assessment and screening values have been reviewed and are found in Flowsheets. The following problems were reviewed today and where indicated follow up appointments were made and/or referrals ordered. Positive Risk Factor Screenings with Interventions:                  No Positive Risk Factors identified today. Objective   Vitals:    06/02/22 0903   BP: 126/72   Pulse: 74   SpO2: 98%   Weight: 146 lb (66.2 kg)   Height: 5' 8\" (1.727 m)      Body mass index is 22.2 kg/m². Allergies   Allergen Reactions    Sulfa Antibiotics Itching     Prior to Visit Medications    Medication Sig Taking?  Authorizing Provider   omeprazole (PRILOSEC) 20 MG delayed release capsule TAKE 1 CAPSULE BY MOUTH EVERY DAY Yes Shaye Bradley,    Potassium Chloride Azucena ER (KLOR-CON M20 PO) Take 20 mEq by mouth three times daily Taking 2 in am and 1 in afternoon Yes Historical Provider, MD   levothyroxine (SYNTHROID) 75 MCG tablet TAKE 1 TABLET BY MOUTH EVERY DAY Yes Rohini Archer,    buPROPion (WELLBUTRIN XL) 150 MG extended release tablet TAKE 1 TABLET BY MOUTH IN THE MORNING Yes Shaye Bradley DO   furosemide (LASIX) 20 MG tablet TAKE 1 TABLET BY MOUTH EVERY DAY Yes Shaye Bradley DO   FLUoxetine (PROZAC) 40 MG capsule TAKE 1 CAPSULE BY MOUTH EVERY DAY Yes Shaye Bradley DO   metoprolol succinate (TOPROL XL) 25 MG extended release tablet TAKE 1 TABLET BY MOUTH EVERY DAY Yes Essence Daniels DO   amLODIPine (NORVASC) 2.5 MG tablet Take 1 tablet by mouth daily Yes Syl Mohr MD   clopidogrel (PLAVIX) 75 MG tablet Take 1 tablet by mouth daily Yes Syl Mohr MD   isosorbide mononitrate (IMDUR) 30 MG extended release tablet Take 1 tablet by mouth 2 times daily Yes Syl Mohr MD   triamcinolone (KENALOG) 0.1 % cream APPLY topically TWICE DAILY Yes Essence Daniels DO   aspirin 81 MG tablet Take 81 mg by mouth daily Yes Historical Provider, MD   b complex vitamins capsule Take 1 capsule by mouth daily. Yes Historical Provider, MD   bisacodyl (DULCOLAX) 5 MG EC tablet Take 5 mg by mouth daily as needed. Take three daily    Yes Historical Provider, MD   Calcium Carbonate-Vitamin D (CALCIUM + D) 600-200 MG-UNIT TABS Take  by mouth daily.    Yes Historical Provider, MD Ziegler (Including outside providers/suppliers regularly involved in providing care):   Patient Care Team:  Essence Daniels DO as PCP - [address] [place], [place] [address]DO as PCP - [facility] Empaneled Provider     Reviewed and updated this visit:  Tobacco  Allergies  Meds  Problems  Med Hx  Surg Hx  Soc Hx  Fam Hx

## 2022-06-02 NOTE — PROGRESS NOTES
Name: Emanuel Granados  : 1940         Chief Complaint:     Chief Complaint   Patient presents with    Medicare AWV    Hypertension    Foreign Body in Ear     hearing aid tip       History of Present Illness:      Emanuel Granados is a 78 y.o.  female who presents with Medicare AWV, Hypertension, and Foreign Body in Ear (hearing aid tip)      HPI    Believes she has lost hearing aid cushion in L ear twice - lost piece and never found it, has FB sensation but no pain. Hearing ok as long as she wears the hearing aids, which she can still do. Struggles with depression and has for a long time. Has bad days, about half the time, where she just sleeps a lot. Never has an abnormally elevated mood. ARMD, getting injections in [facility]. 5/10 ER visit for rectal bleeding. Thinks r/t some meds prescribed by Dr Lexus Kolb. Easy bruising and had had a bleeding hemorrhoid. Sometimes teeth bleed. Was instructed to stop DAPT for 5 days and then just restart plavix but she has remained off both d/t upcoming dental procedure. Used an old hemorrhoid rx  had had and things cleared up. SOB with activity and figures she'll need to have more coronary stents at some point. Just limits activity/takes breaks as needed. Medical History:     Patient Active Problem List   Diagnosis    CAD (coronary artery disease)    Chronic back pain    Hypothyroidism    Hypertension    Post PTCA    Hot flashes, menopausal    Hyperlipidemia    CKD (chronic kidney disease), stage III (HCC)    Postlaminectomy syndrome of lumbar region    Primary parotid gland squamous cell carcinoma (HCC)    Dysthymia    Chronic renal disease, stage III ([facility] [address] [1975]       Medications:       Prior to Admission medications    Medication Sig Start Date End Date Taking?  Authorizing Provider   levothyroxine (SYNTHROID) 75 MCG tablet TAKE 1 TABLET BY MOUTH EVERY DAY 22  Yes Onelia Manual, DO   buPROPion (WELLBUTRIN XL) 150 MG extended release tablet TAKE 1 TABLET BY MOUTH IN THE MORNING 6/2/22  Yes Lili Lopez, DO   furosemide (LASIX) 20 MG tablet TAKE 1 TABLET BY MOUTH EVERY DAY 6/2/22  Yes Lili Lopez, DO   FLUoxetine (PROZAC) 40 MG capsule TAKE 1 CAPSULE BY MOUTH EVERY DAY 6/2/22  Yes Lili Lopez, DO   metoprolol succinate (TOPROL XL) 25 MG extended release tablet TAKE 1 TABLET BY MOUTH EVERY DAY 6/2/22  Yes Lili Lopez, DO   predniSONE (DELTASONE) 20 MG tablet TAKE 1 TABLET BY MOUTH DAILY AS NEEDED FOR ARTHRITIS 6/2/22  Yes Lili Lopez, DO   omeprazole (PRILOSEC) 20 MG delayed release capsule TAKE 1 CAPSULE BY MOUTH EVERY DAY 4/25/22  Yes Lili Lopez DO   Potassium Chloride Azucena ER (KLOR-CON M20 PO) Take 20 mEq by mouth three times daily Taking 2 in am and 1 in afternoon   Yes Historical Provider, MD   amLODIPine (NORVASC) 2.5 MG tablet Take 1 tablet by mouth daily 12/9/21  Yes Clive Reese MD   clopidogrel (PLAVIX) 75 MG tablet Take 1 tablet by mouth daily 12/9/21  Yes Clive Reese MD   isosorbide mononitrate (IMDUR) 30 MG extended release tablet Take 1 tablet by mouth 2 times daily 12/9/21  Yes Clive Reese MD   triamcinolone (KENALOG) 0.1 % cream APPLY topically TWICE DAILY 12/1/21  Yes Lili Lopez DO   b complex vitamins capsule Take 1 capsule by mouth daily. Yes Historical Provider, MD   bisacodyl (DULCOLAX) 5 MG EC tablet Take 5 mg by mouth daily as needed. Take three daily      Yes Historical Provider, MD   Calcium Carbonate-Vitamin D (CALCIUM + D) 600-200 MG-UNIT TABS Take  by mouth daily. Yes Historical Provider, MD        Allergies:       Sulfa antibiotics    Physical Exam:     Vitals:  /72   Pulse 74   Ht 5' 8\" (1.727 m)   Wt 146 lb (66.2 kg)   SpO2 98%   BMI 22.20 kg/m²   Physical Exam  Vitals and nursing note reviewed. Constitutional:       Appearance: Normal appearance. She is well-developed. She is not ill-appearing.    HENT:      Right Ear: Hearing and tympanic membrane normal.      Left Ear: Hearing and tympanic membrane normal.      Nose: Nose normal. No mucosal edema. Mouth/Throat:      Mouth: Mucous membranes are moist.      Pharynx: Oropharynx is clear. Eyes:      Pupils: Pupils are equal, round, and reactive to light. Neck:      Thyroid: No thyroid mass or thyromegaly. Cardiovascular:      Rate and Rhythm: Normal rate and regular rhythm. Heart sounds: S1 normal and S2 normal. No murmur heard. Pulmonary:      Effort: Pulmonary effort is normal.      Breath sounds: Normal breath sounds. Abdominal:      General: Bowel sounds are normal.      Palpations: Abdomen is soft. Tenderness: There is no abdominal tenderness. Lymphadenopathy:      Cervical: No cervical adenopathy. Skin:     General: Skin is warm and dry. Findings: No rash. Neurological:      Mental Status: She is alert and oriented to person, place, and time. Psychiatric:         Mood and Affect: Mood normal.         Behavior: Behavior normal.         Data:     Lab Results   Component Value Date     05/10/2022    K 4.0 05/10/2022     05/10/2022    CO2 17 05/10/2022    BUN 23 05/10/2022    CREATININE 1.31 05/10/2022    GLUCOSE 103 05/10/2022    PROT 6.4 05/10/2022    LABALBU 4.3 05/10/2022    BILITOT 0.18 05/10/2022    ALKPHOS 80 05/10/2022    AST 17 05/10/2022    ALT 13 05/10/2022     Lab Results   Component Value Date    WBC 6.5 05/10/2022    RBC 4.01 05/10/2022    HGB 11.1 05/10/2022    HCT 35.2 05/10/2022    MCV 87.8 05/10/2022    MCH 27.7 05/10/2022    MCHC 31.5 05/10/2022    RDW 16.2 05/10/2022     05/10/2022    MPV NOT REPORTED 10/05/2021     Lab Results   Component Value Date    TSH 3.30 10/05/2021     Lab Results   Component Value Date    CHOL 222 10/05/2021    HDL 72 10/05/2021    LABA1C 5.7 09/12/2019         Assessment & Plan:        Diagnosis Orders   1. Medicare annual wellness visit, subsequent     2.  Stage 3b chronic kidney disease ([facility] [address]     3. Primary parotid gland squamous cell carcinoma (HCC)     4. Dysthymia     5. Dyspnea on exertion     6. Rectal bleeding     7. Presbycusis of both ears       2. Chronic kidney disease stable, continue monitoring  3. History of left-sided parotid cancer status posttreatment, in remission  4. Dysthymia continues, in okay control per patient. Continue same dosing of Prozac. 5.  Chronic dyspnea on exertion, has CAD, does follow with cardiology. Continue same Rx. 6.  Rectal bleeding resolved, continue monitoring and may need endoscopy or other eval.  Currently off any type of anticoagulation. Advised to restart Plavix after dental work. 7.  Bilateral hearing aids stable, no foreign body detected so she must of lost the hearing aid piece somewhere else. Advised she may follow-up as needed if concern like this presents again in the future. Requested Prescriptions     Signed Prescriptions Disp Refills    levothyroxine (SYNTHROID) 75 MCG tablet 90 tablet 1     Sig: TAKE 1 TABLET BY MOUTH EVERY DAY    buPROPion (WELLBUTRIN XL) 150 MG extended release tablet 90 tablet 1     Sig: TAKE 1 TABLET BY MOUTH IN THE MORNING    furosemide (LASIX) 20 MG tablet 90 tablet 1     Sig: TAKE 1 TABLET BY MOUTH EVERY DAY    FLUoxetine (PROZAC) 40 MG capsule 90 capsule 1     Sig: TAKE 1 CAPSULE BY MOUTH EVERY DAY    metoprolol succinate (TOPROL XL) 25 MG extended release tablet 90 tablet 1     Sig: TAKE 1 TABLET BY MOUTH EVERY DAY    predniSONE (DELTASONE) 20 MG tablet 30 tablet 1     Sig: TAKE 1 TABLET BY MOUTH DAILY AS NEEDED FOR ARTHRITIS         Patient Instructions     Personalized Preventive Plan for Arpita Smith - 6/2/2022  Medicare offers a range of preventive health benefits. Some of the tests and screenings are paid in full while other may be subject to a deductible, co-insurance, and/or copay.     Some of these benefits include a comprehensive review of your medical history including lifestyle, illnesses that may run in your family, and various assessments and screenings as appropriate. After reviewing your medical record and screening and assessments performed today your provider may have ordered immunizations, labs, imaging, and/or referrals for you. A list of these orders (if applicable) as well as your Preventive Care list are included within your After Visit Summary for your review. Other Preventive Recommendations:    · A preventive eye exam performed by an eye specialist is recommended every 1-2 years to screen for glaucoma; cataracts, macular degeneration, and other eye disorders. · A preventive dental visit is recommended every 6 months. · Try to get at least 150 minutes of exercise per week or 10,000 steps per day on a pedometer . · Order or download the FREE \"Exercise & Physical Activity: Your Everyday Guide\" from [employer]. Call 1-826.483.2386 or search [employer] online. · You need 5093-9183 mg of calcium and 4792-8008 IU of vitamin D per day. It is possible to meet your calcium requirement with diet alone, but a vitamin D supplement is usually necessary to meet this goal.  · When exposed to the sun, use a sunscreen that protects against both UVA and UVB radiation with an SPF of 30 or greater. Reapply every 2 to 3 hours or after sweating, drying off with a towel, or swimming. · Always wear a seat belt when traveling in a car.  Always wear a helmet when riding a bicycle or motorcycle.        signed by Filemon Smith, DO on 6/5/2022 at 2:28 PM  [address] [address]  Dept: [phone]

## 2022-06-02 NOTE — PATIENT INSTRUCTIONS
Personalized Preventive Plan for Keila Mills - 6/2/2022  Medicare offers a range of preventive health benefits. Some of the tests and screenings are paid in full while other may be subject to a deductible, co-insurance, and/or copay. Some of these benefits include a comprehensive review of your medical history including lifestyle, illnesses that may run in your family, and various assessments and screenings as appropriate. After reviewing your medical record and screening and assessments performed today your provider may have ordered immunizations, labs, imaging, and/or referrals for you. A list of these orders (if applicable) as well as your Preventive Care list are included within your After Visit Summary for your review. Other Preventive Recommendations:    · A preventive eye exam performed by an eye specialist is recommended every 1-2 years to screen for glaucoma; cataracts, macular degeneration, and other eye disorders. · A preventive dental visit is recommended every 6 months. · Try to get at least 150 minutes of exercise per week or 10,000 steps per day on a pedometer . · Order or download the FREE \"Exercise & Physical Activity: Your Everyday Guide\" from [employer]. Call 7-881.578.1075 or search [employer] online. · You need 6867-4598 mg of calcium and 7096-5242 IU of vitamin D per day. It is possible to meet your calcium requirement with diet alone, but a vitamin D supplement is usually necessary to meet this goal.  · When exposed to the sun, use a sunscreen that protects against both UVA and UVB radiation with an SPF of 30 or greater. Reapply every 2 to 3 hours or after sweating, drying off with a towel, or swimming. · Always wear a seat belt when traveling in a car. Always wear a helmet when riding a bicycle or motorcycle.

## 2022-06-10 ENCOUNTER — TELEPHONE (OUTPATIENT)
Dept: FAMILY MEDICINE CLINIC | Age: 82
End: 2022-06-10

## 2022-06-10 RX ORDER — FLUCONAZOLE 150 MG/1
TABLET ORAL
Qty: 2 TABLET | Refills: 0 | Status: SHIPPED | OUTPATIENT
Start: 2022-06-10 | End: 2022-10-18 | Stop reason: ALTCHOICE

## 2022-06-10 NOTE — TELEPHONE ENCOUNTER
Patient had some dental work done and was given antibiotics - now has a yeast infection - asking if they could get a 2 pack of diflucan called in for her to [facility] Maintenance   Topic Date Due    DTaP/Tdap/Td vaccine (2 - Td or Tdap) 10/30/2022    Depression Monitoring  06/02/2023    Annual Wellness Visit (AWV)  06/03/2023    DEXA (modify frequency per FRAX score)  Completed    Flu vaccine  Completed    Shingles vaccine  Completed    Pneumococcal 65+ years Vaccine  Completed    COVID-19 Vaccine  Completed    Hepatitis A vaccine  Aged Out    Hepatitis B vaccine  Aged Out    Hib vaccine  Aged Out    Meningococcal (ACWY) vaccine  Aged Out             (applicable per patient's age: Cancer Screenings, Depression Screening, Fall Risk Screening, Immunizations)    Hemoglobin A1C (%)   Date Value   09/12/2019 5.7     LDL Cholesterol (mg/dL)   Date Value   10/05/2021 130     AST (U/L)   Date Value   05/10/2022 17     ALT (U/L)   Date Value   05/10/2022 13     BUN (mg/dL)   Date Value   05/10/2022 23      (goal A1C is < 7)   (goal LDL is <100) need 30-50% reduction from baseline     BP Readings from Last 3 Encounters:   06/02/22 126/72   05/10/22 (!) 156/84   03/14/22 120/60    (goal /80)      All Future Testing planned in [facility]:  Lab Frequency Next Occurrence   CBC with Auto Differential Once 09/14/2022   Comprehensive Metabolic Panel Once 06/44/2529   Vitamin D 25 Hydroxy Once 09/14/2022   Lipid Panel Once 09/14/2022   TSH with Reflex Once 09/14/2022   Magnesium Once 09/14/2022   EKG 12 Lead Once 09/14/2022   XR CHEST (2 VW) Once 09/14/2022       Next Visit Date:  Future Appointments   Date Time Provider Joshua Aguirre   9/20/2022  9:00 AM MD Mitzi Mays [facility]   12/2/2022  9:00 AM DO KATIE Ames [facility]            Patient Active Problem List:     CAD (coronary artery disease)     Chronic back pain     Hypothyroidism     Hypertension     Post PTCA     Hot flashes, menopausal     Hyperlipidemia     CKD (chronic kidney disease), stage III (HCC)     Postlaminectomy syndrome of lumbar region     Primary parotid gland squamous cell carcinoma (HCC)     Dysthymia     Chronic renal disease, stage III ([facility] [address] [201212]

## 2022-06-10 NOTE — TELEPHONE ENCOUNTER
Last visit:  6/2/2022  Next Visit Date:    Future Appointments   Date Time Provider Joshua Aguirre   9/20/2022  9:00 AM MD Nano Carrero [facility]   12/2/2022  9:00 AM Shaye Bradley DO Kika Salinas [facility]         Medication List:  Prior to Admission medications    Medication Sig Start Date End Date Taking? Authorizing Provider   levothyroxine (SYNTHROID) 75 MCG tablet TAKE 1 TABLET BY MOUTH EVERY DAY 6/2/22   Shaye Bradley DO   buPROPion (WELLBUTRIN XL) 150 MG extended release tablet TAKE 1 TABLET BY MOUTH IN THE MORNING 6/2/22   Shaye Bradley DO   furosemide (LASIX) 20 MG tablet TAKE 1 TABLET BY MOUTH EVERY DAY 6/2/22   Shaye Bradley DO   FLUoxetine (PROZAC) 40 MG capsule TAKE 1 CAPSULE BY MOUTH EVERY DAY 6/2/22   Shaye Bradley DO   metoprolol succinate (TOPROL XL) 25 MG extended release tablet TAKE 1 TABLET BY MOUTH EVERY DAY 6/2/22   Shaye Bradley DO   predniSONE (DELTASONE) 20 MG tablet TAKE 1 TABLET BY MOUTH DAILY AS NEEDED FOR ARTHRITIS 6/2/22   Shaye Bradley DO   omeprazole (PRILOSEC) 20 MG delayed release capsule TAKE 1 CAPSULE BY MOUTH EVERY DAY 4/25/22   Shaye Bradley DO   Potassium Chloride Azucena ER (KLOR-CON M20 PO) Take 20 mEq by mouth three times daily Taking 2 in am and 1 in afternoon    Historical Provider, MD   amLODIPine (NORVASC) 2.5 MG tablet Take 1 tablet by mouth daily 12/9/21   Rufina Freeman MD   clopidogrel (PLAVIX) 75 MG tablet Take 1 tablet by mouth daily 12/9/21   Rufina Freeman MD   isosorbide mononitrate (IMDUR) 30 MG extended release tablet Take 1 tablet by mouth 2 times daily 12/9/21   Rufina Freeman MD   triamcinolone (KENALOG) 0.1 % cream APPLY topically TWICE DAILY 12/1/21   Shaye Bradley DO   b complex vitamins capsule Take 1 capsule by mouth daily. Historical Provider, MD   bisacodyl (DULCOLAX) 5 MG EC tablet Take 5 mg by mouth daily as needed.  Take three daily       Historical Provider, MD   Calcium Carbonate-Vitamin D (CALCIUM + D) 600-200 MG-UNIT TABS Take  by mouth daily.       Historical Provider, MD

## 2022-07-18 ENCOUNTER — TELEPHONE (OUTPATIENT)
Dept: CARDIOLOGY CLINIC | Age: 82
End: 2022-07-18

## 2022-07-18 NOTE — TELEPHONE ENCOUNTER
Malva Anton called to state that he wants to move [place]'s appointment sooner than Sept.    He stated that she is so SOB she has trouble walking from the couch to the kitchen. He stated that she does not have CP and her BP is normal for her. He stated that she did see Dr. Seb Fernandez in June and she was having some SOB at that time, but it has gotten worse. Shweta Vanegas stated that he thinks she needs a cath. .      Please advise

## 2022-07-19 ENCOUNTER — HOSPITAL ENCOUNTER (OUTPATIENT)
Age: 82
Discharge: HOME | End: 2022-07-19
Payer: MEDICARE

## 2022-07-19 ENCOUNTER — HOSPITAL ENCOUNTER (OUTPATIENT)
Dept: GENERAL RADIOLOGY | Age: 82
Discharge: HOME | End: 2022-07-21
Payer: MEDICARE

## 2022-07-19 ENCOUNTER — HOSPITAL ENCOUNTER (OUTPATIENT)
Age: 82
Discharge: HOME | End: 2022-07-21
Payer: MEDICARE

## 2022-07-19 DIAGNOSIS — E78.49 OTHER HYPERLIPIDEMIA: ICD-10-CM

## 2022-07-19 DIAGNOSIS — I25.10 CORONARY ARTERY DISEASE INVOLVING NATIVE CORONARY ARTERY OF NATIVE HEART WITHOUT ANGINA PECTORIS: ICD-10-CM

## 2022-07-19 DIAGNOSIS — I10 ESSENTIAL HYPERTENSION: ICD-10-CM

## 2022-07-19 DIAGNOSIS — E55.9 VITAMIN D DEFICIENCY DISEASE: ICD-10-CM

## 2022-07-19 LAB
ABSOLUTE EOS #: 0.1 K/UL (ref 0–0.4)
ABSOLUTE LYMPH #: 1 K/UL (ref 1–4.8)
ABSOLUTE MONO #: 0.6 K/UL (ref 0–1)
ALBUMIN SERPL-MCNC: 4 G/DL (ref 3.5–5.2)
ALP BLD-CCNC: 71 U/L (ref 35–104)
ALT SERPL-CCNC: 15 U/L (ref 5–33)
ANION GAP SERPL CALCULATED.3IONS-SCNC: 12 MMOL/L (ref 9–17)
AST SERPL-CCNC: 19 U/L
BASOPHILS # BLD: 1 % (ref 0–2)
BASOPHILS ABSOLUTE: 0 K/UL (ref 0–0.2)
BILIRUB SERPL-MCNC: 0.15 MG/DL (ref 0.3–1.2)
BUN BLDV-MCNC: 20 MG/DL (ref 8–23)
BUN/CREAT BLD: 13 (ref 9–20)
CALCIUM SERPL-MCNC: 8.9 MG/DL (ref 8.6–10.4)
CHLORIDE BLD-SCNC: 110 MMOL/L (ref 98–107)
CHOLESTEROL/HDL RATIO: 2.6
CHOLESTEROL: 215 MG/DL
CO2: 21 MMOL/L (ref 20–31)
CREAT SERPL-MCNC: 1.52 MG/DL (ref 0.5–0.9)
DIFFERENTIAL TYPE: YES
EOSINOPHILS RELATIVE PERCENT: 2 % (ref 0–5)
GFR AFRICAN AMERICAN: 39 ML/MIN
GFR NON-AFRICAN AMERICAN: 33 ML/MIN
GFR SERPL CREATININE-BSD FRML MDRD: ABNORMAL ML/MIN/{1.73_M2}
GLUCOSE BLD-MCNC: 96 MG/DL (ref 70–99)
HCT VFR BLD CALC: 32.2 % (ref 36–46)
HDLC SERPL-MCNC: 82 MG/DL
HEMOGLOBIN: 10.3 G/DL (ref 12–16)
LDL CHOLESTEROL: 109 MG/DL (ref 0–130)
LYMPHOCYTES # BLD: 15 % (ref 15–40)
MAGNESIUM: 2 MG/DL (ref 1.6–2.6)
MCH RBC QN AUTO: 27.2 PG (ref 26–34)
MCHC RBC AUTO-ENTMCNC: 32 G/DL (ref 31–37)
MCV RBC AUTO: 85.2 FL (ref 80–100)
MONOCYTES # BLD: 9 % (ref 4–8)
PATIENT FASTING?: YES
PDW BLD-RTO: 16.2 % (ref 12.1–15.2)
PLATELET # BLD: 337 K/UL (ref 140–450)
POTASSIUM SERPL-SCNC: 3.8 MMOL/L (ref 3.7–5.3)
RBC # BLD: 3.78 M/UL (ref 4–5.2)
SEG NEUTROPHILS: 73 % (ref 47–75)
SEGMENTED NEUTROPHILS ABSOLUTE COUNT: 4.9 K/UL (ref 2.5–7)
SODIUM BLD-SCNC: 143 MMOL/L (ref 135–144)
THYROXINE, FREE: 0.94 NG/DL (ref 0.93–1.7)
TOTAL PROTEIN: 6.3 G/DL (ref 6.4–8.3)
TRIGL SERPL-MCNC: 120 MG/DL
TSH SERPL DL<=0.05 MIU/L-ACNC: 5.51 UIU/ML (ref 0.3–5)
VITAMIN D 25-HYDROXY: 65.9 NG/ML
WBC # BLD: 6.7 K/UL (ref 3.5–11)

## 2022-07-19 PROCEDURE — 93005 ELECTROCARDIOGRAM TRACING: CPT

## 2022-07-19 PROCEDURE — 83735 ASSAY OF MAGNESIUM: CPT

## 2022-07-19 PROCEDURE — 80061 LIPID PANEL: CPT

## 2022-07-19 PROCEDURE — 82306 VITAMIN D 25 HYDROXY: CPT

## 2022-07-19 PROCEDURE — 80053 COMPREHEN METABOLIC PANEL: CPT

## 2022-07-19 PROCEDURE — 71046 X-RAY EXAM CHEST 2 VIEWS: CPT

## 2022-07-19 PROCEDURE — 84439 ASSAY OF FREE THYROXINE: CPT

## 2022-07-19 PROCEDURE — 84443 ASSAY THYROID STIM HORMONE: CPT

## 2022-07-19 PROCEDURE — 36415 COLL VENOUS BLD VENIPUNCTURE: CPT

## 2022-07-19 PROCEDURE — 85025 COMPLETE CBC W/AUTO DIFF WBC: CPT

## 2022-07-20 LAB
EKG ATRIAL RATE: 75 BPM
EKG P AXIS: 65 DEGREES
EKG P-R INTERVAL: 142 MS
EKG Q-T INTERVAL: 390 MS
EKG QRS DURATION: 84 MS
EKG QTC CALCULATION (BAZETT): 435 MS
EKG R AXIS: 67 DEGREES
EKG T AXIS: 40 DEGREES
EKG VENTRICULAR RATE: 75 BPM

## 2022-07-20 PROCEDURE — 93010 ELECTROCARDIOGRAM REPORT: CPT | Performed by: INTERNAL MEDICINE

## 2022-07-25 ENCOUNTER — OFFICE VISIT (OUTPATIENT)
Dept: CARDIOLOGY CLINIC | Age: 82
End: 2022-07-25
Payer: MEDICARE

## 2022-07-25 VITALS
BODY MASS INDEX: 22.35 KG/M2 | SYSTOLIC BLOOD PRESSURE: 140 MMHG | WEIGHT: 147 LBS | OXYGEN SATURATION: 97 % | DIASTOLIC BLOOD PRESSURE: 60 MMHG | HEART RATE: 89 BPM

## 2022-07-25 DIAGNOSIS — R07.9 CHEST PAIN, UNSPECIFIED TYPE: Primary | ICD-10-CM

## 2022-07-25 PROCEDURE — G8428 CUR MEDS NOT DOCUMENT: HCPCS | Performed by: INTERNAL MEDICINE

## 2022-07-25 PROCEDURE — 1123F ACP DISCUSS/DSCN MKR DOCD: CPT | Performed by: INTERNAL MEDICINE

## 2022-07-25 PROCEDURE — 99214 OFFICE O/P EST MOD 30 MIN: CPT | Performed by: INTERNAL MEDICINE

## 2022-07-25 PROCEDURE — 1090F PRES/ABSN URINE INCON ASSESS: CPT | Performed by: INTERNAL MEDICINE

## 2022-07-25 PROCEDURE — G8420 CALC BMI NORM PARAMETERS: HCPCS | Performed by: INTERNAL MEDICINE

## 2022-07-25 PROCEDURE — G8399 PT W/DXA RESULTS DOCUMENT: HCPCS | Performed by: INTERNAL MEDICINE

## 2022-07-25 PROCEDURE — 1036F TOBACCO NON-USER: CPT | Performed by: INTERNAL MEDICINE

## 2022-07-25 NOTE — PROGRESS NOTES
Ov Dr. Ave Barrientos for follow up   C/o sob -ok when sitting   Worse when active/walking   C/o can feel palpitations   Energy level is down    Will proceed with heart cath   July 29

## 2022-07-25 NOTE — LETTER
Josh Donnelly MD  [facility]  [address] [place], [place] [address]  [phone]      2022      Rianalilian Elizondo DO Ng  [address]      RE:   Milton Mcneal  :  1940      Dear Dr. Ancel Nyhan:    CHIEF COMPLAINT:  1. Shortness of breath, most likely angina equivalent. 2.  Status post open heart surgery on 2006. 3.  Status post angioplasty of right coronary artery and stenting of her LAD using a 2.75 x 18 mm drug-eluting Xience stent in the LAD. HISTORY OF PRESENT ILLNESS:  I had the pleasure of seeing Mrs. Carissa Mccloud in our office on 2022. She is a pleasant 69-year-old female who had a catheterization by Dr. Kimmie Paz in , that showed 80% disease in the ostium of the right coronary artery and unremarkable circumflex, the LAD had 80% disease, EF 45%. She had an open heart surgery by Dr. Gabi Wilkins on 2006, with a LIMA to the LAD, a vein graft to the diagonal, and a vein graft to the right coronary artery. In 2006, she had a repeat catheterization that showed an EF of 50% with occluded vein graft to the right coronary artery, occluded vein graft to the diagonal, and a patent LIMA to the LAD. We stented her right coronary artery with a 3.0 x 16 mm drug-eluting Resolute stent. On 2012, she had a catheterization that showed a diminutive LIMA to the LAD with 90% disease in the LAD and diagonal at the bifurcation, 90% disease in the ostium of the right coronary artery, 40% circumflex, the vein grafts were all occluded, and her LIMA was nonfunctional.  Her EF was 60%. We did a staging angioplasty and stenting of the LAD and diagonal placing a 3.5 x 24 mm Promus stent in the LAD and diagonal dilating the diagonal through the stent struts. We brought her back on 2012, and did complex angioplasty of the right coronary artery placing a 3.0 x 20 mm Promus stent.     On 2016, we did a catheterization because of shortness of breath, this showed 95% to 99% disease in the proximal right coronary artery which we dilated with a balloon alone using a 3.25 x 15 mm Trek balloon. She had 90% disease in the LAD beyond the stent in which I placed another 2.75 x 18 mm Xience stent. She also had 70% disease in the OM branch of the circumflex and an EF of 50% to 55% and she has had no further cardiac catheterizations. She was having some shortness of breath and chest pain on 11/01/2021 and I did a Lexiscan stress test which was read as normal.    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 She did have some shortness of breath, but she states her symptoms had really not changed from December. Her and her  called to tell us that she had worsening shortness of breath. She was okay when sitting, but when she attempted to walk, she had much more shortness of breath. She also complained of feeling palpitations. Energy level was much lower. She had no PND or orthopnea. No syncope or near syncope. CARDIAC RISK FACTORS:  Known CAD:  Positive. Hypertension:  Positive. PTCA:  Positive. Bypass Surgery:  Positive. Hyperlipidemia:  Positive. Other Family Members:  Positive. Peripheral Vascular Disease:  Positive. Diabetes:  Negative. MEDICATIONS AT THIS TIME:  She is on Norvasc 2.5 mg daily, Dulcolax 5 mg p.r.n., Wellbutrin  mg daily, Plavix 75 mg daily, Prozac 40 mg daily, Lasix 20 mg daily, Imdur 30 mg b.i.d., Synthroid 75 mcg daily, Toprol-XL 25 mg daily, Prilosec 20 mg daily, potassium 20 mEq two in the morning and one in the afternoon. PAST MEDICAL AND SURGICAL HISTORY:  1.  Back surgery and spinal fusion on 10/01/2010.  2.  Peripheral vascular disease with 40% disease in the right internal carotid artery, no disease in the left internal carotid artery.   3. internal carotid artery. 10.  Chronic renal insufficiency, GFR of 33 with her normal GFR approximately 43. 11.  Euthyroid, on treatment. 12.  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 PLAN:  Proceed with a cardiac catheterization on 07/29/2022. DISCUSSION:  Mrs. Rosa Maria Lozoya has developed worsening shortness of breath. Historically, this has been her angina equivalent. She is on maximum medical therapy with Norvasc, Imdur, and metoprolol. Therefore, it is reasonable to proceed directly with a cardiac catheterization which we will do this Friday, on 07/29. We will use her right wrist as her LIMA is nonfunctional and her bypass grafts are all occluded. I will let you know the results of the catheterization as soon as it is performed. Thank you very much for allowing me the privilege of seeing Mrs. Smith. If you have any questions on my thoughts, please do not hesitate to contact me.     Sincerely,        Khushboo Sheppard    D: 07/25/2022 12:22:56     T: 07/25/2022 12:28:55     [email or web address]  Job#: [id number]   Doc#: [id number]

## 2022-07-27 RX ORDER — ISOSORBIDE MONONITRATE 30 MG/1
TABLET, EXTENDED RELEASE ORAL
Qty: 180 TABLET | Refills: 3 | Status: SHIPPED | OUTPATIENT
Start: 2022-07-27

## 2022-07-27 RX ORDER — OMEPRAZOLE 20 MG/1
CAPSULE, DELAYED RELEASE ORAL
Qty: 90 CAPSULE | Refills: 1 | Status: SHIPPED | OUTPATIENT
Start: 2022-07-27

## 2022-07-27 NOTE — TELEPHONE ENCOUNTER
Last visit:  6/2/2022  Next Visit Date:    Future Appointments   Date Time Provider Joshua Aguirre   7/29/2022 11:00 AM SEDA Mora [facility]   9/20/2022  9:00 AM MD Awa rFaire [facility]   12/2/2022  9:00 AM DO Mrakus Espinoza [facility]         Medication List:  Prior to Admission medications    Medication Sig Start Date End Date Taking? Authorizing Provider   fluconazole (DIFLUCAN) 150 MG tablet One tab po at onset of symptoms.  Repeat in 3 days if symptoms persist. 6/10/22   Kandace Castillo DO   levothyroxine (SYNTHROID) 75 MCG tablet TAKE 1 TABLET BY MOUTH EVERY DAY 6/2/22   Kandace Castillo DO   buPROPion (WELLBUTRIN XL) 150 MG extended release tablet TAKE 1 TABLET BY MOUTH IN THE MORNING 6/2/22   Kandace Castillo DO   furosemide (LASIX) 20 MG tablet TAKE 1 TABLET BY MOUTH EVERY DAY 6/2/22   Kandace Castillo, DO   FLUoxetine (PROZAC) 40 MG capsule TAKE 1 CAPSULE BY MOUTH EVERY DAY 6/2/22   Kandace Castillo,    metoprolol succinate (TOPROL XL) 25 MG extended release tablet TAKE 1 TABLET BY MOUTH EVERY DAY 6/2/22   Kandace Castillo,    predniSONE (DELTASONE) 20 MG tablet TAKE 1 TABLET BY MOUTH DAILY AS NEEDED FOR ARTHRITIS 6/2/22   Kandace Castillo DO   omeprazole (PRILOSEC) 20 MG delayed release capsule TAKE 1 CAPSULE BY MOUTH EVERY DAY 4/25/22   Kandace Castillo DO   Potassium Chloride Azucena ER (KLOR-CON M20 PO) Take 20 mEq by mouth three times daily Taking 2 in am and 1 in afternoon    Historical Provider, MD   amLODIPine (NORVASC) 2.5 MG tablet Take 1 tablet by mouth daily 12/9/21   Criss Deleon MD   clopidogrel (PLAVIX) 75 MG tablet Take 1 tablet by mouth daily 12/9/21   Criss Deleon MD   isosorbide mononitrate (IMDUR) 30 MG extended release tablet Take 1 tablet by mouth 2 times daily 12/9/21   Criss Deleon MD   triamcinolone (KENALOG) 0.1 % cream APPLY topically TWICE DAILY 12/1/21   DO vane Espinoza complex vitamins capsule Take 1 capsule by mouth daily. Historical Provider, MD   bisacodyl (DULCOLAX) 5 MG EC tablet Take 5 mg by mouth daily as needed. Take three daily       Historical Provider, MD   Calcium Carbonate-Vitamin D (CALCIUM + D) 600-200 MG-UNIT TABS Take  by mouth daily.       Historical Provider, MD

## 2022-08-01 ENCOUNTER — TELEPHONE (OUTPATIENT)
Dept: CARDIOLOGY CLINIC | Age: 82
End: 2022-08-01

## 2022-08-01 DIAGNOSIS — R07.9 CHEST PAIN, UNSPECIFIED TYPE: Primary | ICD-10-CM

## 2022-08-01 DIAGNOSIS — R94.31 ABNORMAL EKG: ICD-10-CM

## 2022-08-01 NOTE — TELEPHONE ENCOUNTER
Pt called talked with pt and    Per DR Cyndee Gupta will do Asntana Aase and call with results  Still having sob informed that may not change dr is further looking into angina. Will refer to cardiac rehab   And see pt   Pt has appt on 9/20 will keep that until dr looks at the stress test may move up.

## 2022-08-04 ENCOUNTER — HOSPITAL ENCOUNTER (OUTPATIENT)
Dept: NUCLEAR MEDICINE | Age: 82
Discharge: HOME | End: 2022-08-06
Payer: MEDICARE

## 2022-08-04 ENCOUNTER — HOSPITAL ENCOUNTER (OUTPATIENT)
Dept: NON INVASIVE DIAGNOSTICS | Age: 82
Discharge: HOME | End: 2022-08-04
Payer: MEDICARE

## 2022-08-04 ENCOUNTER — OFFICE VISIT (OUTPATIENT)
Dept: CARDIOLOGY CLINIC | Age: 82
End: 2022-08-04
Payer: MEDICARE

## 2022-08-04 VITALS — SYSTOLIC BLOOD PRESSURE: 159 MMHG | DIASTOLIC BLOOD PRESSURE: 70 MMHG | HEART RATE: 74 BPM

## 2022-08-04 DIAGNOSIS — E78.49 OTHER HYPERLIPIDEMIA: ICD-10-CM

## 2022-08-04 DIAGNOSIS — R07.9 CHEST PAIN, UNSPECIFIED TYPE: ICD-10-CM

## 2022-08-04 DIAGNOSIS — I10 ESSENTIAL HYPERTENSION: Primary | ICD-10-CM

## 2022-08-04 DIAGNOSIS — R94.31 ABNORMAL EKG: ICD-10-CM

## 2022-08-04 PROCEDURE — 78452 HT MUSCLE IMAGE SPECT MULT: CPT

## 2022-08-04 PROCEDURE — A9500 TC99M SESTAMIBI: HCPCS | Performed by: INTERNAL MEDICINE

## 2022-08-04 PROCEDURE — 99212 OFFICE O/P EST SF 10 MIN: CPT | Performed by: INTERNAL MEDICINE

## 2022-08-04 PROCEDURE — 3430000000 HC RX DIAGNOSTIC RADIOPHARMACEUTICAL: Performed by: INTERNAL MEDICINE

## 2022-08-04 PROCEDURE — 93017 CV STRESS TEST TRACING ONLY: CPT

## 2022-08-04 PROCEDURE — 2580000003 HC RX 258: Performed by: INTERNAL MEDICINE

## 2022-08-04 PROCEDURE — 6360000002 HC RX W HCPCS: Performed by: INTERNAL MEDICINE

## 2022-08-04 RX ORDER — ASPIRIN 81 MG/1
81 TABLET, COATED ORAL DAILY
COMMUNITY
Start: 2022-07-29 | End: 2022-08-11 | Stop reason: SDUPTHER

## 2022-08-04 RX ORDER — HYDROCORTISONE 25 MG/G
CREAM TOPICAL 2 TIMES DAILY PRN
Qty: 1 EACH | Refills: 5 | Status: SHIPPED | OUTPATIENT
Start: 2022-08-04

## 2022-08-04 RX ORDER — ATORVASTATIN CALCIUM 80 MG/1
80 TABLET, FILM COATED ORAL DAILY
COMMUNITY
Start: 2022-07-29 | End: 2022-08-04 | Stop reason: SINTOL

## 2022-08-04 RX ORDER — SODIUM CHLORIDE 0.9 % (FLUSH) 0.9 %
10 SYRINGE (ML) INJECTION PRN
Status: DISCONTINUED | OUTPATIENT
Start: 2022-08-04 | End: 2022-08-05 | Stop reason: HOSPADM

## 2022-08-04 RX ORDER — AMINOPHYLLINE DIHYDRATE 25 MG/ML
50 INJECTION, SOLUTION INTRAVENOUS
Status: COMPLETED | OUTPATIENT
Start: 2022-08-04 | End: 2022-08-04

## 2022-08-04 RX ORDER — PITAVASTATIN CALCIUM 2.09 MG/1
TABLET, FILM COATED ORAL NIGHTLY
COMMUNITY
End: 2022-08-11 | Stop reason: SDUPTHER

## 2022-08-04 RX ORDER — AMINOPHYLLINE DIHYDRATE 25 MG/ML
100 INJECTION, SOLUTION INTRAVENOUS
Status: COMPLETED | OUTPATIENT
Start: 2022-08-04 | End: 2022-08-04

## 2022-08-04 RX ADMIN — SODIUM CHLORIDE, PRESERVATIVE FREE 10 ML: 5 INJECTION INTRAVENOUS at 09:24

## 2022-08-04 RX ADMIN — REGADENOSON 0.4 MG: 0.08 INJECTION, SOLUTION INTRAVENOUS at 09:24

## 2022-08-04 RX ADMIN — TETRAKIS(2-METHOXYISOBUTYLISOCYANIDE)COPPER(I) TETRAFLUOROBORATE 10 MILLICURIE: 1 INJECTION, POWDER, LYOPHILIZED, FOR SOLUTION INTRAVENOUS at 08:28

## 2022-08-04 RX ADMIN — TETRAKIS(2-METHOXYISOBUTYLISOCYANIDE)COPPER(I) TETRAFLUOROBORATE 30 MILLICURIE: 1 INJECTION, POWDER, LYOPHILIZED, FOR SOLUTION INTRAVENOUS at 08:28

## 2022-08-04 RX ADMIN — AMINOPHYLLINE 50 MG: 25 INJECTION, SOLUTION INTRAVENOUS at 09:31

## 2022-08-04 NOTE — TELEPHONE ENCOUNTER
Requesting a refill on hydrocortisone 2.5% rectal cream.  Patient last seen 6/2/22. Next appt 12/2/22.     Drug [address] Maintenance   Topic Date Due    Flu vaccine (1) 09/01/2022    DTaP/Tdap/Td vaccine (2 - Td or Tdap) 10/30/2022    Depression Monitoring  06/02/2023    Annual Wellness Visit (AWV)  06/03/2023    DEXA (modify frequency per FRAX score)  Completed    Shingles vaccine  Completed    Pneumococcal 65+ years Vaccine  Completed    COVID-19 Vaccine  Completed    Hepatitis A vaccine  Aged Out    Hepatitis B vaccine  Aged Out    Hib vaccine  Aged Out    Meningococcal (ACWY) vaccine  Aged Out             (applicable per patient's age: Cancer Screenings, Depression Screening, Fall Risk Screening, Immunizations)    Hemoglobin A1C (%)   Date Value   09/12/2019 5.7     LDL Cholesterol (mg/dL)   Date Value   07/19/2022 109     AST (U/L)   Date Value   07/19/2022 19     ALT (U/L)   Date Value   07/19/2022 15     BUN (mg/dL)   Date Value   07/19/2022 20      (goal A1C is < 7)   (goal LDL is <100) need 30-50% reduction from baseline     BP Readings from Last 3 Encounters:   08/04/22 (!) 157/72   07/25/22 (!) 140/60   06/02/22 126/72    (goal /80)      All Future Testing planned in [facility]:  Lab Frequency Next Occurrence       Next Visit Date:  Future Appointments   Date Time Provider Joshua Patricki   8/4/2022 10:00 AM Jerrod 62 MED [facility]   9/20/2022  9:00 AM MD Rosa Ag [facility]   12/2/2022  9:00 AM DO Bebo Burdick [facility]            Patient Active Problem List:     CAD (coronary artery disease)     Chronic back pain     Hypothyroidism     Hypertension     Post PTCA     Hot flashes, menopausal     Hyperlipidemia     CKD (chronic kidney disease), stage III ([address]     Postlaminectomy syndrome of lumbar region     Primary parotid gland squamous cell carcinoma ([address]     Dysthymia     Chronic renal disease, stage III ([address] [[id number]]

## 2022-08-04 NOTE — TELEPHONE ENCOUNTER
Last OV: 6/2/2022 AWV  Last RX:    Next scheduled apt: 12/2/2022 6 month         Pt requesting a refill

## 2022-08-04 NOTE — LETTER
Josh Donnelly M.D. [address] [place] [address] [place]   [phone]          2022          DO Hernandez SampsonTianna Rose 78        RE:   Alex Alexander  :  1940      Dear Dr. Ancel Nyhan:    CHIEF COMPLAINT:  1. Continued shortness of breath and chest pain. 2.  Status post cardiac catheterization on 2022 showing severe coronary artery disease with occluded LAD and occluded right coronary artery, which were both new findings with a patent circumflex, which gave collaterals to the right coronary artery, and EF of 60%. 3.  Angioplasty of the LAD and stent placement using a 2.5 x 30 mm drug-eluting Xience stent after reopening the LAD. 4.  Abnormal stress test with inferior wall ischemia. HISTORY OF PRESENT ILLNESS:  I saw Mrs. Alex Alexander and her  in our office on 2022. As you know, she had had marked shortness of breath and chest pain over the last 2 months. We saw her in our office on 2022 and did a cardiac catheterization on 2022. We had previously stented both her LAD and right coronary artery, and she had an unremarkable circumflex with an EF of 60%. When I did her catheterization on , she had actually occluded both her LAD and her right coronary artery with collaterals from the circumflex to the right coronary artery. I managed to open her LAD and we stented this with a 2.5 x 30 mm drug-eluting Xience stent. This was a very prolonged procedure and I did not attempt to open the right coronary artery. I was hoping that she would be improved with the LAD opened. As I bring her back today, she is still very short of breath with any activity. She also develops chest pain with any activity. I indeed do a Lexiscan stress test today and she did develop chest pain with the Lexiscan showing inferior wall ischemia.     I had a discussion concerning her disease. I offered to bring her back to attempt to open up the right coronary artery. She initially was not happy about doing this, which I told her was fine with me but then leave it closed and go to cardiac rehab. After that, she decided that she would want to attempt to open the right coronary artery. We will do this on 09/02. Again, I told her I may not be able to open the right coronary artery, but we will do our best to recanalize the right coronary artery and re-stent this. I also talked about cardiac rehab, which she is not excited about doing, but which I would highly recommend. Thank you very much for allowing me the privilege of seeing Mrs. Smith. We will plan on doing the attempted angioplasty on 09/02/2022.     Sincerely,        Torey Reddy    D: 08/04/2022 11:35:58     T: 08/04/2022 11:38:31     [id number]  Job#: [id number]   Doc#: [id number]

## 2022-08-04 NOTE — PROGRESS NOTES
Lexiscan administered, pt experiences shortness of breath. 09:28 Pt reports \"feel strange, like I can't breath. \" Lungs clear  09:30 Pt reports \"everything hurts\" Dr. Cam Cruz at bedside, pt given Aminophylline 50 mg IVP.  09:36 Pt reports she is feeling better, except her head hurts. 09:39 This part of test completed, pt given snack and beverage.

## 2022-08-05 NOTE — PROCEDURES
[place] [address]                              CARDIAC STRESS TEST    PATIENT NAME: Siddharth Peña                   :        1940  MED REC NO:   [id number]                              ROOM:  ACCOUNT NO:   [de-identified]                           ADMIT DATE: 2022  PROVIDER:     Noelle Aburto      DATE OF STUDY:  2022    Cardiovascular Diagnostics Department    Ordering Provider:  Bolivar Bliss MD    Primary Care Provider:  Torito Keller. Randol Boeck, DO    Interpreting Physician:   Noelle Aburto MD    MYOCARDIAL PERFUSION STRESS IMAGING    The stress ECG results are reported separately. NUCLEAR IMAGING RESULTS:  The overall quality of the study is excellent. Mild attenuation artifact was seen. There is no evidence of abnormal  lung uptake. Additionally, the right ventricle appears normal.  The  left ventricular cavity is noted to be normal in size on stress images. There is no evidence of transient ischemic dilatation (TID) of the left  ventricle. Gated SPECT imaging reveals normal myocardial thickening and wall motion  with a calculated left ventricular ejection fraction (EF) of 54%. The rest images demonstrate homogenous tracer distribution throughout  the myocardium. On stress imaging, a small to moderate perfusion abnormality of mild to  moderate intensity was noted in the inferior, apical and inferoapical  regions. IMPRESSION:  1. Abnormal myocardial perfusion study. There is a small to moderate  perfusion defect of mild to moderate intensity in the inferior, apical  and inferoapical regions during stress imaging, which is most consistent  with ischemia. 2.  Global left ventricular systolic function was normal, without  regional wall motion abnormalities.     Overall these results are most consistent with an intermediate risk for  significant

## 2022-08-07 NOTE — PROCEDURES
[place] [address]                              CARDIAC STRESS TEST    PATIENT NAME: Sonam Ndiaye                   :        1940  MED REC NO:   [id number]                              ROOM:  ACCOUNT NO:   [de-identified]                           ADMIT DATE: 2022  PROVIDER:     Kendall Chacko    DATE OF STUDY:  2022    LEXISCAN STRESS TEST    REASON FOR TEST:  Chest pain. IMPRESSION:  1. We gave 0.4 mg of Lexiscan intravenously. 2.  This was followed in 20 seconds by Cardiolite infusion. 3.  There was no chest pain. 4.  There was no ST depression. 5.  It was an overall negative Lexiscan stress test.  6.  Cardiolite to follow.         Jett Gupta    D: 2022 8:53:57       T: 2022 9:37:03     [id number]  Job#: [id number]     Doc#: [id number]    CC:

## 2022-08-07 NOTE — PROGRESS NOTES
Ov Dr Kinsey Chamberlain follow up   Review stress test and   Set up for angioplasty. Still c/o sob. C/o suraj leg pain   Worse since on lipitor. C/o achiness in lt side   Chest and back pain. Will stop lipitor   Try livalo 2 mg daily    Discussed attempt angioplasty  Or meds and cardiac rehab.     Will do angioplasty on Aug 12 at 9:00
disease. I offered to bring her back to attempt to open up the right coronary artery. She initially was not happy about doing this, which I told her was fine with me but then leave it closed and go to cardiac rehab. After that, she decided that she would want to attempt to open the right coronary artery. We will do this on 09/02. Again, I told her I may not be able to open the right coronary artery, but we will do our best to recanalize the right coronary artery and re-stent this. I also talked about cardiac rehab, which she is not excited about doing, but which I would highly recommend. Thank you very much for allowing me the privilege of seeing Mrs. Smith. We will plan on doing the attempted angioplasty on 09/02/2022.     Sincerely,        Hilda Padilla    D: 08/04/2022 11:35:58     T: 08/04/2022 11:38:31     [id number]  Job#: [id number]   Doc#: [id number]

## 2022-08-08 DIAGNOSIS — I20.9 ANGINA, CLASS III (HCC): Primary | ICD-10-CM

## 2022-08-09 ENCOUNTER — TELEPHONE (OUTPATIENT)
Dept: CARDIAC REHAB | Age: 82
End: 2022-08-09

## 2022-08-09 PROCEDURE — 78452 HT MUSCLE IMAGE SPECT MULT: CPT | Performed by: FAMILY MEDICINE

## 2022-08-09 PROCEDURE — 93016 CV STRESS TEST SUPVJ ONLY: CPT | Performed by: INTERNAL MEDICINE

## 2022-08-09 PROCEDURE — 93018 CV STRESS TEST I&R ONLY: CPT | Performed by: INTERNAL MEDICINE

## 2022-08-09 NOTE — TELEPHONE ENCOUNTER
Spoke with  Lucia Keller about cardiac rehab. This was a follow up phone call to let them know we would be waiting to start rehab until after the patient has her heart cath on 9/2. Informed Lucia Keller I would call back after that time to get [facility] scheduled.

## 2022-08-11 RX ORDER — ASPIRIN 81 MG/1
81 TABLET, COATED ORAL DAILY
Qty: 30 TABLET | Refills: 5 | Status: SHIPPED | OUTPATIENT
Start: 2022-08-11

## 2022-08-11 RX ORDER — PITAVASTATIN CALCIUM 2.09 MG/1
2 TABLET, FILM COATED ORAL NIGHTLY
Qty: 30 TABLET | Refills: 5 | Status: SHIPPED | OUTPATIENT
Start: 2022-08-11

## 2022-08-29 ENCOUNTER — HOSPITAL ENCOUNTER (OUTPATIENT)
Age: 82
Discharge: HOME | End: 2022-08-29
Payer: MEDICARE

## 2022-08-29 ENCOUNTER — TELEPHONE (OUTPATIENT)
Dept: CARDIOLOGY CLINIC | Age: 82
End: 2022-08-29

## 2022-08-29 DIAGNOSIS — E78.49 OTHER HYPERLIPIDEMIA: ICD-10-CM

## 2022-08-29 DIAGNOSIS — N18.31 STAGE 3A CHRONIC KIDNEY DISEASE (HCC): ICD-10-CM

## 2022-08-29 DIAGNOSIS — R07.9 CHEST PAIN, UNSPECIFIED TYPE: ICD-10-CM

## 2022-08-29 DIAGNOSIS — R10.84 GENERALIZED ABDOMINAL PAIN: ICD-10-CM

## 2022-08-29 DIAGNOSIS — I10 ESSENTIAL HYPERTENSION: ICD-10-CM

## 2022-08-29 DIAGNOSIS — D64.9 LOW HEMOGLOBIN: Primary | ICD-10-CM

## 2022-08-29 DIAGNOSIS — D64.9 LOW HEMOGLOBIN: ICD-10-CM

## 2022-08-29 LAB
ABSOLUTE EOS #: 0.2 K/UL (ref 0–0.4)
ABSOLUTE LYMPH #: 1 K/UL (ref 1–4.8)
ABSOLUTE MONO #: 0.4 K/UL (ref 0–1)
ALBUMIN SERPL-MCNC: 4 G/DL (ref 3.5–5.2)
ALP BLD-CCNC: 83 U/L (ref 35–104)
ALT SERPL-CCNC: 14 U/L (ref 5–33)
ANION GAP SERPL CALCULATED.3IONS-SCNC: 12 MMOL/L (ref 9–17)
AST SERPL-CCNC: 17 U/L
BASOPHILS # BLD: 1 % (ref 0–2)
BASOPHILS ABSOLUTE: 0 K/UL (ref 0–0.2)
BILIRUB SERPL-MCNC: 0.25 MG/DL (ref 0.3–1.2)
BUN BLDV-MCNC: 22 MG/DL (ref 8–23)
BUN/CREAT BLD: 14 (ref 9–20)
CALCIUM SERPL-MCNC: 9 MG/DL (ref 8.6–10.4)
CHLORIDE BLD-SCNC: 108 MMOL/L (ref 98–107)
CHOLESTEROL/HDL RATIO: 2.1
CHOLESTEROL: 169 MG/DL
CO2: 23 MMOL/L (ref 20–31)
CREAT SERPL-MCNC: 1.52 MG/DL (ref 0.5–0.9)
DIFFERENTIAL TYPE: YES
EKG ATRIAL RATE: 80 BPM
EKG P AXIS: 78 DEGREES
EKG P-R INTERVAL: 138 MS
EKG Q-T INTERVAL: 386 MS
EKG QRS DURATION: 86 MS
EKG QTC CALCULATION (BAZETT): 445 MS
EKG R AXIS: 87 DEGREES
EKG T AXIS: 9 DEGREES
EKG VENTRICULAR RATE: 80 BPM
EOSINOPHILS RELATIVE PERCENT: 3 % (ref 0–5)
FERRITIN: 49 NG/ML (ref 13–150)
GFR AFRICAN AMERICAN: 39 ML/MIN
GFR NON-AFRICAN AMERICAN: 33 ML/MIN
GFR SERPL CREATININE-BSD FRML MDRD: ABNORMAL ML/MIN/{1.73_M2}
GLUCOSE BLD-MCNC: 94 MG/DL (ref 70–99)
HCT VFR BLD CALC: 31.1 % (ref 36–46)
HDLC SERPL-MCNC: 79 MG/DL
HEMOGLOBIN: 9.6 G/DL (ref 12–16)
IRON SATURATION: 12 % (ref 20–55)
IRON: 43 UG/DL (ref 37–145)
LDL CHOLESTEROL: 73 MG/DL (ref 0–130)
LYMPHOCYTES # BLD: 16 % (ref 15–40)
MCH RBC QN AUTO: 26 PG (ref 26–34)
MCHC RBC AUTO-ENTMCNC: 31 G/DL (ref 31–37)
MCV RBC AUTO: 84.1 FL (ref 80–100)
MONOCYTES # BLD: 7 % (ref 4–8)
PATIENT FASTING?: YES
PDW BLD-RTO: 16.2 % (ref 12.1–15.2)
PLATELET # BLD: 350 K/UL (ref 140–450)
POTASSIUM SERPL-SCNC: 4.2 MMOL/L (ref 3.7–5.3)
RBC # BLD: 3.7 M/UL (ref 4–5.2)
SEG NEUTROPHILS: 73 % (ref 47–75)
SEGMENTED NEUTROPHILS ABSOLUTE COUNT: 4.3 K/UL (ref 2.5–7)
SODIUM BLD-SCNC: 143 MMOL/L (ref 135–144)
TOTAL IRON BINDING CAPACITY: 373 UG/DL (ref 250–450)
TOTAL PROTEIN: 6.5 G/DL (ref 6.4–8.3)
TRIGL SERPL-MCNC: 85 MG/DL
UNSATURATED IRON BINDING CAPACITY: 330 UG/DL (ref 112–347)
WBC # BLD: 5.8 K/UL (ref 3.5–11)

## 2022-08-29 PROCEDURE — 82728 ASSAY OF FERRITIN: CPT

## 2022-08-29 PROCEDURE — 93005 ELECTROCARDIOGRAM TRACING: CPT

## 2022-08-29 PROCEDURE — 83550 IRON BINDING TEST: CPT

## 2022-08-29 PROCEDURE — 83540 ASSAY OF IRON: CPT

## 2022-08-29 PROCEDURE — 93010 ELECTROCARDIOGRAM REPORT: CPT | Performed by: INTERNAL MEDICINE

## 2022-08-29 PROCEDURE — 80053 COMPREHEN METABOLIC PANEL: CPT

## 2022-08-29 PROCEDURE — 80061 LIPID PANEL: CPT

## 2022-08-29 PROCEDURE — 36415 COLL VENOUS BLD VENIPUNCTURE: CPT

## 2022-08-29 PROCEDURE — 85025 COMPLETE CBC W/AUTO DIFF WBC: CPT

## 2022-08-29 NOTE — TELEPHONE ENCOUNTER
Pt called after reviewing pre op cath labs with dr   Hemoglobin low   Will do iron studies today  And schedule ct abd pelvis without contrast for tomorrow  Will repeat bmp and H&h on Thursday.

## 2022-08-30 ENCOUNTER — HOSPITAL ENCOUNTER (OUTPATIENT)
Dept: CT IMAGING | Age: 82
Discharge: HOME | End: 2022-09-01
Payer: MEDICARE

## 2022-08-30 DIAGNOSIS — D64.9 LOW HEMOGLOBIN: ICD-10-CM

## 2022-08-30 DIAGNOSIS — R10.84 GENERALIZED ABDOMINAL PAIN: ICD-10-CM

## 2022-08-30 PROCEDURE — 74176 CT ABD & PELVIS W/O CONTRAST: CPT

## 2022-09-01 ENCOUNTER — HOSPITAL ENCOUNTER (OUTPATIENT)
Age: 82
Discharge: HOME | End: 2022-09-01
Payer: MEDICARE

## 2022-09-01 ENCOUNTER — TELEPHONE (OUTPATIENT)
Dept: CARDIOLOGY CLINIC | Age: 82
End: 2022-09-01

## 2022-09-01 DIAGNOSIS — D64.9 LOW HEMOGLOBIN: ICD-10-CM

## 2022-09-01 DIAGNOSIS — N18.31 STAGE 3A CHRONIC KIDNEY DISEASE (HCC): ICD-10-CM

## 2022-09-01 DIAGNOSIS — R10.84 GENERALIZED ABDOMINAL PAIN: ICD-10-CM

## 2022-09-01 LAB
ANION GAP SERPL CALCULATED.3IONS-SCNC: 11 MMOL/L (ref 9–17)
BUN BLDV-MCNC: 20 MG/DL (ref 8–23)
BUN/CREAT BLD: 13 (ref 9–20)
CALCIUM SERPL-MCNC: 9 MG/DL (ref 8.6–10.4)
CHLORIDE BLD-SCNC: 109 MMOL/L (ref 98–107)
CO2: 23 MMOL/L (ref 20–31)
CREAT SERPL-MCNC: 1.49 MG/DL (ref 0.5–0.9)
FOLATE: >20 NG/ML
GFR AFRICAN AMERICAN: 40 ML/MIN
GFR NON-AFRICAN AMERICAN: 33 ML/MIN
GFR SERPL CREATININE-BSD FRML MDRD: ABNORMAL ML/MIN/{1.73_M2}
GLUCOSE BLD-MCNC: 98 MG/DL (ref 70–99)
HCT VFR BLD CALC: 32 % (ref 36–46)
HEMOGLOBIN: 9.9 G/DL (ref 12–16)
POTASSIUM SERPL-SCNC: 4.1 MMOL/L (ref 3.7–5.3)
SODIUM BLD-SCNC: 143 MMOL/L (ref 135–144)

## 2022-09-01 PROCEDURE — 85014 HEMATOCRIT: CPT

## 2022-09-01 PROCEDURE — 82746 ASSAY OF FOLIC ACID SERUM: CPT

## 2022-09-01 PROCEDURE — 85018 HEMOGLOBIN: CPT

## 2022-09-01 PROCEDURE — 80048 BASIC METABOLIC PNL TOTAL CA: CPT

## 2022-09-01 PROCEDURE — 36415 COLL VENOUS BLD VENIPUNCTURE: CPT

## 2022-09-01 NOTE — TELEPHONE ENCOUNTER
Pt called with labs after being reviewed per Dr Brittny Armas to go ahead with heart cath. Talked with pt and .

## 2022-09-06 ENCOUNTER — HOSPITAL ENCOUNTER (OUTPATIENT)
Age: 82
Discharge: HOME | End: 2022-09-06
Payer: MEDICARE

## 2022-09-06 ENCOUNTER — HOSPITAL ENCOUNTER (OUTPATIENT)
Dept: NURSING | Age: 82
Setting detail: INFUSION SERIES
Discharge: HOME | End: 2022-09-06

## 2022-09-06 VITALS
DIASTOLIC BLOOD PRESSURE: 67 MMHG | TEMPERATURE: 99.7 F | RESPIRATION RATE: 18 BRPM | SYSTOLIC BLOOD PRESSURE: 123 MMHG | OXYGEN SATURATION: 97 % | HEART RATE: 76 BPM

## 2022-09-06 DIAGNOSIS — D64.9 ANEMIA, UNSPECIFIED TYPE: Primary | ICD-10-CM

## 2022-09-06 DIAGNOSIS — Z98.61 POST PTCA: Primary | ICD-10-CM

## 2022-09-06 DIAGNOSIS — D64.9 ANEMIA, UNSPECIFIED TYPE: ICD-10-CM

## 2022-09-06 LAB
ABO/RH: NORMAL
ABO/RH: NORMAL
ABSOLUTE EOS #: 0.1 K/UL (ref 0–0.4)
ABSOLUTE LYMPH #: 1 K/UL (ref 1–4.8)
ABSOLUTE MONO #: 0.7 K/UL (ref 0–1)
ANION GAP SERPL CALCULATED.3IONS-SCNC: 11 MMOL/L (ref 9–17)
ANTIBODY SCREEN: NEGATIVE
BASOPHILS # BLD: 1 % (ref 0–2)
BASOPHILS ABSOLUTE: 0.1 K/UL (ref 0–0.2)
BLD PROD TYP BPU: NORMAL
BPU ID: NORMAL
BUN BLDV-MCNC: 19 MG/DL (ref 8–23)
BUN/CREAT BLD: 12 (ref 9–20)
CALCIUM SERPL-MCNC: 8.6 MG/DL (ref 8.6–10.4)
CHLORIDE BLD-SCNC: 111 MMOL/L (ref 98–107)
CO2: 23 MMOL/L (ref 20–31)
CREAT SERPL-MCNC: 1.6 MG/DL (ref 0.5–0.9)
CROSSMATCH RESULT: NORMAL
DISPENSE STATUS BLOOD BANK: NORMAL
EOSINOPHILS RELATIVE PERCENT: 2 % (ref 0–5)
EXPIRATION DATE: NORMAL
GFR AFRICAN AMERICAN: 37 ML/MIN
GFR NON-AFRICAN AMERICAN: 31 ML/MIN
GFR SERPL CREATININE-BSD FRML MDRD: ABNORMAL ML/MIN/{1.73_M2}
GLUCOSE BLD-MCNC: 98 MG/DL (ref 70–99)
HCT VFR BLD CALC: 25.1 % (ref 36–46)
HEMOGLOBIN: 7.9 G/DL (ref 12–16)
LYMPHOCYTES # BLD: 14 % (ref 15–40)
MCH RBC QN AUTO: 26.5 PG (ref 26–34)
MCHC RBC AUTO-ENTMCNC: 31.6 G/DL (ref 31–37)
MCV RBC AUTO: 83.8 FL (ref 80–100)
MONOCYTES # BLD: 9 % (ref 4–8)
PDW BLD-RTO: 17 % (ref 12.1–15.2)
PLATELET # BLD: 284 K/UL (ref 140–450)
POTASSIUM SERPL-SCNC: 3.4 MMOL/L (ref 3.7–5.3)
RBC # BLD: 3 M/UL (ref 4–5.2)
SEG NEUTROPHILS: 74 % (ref 47–75)
SEGMENTED NEUTROPHILS ABSOLUTE COUNT: 5.4 K/UL (ref 2.5–7)
SODIUM BLD-SCNC: 145 MMOL/L (ref 135–144)
TRANSFUSION STATUS: NORMAL
UNIT DIVISION: 0
WBC # BLD: 7.3 K/UL (ref 3.5–11)

## 2022-09-06 PROCEDURE — P9016 RBC LEUKOCYTES REDUCED: HCPCS

## 2022-09-06 PROCEDURE — 86850 RBC ANTIBODY SCREEN: CPT

## 2022-09-06 PROCEDURE — 36430 TRANSFUSION BLD/BLD COMPNT: CPT

## 2022-09-06 PROCEDURE — 86900 BLOOD TYPING SEROLOGIC ABO: CPT

## 2022-09-06 PROCEDURE — 36415 COLL VENOUS BLD VENIPUNCTURE: CPT

## 2022-09-06 PROCEDURE — 86901 BLOOD TYPING SEROLOGIC RH(D): CPT

## 2022-09-06 PROCEDURE — 80048 BASIC METABOLIC PNL TOTAL CA: CPT

## 2022-09-06 PROCEDURE — 86920 COMPATIBILITY TEST SPIN: CPT

## 2022-09-06 PROCEDURE — 85025 COMPLETE CBC W/AUTO DIFF WBC: CPT

## 2022-09-06 RX ORDER — SODIUM CHLORIDE 9 MG/ML
INJECTION, SOLUTION INTRAVENOUS PRN
Status: DISCONTINUED | OUTPATIENT
Start: 2022-09-06 | End: 2022-09-07 | Stop reason: HOSPADM

## 2022-09-14 ENCOUNTER — HOSPITAL ENCOUNTER (OUTPATIENT)
Age: 82
Discharge: HOME | End: 2022-09-14
Payer: MEDICARE

## 2022-09-14 ENCOUNTER — TELEPHONE (OUTPATIENT)
Dept: CARDIOLOGY CLINIC | Age: 82
End: 2022-09-14

## 2022-09-14 DIAGNOSIS — D64.9 ANEMIA, UNSPECIFIED TYPE: ICD-10-CM

## 2022-09-14 LAB
HCT VFR BLD CALC: 29.8 % (ref 36–46)
HEMOGLOBIN: 9.5 G/DL (ref 12–16)

## 2022-09-14 PROCEDURE — 85014 HEMATOCRIT: CPT

## 2022-09-14 PROCEDURE — 36415 COLL VENOUS BLD VENIPUNCTURE: CPT

## 2022-09-14 PROCEDURE — 85018 HEMOGLOBIN: CPT

## 2022-09-14 NOTE — TELEPHONE ENCOUNTER
Dnoa Snyder called to ask why Nestor Obando was taken off Potassium after her stent at [place] [address]. He stated that she was taking 4 tablets of Potassium 20mg daily. He stated that she is having cramps in her legs and hands now. He stated that he did give her one tablet of Potassium 20 mg last night. He wants to know if she can restart the Potassium?     Please advise

## 2022-10-10 ENCOUNTER — OFFICE VISIT (OUTPATIENT)
Dept: CARDIOLOGY CLINIC | Age: 82
End: 2022-10-10
Payer: MEDICARE

## 2022-10-10 ENCOUNTER — HOSPITAL ENCOUNTER (OUTPATIENT)
Age: 82
Discharge: HOME | End: 2022-10-10
Payer: MEDICARE

## 2022-10-10 VITALS — DIASTOLIC BLOOD PRESSURE: 65 MMHG | HEART RATE: 85 BPM | SYSTOLIC BLOOD PRESSURE: 122 MMHG | OXYGEN SATURATION: 94 %

## 2022-10-10 DIAGNOSIS — D64.9 ANEMIA, UNSPECIFIED TYPE: Primary | ICD-10-CM

## 2022-10-10 DIAGNOSIS — D64.9 ANEMIA, UNSPECIFIED TYPE: ICD-10-CM

## 2022-10-10 DIAGNOSIS — I10 ESSENTIAL HYPERTENSION: ICD-10-CM

## 2022-10-10 DIAGNOSIS — E78.49 OTHER HYPERLIPIDEMIA: ICD-10-CM

## 2022-10-10 LAB
ABSOLUTE EOS #: 0.2 K/UL (ref 0–0.4)
ABSOLUTE LYMPH #: 1.2 K/UL (ref 1–4.8)
ABSOLUTE MONO #: 0.7 K/UL (ref 0–1)
ALBUMIN SERPL-MCNC: 4.3 G/DL (ref 3.5–5.2)
ALP BLD-CCNC: 84 U/L (ref 35–104)
ALT SERPL-CCNC: 13 U/L (ref 5–33)
ANION GAP SERPL CALCULATED.3IONS-SCNC: 12 MMOL/L (ref 9–17)
AST SERPL-CCNC: 18 U/L
BASOPHILS # BLD: 0 % (ref 0–2)
BASOPHILS ABSOLUTE: 0 K/UL (ref 0–0.2)
BILIRUB SERPL-MCNC: 0.3 MG/DL (ref 0.3–1.2)
BUN BLDV-MCNC: 26 MG/DL (ref 8–23)
BUN/CREAT BLD: 18 (ref 9–20)
CALCIUM SERPL-MCNC: 9.3 MG/DL (ref 8.6–10.4)
CHLORIDE BLD-SCNC: 107 MMOL/L (ref 98–107)
CO2: 20 MMOL/L (ref 20–31)
CREAT SERPL-MCNC: 1.42 MG/DL (ref 0.5–0.9)
DIFFERENTIAL TYPE: YES
EOSINOPHILS RELATIVE PERCENT: 2 % (ref 0–5)
GFR SERPL CREATININE-BSD FRML MDRD: 36 ML/MIN/1.73M2
GLUCOSE BLD-MCNC: 109 MG/DL (ref 70–99)
HCT VFR BLD CALC: 33 % (ref 36–46)
HEMOGLOBIN: 10.4 G/DL (ref 12–16)
LYMPHOCYTES # BLD: 14 % (ref 15–40)
MCH RBC QN AUTO: 26.2 PG (ref 26–34)
MCHC RBC AUTO-ENTMCNC: 31.5 G/DL (ref 31–37)
MCV RBC AUTO: 83.3 FL (ref 80–100)
MONOCYTES # BLD: 8 % (ref 4–8)
PDW BLD-RTO: 17.5 % (ref 12.1–15.2)
PLATELET # BLD: 446 K/UL (ref 140–450)
POTASSIUM SERPL-SCNC: 4.6 MMOL/L (ref 3.7–5.3)
RBC # BLD: 3.97 M/UL (ref 4–5.2)
SEG NEUTROPHILS: 76 % (ref 47–75)
SEGMENTED NEUTROPHILS ABSOLUTE COUNT: 6.5 K/UL (ref 2.5–7)
SODIUM BLD-SCNC: 139 MMOL/L (ref 135–144)
THYROXINE, FREE: 1.08 NG/DL (ref 0.93–1.7)
TOTAL PROTEIN: 6.7 G/DL (ref 6.4–8.3)
TSH SERPL DL<=0.05 MIU/L-ACNC: 5.27 UIU/ML (ref 0.3–5)
WBC # BLD: 8.6 K/UL (ref 3.5–11)

## 2022-10-10 PROCEDURE — 1036F TOBACCO NON-USER: CPT | Performed by: INTERNAL MEDICINE

## 2022-10-10 PROCEDURE — 84439 ASSAY OF FREE THYROXINE: CPT

## 2022-10-10 PROCEDURE — 80053 COMPREHEN METABOLIC PANEL: CPT

## 2022-10-10 PROCEDURE — 99214 OFFICE O/P EST MOD 30 MIN: CPT | Performed by: INTERNAL MEDICINE

## 2022-10-10 PROCEDURE — G8484 FLU IMMUNIZE NO ADMIN: HCPCS | Performed by: INTERNAL MEDICINE

## 2022-10-10 PROCEDURE — G8420 CALC BMI NORM PARAMETERS: HCPCS | Performed by: INTERNAL MEDICINE

## 2022-10-10 PROCEDURE — 85025 COMPLETE CBC W/AUTO DIFF WBC: CPT

## 2022-10-10 PROCEDURE — G8428 CUR MEDS NOT DOCUMENT: HCPCS | Performed by: INTERNAL MEDICINE

## 2022-10-10 PROCEDURE — 1123F ACP DISCUSS/DSCN MKR DOCD: CPT | Performed by: INTERNAL MEDICINE

## 2022-10-10 PROCEDURE — 36415 COLL VENOUS BLD VENIPUNCTURE: CPT

## 2022-10-10 PROCEDURE — 1090F PRES/ABSN URINE INCON ASSESS: CPT | Performed by: INTERNAL MEDICINE

## 2022-10-10 PROCEDURE — 84443 ASSAY THYROID STIM HORMONE: CPT

## 2022-10-10 PROCEDURE — G8399 PT W/DXA RESULTS DOCUMENT: HCPCS | Performed by: INTERNAL MEDICINE

## 2022-10-10 NOTE — PROGRESS NOTES
Ov Dr. Capps Sep for follow up   S/p PTCA   Not sure of meds   And states \"they all should be in there\"  Not in cardiac rehab   \"Cant hardly walk now \" per    Last 6 months severe left back/hip pain   That goes down to leg   Had transfusion got better   Now getting worse again   Will be seeing pain specialist   Next week       Will get labs today     Follow up with Dr. Huy Reyes     Follow up in 6 months

## 2022-10-10 NOTE — LETTER
Morgan Stewart MD  [address] [facility]  [address] [place]  [phone]      October 10, 2022      DO Hernandez Rowley [address] 78      RE:   Magaly Sherwood  :  1940      Dear Dr. Michelle Hogan:    CHIEF COMPLAINT:  1.  Marked fatigue. 2.  Severe left back and hip pain that goes down her leg. 3.  History of anemia with a transfusion, where initially she felt better and now is feeling worse again. HISTORY OF PRESENT ILLNESS:  I had the pleasure of seeing Cherelle Akins and her  in our office on 10/10/2022. She is an 78-year-old female who has an extensive history of coronary artery disease. She had a catheterization by Dr. Wing Huitron in  that showed 80% disease in the ostium of the right coronary artery, unremarkable circumflex, the LAD had 80% disease, EF of 45%, and she had open heart surgery by Dr. Fatuma Merchant on 2006, with a LIMA to the LAD, a vein graft to the diagonal, and a vein graft to the right coronary artery. In 2006, she had a repeat catheterization that showed an occluded vein graft to the right coronary artery, occluded vein graft to the diagonal, patent LIMA to the LAD. We stented her right coronary artery with a 3.0 x 16 mm Taxus stent. On 2012, she had a catheterization that showed a diminutive LIMA to the LAD with 90% disease in the LAD and diagonal at the bifurcation, 90% disease in the ostium of the right coronary artery, 40% circumflex, all vein grafts were occluded and her LIMA was nonfunctional, EF of 60%. We did staged angioplasty and stenting of the LAD and diagonal placing a 3.5 x 24 mm Promus stent in the LAD and dilating the diagonal through the stent struts. We then brought her back on 2012, did complex angioplasty of the right coronary artery, placing a 3.0 x 20 mm Promus stent.     On 2016, a catheterization showed 95% to 99% disease in the proximal right coronary artery, which we dilated with a balloon alone. She had 90% disease in the LAD beyond the stent, in which I placed another 2.75 x 18 mm Xience stent. EF was 50% to 55%. I saw her on 07/25/2022, and she was having increased shortness of breath. She was also having much more chest pain. After much discussion, I did a catheterization on 07/29/2022. The catheterization showed occlusion of both her LAD and her right coronary artery. I managed to open the LAD, which we stented with a 2.5 x 30 mm drug-eluting Xience stent. This was a very prolonged procedure and did not attempt to open the mid right coronary artery. We brought her back and I repeated a catheterization on 09/02/2022, in which we reopened the right coronary artery and stented the right coronary artery with 2.75 x 22 and 4.0 x 22 mm Resolute stents. This was a very long procedure. The LAD and diagonal looked good with a re-look at that time. From a cardiac standpoint, she has done well. She has had no chest pain or any unusual shortness of breath. No syncope or near syncope. Because of severe arthritis, she could barely walk. She is seeing Dr. Mynor Moralez for pain in the next month. She did have anemia and got a transfusion on 09/06, at which time she felt good. However, she has now been developing more loss of energy and fatigue and weakness. She has had no syncope or near syncope. CARDIAC RISK FACTORS:  Known CAD:  Positive. Hypertension:  Positive. PTCA:  Positive. Bypass Surgery:  Positive. Hyperlipidemia:  Positive. Other Family Members:  Positive. Peripheral Vascular Disease:  Positive. Diabetes:  Negative.     MEDICATIONS AT THIS TIME:  (Did not bring her medications but this is what we have listed) aspirin 81 mg daily, Wellbutrin  mg daily, Plavix 75 mg daily, Prozac 40 mg daily, Lasix 20 mg daily, Synthroid 75 mcg daily, Toprol-XL 25 mg daily, Prilosec 20 mg daily, Livalo 2 mg nightly, potassium 20 mEq daily. PAST MEDICAL AND SURGICAL HISTORY:  1.  Back surgery and spinal fusion on 10/01/2010.  2.  Peripheral vascular disease with 40% in the right internal carotid artery, no disease in the left internal carotid artery. 3.  Severe arthritis. 4.  Oophorectomy. 5.  Depression. 6.  Euthyroid, on treatment. 7.  Cardiac as above. 8.  Squamous cell carcinoma, left parotid gland, status post left parotidectomy and neck dissection, with no reoccurrence after radiation therapy. FAMILY HISTORY:  Mother had MI. Father had MI.    SOCIAL HISTORY:  She is 78years old. Two children. Does not smoke or drink alcohol. She and her  lived in [place], and came back 30 years ago. Two sons, one in [facility], one in [place]. She is very inactive now because of severe back pain. REVIEW OF SYSTEMS:  Cardiac as above. Other systems reviewed including constitutional, eyes, ears, nose and throat, cardiovascular, respiratory, GI, , musculoskeletal, integumentary, neurologic, endocrine, hematologic and allergic/immunologic are negative except for what is described above. No weight loss or weight gain. No change in bowel habits. No blood in stools. No fevers, sweats or chills. PHYSICAL EXAMINATION:  VITAL SIGNS:  Her blood pressure was 122/65 with heart rate of 85 and regular. Respiratory rate 18. O2 sat 94%. GENERAL:  She is a pleasant 47-year-old female. Denied pain. She was oriented to person, place and time. Answered questions appropriately. SKIN:  No unusual skin changes. HEENT:  The pupils are equally round and intact. Mucous membranes were dry. NECK:  No JVD. Good carotid pulses. No carotid bruits. No lymphadenopathy or thyromegaly. CARDIOVASCULAR EXAM:  S1 and S2 were normal.  No S3 or S4. Soft systolic blowing type murmur. No diastolic murmur. PMI was normal.  No lift, thrust, or pericardial friction rub. LUNGS:  Clear to auscultation and percussion.   ABDOMEN:  Soft and nontender. Good bowel sounds. EXTREMITIES:  Good femoral pulses. Good pedal pulses. No pedal edema. Skin was warm and dry. No calf tenderness. Nail beds pink. Good cap refill. PULSES:  Bilateral symmetrical radial, brachial and carotid pulses. No carotid bruits. Good femoral and pedal pulses. NEUROLOGIC EXAM:  Within normal limits. PSYCHIATRIC EXAM:  Within normal limits. Laboratory data is pending. IMPRESSION:  1. Status post catheterization on 07/29/2022, that showed nonfunctional LIMA to the LAD, with an occluded vein graft to the right coronary artery, occluded vein graft to the circumflex, with occluded right coronary artery, occluded LAD, with 80% disease in the diagonal, mild disease in the circumflex, EF of 60%, with stenting of the mid LAD with a 2.5 x 30 mm Xience stent with 0% residual stenosis and PTCA with balloon alone of the first diagonal.  2.  Continued chest pain and shortness of breath with a catheterization on 09/02/2022, with a re-look on the LAD, which was widely patent, with reopening of the right coronary artery, placing 2.75 x 22 and 4.0 x 22 mm Resolute stents with a good end result, which was a very prolonged procedure. 3.  Functional class I at this time. 4.  Severe arthritis with severe back and leg pain. 5.  Marked fatigue. 6.  History of anemia status post transfusion. 7.  Status post catheterization in 2005 showing 80% LAD and diagonal and unremarkable circumflex, 80% RCA with an EF of 45% to 50%. 8.  Open heart surgery on 05/12/2006, by Dr. Carlos Leblanc with a LIMA to the LAD, vein graft to the diagonal, vein graft to the right coronary artery. 9.  Catheterization in 07/2006 showed occluded vein graft to the right coronary artery, occluded vein graft to the diagonal, with patent LIMA to the LAD, with angioplasty of the ostium of the right coronary artery placing 3.0 x 16 mm Taxus stent.   10.  Catheterization on 11/07/2012, showed nonfunctional LIMA to the LAD with an occluded vein graft with 90% disease in the LAD and 90% disease in the ostium of the right coronary artery, with mild disease in the circumflex, EF of 60%, with angioplasty of the LAD placing 3.5 x 24 mm Promus stent, and angioplasty of the right coronary artery on 11/14/2012, placing a 3.0 x 20 mm Promus stent in the ostium. 11.  Catheterization in 2016 showing 95% disease in the proximal right coronary artery with in-stent stenosis with balloon alone, with 90% LAD, which was stented with a 2.75 x 18 mm Xience stent. 12.  Hyperlipidemia, not wanting to take statins or PCSK9 inhibitor. 13.  Peripheral vascular disease with 40% to 50% disease in the right internal carotid artery. 14.  Chronic renal insufficiency. 15.  Euthyroid, on treatment. 16.  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 PLAN:  1. We will do lab work today including CBC, complete metabolic profile and TSH in view of her fatigue. 2.  We will see in 6 months. DISCUSSION:  Audrey Kelly is having no chest pain or any unusual shortness of breath after the reopening of her LAD and her right coronary artery. However, she is very debilitated with severe back and leg pain and is still seeing Dr. Rocky Chan for injections and for pain control. She was anemic and received a transfusion, at which time she felt \"very good. \"  However, in the last three to four weeks, she has become markedly fatigued again. I will do a blood work today including CBC to check her hemoglobin. Unfortunately, she is very limited by her arthritis. I will plan on seeing her in 6 months unless she develops more cardiac symptoms. Thank you very much for allowing me the privilege of seeing Mrs. Smith. If you have any questions on my thoughts, please do not hesitate to contact me.     Sincerely,        Luciana Mcmahan    D: 10/10/2022 10:27:35     T: 10/10/2022 10:33:42 [email or web address]  Job#: [id number]   Doc#: [id number]

## 2022-10-12 NOTE — PROGRESS NOTES
Rosi Lemus MD  [facility]  [address]  [phone]      October 10, 2022      [facility]to [place] [address]      RE:   Nancy Schmidt  :  1940      Dear Dr. Juan Pablo Fernandez:    CHIEF COMPLAINT:  1.  Marked fatigue. 2.  Severe left back and hip pain that goes down her leg. 3.  History of anemia with a transfusion, where initially she felt better and now is feeling worse again. HISTORY OF PRESENT ILLNESS:  I had the pleasure of seeing Guido Piper and her  in our office on 10/10/2022. She is an 41-year-old female who has an extensive history of coronary artery disease. She had a catheterization by Dr. Brenda Roberson in  that showed 80% disease in the ostium of the right coronary artery, unremarkable circumflex, the LAD had 80% disease, EF of 45%, and she had open heart surgery by Dr. Loreto Hartley on 2006, with a LIMA to the LAD, a vein graft to the diagonal, and a vein graft to the right coronary artery. In 2006, she had a repeat catheterization that showed an occluded vein graft to the right coronary artery, occluded vein graft to the diagonal, patent LIMA to the LAD. We stented her right coronary artery with a 3.0 x 16 mm Taxus stent. On 2012, she had a catheterization that showed a diminutive LIMA to the LAD with 90% disease in the LAD and diagonal at the bifurcation, 90% disease in the ostium of the right coronary artery, 40% circumflex, all vein grafts were occluded and her LIMA was nonfunctional, EF of 60%. We did staged angioplasty and stenting of the LAD and diagonal placing a 3.5 x 24 mm Promus stent in the LAD and dilating the diagonal through the stent struts. We then brought her back on 2012, did complex angioplasty of the right coronary artery, placing a 3.0 x 20 mm Promus stent.     On 2016, a catheterization showed 95% to 99% disease in the proximal right coronary artery, which we dilated with a balloon alone. She had 90% disease in the LAD beyond the stent, in which I placed another 2.75 x 18 mm Xience stent. EF was 50% to 55%. I saw her on 07/25/2022, and she was having increased shortness of breath. She was also having much more chest pain. After much discussion, I did a catheterization on 07/29/2022. The catheterization showed occlusion of both her LAD and her right coronary artery. I managed to open the LAD, which we stented with a 2.5 x 30 mm drug-eluting Xience stent. This was a very prolonged procedure and did not attempt to open the mid right coronary artery. We brought her back and I repeated a catheterization on 09/02/2022, in which we reopened the right coronary artery and stented the right coronary artery with 2.75 x 22 and 4.0 x 22 mm Resolute stents. This was a very long procedure. The LAD and diagonal looked good with a re-look at that time. From a cardiac standpoint, she has done well. She has had no chest pain or any unusual shortness of breath. No syncope or near syncope. Because of severe arthritis, she could barely walk. She is seeing Dr. Elizabeth Eli for pain in the next month. She did have anemia and got a transfusion on 09/06, at which time she felt good. However, she has now been developing more loss of energy and fatigue and weakness. She has had no syncope or near syncope. CARDIAC RISK FACTORS:  Known CAD:  Positive. Hypertension:  Positive. PTCA:  Positive. Bypass Surgery:  Positive. Hyperlipidemia:  Positive. Other Family Members:  Positive. Peripheral Vascular Disease:  Positive. Diabetes:  Negative.     MEDICATIONS AT THIS TIME:  (Did not bring her medications but this is what we have listed) aspirin 81 mg daily, Wellbutrin  mg daily, Plavix 75 mg daily, Prozac 40 mg daily, Lasix 20 mg daily, Synthroid 75 mcg daily, Toprol-XL 25 mg daily, Prilosec 20 mg daily, Livalo 2 mg nightly, potassium 20 mEq daily. PAST MEDICAL AND SURGICAL HISTORY:  1.  Back surgery and spinal fusion on 10/01/2010.  2.  Peripheral vascular disease with 40% in the right internal carotid artery, no disease in the left internal carotid artery. 3.  Severe arthritis. 4.  Oophorectomy. 5.  Depression. 6.  Euthyroid, on treatment. 7.  Cardiac as above. 8.  Squamous cell carcinoma, left parotid gland, status post left parotidectomy and neck dissection, with no reoccurrence after radiation therapy. FAMILY HISTORY:  Mother had MI. Father had MI.    SOCIAL HISTORY:  She is 78years old. Two children. Does not smoke or drink alcohol. She and her  lived in [place], and came back 30 years ago. Two sons, one in [facility], one in [place]. She is very inactive now because of severe back pain. REVIEW OF SYSTEMS:  Cardiac as above. Other systems reviewed including constitutional, eyes, ears, nose and throat, cardiovascular, respiratory, GI, , musculoskeletal, integumentary, neurologic, endocrine, hematologic and allergic/immunologic are negative except for what is described above. No weight loss or weight gain. No change in bowel habits. No blood in stools. No fevers, sweats or chills. PHYSICAL EXAMINATION:  VITAL SIGNS:  Her blood pressure was 122/65 with heart rate of 85 and regular. Respiratory rate 18. O2 sat 94%. GENERAL:  She is a pleasant 66-year-old female. Denied pain. She was oriented to person, place and time. Answered questions appropriately. SKIN:  No unusual skin changes. HEENT:  The pupils are equally round and intact. Mucous membranes were dry. NECK:  No JVD. Good carotid pulses. No carotid bruits. No lymphadenopathy or thyromegaly. CARDIOVASCULAR EXAM:  S1 and S2 were normal.  No S3 or S4. Soft systolic blowing type murmur. No diastolic murmur. PMI was normal.  No lift, thrust, or pericardial friction rub. LUNGS:  Clear to auscultation and percussion.   ABDOMEN:  Soft and nontender. Good bowel sounds. EXTREMITIES:  Good femoral pulses. Good pedal pulses. No pedal edema. Skin was warm and dry. No calf tenderness. Nail beds pink. Good cap refill. PULSES:  Bilateral symmetrical radial, brachial and carotid pulses. No carotid bruits. Good femoral and pedal pulses. NEUROLOGIC EXAM:  Within normal limits. PSYCHIATRIC EXAM:  Within normal limits. Laboratory data is pending. IMPRESSION:  1. Status post catheterization on 07/29/2022, that showed nonfunctional LIMA to the LAD, with an occluded vein graft to the right coronary artery, occluded vein graft to the circumflex, with occluded right coronary artery, occluded LAD, with 80% disease in the diagonal, mild disease in the circumflex, EF of 60%, with stenting of the mid LAD with a 2.5 x 30 mm Xience stent with 0% residual stenosis and PTCA with balloon alone of the first diagonal.  2.  Continued chest pain and shortness of breath with a catheterization on 09/02/2022, with a re-look on the LAD, which was widely patent, with reopening of the right coronary artery, placing 2.75 x 22 and 4.0 x 22 mm Resolute stents with a good end result, which was a very prolonged procedure. 3.  Functional class I at this time. 4.  Severe arthritis with severe back and leg pain. 5.  Marked fatigue. 6.  History of anemia status post transfusion. 7.  Status post catheterization in 2005 showing 80% LAD and diagonal and unremarkable circumflex, 80% RCA with an EF of 45% to 50%. 8.  Open heart surgery on 05/12/2006, by Dr. Milena Gonsales with a LIMA to the LAD, vein graft to the diagonal, vein graft to the right coronary artery. 9.  Catheterization in 07/2006 showed occluded vein graft to the right coronary artery, occluded vein graft to the diagonal, with patent LIMA to the LAD, with angioplasty of the ostium of the right coronary artery placing 3.0 x 16 mm Taxus stent.   10.  Catheterization on 11/07/2012, showed nonfunctional LIMA to the LAD with an occluded vein graft with 90% disease in the LAD and 90% disease in the ostium of the right coronary artery, with mild disease in the circumflex, EF of 60%, with angioplasty of the LAD placing 3.5 x 24 mm Promus stent, and angioplasty of the right coronary artery on 11/14/2012, placing a 3.0 x 20 mm Promus stent in the ostium. 11.  Catheterization in 2016 showing 95% disease in the proximal right coronary artery with in-stent stenosis with balloon alone, with 90% LAD, which was stented with a 2.75 x 18 mm Xience stent. 12.  Hyperlipidemia, not wanting to take statins or PCSK9 inhibitor. 13.  Peripheral vascular disease with 40% to 50% disease in the right internal carotid artery. 14.  Chronic renal insufficiency. 15.  Euthyroid, on treatment. 16.  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 PLAN:  1. We will do lab work today including CBC, complete metabolic profile and TSH in view of her fatigue. 2.  We will see in 6 months. DISCUSSION:  Chelly Phan is having no chest pain or any unusual shortness of breath after the reopening of her LAD and her right coronary artery. However, she is very debilitated with severe back and leg pain and is still seeing Dr. Yahaira Delarosa for injections and for pain control. She was anemic and received a transfusion, at which time she felt \"very good. \"  However, in the last three to four weeks, she has become markedly fatigued again. I will do a blood work today including CBC to check her hemoglobin. Unfortunately, she is very limited by her arthritis. I will plan on seeing her in 6 months unless she develops more cardiac symptoms. Thank you very much for allowing me the privilege of seeing Mrs. Smith. If you have any questions on my thoughts, please do not hesitate to contact me.     Sincerely,        Celestino Farrell    D: 10/10/2022 10:27:35     T: 10/10/2022 10:33:42 [id number]  Job#: [id number]   Doc#: [id number]

## 2022-10-17 ENCOUNTER — TELEPHONE (OUTPATIENT)
Dept: FAMILY MEDICINE CLINIC | Age: 82
End: 2022-10-17

## 2022-10-17 RX ORDER — CLOPIDOGREL BISULFATE 75 MG/1
75 TABLET ORAL DAILY
Qty: 90 TABLET | Refills: 3 | Status: SHIPPED | OUTPATIENT
Start: 2022-10-17

## 2022-10-17 RX ORDER — AMLODIPINE BESYLATE 2.5 MG/1
2.5 TABLET ORAL DAILY
Qty: 90 TABLET | Refills: 3 | Status: SHIPPED | OUTPATIENT
Start: 2022-10-17

## 2022-10-17 NOTE — TELEPHONE ENCOUNTER
Pts'  called stating pt is anemia and has been SOB x 3 days. Scheduled an appointment for 10/18/22 @ 11:00 am advised if SOB gets worse to take pt to the ER.

## 2022-10-18 ENCOUNTER — HOSPITAL ENCOUNTER (OUTPATIENT)
Age: 82
Discharge: HOME | End: 2022-10-18
Payer: MEDICARE

## 2022-10-18 ENCOUNTER — OFFICE VISIT (OUTPATIENT)
Dept: FAMILY MEDICINE CLINIC | Age: 82
End: 2022-10-18
Payer: MEDICARE

## 2022-10-18 VITALS
HEART RATE: 82 BPM | WEIGHT: 141 LBS | BODY MASS INDEX: 21.37 KG/M2 | DIASTOLIC BLOOD PRESSURE: 80 MMHG | OXYGEN SATURATION: 94 % | SYSTOLIC BLOOD PRESSURE: 124 MMHG | HEIGHT: 68 IN

## 2022-10-18 DIAGNOSIS — D64.9 NORMOCHROMIC ANEMIA: Primary | ICD-10-CM

## 2022-10-18 DIAGNOSIS — M16.12 PRIMARY OSTEOARTHRITIS OF LEFT HIP: ICD-10-CM

## 2022-10-18 DIAGNOSIS — R06.09 DYSPNEA ON EXERTION: ICD-10-CM

## 2022-10-18 DIAGNOSIS — D64.9 NORMOCHROMIC ANEMIA: ICD-10-CM

## 2022-10-18 PROBLEM — N18.30 CHRONIC RENAL DISEASE, STAGE III (HCC): Status: RESOLVED | Noted: 2022-06-02 | Resolved: 2022-10-18

## 2022-10-18 LAB
ABSOLUTE EOS #: 0.1 K/UL (ref 0–0.4)
ABSOLUTE LYMPH #: 1.3 K/UL (ref 1–4.8)
ABSOLUTE MONO #: 0.6 K/UL (ref 0–1)
ABSOLUTE RETIC #: 0.05 M/UL (ref 0.02–0.1)
ALBUMIN SERPL-MCNC: 4.3 G/DL (ref 3.5–5.2)
ALP BLD-CCNC: 80 U/L (ref 35–104)
ALT SERPL-CCNC: 15 U/L (ref 5–33)
ANION GAP SERPL CALCULATED.3IONS-SCNC: 12 MMOL/L (ref 9–17)
AST SERPL-CCNC: 18 U/L
BASOPHILS # BLD: 0 % (ref 0–2)
BASOPHILS ABSOLUTE: 0 K/UL (ref 0–0.2)
BILIRUB SERPL-MCNC: 0.3 MG/DL (ref 0.3–1.2)
BUN BLDV-MCNC: 20 MG/DL (ref 8–23)
BUN/CREAT BLD: 13 (ref 9–20)
CALCIUM SERPL-MCNC: 9.3 MG/DL (ref 8.6–10.4)
CHLORIDE BLD-SCNC: 111 MMOL/L (ref 98–107)
CO2: 18 MMOL/L (ref 20–31)
CREAT SERPL-MCNC: 1.49 MG/DL (ref 0.5–0.9)
DIFFERENTIAL TYPE: YES
EOSINOPHILS RELATIVE PERCENT: 1 % (ref 0–5)
FERRITIN: 50 NG/ML (ref 13–150)
FOLATE: >20 NG/ML
GFR SERPL CREATININE-BSD FRML MDRD: 34 ML/MIN/1.73M2
GLUCOSE BLD-MCNC: 101 MG/DL (ref 70–99)
HCT VFR BLD CALC: 32.9 % (ref 36–46)
HEMOGLOBIN: 10.3 G/DL (ref 12–16)
IRON SATURATION: 11 % (ref 20–55)
IRON: 41 UG/DL (ref 37–145)
LYMPHOCYTES # BLD: 13 % (ref 15–40)
MCH RBC QN AUTO: 26.1 PG (ref 26–34)
MCHC RBC AUTO-ENTMCNC: 31.3 G/DL (ref 31–37)
MCV RBC AUTO: 83.2 FL (ref 80–100)
MONOCYTES # BLD: 6 % (ref 4–8)
PDW BLD-RTO: 17.6 % (ref 12.1–15.2)
PLATELET # BLD: 372 K/UL (ref 140–450)
POTASSIUM SERPL-SCNC: 4 MMOL/L (ref 3.7–5.3)
RBC # BLD: 3.95 M/UL (ref 4–5.2)
RETIC %: 1.4 % (ref 0.5–2)
SEG NEUTROPHILS: 80 % (ref 47–75)
SEGMENTED NEUTROPHILS ABSOLUTE COUNT: 8 K/UL (ref 2.5–7)
SODIUM BLD-SCNC: 141 MMOL/L (ref 135–144)
TOTAL IRON BINDING CAPACITY: 377 UG/DL (ref 250–450)
TOTAL PROTEIN: 6.7 G/DL (ref 6.4–8.3)
UNSATURATED IRON BINDING CAPACITY: 336 UG/DL (ref 112–347)
VITAMIN B-12: 1909 PG/ML (ref 232–1245)
WBC # BLD: 10 K/UL (ref 3.5–11)

## 2022-10-18 PROCEDURE — 83540 ASSAY OF IRON: CPT

## 2022-10-18 PROCEDURE — 83550 IRON BINDING TEST: CPT

## 2022-10-18 PROCEDURE — 36415 COLL VENOUS BLD VENIPUNCTURE: CPT

## 2022-10-18 PROCEDURE — 82607 VITAMIN B-12: CPT

## 2022-10-18 PROCEDURE — G8484 FLU IMMUNIZE NO ADMIN: HCPCS | Performed by: FAMILY MEDICINE

## 2022-10-18 PROCEDURE — 1090F PRES/ABSN URINE INCON ASSESS: CPT | Performed by: FAMILY MEDICINE

## 2022-10-18 PROCEDURE — G8427 DOCREV CUR MEDS BY ELIG CLIN: HCPCS | Performed by: FAMILY MEDICINE

## 2022-10-18 PROCEDURE — 80053 COMPREHEN METABOLIC PANEL: CPT

## 2022-10-18 PROCEDURE — 1123F ACP DISCUSS/DSCN MKR DOCD: CPT | Performed by: FAMILY MEDICINE

## 2022-10-18 PROCEDURE — 99214 OFFICE O/P EST MOD 30 MIN: CPT | Performed by: FAMILY MEDICINE

## 2022-10-18 PROCEDURE — 82728 ASSAY OF FERRITIN: CPT

## 2022-10-18 PROCEDURE — G8420 CALC BMI NORM PARAMETERS: HCPCS | Performed by: FAMILY MEDICINE

## 2022-10-18 PROCEDURE — G8399 PT W/DXA RESULTS DOCUMENT: HCPCS | Performed by: FAMILY MEDICINE

## 2022-10-18 PROCEDURE — 82746 ASSAY OF FOLIC ACID SERUM: CPT

## 2022-10-18 PROCEDURE — 85045 AUTOMATED RETICULOCYTE COUNT: CPT

## 2022-10-18 PROCEDURE — 1036F TOBACCO NON-USER: CPT | Performed by: FAMILY MEDICINE

## 2022-10-18 PROCEDURE — 85025 COMPLETE CBC W/AUTO DIFF WBC: CPT

## 2022-10-18 RX ORDER — ATORVASTATIN CALCIUM 80 MG/1
80 TABLET, FILM COATED ORAL DAILY
COMMUNITY
Start: 2022-07-29

## 2022-10-18 NOTE — PROGRESS NOTES
Name: Wilder Knight  : 1940         Chief Complaint:     Chief Complaint   Patient presents with    Anemia    Arthritis       History of Present Illness:      Wilder Knight is a 78 y.o.  female who presents with Anemia and Arthritis      HPI    Pt presents with  c/o dyspnea on exertion. Past few mos has gotten out of breath very easily with activity. Because of this, she underwent cardiac workup and did have stents placed in  July and September. Also underwent blood transfusion 22. After the transfusion she felt really well for a while but then started getting fatigued and SOB again. Pt unaware of any source of blood loss. Did have BRBPR in May which quickly resolved. BM's occur approx daily, typically soft (sometimes takes a softener), light brown color. L hip pain that now goes down into anterior thigh. Uses prednisone intermittently and it helps for a couple days. OA dx few yrs ago. Dr Kimi Wiley said he couldn't do anything and wasn't a \"shot doctor\" so then she saw Dr Brenda Akers who was giving lumbar injections. Those would help her back and a little with hip pain but not completely and this is a new symptom in past couple mos. Missed appt with Dr Brenda Akers yesterday d/t SOB. Medical History:     Patient Active Problem List   Diagnosis    CAD (coronary artery disease)    Chronic back pain    Hypothyroidism    Hypertension    Post PTCA    Hot flashes, menopausal    Hyperlipidemia    CKD (chronic kidney disease), stage III (HCC)    Postlaminectomy syndrome of lumbar region    Primary parotid gland squamous cell carcinoma (HCC)    Dysthymia       Medications:       Prior to Admission medications    Medication Sig Start Date End Date Taking?  Authorizing Provider   atorvastatin (LIPITOR) 80 MG tablet Take 80 mg by mouth daily 22  Yes Historical Provider, MD   amLODIPine (NORVASC) 2.5 MG tablet TAKE 1 TABLET BY MOUTH DAILY 10/17/22  Yes America Ac MD   clopidogrel (PLAVIX) 75 MG tablet Take 1 tablet by mouth daily 10/17/22  Yes Raymond Perkins MD   ASPIRIN LOW DOSE 81 MG EC tablet Take 1 tablet by mouth in the morning. 8/11/22  Yes Raymond Perkins MD   pitavastatin (LIVALO) 2 MG TABS tablet Take 1 tablet by mouth nightly 8/11/22  Yes Raymond Perkins MD   hydrocortisone (ANUSOL-HC) 2.5 % CREA rectal cream Place rectally 2 times daily as needed for Hemorrhoids 8/4/22  Yes JUAN Evangelista - CNP   omeprazole (PRILOSEC) 20 MG delayed release capsule TAKE 1 CAPSULE BY MOUTH EVERY DAY 7/27/22  Yes Valdo Waldron DO   isosorbide mononitrate (IMDUR) 30 MG extended release tablet TAKE 1 TABLET BY MOUTH TWICE DAILY 7/27/22  Yes Raymond Perkins MD   levothyroxine (SYNTHROID) 75 MCG tablet TAKE 1 TABLET BY MOUTH EVERY DAY 6/2/22  Yes Valdo Waldron DO   buPROPion (WELLBUTRIN XL) 150 MG extended release tablet TAKE 1 TABLET BY MOUTH IN THE MORNING 6/2/22  Yes Valdo Waldron DO   furosemide (LASIX) 20 MG tablet TAKE 1 TABLET BY MOUTH EVERY DAY 6/2/22  Yes Valdo Waldron DO   FLUoxetine (PROZAC) 40 MG capsule TAKE 1 CAPSULE BY MOUTH EVERY DAY 6/2/22  Yes Valdo Waldron DO   metoprolol succinate (TOPROL XL) 25 MG extended release tablet TAKE 1 TABLET BY MOUTH EVERY DAY 6/2/22  Yes Valdo Waldron DO   predniSONE (DELTASONE) 20 MG tablet TAKE 1 TABLET BY MOUTH DAILY AS NEEDED FOR ARTHRITIS 6/2/22  Yes Valdo Waldron DO   Potassium Chloride Azucena ER (KLOR-CON M20 PO) Take 20 mEq by mouth three times daily Taking 2 in am and 1 in afternoon   Yes Historical Provider, MD   b complex vitamins capsule Take 1 capsule by mouth daily. Yes Historical Provider, MD   bisacodyl (DULCOLAX) 5 MG EC tablet Take 5 mg by mouth daily as needed. Take three daily      Yes Historical Provider, MD   calcium carbonate-vitamin D 600-200 MG-UNIT TABS Take  by mouth daily.      Yes Historical Provider, MD   triamcinolone (KENALOG) 0.1 % cream APPLY topically TWICE DAILY 12/1/21   Valdo Waldron DO Allergies:       Sulfa antibiotics    Physical Exam:     Vitals:  /80   Pulse 82   Ht 5' 8\" (1.727 m)   Wt 141 lb (64 kg)   SpO2 94%   BMI 21.44 kg/m²   Physical Exam  Vitals and nursing note reviewed. Constitutional:       Appearance: Normal appearance. She is well-developed. She is not ill-appearing. HENT:      Right Ear: Hearing and tympanic membrane normal.      Left Ear: Hearing and tympanic membrane normal.      Nose: Nose normal. No mucosal edema. Mouth/Throat:      Mouth: Mucous membranes are moist.      Pharynx: Oropharynx is clear. Eyes:      Pupils: Pupils are equal, round, and reactive to light. Neck:      Thyroid: No thyroid mass or thyromegaly. Cardiovascular:      Rate and Rhythm: Normal rate and regular rhythm. Heart sounds: S1 normal and S2 normal. No murmur heard. Pulmonary:      Effort: Pulmonary effort is normal.      Breath sounds: Normal breath sounds. Abdominal:      General: Bowel sounds are normal.      Palpations: Abdomen is soft. Tenderness: There is no abdominal tenderness. Lymphadenopathy:      Cervical: No cervical adenopathy. Skin:     General: Skin is warm and dry. Findings: No rash. Neurological:      Mental Status: She is alert and oriented to person, place, and time.    Psychiatric:         Mood and Affect: Mood normal.         Behavior: Behavior normal.       Data:     Lab Results   Component Value Date/Time     10/18/2022 12:26 PM    K 4.0 10/18/2022 12:26 PM     10/18/2022 12:26 PM    CO2 18 10/18/2022 12:26 PM    BUN 20 10/18/2022 12:26 PM    CREATININE 1.49 10/18/2022 12:26 PM    GLUCOSE 101 10/18/2022 12:26 PM    PROT 6.7 10/18/2022 12:26 PM    LABALBU 4.3 10/18/2022 12:26 PM    BILITOT 0.3 10/18/2022 12:26 PM    ALKPHOS 80 10/18/2022 12:26 PM    AST 18 10/18/2022 12:26 PM    ALT 15 10/18/2022 12:26 PM     Lab Results   Component Value Date/Time    WBC 10.0 10/18/2022 12:26 PM    RBC 3.95 10/18/2022 12:26 PM HGB 10.3 10/18/2022 12:26 PM    HCT 32.9 10/18/2022 12:26 PM    MCV 83.2 10/18/2022 12:26 PM    MCH 26.1 10/18/2022 12:26 PM    MCHC 31.3 10/18/2022 12:26 PM    RDW 17.6 10/18/2022 12:26 PM     10/18/2022 12:26 PM    MPV NOT REPORTED 10/05/2021 08:15 AM     Lab Results   Component Value Date/Time    TSH 5.27 10/10/2022 10:32 AM     Lab Results   Component Value Date/Time    CHOL 169 08/29/2022 08:01 AM    HDL 79 08/29/2022 08:01 AM    LABA1C 5.7 09/12/2019 08:04 AM         Assessment & Plan:        Diagnosis Orders   1. Normochromic anemia  Comprehensive Metabolic Panel    CBC with Auto Differential    Iron and TIBC    Vitamin B12 & Folate    Reticulocytes    Ferritin      2. Dyspnea on exertion        3. Primary osteoarthritis of left hip          Anemia which began in May, corbin hb 7.9 9/6/22, received 1 unit prbc and then improved. However, remains SOB and still uncertain etiology of anemia. Labs ordered as above incl 3 pack of fecal occult. May need endoscopy, chest imaging (already had CT abd/pelvis)   L hip OA and pain - cont same rx and f/u with pain mgmt. May seek second ortho opinion if desired. May cont prednisone sparingly as she has been.          signed by Caleb Joiner DO on 10/18/2022 at 10:42 PM  [address]  Dept: [phone]

## 2022-10-24 RX ORDER — POTASSIUM CHLORIDE 20 MEQ/1
TABLET, EXTENDED RELEASE ORAL
Qty: 360 TABLET | Refills: 3 | OUTPATIENT
Start: 2022-10-24

## 2022-11-04 ENCOUNTER — TELEPHONE (OUTPATIENT)
Dept: FAMILY MEDICINE CLINIC | Age: 82
End: 2022-11-04

## 2022-11-04 DIAGNOSIS — D64.9 NORMOCHROMIC ANEMIA: Primary | ICD-10-CM

## 2022-11-04 NOTE — TELEPHONE ENCOUNTER
Pt's  called stating he is very concerned about his wife. She is not able to see Dr. Eli Scott for a colonoscopy until January, he would like her to have one completed sooner. Here at [facility] if possible. Pt has been very fatigued and SOB, he thinks she may need more blood.  stated that the last time she received blood she did very well for about one month. Advised pt she mat get a response until Monday.  Please advise

## 2022-11-07 ENCOUNTER — HOSPITAL ENCOUNTER (OUTPATIENT)
Age: 82
Discharge: HOME | End: 2022-11-07
Payer: MEDICARE

## 2022-11-07 DIAGNOSIS — D64.9 NORMOCHROMIC ANEMIA: ICD-10-CM

## 2022-11-07 LAB
ABSOLUTE EOS #: 0.1 K/UL (ref 0–0.4)
ABSOLUTE LYMPH #: 1.1 K/UL (ref 1–4.8)
ABSOLUTE MONO #: 0.6 K/UL (ref 0–1)
BASOPHILS # BLD: 0 % (ref 0–2)
BASOPHILS ABSOLUTE: 0 K/UL (ref 0–0.2)
DIFFERENTIAL TYPE: YES
EOSINOPHILS RELATIVE PERCENT: 2 % (ref 0–5)
HCT VFR BLD CALC: 27.7 % (ref 36–46)
HEMOGLOBIN: 8.7 G/DL (ref 12–16)
LYMPHOCYTES # BLD: 16 % (ref 15–40)
MCH RBC QN AUTO: 25.6 PG (ref 26–34)
MCHC RBC AUTO-ENTMCNC: 31.4 G/DL (ref 31–37)
MCV RBC AUTO: 81.5 FL (ref 80–100)
MONOCYTES # BLD: 9 % (ref 4–8)
PDW BLD-RTO: 17.8 % (ref 12.1–15.2)
PLATELET # BLD: 390 K/UL (ref 140–450)
RBC # BLD: 3.4 M/UL (ref 4–5.2)
SEG NEUTROPHILS: 73 % (ref 47–75)
SEGMENTED NEUTROPHILS ABSOLUTE COUNT: 5.2 K/UL (ref 2.5–7)
WBC # BLD: 7 K/UL (ref 3.5–11)

## 2022-11-07 PROCEDURE — 85025 COMPLETE CBC W/AUTO DIFF WBC: CPT

## 2022-11-07 PROCEDURE — 36415 COLL VENOUS BLD VENIPUNCTURE: CPT

## 2022-11-07 NOTE — TELEPHONE ENCOUNTER
Her blood count still stayed up for more than a month after her transfusion so I don't believe that's the only cause of her fatigue. I'm reordering labs today and we'll keep checking every couple wks. If it severely drops we'll try to get her in sooner. We really do need the stool occult too! If needed I can do rectal exam for it (she had said she couldn't/wouldn't d/t arthritis).

## 2022-11-09 ENCOUNTER — OFFICE VISIT (OUTPATIENT)
Dept: FAMILY MEDICINE CLINIC | Age: 82
End: 2022-11-09
Payer: MEDICARE

## 2022-11-09 VITALS
BODY MASS INDEX: 21.67 KG/M2 | HEIGHT: 68 IN | SYSTOLIC BLOOD PRESSURE: 114 MMHG | DIASTOLIC BLOOD PRESSURE: 66 MMHG | WEIGHT: 143 LBS | OXYGEN SATURATION: 99 % | HEART RATE: 74 BPM

## 2022-11-09 DIAGNOSIS — R91.1 LUNG NODULE: ICD-10-CM

## 2022-11-09 DIAGNOSIS — D64.9 NORMOCHROMIC ANEMIA: Primary | ICD-10-CM

## 2022-11-09 DIAGNOSIS — R19.5 OCCULT BLOOD POSITIVE STOOL: ICD-10-CM

## 2022-11-09 DIAGNOSIS — D50.0 IRON DEFICIENCY ANEMIA DUE TO CHRONIC BLOOD LOSS: ICD-10-CM

## 2022-11-09 LAB
HEMOCCULT STL QL: NORMAL

## 2022-11-09 PROCEDURE — 1090F PRES/ABSN URINE INCON ASSESS: CPT | Performed by: FAMILY MEDICINE

## 2022-11-09 PROCEDURE — G8484 FLU IMMUNIZE NO ADMIN: HCPCS | Performed by: FAMILY MEDICINE

## 2022-11-09 PROCEDURE — 1123F ACP DISCUSS/DSCN MKR DOCD: CPT | Performed by: FAMILY MEDICINE

## 2022-11-09 PROCEDURE — G8399 PT W/DXA RESULTS DOCUMENT: HCPCS | Performed by: FAMILY MEDICINE

## 2022-11-09 PROCEDURE — 3078F DIAST BP <80 MM HG: CPT | Performed by: FAMILY MEDICINE

## 2022-11-09 PROCEDURE — 3074F SYST BP LT 130 MM HG: CPT | Performed by: FAMILY MEDICINE

## 2022-11-09 PROCEDURE — 82270 OCCULT BLOOD FECES: CPT | Performed by: FAMILY MEDICINE

## 2022-11-09 PROCEDURE — 1036F TOBACCO NON-USER: CPT | Performed by: FAMILY MEDICINE

## 2022-11-09 PROCEDURE — G8427 DOCREV CUR MEDS BY ELIG CLIN: HCPCS | Performed by: FAMILY MEDICINE

## 2022-11-09 PROCEDURE — G8420 CALC BMI NORM PARAMETERS: HCPCS | Performed by: FAMILY MEDICINE

## 2022-11-09 PROCEDURE — 99214 OFFICE O/P EST MOD 30 MIN: CPT | Performed by: FAMILY MEDICINE

## 2022-11-09 RX ORDER — GABAPENTIN 100 MG/1
100 CAPSULE ORAL NIGHTLY
Qty: 30 CAPSULE | Refills: 0 | Status: SHIPPED | OUTPATIENT
Start: 2022-11-09 | End: 2022-12-09

## 2022-11-09 RX ORDER — PANTOPRAZOLE SODIUM 40 MG/1
40 TABLET, DELAYED RELEASE ORAL
Qty: 30 TABLET | Refills: 5 | Status: SHIPPED | OUTPATIENT
Start: 2022-11-09

## 2022-11-09 NOTE — PROGRESS NOTES
Name: Dong Nickerson  : 1940         Chief Complaint:     Chief Complaint   Patient presents with    Anemia       History of Present Illness:      Dong Nickerson is a 78 y.o.  female who presents with Anemia      HPI    C/o worsening fatigue and LANIER which she r/t anemia. They again recall how much better she felt after PRBC transfusion in the summer and that she felt good for about 3 wks afterwards. Unaware of any blood loss, denies hematochezia or dark stools. Gets heartburn at times. No dysphagia.  bought an otc liquid iron supplement that pt will start soon. Pt continues taking prednisone 20 mg about once every 3 days to help with MSK pains. Most bothered by pain in low back and L posterior hip and thigh. Medical History:     Patient Active Problem List   Diagnosis    CAD (coronary artery disease)    Chronic back pain    Hypothyroidism    Hypertension    Post PTCA    Hot flashes, menopausal    Hyperlipidemia    CKD (chronic kidney disease), stage III (HCC)    Postlaminectomy syndrome of lumbar region    Primary parotid gland squamous cell carcinoma (HCC)    Dysthymia       Medications:       Prior to Admission medications    Medication Sig Start Date End Date Taking? Authorizing Provider   pantoprazole (PROTONIX) 40 MG tablet Take 1 tablet by mouth every morning (before breakfast) 22  Yes Boom Granados, DO   gabapentin (NEURONTIN) 100 MG capsule Take 1 capsule by mouth nightly for 30 days.  22 Yes Boom Granados, DO   atorvastatin (LIPITOR) 80 MG tablet Take 80 mg by mouth daily 22  Yes Historical Provider, MD   amLODIPine (NORVASC) 2.5 MG tablet TAKE 1 TABLET BY MOUTH DAILY 10/17/22  Yes Keyla Leon MD   clopidogrel (PLAVIX) 75 MG tablet Take 1 tablet by mouth daily 10/17/22  Yes Keyla Leon MD   pitavastatin (LIVALO) 2 MG TABS tablet Take 1 tablet by mouth nightly 22  Yes Keyla Leon MD   hydrocortisone (ANUSOL-HC) 2.5 % CREA rectal cream Place rectally 2 times daily as needed for Hemorrhoids 8/4/22  Yes Ticatalino List, APRN - CNP   isosorbide mononitrate (IMDUR) 30 MG extended release tablet TAKE 1 TABLET BY MOUTH TWICE DAILY 7/27/22  Yes Rema Roberto MD   levothyroxine (SYNTHROID) 75 MCG tablet TAKE 1 TABLET BY MOUTH EVERY DAY 6/2/22  Yes Carlitos Zepeda DO   buPROPion (WELLBUTRIN XL) 150 MG extended release tablet TAKE 1 TABLET BY MOUTH IN THE MORNING 6/2/22  Yes Carlitos Zepeda DO   furosemide (LASIX) 20 MG tablet TAKE 1 TABLET BY MOUTH EVERY DAY 6/2/22  Yes Carlitos Zepeda DO   FLUoxetine (PROZAC) 40 MG capsule TAKE 1 CAPSULE BY MOUTH EVERY DAY 6/2/22  Yes Carlitos Zepeda DO   metoprolol succinate (TOPROL XL) 25 MG extended release tablet TAKE 1 TABLET BY MOUTH EVERY DAY 6/2/22  Yes Carlitos Zepeda DO   predniSONE (DELTASONE) 20 MG tablet TAKE 1 TABLET BY MOUTH DAILY AS NEEDED FOR ARTHRITIS 6/2/22  Yes Carlitos Zepeda DO   Potassium Chloride Azucena ER (KLOR-CON M20 PO) Take 20 mEq by mouth three times daily Taking 2 in am and 1 in afternoon   Yes Historical Provider, MD   triamcinolone (KENALOG) 0.1 % cream APPLY topically TWICE DAILY 12/1/21  Yes Carlitos Zepeda DO   b complex vitamins capsule Take 1 capsule by mouth daily. Yes Historical Provider, MD   bisacodyl (DULCOLAX) 5 MG EC tablet Take 5 mg by mouth daily as needed. Take three daily      Yes Historical Provider, MD   calcium carbonate-vitamin D 600-200 MG-UNIT TABS Take  by mouth daily. Yes Historical Provider, MD   ASPIRIN LOW DOSE 81 MG EC tablet Take 1 tablet by mouth in the morning. 8/11/22   Rema Roberto MD        Allergies:       Sulfa antibiotics    Physical Exam:     Vitals:  /66   Pulse 74   Ht 5' 8\" (1.727 m)   Wt 143 lb (64.9 kg)   SpO2 99%   BMI 21.74 kg/m²   Physical Exam  Vitals and nursing note reviewed. Constitutional:       General: She is not in acute distress. Appearance: Normal appearance. She is well-developed.  She is not ill-appearing. Cardiovascular:      Rate and Rhythm: Normal rate and regular rhythm. Heart sounds: Normal heart sounds. Pulmonary:      Effort: Pulmonary effort is normal.      Breath sounds: Normal breath sounds. Neurological:      Mental Status: She is alert and oriented to person, place, and time. Psychiatric:         Mood and Affect: Mood normal.         Behavior: Behavior normal.       Data:     Lab Results   Component Value Date/Time     10/18/2022 12:26 PM    K 4.0 10/18/2022 12:26 PM     10/18/2022 12:26 PM    CO2 18 10/18/2022 12:26 PM    BUN 20 10/18/2022 12:26 PM    CREATININE 1.49 10/18/2022 12:26 PM    GLUCOSE 101 10/18/2022 12:26 PM    PROT 6.7 10/18/2022 12:26 PM    LABALBU 4.3 10/18/2022 12:26 PM    BILITOT 0.3 10/18/2022 12:26 PM    ALKPHOS 80 10/18/2022 12:26 PM    AST 18 10/18/2022 12:26 PM    ALT 15 10/18/2022 12:26 PM     Lab Results   Component Value Date/Time    WBC 7.0 11/07/2022 02:31 PM    RBC 3.40 11/07/2022 02:31 PM    HGB 8.7 11/07/2022 02:31 PM    HCT 27.7 11/07/2022 02:31 PM    MCV 81.5 11/07/2022 02:31 PM    MCH 25.6 11/07/2022 02:31 PM    MCHC 31.4 11/07/2022 02:31 PM    RDW 17.8 11/07/2022 02:31 PM     11/07/2022 02:31 PM    MPV NOT REPORTED 10/05/2021 08:15 AM     Lab Results   Component Value Date/Time    TSH 5.27 10/10/2022 10:32 AM     Lab Results   Component Value Date/Time    CHOL 169 08/29/2022 08:01 AM    HDL 79 08/29/2022 08:01 AM    LABA1C 5.7 09/12/2019 08:04 AM         Assessment & Plan:        Diagnosis Orders   1. Normochromic anemia  POCT occult blood stool    POCT occult blood stool      2. Iron deficiency anemia due to chronic blood loss  CT CHEST W CONTRAST    Hemoglobin and Hematocrit      3. Lung nodule  CT CHEST W CONTRAST      4. Occult blood positive stool  CT CHEST W CONTRAST        Anemia over past few mos with positive occult blood in stool. Suspect upper GI source.  She is unable to d/c DAPT at this time d/t coronary stenting 9/2/22 and also does not want to undergo endoscopy. So at this time we'll start iron supplementation, start PPI, and monitor H/H. Also ordered CT chest to assess lung nodule identified on CT abd/pelvis in September and also get a look at esophagus. Requested Prescriptions     Signed Prescriptions Disp Refills    pantoprazole (PROTONIX) 40 MG tablet 30 tablet 5     Sig: Take 1 tablet by mouth every morning (before breakfast)    gabapentin (NEURONTIN) 100 MG capsule 30 capsule 0     Sig: Take 1 capsule by mouth nightly for 30 days.          There are no Patient Instructions on file for this visit.      signed by Birgit Ness DO on 11/12/2022 at 2:02 PM  [address]  Dept: [phone]

## 2022-11-15 ENCOUNTER — HOSPITAL ENCOUNTER (OUTPATIENT)
Dept: CT IMAGING | Age: 82
Discharge: HOME | End: 2022-11-17
Payer: MEDICARE

## 2022-11-15 DIAGNOSIS — R19.5 OCCULT BLOOD POSITIVE STOOL: ICD-10-CM

## 2022-11-15 DIAGNOSIS — D50.0 IRON DEFICIENCY ANEMIA DUE TO CHRONIC BLOOD LOSS: ICD-10-CM

## 2022-11-15 DIAGNOSIS — R91.1 LUNG NODULE: ICD-10-CM

## 2022-11-15 PROCEDURE — 71250 CT THORAX DX C-: CPT

## 2022-11-23 ENCOUNTER — HOSPITAL ENCOUNTER (OUTPATIENT)
Age: 82
Discharge: HOME | End: 2022-11-23
Payer: MEDICARE

## 2022-11-23 DIAGNOSIS — D50.0 IRON DEFICIENCY ANEMIA DUE TO CHRONIC BLOOD LOSS: ICD-10-CM

## 2022-11-23 LAB
HCT VFR BLD CALC: 31 % (ref 36–46)
HEMOGLOBIN: 9.6 G/DL (ref 12–16)

## 2022-11-23 PROCEDURE — 85014 HEMATOCRIT: CPT

## 2022-11-23 PROCEDURE — 36415 COLL VENOUS BLD VENIPUNCTURE: CPT

## 2022-11-23 PROCEDURE — 85018 HEMOGLOBIN: CPT

## 2022-12-02 ENCOUNTER — HOSPITAL ENCOUNTER (OUTPATIENT)
Age: 82
Discharge: HOME | End: 2022-12-02
Payer: MEDICARE

## 2022-12-02 ENCOUNTER — TELEPHONE (OUTPATIENT)
Dept: FAMILY MEDICINE CLINIC | Age: 82
End: 2022-12-02

## 2022-12-02 ENCOUNTER — OFFICE VISIT (OUTPATIENT)
Dept: FAMILY MEDICINE CLINIC | Age: 82
End: 2022-12-02
Payer: MEDICARE

## 2022-12-02 VITALS
BODY MASS INDEX: 21.98 KG/M2 | WEIGHT: 145 LBS | HEIGHT: 68 IN | OXYGEN SATURATION: 99 % | SYSTOLIC BLOOD PRESSURE: 126 MMHG | DIASTOLIC BLOOD PRESSURE: 62 MMHG | HEART RATE: 72 BPM

## 2022-12-02 DIAGNOSIS — E87.6 HYPOKALEMIA: Primary | ICD-10-CM

## 2022-12-02 DIAGNOSIS — D50.0 IRON DEFICIENCY ANEMIA DUE TO CHRONIC BLOOD LOSS: Primary | ICD-10-CM

## 2022-12-02 DIAGNOSIS — D50.0 IRON DEFICIENCY ANEMIA DUE TO CHRONIC BLOOD LOSS: ICD-10-CM

## 2022-12-02 DIAGNOSIS — R19.5 OCCULT BLOOD POSITIVE STOOL: ICD-10-CM

## 2022-12-02 DIAGNOSIS — K90.89 OTHER INTESTINAL MALABSORPTION: ICD-10-CM

## 2022-12-02 DIAGNOSIS — E87.6 HYPOKALEMIA: ICD-10-CM

## 2022-12-02 LAB
HCT VFR BLD CALC: 31.2 % (ref 36–46)
HEMOGLOBIN: 9.8 G/DL (ref 12–16)
POTASSIUM SERPL-SCNC: 3.4 MMOL/L (ref 3.7–5.3)

## 2022-12-02 PROCEDURE — 85018 HEMOGLOBIN: CPT

## 2022-12-02 PROCEDURE — G8420 CALC BMI NORM PARAMETERS: HCPCS | Performed by: FAMILY MEDICINE

## 2022-12-02 PROCEDURE — 3078F DIAST BP <80 MM HG: CPT | Performed by: FAMILY MEDICINE

## 2022-12-02 PROCEDURE — 85014 HEMATOCRIT: CPT

## 2022-12-02 PROCEDURE — G8427 DOCREV CUR MEDS BY ELIG CLIN: HCPCS | Performed by: FAMILY MEDICINE

## 2022-12-02 PROCEDURE — 1123F ACP DISCUSS/DSCN MKR DOCD: CPT | Performed by: FAMILY MEDICINE

## 2022-12-02 PROCEDURE — G8484 FLU IMMUNIZE NO ADMIN: HCPCS | Performed by: FAMILY MEDICINE

## 2022-12-02 PROCEDURE — 3074F SYST BP LT 130 MM HG: CPT | Performed by: FAMILY MEDICINE

## 2022-12-02 PROCEDURE — 1036F TOBACCO NON-USER: CPT | Performed by: FAMILY MEDICINE

## 2022-12-02 PROCEDURE — 99213 OFFICE O/P EST LOW 20 MIN: CPT | Performed by: FAMILY MEDICINE

## 2022-12-02 PROCEDURE — 84132 ASSAY OF SERUM POTASSIUM: CPT

## 2022-12-02 PROCEDURE — 1090F PRES/ABSN URINE INCON ASSESS: CPT | Performed by: FAMILY MEDICINE

## 2022-12-02 PROCEDURE — G8399 PT W/DXA RESULTS DOCUMENT: HCPCS | Performed by: FAMILY MEDICINE

## 2022-12-02 PROCEDURE — 36415 COLL VENOUS BLD VENIPUNCTURE: CPT

## 2022-12-02 RX ORDER — POTASSIUM CHLORIDE 20MEQ/15ML
LIQUID (ML) ORAL DAILY
Qty: 450 ML | Refills: 0 | Status: CANCELLED | OUTPATIENT
Start: 2022-12-02

## 2022-12-02 RX ORDER — LEVOTHYROXINE SODIUM 0.07 MG/1
TABLET ORAL
Qty: 90 TABLET | Refills: 1 | Status: SHIPPED | OUTPATIENT
Start: 2022-12-02

## 2022-12-02 RX ORDER — FUROSEMIDE 20 MG/1
TABLET ORAL
Qty: 90 TABLET | Refills: 1 | Status: SHIPPED | OUTPATIENT
Start: 2022-12-02

## 2022-12-02 RX ORDER — FLUOXETINE HYDROCHLORIDE 40 MG/1
CAPSULE ORAL
Qty: 90 CAPSULE | Refills: 1 | Status: SHIPPED | OUTPATIENT
Start: 2022-12-02

## 2022-12-02 RX ORDER — POTASSIUM CHLORIDE 1.5 G/1.77G
20 POWDER, FOR SOLUTION ORAL 2 TIMES DAILY
Qty: 60 EACH | Refills: 3 | Status: SHIPPED | OUTPATIENT
Start: 2022-12-02

## 2022-12-02 RX ORDER — PREDNISONE 20 MG/1
TABLET ORAL
Qty: 30 TABLET | Refills: 1 | Status: SHIPPED | OUTPATIENT
Start: 2022-12-02

## 2022-12-02 RX ORDER — METOPROLOL SUCCINATE 25 MG/1
TABLET, EXTENDED RELEASE ORAL
Qty: 90 TABLET | Refills: 1 | Status: SHIPPED | OUTPATIENT
Start: 2022-12-02

## 2022-12-02 RX ORDER — BUPROPION HYDROCHLORIDE 150 MG/1
TABLET ORAL
Qty: 90 TABLET | Refills: 1 | Status: SHIPPED | OUTPATIENT
Start: 2022-12-02

## 2022-12-02 RX ORDER — GABAPENTIN 300 MG/1
300 CAPSULE ORAL 3 TIMES DAILY
Qty: 90 CAPSULE | Refills: 2 | Status: SHIPPED | OUTPATIENT
Start: 2022-12-02 | End: 2023-03-02

## 2022-12-02 NOTE — PROGRESS NOTES
Name: Travis Hurtado  : 1940         Chief Complaint:     Chief Complaint   Patient presents with    Leg Pain     Left leg pain. Anemia    Hypertension       History of Present Illness:      Travis Hurtado is a 78 y.o.  female who presents with Leg Pain (Left leg pain. ), Anemia, and Hypertension      HPI    Still having a lot of pain in LLE. Actually improves if she's been standing for a while. Gabapentin 100 mg helps to dull pain a little for a couple hrs. Initially was taking at bedtime but moved to AM.     Anemia improved. On liquid supplement BID which doesn't seem to bother her at all, no stool changes or stomach upset. Still out of breath a lot and poor energy. Now thinks she needs colonoscopy. Does have a hemorrhoid that bleeds sometimes. Hypokalemia - seeing capsules in stool. Taking 20 meq TID. Medical History:     Patient Active Problem List   Diagnosis    CAD (coronary artery disease)    Chronic back pain    Hypothyroidism    Hypertension    Post PTCA    Hot flashes, menopausal    Hyperlipidemia    CKD (chronic kidney disease), stage III (HCC)    Postlaminectomy syndrome of lumbar region    Primary parotid gland squamous cell carcinoma (HCC)    Dysthymia       Medications:       Prior to Admission medications    Medication Sig Start Date End Date Taking?  Authorizing Provider   metoprolol succinate (TOPROL XL) 25 MG extended release tablet TAKE 1 TABLET BY MOUTH EVERY DAY 22  Yes Shalini Abbott, DO   FLUoxetine (PROZAC) 40 MG capsule TAKE 1 CAPSULE BY MOUTH EVERY DAY 22  Yes Shalini Abbott, DO   furosemide (LASIX) 20 MG tablet TAKE 1 TABLET BY MOUTH EVERY DAY 22  Yes Shalini Abbott, DO   buPROPion (WELLBUTRIN XL) 150 MG extended release tablet TAKE 1 TABLET BY MOUTH IN THE MORNING 22  Yes Shalini Abbott, DO   predniSONE (DELTASONE) 20 MG tablet TAKE 1 TABLET BY MOUTH DAILY AS NEEDED FOR ARTHRITIS 22  Yes Shalini Abbott, DO   levothyroxine (SYNTHROID) 75 MCG tablet TAKE 1 TABLET BY MOUTH EVERY DAY 12/2/22  Yes Sujey Jenkins DO   gabapentin (NEURONTIN) 300 MG capsule Take 1 capsule by mouth 3 times daily for 90 days. 12/2/22 3/2/23 Yes Sujey Jenkins DO   pantoprazole (PROTONIX) 40 MG tablet Take 1 tablet by mouth every morning (before breakfast) 11/9/22  Yes Sujey Jenkins DO   amLODIPine (NORVASC) 2.5 MG tablet TAKE 1 TABLET BY MOUTH DAILY 10/17/22  Yes Vero Salinas MD   clopidogrel (PLAVIX) 75 MG tablet Take 1 tablet by mouth daily 10/17/22  Yes Vero Salinas MD   ASPIRIN LOW DOSE 81 MG EC tablet Take 1 tablet by mouth in the morning. 8/11/22  Yes Vero Salinas MD   pitavastatin (LIVALO) 2 MG TABS tablet Take 1 tablet by mouth nightly 8/11/22  Yes Vero Salinas MD   hydrocortisone (ANUSOL-HC) 2.5 % CREA rectal cream Place rectally 2 times daily as needed for Hemorrhoids 8/4/22  Yes JUAN Acevedo - CNP   isosorbide mononitrate (IMDUR) 30 MG extended release tablet TAKE 1 TABLET BY MOUTH TWICE DAILY 7/27/22  Yes Vero Salinas MD   Potassium Chloride Azucena ER (KLOR-CON M20 PO) Take 20 mEq by mouth three times daily Taking 2 in am and 1 in afternoon   Yes Historical Provider, MD   triamcinolone (KENALOG) 0.1 % cream APPLY topically TWICE DAILY 12/1/21  Yes Sujey Jenkins DO   b complex vitamins capsule Take 1 capsule by mouth daily. Yes Historical Provider, MD   bisacodyl (DULCOLAX) 5 MG EC tablet Take 5 mg by mouth daily as needed. Take three daily      Yes Historical Provider, MD   calcium carbonate-vitamin D 600-200 MG-UNIT TABS Take  by mouth daily. Yes Historical Provider, MD   potassium chloride (KLOR-CON) 20 MEQ packet Take 20 mEq by mouth 2 times daily 12/2/22   Sujey Jenkins DO        Allergies:       Sulfa antibiotics    Physical Exam:     Vitals:  /62   Pulse 72   Ht 5' 8\" (1.727 m)   Wt 145 lb (65.8 kg)   SpO2 99%   BMI 22.05 kg/m²   Physical Exam  Vitals and nursing note reviewed. Constitutional:       General: She is not in acute distress. Appearance: Normal appearance. She is well-developed. She is not ill-appearing. Cardiovascular:      Rate and Rhythm: Normal rate and regular rhythm. Heart sounds: Normal heart sounds. Pulmonary:      Effort: Pulmonary effort is normal.      Breath sounds: Normal breath sounds. Neurological:      Mental Status: She is alert and oriented to person, place, and time. Psychiatric:         Mood and Affect: Mood normal.         Behavior: Behavior normal.       Data:     Lab Results   Component Value Date/Time     10/18/2022 12:26 PM    K 3.4 12/02/2022 10:03 AM     10/18/2022 12:26 PM    CO2 18 10/18/2022 12:26 PM    BUN 20 10/18/2022 12:26 PM    CREATININE 1.49 10/18/2022 12:26 PM    GLUCOSE 101 10/18/2022 12:26 PM    PROT 6.7 10/18/2022 12:26 PM    LABALBU 4.3 10/18/2022 12:26 PM    BILITOT 0.3 10/18/2022 12:26 PM    ALKPHOS 80 10/18/2022 12:26 PM    AST 18 10/18/2022 12:26 PM    ALT 15 10/18/2022 12:26 PM     Lab Results   Component Value Date/Time    WBC 7.0 11/07/2022 02:31 PM    RBC 3.40 11/07/2022 02:31 PM    HGB 9.8 12/02/2022 10:03 AM    HCT 31.2 12/02/2022 10:03 AM    MCV 81.5 11/07/2022 02:31 PM    MCH 25.6 11/07/2022 02:31 PM    MCHC 31.4 11/07/2022 02:31 PM    RDW 17.8 11/07/2022 02:31 PM     11/07/2022 02:31 PM    MPV NOT REPORTED 10/05/2021 08:15 AM     Lab Results   Component Value Date/Time    TSH 5.27 10/10/2022 10:32 AM     Lab Results   Component Value Date/Time    CHOL 169 08/29/2022 08:01 AM    HDL 79 08/29/2022 08:01 AM    LABA1C 5.7 09/12/2019 08:04 AM         Assessment & Plan:        Diagnosis Orders   1. Iron deficiency anemia due to chronic blood loss  Melodie Blunt MD, Gastroenterology, [facility]    Hemoglobin and Hematocrit      2. Occult blood positive stool  Melodie Blunt MD, Gastroenterology, [facility]    Hemoglobin and Hematocrit      3.  Hypokalemia  Potassium        Still tired and dyspneic easily with activity. Discussed that symptoms have not always lined up with Hb levels so symptoms may be multifactorial incl CAD, age, deconditioning. Rechecking blood counts. Discussed risks/benefits of colonoscopy and elected to place referral but not plan on endoscopy til at least March so she can be on DAPT for 6 mos uninterrupted first.  Rechecking hypokalemia. Sounds as though she's not absorbing KCl supplement so we'll likely rx other formulation. Requested Prescriptions     Signed Prescriptions Disp Refills    metoprolol succinate (TOPROL XL) 25 MG extended release tablet 90 tablet 1     Sig: TAKE 1 TABLET BY MOUTH EVERY DAY    FLUoxetine (PROZAC) 40 MG capsule 90 capsule 1     Sig: TAKE 1 CAPSULE BY MOUTH EVERY DAY    furosemide (LASIX) 20 MG tablet 90 tablet 1     Sig: TAKE 1 TABLET BY MOUTH EVERY DAY    buPROPion (WELLBUTRIN XL) 150 MG extended release tablet 90 tablet 1     Sig: TAKE 1 TABLET BY MOUTH IN THE MORNING    predniSONE (DELTASONE) 20 MG tablet 30 tablet 1     Sig: TAKE 1 TABLET BY MOUTH DAILY AS NEEDED FOR ARTHRITIS    levothyroxine (SYNTHROID) 75 MCG tablet 90 tablet 1     Sig: TAKE 1 TABLET BY MOUTH EVERY DAY    gabapentin (NEURONTIN) 300 MG capsule 90 capsule 2     Sig: Take 1 capsule by mouth 3 times daily for 90 days.          There are no Patient Instructions on file for this visit.      signed by Sapna Hernandez DO on 12/5/2022 at 10:08 PM  [facility] [address] [phone]  Dept: [phone]

## 2022-12-02 NOTE — TELEPHONE ENCOUNTER
----- Message from [employer], DO sent at 12/2/2022  1:38 PM EST -----  Very slow improvement in hb. Continue iron. K is still low. Pt not absorbing her supplement, seeing it in stool undigested.  Does pharm have any capsules she could open and take on applesauce, etc?

## 2022-12-02 NOTE — TELEPHONE ENCOUNTER
[facility] can do a capsule to be put on food or a packet mixed in 4 oz of water.  I show an oral solution for peds but probably a compound

## 2022-12-31 ENCOUNTER — APPOINTMENT (OUTPATIENT)
Dept: CT IMAGING | Age: 82
End: 2022-12-31
Payer: MEDICARE

## 2022-12-31 ENCOUNTER — HOSPITAL ENCOUNTER (EMERGENCY)
Age: 82
Discharge: HOME | End: 2023-01-01
Attending: FAMILY MEDICINE
Payer: MEDICARE

## 2022-12-31 VITALS
HEART RATE: 86 BPM | SYSTOLIC BLOOD PRESSURE: 148 MMHG | HEIGHT: 68 IN | RESPIRATION RATE: 18 BRPM | OXYGEN SATURATION: 98 % | DIASTOLIC BLOOD PRESSURE: 73 MMHG | TEMPERATURE: 98.3 F | BODY MASS INDEX: 22.73 KG/M2 | WEIGHT: 150 LBS

## 2022-12-31 DIAGNOSIS — Z79.01 LONG TERM (CURRENT) USE OF ANTICOAGULANTS: ICD-10-CM

## 2022-12-31 DIAGNOSIS — S01.01XA LACERATION OF SCALP, INITIAL ENCOUNTER: Primary | ICD-10-CM

## 2022-12-31 DIAGNOSIS — Z78.9 DIPHTHERIA-PERTUSSIS-TETANUS (DPT) VACCINATION ADMINISTERED AT CURRENT VISIT: ICD-10-CM
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
## 2022-12-31 PROCEDURE — 90715 TDAP VACCINE 7 YRS/> IM: CPT | Performed by: FAMILY MEDICINE

## 2022-12-31 PROCEDURE — 70450 CT HEAD/BRAIN W/O DYE: CPT

## 2022-12-31 PROCEDURE — 6360000002 HC RX W HCPCS: Performed by: FAMILY MEDICINE

## 2022-12-31 PROCEDURE — 90471 IMMUNIZATION ADMIN: CPT | Performed by: FAMILY MEDICINE

## 2022-12-31 PROCEDURE — 72125 CT NECK SPINE W/O DYE: CPT

## 2022-12-31 PROCEDURE — 12001 RPR S/N/AX/GEN/TRNK 2.5CM/<: CPT

## 2022-12-31 PROCEDURE — 99285 EMERGENCY DEPT VISIT HI MDM: CPT

## 2022-12-31 PROCEDURE — 70450 CT HEAD/BRAIN W/O DYE: CPT | Performed by: RADIOLOGY

## 2022-12-31 RX ADMIN — TETANUS TOXOID, REDUCED DIPHTHERIA TOXOID AND ACELLULAR PERTUSSIS VACCINE, ADSORBED 0.5 ML: 5; 2.5; 8; 8; 2.5 SUSPENSION INTRAMUSCULAR at 23:41

## 2023-01-01 NOTE — ED NOTES
Wound stapled with 7 stapled by Dr. Prabhjot Garcia with writer at bedside. Wound cleansed with normal saline and hibiclens and dressed with telfa, 4x4 and coban. Patient instructed to leave dressing on until am and to have stapled removed in 7-10 days. Pt verbalized understanding.       Miryam Jasmine RN  01/01/23 [id number]

## 2023-01-01 NOTE — ED PROVIDER NOTES
[address]  eMERGENCY dEPARTMENT eNCOUnter          [facility]       Chief Complaint   Patient presents with    Fall     Patient arrives to ER [facility] with complaints of a witnessed fall at home where patient hit the back of her head on an end table. Patient denies LOC and neck/back pain. Pt reports that she is on blood thinners. Nurses Notes reviewed and I agree except as noted in the HPI. HISTORY OF PRESENT ILLNESS    Suzna Aguilera is a 78 y.o. female who presents to the emergency room via EMS from home, patient accidental fall, striking her head on a nightstand resulting in laceration of the posterior aspect of the head, denies any loss of consciousness denies headache lightheadedness dizziness denies nausea denies change in vision. Patient is noted be on blood thinners. Denies pain otherwise. REVIEW OF SYSTEMS     Review of Systems   Eyes:  Negative for photophobia and visual disturbance. Gastrointestinal:  Negative for nausea. Neurological:  Negative for syncope and headaches. All other systems reviewed and are negative. PAST MEDICAL HISTORY    has a past medical history of CAD (coronary artery disease), Chronic back pain, Hyperlipidemia, Hypertension, Hypothyroidism, Macular degeneration, Post PTCA, and Transfusion history. SURGICAL HISTORY      has a past surgical history that includes Coronary artery bypass graft (2006); Spine surgery (2010); Coronary angioplasty with stent (2006,11/14/2012); Hemorrhoid surgery; Ovary removal (Right); Upper gastrointestinal endoscopy (06/11/2013); angioplasty (12/07/2016); Colonoscopy (06/11/2013); pr njx dx/ther sbst intrlmnr lmbr/sac w/img gdn (N/A, 08/28/2018); angioplasty (07/29/2022); and Coronary angioplasty (Left, 09/02/2022).     CURRENT MEDICATIONS       Discharge Medication List as of 1/1/2023 12:28 AM        CONTINUE these medications which have NOT CHANGED    Details   metoprolol succinate (TOPROL XL) 25 MG extended release tablet TAKE 1 TABLET BY MOUTH EVERY DAY, Disp-90 tablet, R-1We are requesting this refill to have on file for next month s order. Normal      FLUoxetine (PROZAC) 40 MG capsule TAKE 1 CAPSULE BY MOUTH EVERY DAY, Disp-90 capsule, R-1We are requesting this refill to have on file for next month s order. Normal      furosemide (LASIX) 20 MG tablet TAKE 1 TABLET BY MOUTH EVERY DAY, Disp-90 tablet, R-1We are requesting this refill to have on file for next month s order. Normal      buPROPion (WELLBUTRIN XL) 150 MG extended release tablet TAKE 1 TABLET BY MOUTH IN THE MORNING, Disp-90 tablet, R-1We are requesting this refill to have on file for next month s order. Normal      predniSONE (DELTASONE) 20 MG tablet TAKE 1 TABLET BY MOUTH DAILY AS NEEDED FOR ARTHRITIS, Disp-30 tablet, R-1We are requesting this refill to have on file for next month s order. Normal      levothyroxine (SYNTHROID) 75 MCG tablet TAKE 1 TABLET BY MOUTH EVERY DAY, Disp-90 tablet, R-1We are requesting this refill to have on file for next month s order. Normal      gabapentin (NEURONTIN) 300 MG capsule Take 1 capsule by mouth 3 times daily for 90 days. , Disp-90 capsule, R-2Normal      potassium chloride (KLOR-CON) 20 MEQ packet Take 20 mEq by mouth 2 times daily, Disp-60 each, R-3Unable to absorb Klor-COn tablets - undigested in stool and serum K remaining lowNormal      pantoprazole (PROTONIX) 40 MG tablet Take 1 tablet by mouth every morning (before breakfast), Disp-30 tablet, R-5Normal      amLODIPine (NORVASC) 2.5 MG tablet TAKE 1 TABLET BY MOUTH DAILY, Disp-90 tablet, R-3Normal      clopidogrel (PLAVIX) 75 MG tablet Take 1 tablet by mouth daily, Disp-90 tablet, R-3Normal      ASPIRIN LOW DOSE 81 MG EC tablet Take 1 tablet by mouth in the morning., Disp-30 tablet, R-5, DAWNormal      pitavastatin (LIVALO) 2 MG TABS tablet Take 1 tablet by mouth nightly, Disp-30 tablet, R-5Normal      hydrocortisone (ANUSOL-HC) 2.5 % CREA rectal cream Place rectally 2 times daily as needed for Hemorrhoids, Rectal, 2 TIMES DAILY PRN Starting Thu 2022, Disp-1 each, R-5, Normal      isosorbide mononitrate (IMDUR) 30 MG extended release tablet TAKE 1 TABLET BY MOUTH TWICE DAILY, Disp-180 tablet, R-3We are requesting this refill to have on file for next month s order. Normal      Potassium Chloride Azucena ER (KLOR-CON M20 PO) Take 20 mEq by mouth three times daily Taking 2 in am and 1 in afternoonHistorical Med      triamcinolone (KENALOG) 0.1 % cream APPLY topically TWICE DAILY, Disp-30 g, R-2, Normal      b complex vitamins capsule Take 1 capsule by mouth daily. bisacodyl (DULCOLAX) 5 MG EC tablet Take 5 mg by mouth daily as needed. Take three daily         calcium carbonate-vitamin D 600-200 MG-UNIT TABS Take  by mouth daily. Historical Med             ALLERGIES     is allergic to sulfa antibiotics. FAMILY HISTORY     She indicated that her mother is . She indicated that her father is . family history includes Cancer in her father; Dementia in her father; Heart Disease in her mother. SOCIAL HISTORY      reports that she quit smoking about 34 years ago. Her smoking use included cigarettes. She has a 20.00 pack-year smoking history. She has never used smokeless tobacco. She reports that she does not drink alcohol and does not use drugs. PHYSICAL EXAM     INITIAL VITALS:  height is 5' 8\" (1.727 m) and weight is 150 lb (68 kg). Her oral temperature is 98.3 °F (36.8 °C). Her blood pressure is 148/73 (abnormal) and her pulse is 86. Her respiration is 18 and oxygen saturation is 98%. Physical Exam   Constitutional: Patient is oriented to person, place, and time. Patient appears well-developed and well-nourished. Patient is active and cooperative.     HENT:   Head: Normocephalic , there is a 2.2 meter roughly linear laceration to patient's right occipital scalp, the lateral one third portion being somewhat macerated tissue, there is no skull depression  Right Ear: [place], external ear normal. No drainage. Negative hemotympanum  Left Ear: [place], external ear normal. No drainage. Negative hemotympanum  Nose: Nose normal. No nasal deformity. No epistaxis. Mouth/Throat: Mucous membranes are not dry. Eyes: EOMI. Conjunctivae, sclera, and lids are normal. Right eye exhibits no discharge. Left eye exhibits no discharge. Neck: Full passive range of motion without pain and phonation normal.   Cardiovascular:  Normal rate, regular rhythm and intact distal pulses. No edema. Pulses: Right radial pulse  2+   Pulmonary/Chest: Effort normal. No tachypnea and no bradypnea. No wheezes, rhonchi, or rales. Abdominal: Soft. Patient without distension or tenderness, no rigidity, rebound, or gaurding. There is no CVA tenderness. Musculoskeletal:   Negative acute trauma or deformity,  apparent full range of motion and normal strength all extremities appropriate to age. Neurological: Patient is alert and oriented to person, place, and time. patient displays no tremor. Patient displays no seizure activity. Skin: Skin is warm and dry. Patient is not diaphoretic. Psychiatric: Patient has a normal mood and pleasant affect. Patient speech is normal and behavior is normal. Cognition and memory are normal.     DIFFERENTIAL DIAGNOSIS:   ICH, TBI, scalp laceration    DIAGNOSTIC RESULTS           RADIOLOGY: non-plain film images(s) such as CT, Ultrasound and MRI are read by the radiologist.  CT CSpine W/O Contrast   Final Result      No fractures or loss of vertebral body height throughout the cervical spine and    no change since 12/12/2018. CT Head W/O Contrast   Final Result      No acute intracranial abnormality. Right occipital scalp soft tissue swelling. FOLLOW-UP: Follow-up as clinically indicated.               LABS:   Labs Reviewed - No data to display    MIPS    Not applicable    MEDICAL DECISION MAKING   Vitals:    Vitals:    12/31/22 2318 12/31/22 2319   BP: (!) 148/73 (!) 148/73   Pulse: 86 86   Resp: 18 18   Temp:  98.3 °F (36.8 °C)   TempSrc:  Oral   SpO2: 98% 98%   Weight:  150 lb (68 kg)   Height:  5' 8\" (1.727 m)     Patient presents via EMS and brought to ER room #2 transferred ER stretcher, initial primary and secondary survey performed, initial orders placed including CT head cervical spine noncontrast, updating tetanus shot, afterwards will better reevaluate wound to the posterior head is did not wish to turn the head to better evaluate laceration.   Patient sitting bed semi-Fowlers acknowledged    Patient has Radha Organ arrived to the emergency room while patient was at CAT scan, updated on plan of care, acknowledged    Tdap administered by nursing    See procedure note below for laceration repair    CT radiology report reviewed    Patient be discharged home at this time, we discussed staple removal in 7 to 10 days through PCP and or ER, discussed signs symptoms to watch for for any worsening traumatic brain injury, return to ER if any symptoms change worse other concerns, acknowledged    Patient to be driven home by     PROCEDURES:  Lac Repair    Date/Time: 1/1/2023 12:17 AM  Performed by: Rina Garsia MD  Authorized by: Rina Garsia MD     Consent:     Consent obtained:  Verbal    Consent given by:  Patient    Risks, benefits, and alternatives were discussed: yes      Risks discussed:  Pain and poor wound healing  Universal protocol:     Procedure explained and questions answered to patient or proxy's satisfaction: yes      Relevant documents present and verified: yes      Test results available: yes      Imaging studies available: yes      Required blood products, implants, devices, and special equipment available: yes      Site/side marked: yes      Immediately prior to procedure, a time out was called: yes      Patient identity confirmed:  Verbally with patient  Anesthesia:     Anesthesia method: None  Laceration details:     Location:  Scalp    Scalp location:  Occipital    Length (cm):  2.2  Exploration:     Hemostasis achieved with:  Direct pressure    Wound exploration: wound explored through full range of motion and entire depth of wound visualized      Wound extent: no foreign bodies/material noted      Contaminated: no    Treatment:     Area cleansed with:  Chlorhexidine    Amount of cleaning:  Standard    Irrigation solution:  Sterile saline  Skin repair:     Repair method:  Staples    Number of staples:  7  Approximation:     Approximation:  Close (Lateral portion with some maceration of tissue limiting clean staple repair)  Repair type:     Repair type:  Simple  Post-procedure details:     Dressing:  Open (no dressing)    Procedure completion:  Tolerated     FINAL IMPRESSION      1. Laceration of scalp, initial encounter    2. Long term (current) use of anticoagulants    3. Diphtheria-pertussis-tetanus (DPT) vaccination administered at current visit    4. Accidental fall, initial encounter          DISPOSITION/PLAN   Discharge    PATIENT REFERRED TO:  DO Lizbet Travis 1  [place] [address]       For staple removal in ~7-10 days    HOSP [facility] ED  [address] [facility] [address] [phone]    As needed    DISCHARGE MEDICATIONS:  Discharge Medication List as of 1/1/2023 12:28 AM              [facility]          ED Medications administered this visit:    Medications   Tetanus-Diphth-Acell Pertussis (BOOSTRIX) injection 0.5 mL (0.5 mLs IntraMUSCular Given 12/31/22 4060)       New Prescriptions from this visit:    Discharge Medication List as of 1/1/2023 12:28 AM          Follow-up:  Nam Wang DO  [address] [facility] [address] [phone]      For staple removal in ~7-10 days    HOSP [facility] ED  [address] [place] [id number] [phone]    As needed      Final Impression:   1.  Laceration of scalp, initial encounter    2. Long term (current) use of anticoagulants    3. Diphtheria-pertussis-tetanus (DPT) vaccination administered at current visit    4.  Accidental fall, initial encounter               (Please note that portions of this note were completed with a voice recognition program.  Efforts were made to edit the dictations but occasionally words are mis-transcribed.)    MD Keila Solomon MD  01/01/23 1558

## 2023-01-09 ENCOUNTER — OFFICE VISIT (OUTPATIENT)
Dept: FAMILY MEDICINE CLINIC | Age: 83
End: 2023-01-09

## 2023-01-09 ENCOUNTER — HOSPITAL ENCOUNTER (OUTPATIENT)
Age: 83
Discharge: HOME | End: 2023-01-09
Payer: MEDICARE

## 2023-01-09 VITALS
HEART RATE: 80 BPM | BODY MASS INDEX: 21.98 KG/M2 | HEIGHT: 68 IN | WEIGHT: 145 LBS | DIASTOLIC BLOOD PRESSURE: 80 MMHG | SYSTOLIC BLOOD PRESSURE: 120 MMHG | OXYGEN SATURATION: 98 %

## 2023-01-09 DIAGNOSIS — D50.0 IRON DEFICIENCY ANEMIA DUE TO CHRONIC BLOOD LOSS: ICD-10-CM

## 2023-01-09 DIAGNOSIS — E87.6 HYPOKALEMIA: ICD-10-CM

## 2023-01-09 DIAGNOSIS — Z48.02 ENCOUNTER FOR STAPLE REMOVAL: ICD-10-CM

## 2023-01-09 DIAGNOSIS — S01.01XD LACERATION OF SCALP, SUBSEQUENT ENCOUNTER: Primary | ICD-10-CM

## 2023-01-09 LAB
HCT VFR BLD CALC: 34.3 % (ref 36–46)
HEMOGLOBIN: 10.9 G/DL (ref 12–16)
POTASSIUM SERPL-SCNC: 5.1 MMOL/L (ref 3.7–5.3)

## 2023-01-09 PROCEDURE — 84132 ASSAY OF SERUM POTASSIUM: CPT

## 2023-01-09 PROCEDURE — 85014 HEMATOCRIT: CPT

## 2023-01-09 PROCEDURE — 36415 COLL VENOUS BLD VENIPUNCTURE: CPT

## 2023-01-09 PROCEDURE — 85018 HEMOGLOBIN: CPT

## 2023-01-09 RX ORDER — TRIAMCINOLONE ACETONIDE 1 MG/G
CREAM TOPICAL
Qty: 30 G | Refills: 2 | Status: SHIPPED | OUTPATIENT
Start: 2023-01-09

## 2023-01-09 ASSESSMENT — PATIENT HEALTH QUESTIONNAIRE - PHQ9
5. POOR APPETITE OR OVEREATING: 0
SUM OF ALL RESPONSES TO PHQ9 QUESTIONS 1 & 2: 0
8. MOVING OR SPEAKING SO SLOWLY THAT OTHER PEOPLE COULD HAVE NOTICED. OR THE OPPOSITE, BEING SO FIGETY OR RESTLESS THAT YOU HAVE BEEN MOVING AROUND A LOT MORE THAN USUAL: 0
3. TROUBLE FALLING OR STAYING ASLEEP: 0
2. FEELING DOWN, DEPRESSED OR HOPELESS: 0
SUM OF ALL RESPONSES TO PHQ QUESTIONS 1-9: 0
4. FEELING TIRED OR HAVING LITTLE ENERGY: 0
9. THOUGHTS THAT YOU WOULD BE BETTER OFF DEAD, OR OF HURTING YOURSELF: 0
10. IF YOU CHECKED OFF ANY PROBLEMS, HOW DIFFICULT HAVE THESE PROBLEMS MADE IT FOR YOU TO DO YOUR WORK, TAKE CARE OF THINGS AT HOME, OR GET ALONG WITH OTHER PEOPLE: 0
7. TROUBLE CONCENTRATING ON THINGS, SUCH AS READING THE NEWSPAPER OR WATCHING TELEVISION: 0
SUM OF ALL RESPONSES TO PHQ QUESTIONS 1-9: 0
1. LITTLE INTEREST OR PLEASURE IN DOING THINGS: 0
SUM OF ALL RESPONSES TO PHQ QUESTIONS 1-9: 0
SUM OF ALL RESPONSES TO PHQ QUESTIONS 1-9: 0
6. FEELING BAD ABOUT YOURSELF - OR THAT YOU ARE A FAILURE OR HAVE LET YOURSELF OR YOUR FAMILY DOWN: 0

## 2023-01-09 NOTE — PROGRESS NOTES
Name: Travis Hurtado  : 1940         Chief Complaint:     Chief Complaint   Patient presents with    Suture / Staple Removal     Scalp laceration from fall on 22. 7 staples placed. History of Present Illness:      Travis Hurtado is a 78 y.o.  female who presents with Suture / Staple Removal (Scalp laceration from fall on 22. 7 staples placed. )      HPI    Patient presents with  for follow-up from ER visit  for fall in which she sustained right posterior scalp laceration which was then stapled. She had been at home, LLE gave out and she fell hitting L forearm and R occiput. Uses 4-pronged cane at home - walker doesn't fit through doorways. Taking 3-4 K per day. Not seeing them in stool but hasn't specifically looked. Still on pill (hard tablet) and has a bunch of it at home. Years ago tried liquid and couldn't drink it. Iron deficiency anemia, has been taking liquid iron supplement but had to stop taking it the last few days due to constipation. Medical History:     Patient Active Problem List   Diagnosis    CAD (coronary artery disease)    Chronic back pain    Hypothyroidism    Hypertension    Post PTCA    Hot flashes, menopausal    Hyperlipidemia    CKD (chronic kidney disease), stage III (HCC)    Postlaminectomy syndrome of lumbar region    Primary parotid gland squamous cell carcinoma (HCC)    Dysthymia       Medications:       Prior to Admission medications    Medication Sig Start Date End Date Taking?  Authorizing Provider   triamcinolone (KENALOG) 0.1 % cream APPLY topically TWICE DAILY 23  Yes Shalini Abbott, DO   metoprolol succinate (TOPROL XL) 25 MG extended release tablet TAKE 1 TABLET BY MOUTH EVERY DAY 22  Yes Shalini Abbott DO   FLUoxetine (PROZAC) 40 MG capsule TAKE 1 CAPSULE BY MOUTH EVERY DAY 22  Yes Shalini Abbott DO   furosemide (LASIX) 20 MG tablet TAKE 1 TABLET BY MOUTH EVERY DAY 22  Yes Shalini Abbott, DO buPROPion (WELLBUTRIN XL) 150 MG extended release tablet TAKE 1 TABLET BY MOUTH IN THE MORNING 12/2/22  Yes Orlando Dan DO   predniSONE (DELTASONE) 20 MG tablet TAKE 1 TABLET BY MOUTH DAILY AS NEEDED FOR ARTHRITIS 12/2/22  Yes Orlando Dan DO   levothyroxine (SYNTHROID) 75 MCG tablet TAKE 1 TABLET BY MOUTH EVERY DAY 12/2/22  Yes Orlando Dan DO   gabapentin (NEURONTIN) 300 MG capsule Take 1 capsule by mouth 3 times daily for 90 days. Patient taking differently: Take 100 mg by mouth daily. 12/2/22 3/2/23 Yes Orlando Dan DO   pantoprazole (PROTONIX) 40 MG tablet Take 1 tablet by mouth every morning (before breakfast) 11/9/22  Yes Orlando Dan DO   amLODIPine (NORVASC) 2.5 MG tablet TAKE 1 TABLET BY MOUTH DAILY 10/17/22  Yes America Ac MD   clopidogrel (PLAVIX) 75 MG tablet Take 1 tablet by mouth daily 10/17/22  Yes America Ac MD   ASPIRIN LOW DOSE 81 MG EC tablet Take 1 tablet by mouth in the morning. 8/11/22  Yes America Ac MD   pitavastatin (LIVALO) 2 MG TABS tablet Take 1 tablet by mouth nightly 8/11/22  Yes America Ac MD   hydrocortisone (ANUSOL-HC) 2.5 % CREA rectal cream Place rectally 2 times daily as needed for Hemorrhoids 8/4/22  Yes JUAN Webb CNP   isosorbide mononitrate (IMDUR) 30 MG extended release tablet TAKE 1 TABLET BY MOUTH TWICE DAILY 7/27/22  Yes America Ac MD   Potassium Chloride Azucena ER (KLOR-CON M20 PO) Take 20 mEq by mouth three times daily Taking 2 in am and 1 in afternoon   Yes Historical Provider, MD   b complex vitamins capsule Take 1 capsule by mouth daily. Yes Historical Provider, MD   bisacodyl (DULCOLAX) 5 MG EC tablet Take 5 mg by mouth daily as needed. Take three daily      Yes Historical Provider, MD   calcium carbonate-vitamin D 600-200 MG-UNIT TABS Take  by mouth daily.      Yes Historical Provider, MD        Allergies:       Sulfa antibiotics    Physical Exam:     Vitals:  /80   Pulse 80   Ht 5' 8\" (1.727 m)   Wt 145 lb (65.8 kg)   SpO2 98%   BMI 22.05 kg/m²   Physical Exam  Vitals and nursing note reviewed. Constitutional:       General: She is not in acute distress. Appearance: She is well-developed. Pulmonary:      Effort: Pulmonary effort is normal.   Skin:     General: Skin is warm and dry. Comments: Right posterior scalp laceration intact with much granulation tissue present, 7 staples. These were removed without difficulty. No subsequent separation of laceration and no bleeding from staple holes. Neurological:      Mental Status: She is alert and oriented to person, place, and time. Psychiatric:         Mood and Affect: Mood normal.         Behavior: Behavior normal.       Data:     Lab Results   Component Value Date/Time     10/18/2022 12:26 PM    K 5.1 01/09/2023 11:39 AM     10/18/2022 12:26 PM    CO2 18 10/18/2022 12:26 PM    BUN 20 10/18/2022 12:26 PM    CREATININE 1.49 10/18/2022 12:26 PM    GLUCOSE 101 10/18/2022 12:26 PM    PROT 6.7 10/18/2022 12:26 PM    LABALBU 4.3 10/18/2022 12:26 PM    BILITOT 0.3 10/18/2022 12:26 PM    ALKPHOS 80 10/18/2022 12:26 PM    AST 18 10/18/2022 12:26 PM    ALT 15 10/18/2022 12:26 PM     Lab Results   Component Value Date/Time    WBC 7.0 11/07/2022 02:31 PM    RBC 3.40 11/07/2022 02:31 PM    HGB 10.9 01/09/2023 11:39 AM    HCT 34.3 01/09/2023 11:39 AM    MCV 81.5 11/07/2022 02:31 PM    MCH 25.6 11/07/2022 02:31 PM    MCHC 31.4 11/07/2022 02:31 PM    RDW 17.8 11/07/2022 02:31 PM     11/07/2022 02:31 PM    MPV NOT REPORTED 10/05/2021 08:15 AM     Lab Results   Component Value Date/Time    TSH 5.27 10/10/2022 10:32 AM     Lab Results   Component Value Date/Time    CHOL 169 08/29/2022 08:01 AM    HDL 79 08/29/2022 08:01 AM    LABA1C 5.7 09/12/2019 08:04 AM         Assessment & Plan:        Diagnosis Orders   1. Laceration of scalp, subsequent encounter   - CT REMOVAL OF SUTURES      2.  Encounter for staple removal  [id number] - VT REMOVAL OF SUTURES      3. Hypokalemia  Potassium      4. Iron deficiency anemia due to chronic blood loss  Hemoglobin and Hematocrit        1-2. Accidental fall at home as above, left lower extremity weakness and radicular pain related to lumbar disease. Only injury was the scalp laceration which has healed nicely. Staples removed without difficulty. 3.  Hypokalemia, had had trouble with absorption of iron tablets. At that time we offered to change to capsules or liquid but patient did not pick these up, remembered in the past that she was unable to drink the liquid potassium due to taste. Did not try capsules. She is agreeable to capsules if needed. First we will recheck labs. Uncertain whether she has been absorbing tablets recently, has not been seeing them in her stool. 4.  Rechecking anemia which has been slow to respond to iron supplementation. She did tolerate the liquid iron for quite a while before developing constipation so potentially we could restart it at a lower dose. If she does not tolerate it, she may need hematology referral for iron infusions. Requested Prescriptions     Signed Prescriptions Disp Refills    triamcinolone (KENALOG) 0.1 % cream 30 g 2     Sig: APPLY topically TWICE DAILY         Patient Instructions   SURVEY:    You may be receiving a survey from [employer] regarding your visit today. You may get this in the mail, through your MyChart or in your email. Please complete the survey to enable us to provide the highest quality of care to you and your family. If you cannot score us as very good ( 5 Stars) on any question, please feel free to call the office to discuss how we could have made your experience exceptional.     Thank you.     Clinical Care Team:  DO Ruy Parr, [address] [place] [address] Team:  Nabil Roberts Meet      signed by Mich Wilson DO on 1/10/2023 at 10:42 PM  [facility] [address]  Dept: [phone]

## 2023-01-09 NOTE — PATIENT INSTRUCTIONS
SURVEY:    You may be receiving a survey from [employer] regarding your visit today. You may get this in the mail, through your MyChart or in your email. Please complete the survey to enable us to provide the highest quality of care to you and your family. If you cannot score us as very good ( 5 Stars) on any question, please feel free to call the office to discuss how we could have made your experience exceptional.     Thank you.     Clinical Care Team:  DO Mihaela Bailey Arm, [address] [place] [address] Team:  [address] [place] [address]

## 2023-01-16 ENCOUNTER — TELEPHONE (OUTPATIENT)
Dept: CARDIOLOGY CLINIC | Age: 83
End: 2023-01-16

## 2023-01-16 RX ORDER — PITAVASTATIN CALCIUM 2.09 MG/1
2 TABLET, FILM COATED ORAL NIGHTLY
Qty: 30 TABLET | Refills: 5 | Status: SHIPPED | OUTPATIENT
Start: 2023-01-16 | End: 2023-01-16

## 2023-01-16 RX ORDER — ATORVASTATIN CALCIUM 80 MG/1
80 TABLET, FILM COATED ORAL DAILY
Qty: 30 TABLET | Refills: 11 | Status: SHIPPED | OUTPATIENT
Start: 2023-01-16

## 2023-01-16 RX ORDER — PANTOPRAZOLE SODIUM 40 MG/1
TABLET, DELAYED RELEASE ORAL
Qty: 90 TABLET | Refills: 1 | Status: SHIPPED | OUTPATIENT
Start: 2023-01-16

## 2023-01-16 NOTE — TELEPHONE ENCOUNTER
Last OV: 1/9/2023  ED FU   Last RX:    Next scheduled apt: 3/2/2023   3 MONTH               Surescript requesting a refill

## 2023-01-16 NOTE — TELEPHONE ENCOUNTER
Leesa Saint called to ask if Dr. Marian Dyson would switch Ana Herrera back to Lipitor (maybe try a lower strength) instead of Livalo. He stated that she has symptoms with any of the cholesterol medications, but she has back pain and he feels it is worse since she went on the Livalo. Also, the price is a lot higher for Livalo. If Dr. Marian Dyson changes it, he would like it sent to [facility]. Malika Woods

## 2023-02-20 ENCOUNTER — TELEPHONE (OUTPATIENT)
Dept: CARDIOLOGY CLINIC | Age: 83
End: 2023-02-20

## 2023-02-20 NOTE — TELEPHONE ENCOUNTER
Ivy Aguilera called to state that [facility] isn't tolerating the Lipitor. He states that she is having side effects with muscle pain. He stated that she was on a cane and now she is using a walker. He stated that she has all the side effects of a statin except a headache. He stated that she has been on the Lipitor for a month after switching from Livalo. He is wanting to know if she can stop the Lipitor and start Red Yeast Rice 600 mg twice a day and see how she is doing at her appointment with Dr. Timothy Giron in one month. Please call Ivy Aguilera. Eula Ruggiero

## 2023-03-02 ENCOUNTER — HOSPITAL ENCOUNTER (OUTPATIENT)
Age: 83
Discharge: HOME | End: 2023-03-02
Payer: MEDICARE

## 2023-03-02 ENCOUNTER — OFFICE VISIT (OUTPATIENT)
Dept: FAMILY MEDICINE CLINIC | Age: 83
End: 2023-03-02

## 2023-03-02 VITALS
HEART RATE: 76 BPM | OXYGEN SATURATION: 97 % | BODY MASS INDEX: 22.28 KG/M2 | DIASTOLIC BLOOD PRESSURE: 62 MMHG | SYSTOLIC BLOOD PRESSURE: 124 MMHG | HEIGHT: 68 IN | WEIGHT: 147 LBS

## 2023-03-02 DIAGNOSIS — E03.9 ACQUIRED HYPOTHYROIDISM: ICD-10-CM

## 2023-03-02 DIAGNOSIS — M54.16 CHRONIC LEFT-SIDED LUMBAR RADICULOPATHY: ICD-10-CM

## 2023-03-02 DIAGNOSIS — R45.86 MOOD AND AFFECT DISTURBANCE: ICD-10-CM

## 2023-03-02 DIAGNOSIS — N18.32 STAGE 3B CHRONIC KIDNEY DISEASE (HCC): ICD-10-CM

## 2023-03-02 DIAGNOSIS — D64.9 NORMOCHROMIC ANEMIA: ICD-10-CM

## 2023-03-02 DIAGNOSIS — E87.6 HYPOKALEMIA: ICD-10-CM

## 2023-03-02 DIAGNOSIS — C07 PRIMARY PAROTID GLAND SQUAMOUS CELL CARCINOMA (HCC): ICD-10-CM

## 2023-03-02 DIAGNOSIS — D64.9 NORMOCHROMIC ANEMIA: Primary | ICD-10-CM

## 2023-03-02 LAB
ABSOLUTE EOS #: 0.2 K/UL (ref 0–0.4)
ABSOLUTE LYMPH #: 1 K/UL (ref 1–4.8)
ABSOLUTE MONO #: 0.6 K/UL (ref 0–1)
ANION GAP SERPL CALCULATED.3IONS-SCNC: 12 MMOL/L (ref 9–17)
BASOPHILS # BLD: 1 % (ref 0–2)
BASOPHILS ABSOLUTE: 0.1 K/UL (ref 0–0.2)
BUN SERPL-MCNC: 21 MG/DL (ref 8–23)
BUN/CREAT BLD: 14 (ref 9–20)
CALCIUM SERPL-MCNC: 9 MG/DL (ref 8.6–10.4)
CHLORIDE SERPL-SCNC: 111 MMOL/L (ref 98–107)
CO2 SERPL-SCNC: 19 MMOL/L (ref 20–31)
CREAT SERPL-MCNC: 1.46 MG/DL (ref 0.5–0.9)
DIFFERENTIAL TYPE: YES
EOSINOPHILS RELATIVE PERCENT: 2 % (ref 0–5)
GFR SERPL CREATININE-BSD FRML MDRD: 35 ML/MIN/1.73M2
GLUCOSE SERPL-MCNC: 99 MG/DL (ref 70–99)
HCT VFR BLD AUTO: 31.2 % (ref 36–46)
HGB BLD-MCNC: 9.7 G/DL (ref 12–16)
LYMPHOCYTES # BLD: 13 % (ref 15–40)
MCH RBC QN AUTO: 25.4 PG (ref 26–34)
MCHC RBC AUTO-ENTMCNC: 31.1 G/DL (ref 31–37)
MCV RBC AUTO: 81.9 FL (ref 80–100)
MONOCYTES # BLD: 7 % (ref 4–8)
PDW BLD-RTO: 18.4 % (ref 12.1–15.2)
PLATELET # BLD AUTO: 315 K/UL (ref 140–450)
POTASSIUM SERPL-SCNC: 3.9 MMOL/L (ref 3.7–5.3)
RBC # BLD: 3.81 M/UL (ref 4–5.2)
SEG NEUTROPHILS: 77 % (ref 47–75)
SEGMENTED NEUTROPHILS ABSOLUTE COUNT: 6.2 K/UL (ref 2.5–7)
SODIUM SERPL-SCNC: 142 MMOL/L (ref 135–144)
TSH SERPL-ACNC: 8.98 UIU/ML (ref 0.3–5)
WBC # BLD AUTO: 8 K/UL (ref 3.5–11)

## 2023-03-02 PROCEDURE — 85025 COMPLETE CBC W/AUTO DIFF WBC: CPT

## 2023-03-02 PROCEDURE — 84443 ASSAY THYROID STIM HORMONE: CPT

## 2023-03-02 PROCEDURE — 36415 COLL VENOUS BLD VENIPUNCTURE: CPT

## 2023-03-02 PROCEDURE — 84439 ASSAY OF FREE THYROXINE: CPT

## 2023-03-02 PROCEDURE — 80048 BASIC METABOLIC PNL TOTAL CA: CPT

## 2023-03-02 RX ORDER — UBIDECARENONE 50 MG
600 CAPSULE ORAL 2 TIMES DAILY
COMMUNITY

## 2023-03-02 SDOH — ECONOMIC STABILITY: FOOD INSECURITY: WITHIN THE PAST 12 MONTHS, THE FOOD YOU BOUGHT JUST DIDN'T LAST AND YOU DIDN'T HAVE MONEY TO GET MORE.: NEVER TRUE

## 2023-03-02 SDOH — ECONOMIC STABILITY: INCOME INSECURITY: HOW HARD IS IT FOR YOU TO PAY FOR THE VERY BASICS LIKE FOOD, HOUSING, MEDICAL CARE, AND HEATING?: NOT HARD AT ALL

## 2023-03-02 SDOH — ECONOMIC STABILITY: FOOD INSECURITY: WITHIN THE PAST 12 MONTHS, YOU WORRIED THAT YOUR FOOD WOULD RUN OUT BEFORE YOU GOT MONEY TO BUY MORE.: NEVER TRUE

## 2023-03-02 SDOH — ECONOMIC STABILITY: HOUSING INSECURITY
IN THE LAST 12 MONTHS, WAS THERE A TIME WHEN YOU DID NOT HAVE A STEADY PLACE TO SLEEP OR SLEPT IN A SHELTER (INCLUDING NOW)?: NO

## 2023-03-02 NOTE — PROGRESS NOTES
Name: Alondra Barnes  : 1940         Chief Complaint:     Chief Complaint   Patient presents with    Hypothyroidism    Hypertension    Anemia       History of Present Illness:      Alondra Barnes is a 78 y.o.  female who presents with Hypothyroidism, Hypertension, and Anemia      HPI    Stopped gabapentin, lipitor, and prednisone. Pt says this is because she was gaining too much weight and also having pain in muscles. Taking red yeast rice. Gabapentin (stopped about a week ago) didn't seem like it was helping except with making her a little calmer, didn't help pain in L thigh. Was taking prednisone about every 3 days, then every day, then sometimes BID, starting sometimes after the last time she was in here. Stopped taking it about 2 wks ago and did ok with that. Taking tylenol now which she thinks is working \"as good as the others. \" Thinks 2 tabs once a day. Anemia - still craving ice. On liquid iron supplement daily despite still having some stool trouble from it, having diarrhea this wk. Yellow. Was eating pineapple 3 big chunks daily. Nervous about appt with Dr Iam Rouse tomorrow. Has a hemorrhoid that's bleeding and she'd like to get rid of that. Potassium on 2 pills now instead of 4. Pt seemed quite negative today and when asked about it says she does feel a little down, unsure why. Medical History:     Patient Active Problem List   Diagnosis    CAD (coronary artery disease)    Chronic back pain    Hypothyroidism    Hypertension    Post PTCA    Hot flashes, menopausal    Hyperlipidemia    CKD (chronic kidney disease), stage III (HCC)    Postlaminectomy syndrome of lumbar region    Primary parotid gland squamous cell carcinoma (HCC)    Dysthymia       Medications:       Prior to Admission medications    Medication Sig Start Date End Date Taking?  Authorizing Provider   Red Yeast Rice 600 MG TABS Take 600 mg by mouth in the morning and at bedtime   Yes Historical Provider, MD   pantoprazole (PROTONIX) 40 MG tablet TAKE 1 TABLET BY MOUTH EVERY MORNING before BREAKFAST 1/16/23  Yes Marcelino Caputo DO   triamcinolone (KENALOG) 0.1 % cream APPLY topically TWICE DAILY 1/9/23  Yes Marcelino Caputo DO   metoprolol succinate (TOPROL XL) 25 MG extended release tablet TAKE 1 TABLET BY MOUTH EVERY DAY 12/2/22  Yes Marcelino Caputo DO   FLUoxetine (PROZAC) 40 MG capsule TAKE 1 CAPSULE BY MOUTH EVERY DAY 12/2/22  Yes Marcelino Caputo DO   furosemide (LASIX) 20 MG tablet TAKE 1 TABLET BY MOUTH EVERY DAY 12/2/22  Yes Marcelino Caputo DO   buPROPion (WELLBUTRIN XL) 150 MG extended release tablet TAKE 1 TABLET BY MOUTH IN THE MORNING 12/2/22  Yes Marcelino Caputo DO   levothyroxine (SYNTHROID) 75 MCG tablet TAKE 1 TABLET BY MOUTH EVERY DAY 12/2/22  Yes Marcelino Caputo DO   amLODIPine (NORVASC) 2.5 MG tablet TAKE 1 TABLET BY MOUTH DAILY 10/17/22  Yes Jennifer Catherine MD   clopidogrel (PLAVIX) 75 MG tablet Take 1 tablet by mouth daily 10/17/22  Yes Jennifer Catherine MD   ASPIRIN LOW DOSE 81 MG EC tablet Take 1 tablet by mouth in the morning. 8/11/22  Yes Jennifer Catherine MD   hydrocortisone (ANUSOL-HC) 2.5 % CREA rectal cream Place rectally 2 times daily as needed for Hemorrhoids 8/4/22  Yes JUAN Rangel - CNP   isosorbide mononitrate (IMDUR) 30 MG extended release tablet TAKE 1 TABLET BY MOUTH TWICE DAILY 7/27/22  Yes Jennifer Catherine MD   Potassium Chloride Azucena ER (KLOR-CON M20 PO) Take 20 mEq by mouth three times daily Taking 2 in am and 1 in afternoon   Yes Historical Provider, MD   b complex vitamins capsule Take 1 capsule by mouth daily. Yes Historical Provider, MD   bisacodyl (DULCOLAX) 5 MG EC tablet Take 5 mg by mouth daily as needed. Take three daily      Yes Historical Provider, MD   calcium carbonate-vitamin D 600-200 MG-UNIT TABS Take  by mouth daily.      Yes Historical Provider, MD        Allergies:       Sulfa antibiotics    Physical Exam:     Vitals:  /62   Pulse 76   Ht 5' 8\" (1.727 m)   Wt 147 lb (66.7 kg)   SpO2 97%   BMI 22.35 kg/m²   Physical Exam  Vitals and nursing note reviewed. Constitutional:       General: She is not in acute distress. Appearance: Normal appearance. She is well-developed. She is not ill-appearing. Cardiovascular:      Rate and Rhythm: Normal rate and regular rhythm. Heart sounds: Normal heart sounds. Pulmonary:      Effort: Pulmonary effort is normal.      Breath sounds: Normal breath sounds. Neurological:      Mental Status: She is alert and oriented to person, place, and time. Psychiatric:      Comments: A little agitated today, goes from one complaint to the next, no smiling, also repeats self more than usual and says she's 78 yrs old. Data:     Lab Results   Component Value Date/Time     03/02/2023 11:20 AM    K 3.9 03/02/2023 11:20 AM     03/02/2023 11:20 AM    CO2 19 03/02/2023 11:20 AM    BUN 21 03/02/2023 11:20 AM    CREATININE 1.46 03/02/2023 11:20 AM    GLUCOSE 99 03/02/2023 11:20 AM    PROT 6.7 10/18/2022 12:26 PM    LABALBU 4.3 10/18/2022 12:26 PM    BILITOT 0.3 10/18/2022 12:26 PM    ALKPHOS 80 10/18/2022 12:26 PM    AST 18 10/18/2022 12:26 PM    ALT 15 10/18/2022 12:26 PM     Lab Results   Component Value Date/Time    WBC 8.0 03/02/2023 11:20 AM    RBC 3.81 03/02/2023 11:20 AM    HGB 9.7 03/02/2023 11:20 AM    HCT 31.2 03/02/2023 11:20 AM    MCV 81.9 03/02/2023 11:20 AM    MCH 25.4 03/02/2023 11:20 AM    MCHC 31.1 03/02/2023 11:20 AM    RDW 18.4 03/02/2023 11:20 AM     03/02/2023 11:20 AM    MPV NOT REPORTED 10/05/2021 08:15 AM     Lab Results   Component Value Date/Time    TSH 8.98 03/02/2023 11:20 AM     Lab Results   Component Value Date/Time    CHOL 169 08/29/2022 08:01 AM    HDL 79 08/29/2022 08:01 AM    LABA1C 5.7 09/12/2019 08:04 AM         Assessment & Plan:        Diagnosis Orders   1. Normochromic anemia  CBC with Auto Differential      2. Acquired hypothyroidism  TSH With Reflex Ft4      3. Hypokalemia  Basic Metabolic Panel      4. Mood and affect disturbance        5. Chronic left-sided lumbar radiculopathy        6. Primary parotid gland squamous cell carcinoma (HCC)        7. Stage 3b chronic kidney disease ([facility] [address]          1. Anemia for the past several months, has had some improvement with iron supplementation. Some rectal bleeding which she was really intent today was being caused by external hemorrhoid. She does have upcoming GI appointment and I will defer that exam to Dr. Oralia Peoples. Updating labs. 2.  Hypothyroid with recent TSH elevation, rechecking  3. Hypokalemia with recent dose change, rechecking  4. Patient more forgetful today and also very negative, little agitated. She had unfortunately made some med changes on her own based on things she had read online. I asked her to allow me and Dr. Tahir Vazquez to make any and all med adjustments since we know her entire clinical situation. Advised it was particularly dangerous for her to increase her prednisone to twice daily dosing and then to stop med suddenly. Thankfully she does seem to have done okay with that. 5.  Left lumbar radiculopathy, again had been on prednisone and abruptly stopped. She says this did not alter the pain and neither did stopping gabapentin. Okay to continue Tylenol as needed for now. 6.  History of parotid cancer status postresection and radiation, follows with ENT  7. Has been stable, updating labs      Patient Instructions   SURVEY:    You may be receiving a survey from [employer] regarding your visit today. You may get this in the mail, through your MyChart or in your email. Please complete the survey to enable us to provide the highest quality of care to you and your family. If you cannot score us as very good ( 5 Stars) on any question, please feel free to call the office to discuss how we could have made your experience exceptional.     Thank you.     Clinical Care Team:  Dr. Coery Silva, DO Harrison Kee, [address] Team:  [address]      signed by Artem Santoyo DO on 3/5/2023 at 9:25 PM  [address]  [address]  Dept: [phone]

## 2023-03-03 LAB — T4 FREE SERPL-MCNC: 1.03 NG/DL (ref 0.93–1.7)

## 2023-03-06 ENCOUNTER — TELEPHONE (OUTPATIENT)
Dept: FAMILY MEDICINE CLINIC | Age: 83
End: 2023-03-06

## 2023-03-06 RX ORDER — LEVOTHYROXINE SODIUM 0.1 MG/1
TABLET ORAL
Qty: 90 TABLET | Refills: 1 | Status: SHIPPED | OUTPATIENT
Start: 2023-03-06

## 2023-03-06 NOTE — TELEPHONE ENCOUNTER
----- Message from Sonido Woods DO sent at 3/3/2023 12:35 PM EST -----  Thyroid is uncontrolled so recommend increasing Synthroid to 100 mcg daily. Hemoglobin fell back down a little bit also, so really try to take that iron every day and preferably with something that contains vitamin C. Potassium in the normal range.

## 2023-04-03 ENCOUNTER — NURSE ONLY (OUTPATIENT)
Dept: FAMILY MEDICINE CLINIC | Age: 83
End: 2023-04-03
Payer: MEDICARE

## 2023-04-03 ENCOUNTER — TELEPHONE (OUTPATIENT)
Dept: FAMILY MEDICINE CLINIC | Age: 83
End: 2023-04-03

## 2023-04-03 DIAGNOSIS — R34 URINE OUTPUT LOW: Primary | ICD-10-CM

## 2023-04-03 LAB
BILIRUBIN, POC: NEGATIVE
BLOOD URINE, POC: NEGATIVE
CLARITY, POC: CLEAR
COLOR, POC: YELLOW
GLUCOSE URINE, POC: NEGATIVE
KETONES, POC: NEGATIVE
LEUKOCYTE EST, POC: NEGATIVE
NITRITE, POC: NEGATIVE
PH, POC: 5
PROTEIN, POC: NEGATIVE
SPECIFIC GRAVITY, POC: 1.01
UROBILINOGEN, POC: 0.2

## 2023-04-03 PROCEDURE — 81003 URINALYSIS AUTO W/O SCOPE: CPT | Performed by: FAMILY MEDICINE

## 2023-04-03 PROCEDURE — 99999 PR OFFICE/OUTPT VISIT,PROCEDURE ONLY: CPT | Performed by: FAMILY MEDICINE

## 2023-04-03 NOTE — TELEPHONE ENCOUNTER
Pt's  called stating that pt has hardly been making urine this weekend. Denies any fever, pressure, ABD pain. Pt's  stated she feels fine, she just hasn't been going to the bathroom. Offered an appointment, but wanted seen sooner, pt's  stated he didn't think she can give a sample.  Please advise

## 2023-04-03 NOTE — TELEPHONE ENCOUNTER
Really needs to come in or at least have nurse visit for vital signs and I would need some testing. She might be very dehydrated or have something going on with the bladder that would cause her not to be able to urinate.

## 2023-04-04 ENCOUNTER — HOSPITAL ENCOUNTER (OUTPATIENT)
Dept: ULTRASOUND IMAGING | Age: 83
Discharge: HOME | End: 2023-04-06
Payer: MEDICARE

## 2023-04-04 ENCOUNTER — HOSPITAL ENCOUNTER (OUTPATIENT)
Age: 83
Discharge: HOME | End: 2023-04-04
Payer: MEDICARE

## 2023-04-04 DIAGNOSIS — R34 DECREASED URINE OUTPUT: ICD-10-CM

## 2023-04-04 DIAGNOSIS — D64.9 NORMOCHROMIC ANEMIA: ICD-10-CM

## 2023-04-04 LAB
ABSOLUTE EOS #: 0.6 K/UL (ref 0–0.4)
ABSOLUTE LYMPH #: 1.3 K/UL (ref 1–4.8)
ABSOLUTE MONO #: 0.7 K/UL (ref 0–1)
ALBUMIN SERPL-MCNC: 4.3 G/DL (ref 3.5–5.2)
ALP SERPL-CCNC: 76 U/L (ref 35–104)
ALT SERPL-CCNC: 18 U/L (ref 5–33)
ANION GAP SERPL CALCULATED.3IONS-SCNC: 11 MMOL/L (ref 9–17)
AST SERPL-CCNC: 23 U/L
BASOPHILS # BLD: 1 % (ref 0–2)
BASOPHILS ABSOLUTE: 0 K/UL (ref 0–0.2)
BILIRUB SERPL-MCNC: 0.2 MG/DL (ref 0.3–1.2)
BUN SERPL-MCNC: 31 MG/DL (ref 8–23)
BUN/CREAT BLD: 19 (ref 9–20)
CALCIUM SERPL-MCNC: 8.8 MG/DL (ref 8.6–10.4)
CHLORIDE SERPL-SCNC: 108 MMOL/L (ref 98–107)
CO2 SERPL-SCNC: 18 MMOL/L (ref 20–31)
CREAT SERPL-MCNC: 1.64 MG/DL (ref 0.5–0.9)
DIFFERENTIAL TYPE: YES
EOSINOPHILS RELATIVE PERCENT: 7 % (ref 0–5)
GFR SERPL CREATININE-BSD FRML MDRD: 31 ML/MIN/1.73M2
GLUCOSE SERPL-MCNC: 96 MG/DL (ref 70–99)
HCT VFR BLD AUTO: 33.3 % (ref 36–46)
HGB BLD-MCNC: 10.2 G/DL (ref 12–16)
LYMPHOCYTES # BLD: 14 % (ref 15–40)
MCH RBC QN AUTO: 25.1 PG (ref 26–34)
MCHC RBC AUTO-ENTMCNC: 30.6 G/DL (ref 31–37)
MCV RBC AUTO: 82.1 FL (ref 80–100)
MONOCYTES # BLD: 8 % (ref 4–8)
PDW BLD-RTO: 18.1 % (ref 12.1–15.2)
PLATELET # BLD AUTO: 478 K/UL (ref 140–450)
POTASSIUM SERPL-SCNC: 4.8 MMOL/L (ref 3.7–5.3)
PROT SERPL-MCNC: 6.3 G/DL (ref 6.4–8.3)
RBC # BLD: 4.05 M/UL (ref 4–5.2)
SEG NEUTROPHILS: 70 % (ref 47–75)
SEGMENTED NEUTROPHILS ABSOLUTE COUNT: 6.3 K/UL (ref 2.5–7)
SODIUM SERPL-SCNC: 137 MMOL/L (ref 135–144)
WBC # BLD AUTO: 8.9 K/UL (ref 3.5–11)

## 2023-04-04 PROCEDURE — 80053 COMPREHEN METABOLIC PANEL: CPT

## 2023-04-04 PROCEDURE — 76770 US EXAM ABDO BACK WALL COMP: CPT

## 2023-04-04 PROCEDURE — 85025 COMPLETE CBC W/AUTO DIFF WBC: CPT

## 2023-04-04 PROCEDURE — 36415 COLL VENOUS BLD VENIPUNCTURE: CPT

## 2023-04-06 ENCOUNTER — OFFICE VISIT (OUTPATIENT)
Dept: GASTROENTEROLOGY | Age: 83
End: 2023-04-06
Payer: MEDICARE

## 2023-04-06 VITALS
BODY MASS INDEX: 21.22 KG/M2 | RESPIRATION RATE: 19 BRPM | WEIGHT: 140 LBS | HEIGHT: 68 IN | OXYGEN SATURATION: 94 % | HEART RATE: 55 BPM

## 2023-04-06 DIAGNOSIS — K64.9 HEMORRHOIDS, UNSPECIFIED HEMORRHOID TYPE: Primary | ICD-10-CM

## 2023-04-06 PROCEDURE — 1123F ACP DISCUSS/DSCN MKR DOCD: CPT | Performed by: INTERNAL MEDICINE

## 2023-04-06 PROCEDURE — G8427 DOCREV CUR MEDS BY ELIG CLIN: HCPCS | Performed by: INTERNAL MEDICINE

## 2023-04-06 PROCEDURE — G8420 CALC BMI NORM PARAMETERS: HCPCS | Performed by: INTERNAL MEDICINE

## 2023-04-06 PROCEDURE — 1090F PRES/ABSN URINE INCON ASSESS: CPT | Performed by: INTERNAL MEDICINE

## 2023-04-06 PROCEDURE — 1036F TOBACCO NON-USER: CPT | Performed by: INTERNAL MEDICINE

## 2023-04-06 PROCEDURE — 99202 OFFICE O/P NEW SF 15 MIN: CPT | Performed by: INTERNAL MEDICINE

## 2023-04-06 PROCEDURE — G8399 PT W/DXA RESULTS DOCUMENT: HCPCS | Performed by: INTERNAL MEDICINE

## 2023-04-06 NOTE — PATIENT INSTRUCTIONS
SURVEY:    You may be receiving a survey from [employer] regarding your visit today. Please complete the survey to enable us to provide the highest quality of care to you and your family. If you cannot score us a very good on any question, please call the office to discuss how we could have made your experience a very good one. Thank you.   MD Lorene Infante MD Reginold Cousins, MD Lyell Chase, [address], PM  MICHEAL Cortezille, [address] [place] [address]  Noble Reynolds, [address] [place] [address]

## 2023-04-06 NOTE — PROGRESS NOTES
Past Surgical History:   Procedure Laterality Date    ANGIOPLASTY  12/07/2016    Dr. Annette Son @ [facility] x 1     ANGIOPLASTY  07/29/2022    Dr. Annette Son @ [address] [place] [address] Dr [facility]--Stenting X 2--    COLONOSCOPY  06/11/2013    [facility]    CORONARY ANGIOPLASTY Left 09/02/2022    Dr. Annette Son @ [address] [place] [address] Dr Health--Stenting X 2-also Cardiac rehab    [address] [facility]  2006,11/14/2012    CORONARY ARTERY BYPASS GRAFT  2006    HEMORRHOID SURGERY      OVARY REMOVAL Right     [place] NJX DX/THER SBST INTRLMNR LMBR/SAC W/IMG GDN N/A 08/28/2018    EPIDURAL STEROID INJECTION-CUADAL performed by Serge Zeng MD at [address]    laminectomy    UPPER GASTROINTESTINAL ENDOSCOPY  06/11/2013         Current Outpatient Medications   Medication Sig Dispense Refill    tamsulosin (FLOMAX) 0.4 MG capsule Take 1 capsule by mouth daily 30 capsule 0    levothyroxine (SYNTHROID) 100 MCG tablet TAKE 1 TABLET BY MOUTH EVERY DAY 90 tablet 1    Red Yeast Rice 600 MG TABS Take 600 mg by mouth in the morning and at bedtime      pantoprazole (PROTONIX) 40 MG tablet TAKE 1 TABLET BY MOUTH EVERY MORNING before BREAKFAST 90 tablet 1    triamcinolone (KENALOG) 0.1 % cream APPLY topically TWICE DAILY 30 g 2    metoprolol succinate (TOPROL XL) 25 MG extended release tablet TAKE 1 TABLET BY MOUTH EVERY DAY 90 tablet 1    FLUoxetine (PROZAC) 40 MG capsule TAKE 1 CAPSULE BY MOUTH EVERY DAY 90 capsule 1    furosemide (LASIX) 20 MG tablet TAKE 1 TABLET BY MOUTH EVERY DAY 90 tablet 1    buPROPion (WELLBUTRIN XL) 150 MG extended release tablet TAKE 1 TABLET BY MOUTH IN THE MORNING 90 tablet 1    amLODIPine (NORVASC) 2.5 MG tablet TAKE 1 TABLET BY MOUTH DAILY 90 tablet 3    clopidogrel (PLAVIX) 75 MG tablet Take 1 tablet by mouth daily 90 tablet 3    ASPIRIN LOW DOSE 81 MG EC tablet Take 1 tablet by mouth in the morning.  30 tablet 5    hydrocortisone (ANUSOL-HC) 2.5 % CREA rectal cream Place rectally 2 times daily as needed for

## 2023-04-25 ENCOUNTER — OFFICE VISIT (OUTPATIENT)
Dept: CARDIOLOGY CLINIC | Age: 83
End: 2023-04-25
Payer: MEDICARE

## 2023-04-25 VITALS
WEIGHT: 145 LBS | SYSTOLIC BLOOD PRESSURE: 120 MMHG | OXYGEN SATURATION: 98 % | HEART RATE: 87 BPM | BODY MASS INDEX: 22.05 KG/M2 | DIASTOLIC BLOOD PRESSURE: 60 MMHG

## 2023-04-25 DIAGNOSIS — I10 ESSENTIAL HYPERTENSION: ICD-10-CM

## 2023-04-25 DIAGNOSIS — D64.9 ANEMIA, UNSPECIFIED TYPE: Primary | ICD-10-CM

## 2023-04-25 PROCEDURE — 1036F TOBACCO NON-USER: CPT | Performed by: INTERNAL MEDICINE

## 2023-04-25 PROCEDURE — G8399 PT W/DXA RESULTS DOCUMENT: HCPCS | Performed by: INTERNAL MEDICINE

## 2023-04-25 PROCEDURE — 1123F ACP DISCUSS/DSCN MKR DOCD: CPT | Performed by: INTERNAL MEDICINE

## 2023-04-25 PROCEDURE — 1090F PRES/ABSN URINE INCON ASSESS: CPT | Performed by: INTERNAL MEDICINE

## 2023-04-25 PROCEDURE — 3078F DIAST BP <80 MM HG: CPT | Performed by: INTERNAL MEDICINE

## 2023-04-25 PROCEDURE — G8420 CALC BMI NORM PARAMETERS: HCPCS | Performed by: INTERNAL MEDICINE

## 2023-04-25 PROCEDURE — 3074F SYST BP LT 130 MM HG: CPT | Performed by: INTERNAL MEDICINE

## 2023-04-25 PROCEDURE — 99214 OFFICE O/P EST MOD 30 MIN: CPT | Performed by: INTERNAL MEDICINE

## 2023-04-25 PROCEDURE — G8427 DOCREV CUR MEDS BY ELIG CLIN: HCPCS | Performed by: INTERNAL MEDICINE

## 2023-04-25 RX ORDER — PREDNISONE 20 MG/1
20 TABLET ORAL PRN
COMMUNITY

## 2023-04-26 NOTE — PROGRESS NOTES
Ov Dr. Tana Morel for 6 month f/u   C/o sob when walking or active   X 2 months   Thought had UTI went to Dr. Justina Pradhan   No infection was given flomax doing   Better   Dr. Justina Pradhan suggested colonoscopy   Had appt with Dr. Armando Cueva   Pt states Dr. Armando Cueva said she couldn't do   Colonoscopy since on blood thinners  Has been out of Iron pills x 2 weeks   \"[employer] is out of them\"  Bedside echo done       Will get BMP in 2 weeks NON FASTING     Follow up in 6 months
pericardial friction rub. LUNGS:  Clear to auscultation and percussion. ABDOMEN:  Soft and nontender. Good bowel sounds. EXTREMITIES:  Good femoral pulses. Good pedal pulses. She had no pedal edema. Skin was warm and dry. No calf tenderness. Nail beds pink. Good cap refill. PULSES:  Bilateral symmetrical radial, brachial and carotid pulses. No carotid bruits. Good femoral and pedal pulses. NEUROLOGIC EXAM:  Within normal limits. PSYCHIATRIC EXAM:  Within normal limits. LABORATORY DATA:  Sodium 136, potassium 3.8, BUN 31, creatinine 1.8, GFR was 27, calcium 8.8. Cholesterol was 202 with an HDL of 66, , triglycerides 122. ALT was 15, AST was 18. Her TSH was 2.77. White count 11.0, hemoglobin 9.4 with a platelet count of 084,598. Her EKG showed sinus rhythm with nonspecific ST changes with no changes from previous EKGs. IMPRESSION:  1. Functional class I to II with shortness of breath on exertion but no chest pain. 2.  Acute-on-chronic renal insufficiency with creatinine up to 1.8 at this time. 3.  Severe arthritis with back pain and leg pain although seems improved off of the Lipitor. 4.  Status post catheterization in 2005 with 80% LAD and diagonal, unremarkable circumflex, 80% RCA with an EF of 45% to 50%. 5.  Open heart surgery on 05/12/2006 by Dr. Hernandez Letters with a LIMA to the LAD, a vein graft to the diagonal, and a vein graft to the right coronary artery. 6.  Catheterization in 07/2006 showed occluded vein graft to the right coronary artery, occluded vein graft to the diagonal, patent LIMA to the LAD with angioplasty of the ostium of the right coronary artery placing a 3.0 x 16 mm Taxus stent.   7.  Catheterization on 11/07/2012 showing nonfunctional LIMA to the LAD with occluded vein grafts with 90% LAD, 90% ostium of the right coronary artery, mild disease in the circumflex, EF of 60% with angioplasty of the LAD placing 3.5 x 24 mm Promus stent and angioplasty of the right

## 2023-05-08 ENCOUNTER — HOSPITAL ENCOUNTER (OUTPATIENT)
Age: 83
Discharge: HOME | End: 2023-05-08
Payer: MEDICARE

## 2023-05-08 DIAGNOSIS — I10 ESSENTIAL HYPERTENSION: ICD-10-CM

## 2023-05-08 DIAGNOSIS — D64.9 ANEMIA, UNSPECIFIED TYPE: ICD-10-CM

## 2023-05-08 LAB
ANION GAP SERPL CALCULATED.3IONS-SCNC: 11 MMOL/L (ref 9–17)
BUN SERPL-MCNC: 21 MG/DL (ref 8–23)
BUN/CREAT BLD: 13 (ref 9–20)
CALCIUM SERPL-MCNC: 8.9 MG/DL (ref 8.6–10.4)
CHLORIDE SERPL-SCNC: 112 MMOL/L (ref 98–107)
CO2 SERPL-SCNC: 17 MMOL/L (ref 20–31)
CREAT SERPL-MCNC: 1.68 MG/DL (ref 0.5–0.9)
GFR SERPL CREATININE-BSD FRML MDRD: 30 ML/MIN/1.73M2
GLUCOSE SERPL-MCNC: 109 MG/DL (ref 70–99)
POTASSIUM SERPL-SCNC: 3.8 MMOL/L (ref 3.7–5.3)
SODIUM SERPL-SCNC: 140 MMOL/L (ref 135–144)

## 2023-05-08 PROCEDURE — 80048 BASIC METABOLIC PNL TOTAL CA: CPT

## 2023-05-08 PROCEDURE — 36415 COLL VENOUS BLD VENIPUNCTURE: CPT

## 2023-05-09 ENCOUNTER — TELEPHONE (OUTPATIENT)
Dept: CARDIOLOGY CLINIC | Age: 83
End: 2023-05-09

## 2023-05-22 ENCOUNTER — TELEPHONE (OUTPATIENT)
Dept: CARDIOLOGY CLINIC | Age: 83
End: 2023-05-22

## 2023-05-22 NOTE — TELEPHONE ENCOUNTER
notified will have her   Get CBC/CXR/PFT   He will call scheduling to schedule PFT  Will get cbc/cxr tomorrow (might wait until same  Day of PFT if not out far per )  PER BLAINE QUEEN

## 2023-05-22 NOTE — TELEPHONE ENCOUNTER
Ángelleesajennifer Mera called to ask for an appointment for [facility]. He stated that her SOB is no better than it was when she was in here a month ago. He stated that she does not have any chest pain or other symptoms and her BP is good. He stated that she gets winded just going from the couch to the bathroom. He wanted her to see Dr. Altagracia Reyes again. Please advise. Lionel Fonseca

## 2023-05-23 DIAGNOSIS — R06.02 SOB (SHORTNESS OF BREATH): Primary | ICD-10-CM

## 2023-05-25 ENCOUNTER — HOSPITAL ENCOUNTER (OUTPATIENT)
Dept: PULMONOLOGY | Age: 83
Discharge: HOME | End: 2023-05-25
Payer: MEDICARE

## 2023-05-25 ENCOUNTER — HOSPITAL ENCOUNTER (OUTPATIENT)
Age: 83
Discharge: HOME | End: 2023-05-25
Payer: MEDICARE

## 2023-05-25 VITALS — OXYGEN SATURATION: 98 %

## 2023-05-25 DIAGNOSIS — D64.9 ANEMIA, UNSPECIFIED TYPE: ICD-10-CM

## 2023-05-25 DIAGNOSIS — I10 ESSENTIAL HYPERTENSION: ICD-10-CM

## 2023-05-25 DIAGNOSIS — R06.02 SOB (SHORTNESS OF BREATH): ICD-10-CM

## 2023-05-25 LAB
ALBUMIN SERPL-MCNC: 4.2 G/DL (ref 3.5–5.2)
ALP SERPL-CCNC: 85 U/L (ref 35–104)
ALT SERPL-CCNC: 13 U/L (ref 5–33)
ANION GAP SERPL CALCULATED.3IONS-SCNC: 15 MMOL/L (ref 9–17)
AST SERPL-CCNC: 18 U/L
BASOPHILS # BLD: 0 K/UL (ref 0–0.2)
BASOPHILS NFR BLD: 0 % (ref 0–2)
BILIRUB SERPL-MCNC: 0.3 MG/DL (ref 0.3–1.2)
BUN SERPL-MCNC: 26 MG/DL (ref 8–23)
BUN/CREAT SERPL: 13 (ref 9–20)
CALCIUM SERPL-MCNC: 9.6 MG/DL (ref 8.6–10.4)
CHLORIDE SERPL-SCNC: 106 MMOL/L (ref 98–107)
CHOLEST SERPL-MCNC: 194 MG/DL
CHOLESTEROL/HDL RATIO: 2.9
CO2 SERPL-SCNC: 18 MMOL/L (ref 20–31)
CREAT SERPL-MCNC: 1.96 MG/DL (ref 0.5–0.9)
EKG ATRIAL RATE: 88 BPM
EKG P AXIS: 87 DEGREES
EKG P-R INTERVAL: 144 MS
EKG Q-T INTERVAL: 366 MS
EKG QRS DURATION: 82 MS
EKG QTC CALCULATION (BAZETT): 442 MS
EKG R AXIS: 80 DEGREES
EKG T AXIS: -14 DEGREES
EKG VENTRICULAR RATE: 88 BPM
EOSINOPHIL # BLD: 0 K/UL (ref 0–0.4)
EOSINOPHILS RELATIVE PERCENT: 1 % (ref 0–5)
ERYTHROCYTE [DISTWIDTH] IN BLOOD BY AUTOMATED COUNT: 24 % (ref 12.1–15.2)
GFR SERPL CREATININE-BSD FRML MDRD: 25 ML/MIN/1.73M2
GLUCOSE SERPL-MCNC: 153 MG/DL (ref 70–99)
HCT VFR BLD AUTO: 33.2 % (ref 36–46)
HDLC SERPL-MCNC: 68 MG/DL
HGB BLD-MCNC: 10.3 G/DL (ref 12–16)
LDLC SERPL CALC-MCNC: 108 MG/DL (ref 0–130)
LYMPHOCYTES # BLD: 10 % (ref 15–40)
LYMPHOCYTES NFR BLD: 0.8 K/UL (ref 1–4.8)
MAGNESIUM SERPL-MCNC: 2.1 MG/DL (ref 1.6–2.6)
MCH RBC QN AUTO: 26 PG (ref 26–34)
MCHC RBC AUTO-ENTMCNC: 31.1 G/DL (ref 31–37)
MCV RBC AUTO: 83.5 FL (ref 80–100)
MONOCYTES NFR BLD: 0.2 K/UL (ref 0–1)
MONOCYTES NFR BLD: 3 % (ref 4–8)
MORPHOLOGY: ABNORMAL
NEUTROPHILS NFR BLD: 86 % (ref 47–75)
NEUTS SEG NFR BLD: 6.7 K/UL (ref 2.5–7)
PATIENT FASTING?: YES
PLATELET # BLD AUTO: 362 K/UL (ref 140–450)
POTASSIUM SERPL-SCNC: 4.3 MMOL/L (ref 3.7–5.3)
PROT SERPL-MCNC: 6.5 G/DL (ref 6.4–8.3)
RBC # BLD AUTO: 3.97 M/UL (ref 4–5.2)
SODIUM SERPL-SCNC: 139 MMOL/L (ref 135–144)
TRIGL SERPL-MCNC: 91 MG/DL
TSH SERPL-MCNC: 1.56 UIU/ML (ref 0.3–5)
WBC OTHER # BLD: 7.8 K/UL (ref 3.5–11)

## 2023-05-25 PROCEDURE — 36415 COLL VENOUS BLD VENIPUNCTURE: CPT

## 2023-05-25 PROCEDURE — 83735 ASSAY OF MAGNESIUM: CPT

## 2023-05-25 PROCEDURE — 6360000002 HC RX W HCPCS: Performed by: INTERNAL MEDICINE

## 2023-05-25 PROCEDURE — 94060 EVALUATION OF WHEEZING: CPT

## 2023-05-25 PROCEDURE — 93005 ELECTROCARDIOGRAM TRACING: CPT

## 2023-05-25 PROCEDURE — 80061 LIPID PANEL: CPT

## 2023-05-25 PROCEDURE — 94726 PLETHYSMOGRAPHY LUNG VOLUMES: CPT

## 2023-05-25 PROCEDURE — 85025 COMPLETE CBC W/AUTO DIFF WBC: CPT

## 2023-05-25 PROCEDURE — 80053 COMPREHEN METABOLIC PANEL: CPT

## 2023-05-25 PROCEDURE — 94729 DIFFUSING CAPACITY: CPT

## 2023-05-25 PROCEDURE — 84443 ASSAY THYROID STIM HORMONE: CPT

## 2023-05-25 RX ORDER — ALBUTEROL SULFATE 2.5 MG/3ML
2.5 SOLUTION RESPIRATORY (INHALATION) ONCE
Status: COMPLETED | OUTPATIENT
Start: 2023-05-25 | End: 2023-05-25

## 2023-05-25 RX ADMIN — ALBUTEROL SULFATE 2.5 MG: 2.5 SOLUTION RESPIRATORY (INHALATION) at 12:44

## 2023-05-31 ENCOUNTER — TELEPHONE (OUTPATIENT)
Dept: CARDIOLOGY CLINIC | Age: 83
End: 2023-05-31

## 2023-05-31 DIAGNOSIS — I10 ESSENTIAL HYPERTENSION: Primary | ICD-10-CM

## 2023-05-31 RX ORDER — PREDNISONE 20 MG/1
TABLET ORAL
Qty: 30 TABLET | Refills: 1 | Status: SHIPPED | OUTPATIENT
Start: 2023-05-31

## 2023-05-31 NOTE — TELEPHONE ENCOUNTER
Did patient request this?   She had stopped it on her own
Patient is requesting, states that she takes one only if she needs it and would like to have it.
isosorbide mononitrate (IMDUR) 30 MG extended release tablet TAKE 1 TABLET BY MOUTH TWICE DAILY 7/27/22   Gala Zeng MD   Potassium Chloride Azucena ER (KLOR-CON M20 PO) Take 40 mEq by mouth daily (with breakfast)    Historical Provider, MD   b complex vitamins capsule Take 1 capsule by mouth daily    Historical Provider, MD   bisacodyl (DULCOLAX) 5 MG EC tablet Take 1 tablet by mouth daily as needed Take three daily    Historical Provider, MD

## 2023-06-05 ENCOUNTER — TELEPHONE (OUTPATIENT)
Dept: CARDIOLOGY CLINIC | Age: 83
End: 2023-06-05

## 2023-06-05 RX ORDER — AMLODIPINE BESYLATE 2.5 MG/1
2.5 TABLET ORAL DAILY
Qty: 60 TABLET | Refills: 11 | Status: SHIPPED | OUTPATIENT
Start: 2023-06-05 | End: 2023-06-05 | Stop reason: SDUPTHER

## 2023-06-05 RX ORDER — AMLODIPINE BESYLATE 2.5 MG/1
2.5 TABLET ORAL 2 TIMES DAILY
Qty: 60 TABLET | Refills: 11 | Status: SHIPPED | OUTPATIENT
Start: 2023-06-05

## 2023-06-05 NOTE — TELEPHONE ENCOUNTER
Pt called with PFT   Still c/o sob   Will increase norvasc 2.5 mg to bid  Informed if sob not any better   Will repeat the heart cath   Pt wants to try the medication  First see if it help   She will call in 1-2 weeks to   Let us know if she wants  To go ahead with heart cath.

## 2023-06-09 ENCOUNTER — OFFICE VISIT (OUTPATIENT)
Dept: FAMILY MEDICINE CLINIC | Age: 83
End: 2023-06-09

## 2023-06-09 VITALS
DIASTOLIC BLOOD PRESSURE: 60 MMHG | SYSTOLIC BLOOD PRESSURE: 118 MMHG | HEART RATE: 80 BPM | WEIGHT: 141.6 LBS | BODY MASS INDEX: 21.46 KG/M2 | HEIGHT: 68 IN | OXYGEN SATURATION: 98 %

## 2023-06-09 DIAGNOSIS — N18.32 STAGE 3B CHRONIC KIDNEY DISEASE (HCC): ICD-10-CM

## 2023-06-09 DIAGNOSIS — R06.02 SHORTNESS OF BREATH: ICD-10-CM

## 2023-06-09 DIAGNOSIS — D64.9 NORMOCHROMIC ANEMIA: Primary | ICD-10-CM

## 2023-06-09 RX ORDER — POTASSIUM CHLORIDE 1.5 G/1.77G
POWDER, FOR SOLUTION ORAL
COMMUNITY
Start: 2023-04-10

## 2023-06-09 RX ORDER — LEVOTHYROXINE SODIUM 0.07 MG/1
75 TABLET ORAL DAILY
COMMUNITY
Start: 2023-04-17 | End: 2023-06-09

## 2023-06-09 RX ORDER — IRON POLYSACCHARIDE COMPLEX 150 MG
150 CAPSULE ORAL EVERY OTHER DAY
Qty: 15 CAPSULE | Refills: 3 | Status: SHIPPED | OUTPATIENT
Start: 2023-06-09

## 2023-06-09 RX ORDER — PANTOPRAZOLE SODIUM 40 MG/1
40 TABLET, DELAYED RELEASE ORAL
Qty: 90 TABLET | Refills: 1
Start: 2023-06-09

## 2023-06-09 ASSESSMENT — PATIENT HEALTH QUESTIONNAIRE - PHQ9
SUM OF ALL RESPONSES TO PHQ QUESTIONS 1-9: 3
10. IF YOU CHECKED OFF ANY PROBLEMS, HOW DIFFICULT HAVE THESE PROBLEMS MADE IT FOR YOU TO DO YOUR WORK, TAKE CARE OF THINGS AT HOME, OR GET ALONG WITH OTHER PEOPLE: 0
9. THOUGHTS THAT YOU WOULD BE BETTER OFF DEAD, OR OF HURTING YOURSELF: 0
4. FEELING TIRED OR HAVING LITTLE ENERGY: 1
3. TROUBLE FALLING OR STAYING ASLEEP: 0
7. TROUBLE CONCENTRATING ON THINGS, SUCH AS READING THE NEWSPAPER OR WATCHING TELEVISION: 0
8. MOVING OR SPEAKING SO SLOWLY THAT OTHER PEOPLE COULD HAVE NOTICED. OR THE OPPOSITE, BEING SO FIGETY OR RESTLESS THAT YOU HAVE BEEN MOVING AROUND A LOT MORE THAN USUAL: 0
SUM OF ALL RESPONSES TO PHQ QUESTIONS 1-9: 3
2. FEELING DOWN, DEPRESSED OR HOPELESS: 1
5. POOR APPETITE OR OVEREATING: 0
SUM OF ALL RESPONSES TO PHQ9 QUESTIONS 1 & 2: 1
SUM OF ALL RESPONSES TO PHQ QUESTIONS 1-9: 3
1. LITTLE INTEREST OR PLEASURE IN DOING THINGS: 0
SUM OF ALL RESPONSES TO PHQ QUESTIONS 1-9: 3
6. FEELING BAD ABOUT YOURSELF - OR THAT YOU ARE A FAILURE OR HAVE LET YOURSELF OR YOUR FAMILY DOWN: 1

## 2023-06-09 ASSESSMENT — LIFESTYLE VARIABLES: HOW OFTEN DO YOU HAVE A DRINK CONTAINING ALCOHOL: NEVER

## 2023-06-09 NOTE — PROGRESS NOTES
Name: Parish Alatorre  : 1940         Chief Complaint:     Chief Complaint   Patient presents with    Anemia       History of Present Illness:      Parish Alatorre is a 78 y.o.  female who presents with Anemia      HPI    F/u anemia. No current GI complaints though she feels she did better on prilosec rather than protonix. However, ok with it because of plavix interaction with prilosec. OTC iron supplement expensive. BM's and stomach ok. Potassium does best on large tablet rather than capsule. Urination back to normal but wonders if that's only because of lasix which she's still taking daily, has been on for a long time. Some pain in L flank which is better with lying down. Reports family h/o kidney failure. Medical History:     Patient Active Problem List   Diagnosis    CAD (coronary artery disease)    Chronic back pain    Hypothyroidism    Hypertension    Post PTCA    Hot flashes, menopausal    Hyperlipidemia    CKD (chronic kidney disease), stage III (HCC)    Postlaminectomy syndrome of lumbar region    Primary parotid gland squamous cell carcinoma (HCC)    Dysthymia       Medications:       Prior to Admission medications    Medication Sig Start Date End Date Taking?  Authorizing Provider   potassium chloride (KLOR-CON) 20 MEQ packet mix one PACKET and drink by mouth 2 (TWO) times daily 4/10/23  Yes Historical Provider, MD   pantoprazole (PROTONIX) 40 MG tablet Take 1 tablet by mouth every morning (before breakfast) 23  Yes Sapna Hernandez DO   iron polysaccharides (IFEREX 150) 150 MG capsule Take 1 capsule by mouth every other day 23  Yes Sapna Hernandez DO   amLODIPine (NORVASC) 2.5 MG tablet Take 1 tablet by mouth in the morning and at bedtime 23  Yes Steve Cosby MD   levothyroxine (SYNTHROID) 100 MCG tablet TAKE 1 TABLET BY MOUTH EVERY DAY 3/6/23  Yes Sapna Hernandez DO   Red Yeast Rice 600 MG TABS Take 600 mg by mouth in the morning and at bedtime   Yes Historical

## 2023-06-20 ENCOUNTER — OFFICE VISIT (OUTPATIENT)
Dept: PRIMARY CARE CLINIC | Age: 83
End: 2023-06-20

## 2023-06-20 VITALS
OXYGEN SATURATION: 99 % | HEIGHT: 68 IN | SYSTOLIC BLOOD PRESSURE: 92 MMHG | HEART RATE: 87 BPM | DIASTOLIC BLOOD PRESSURE: 64 MMHG | BODY MASS INDEX: 21.07 KG/M2 | WEIGHT: 139 LBS

## 2023-06-20 DIAGNOSIS — I25.10 CORONARY ARTERY DISEASE INVOLVING NATIVE CORONARY ARTERY WITHOUT ANGINA PECTORIS, UNSPECIFIED WHETHER NATIVE OR TRANSPLANTED HEART: ICD-10-CM

## 2023-06-20 DIAGNOSIS — E78.2 MIXED HYPERLIPIDEMIA: ICD-10-CM

## 2023-06-20 DIAGNOSIS — I10 PRIMARY HYPERTENSION: Primary | ICD-10-CM

## 2023-06-20 DIAGNOSIS — N18.4 CRI (CHRONIC RENAL INSUFFICIENCY), STAGE 4 (SEVERE) (HCC): ICD-10-CM

## 2023-06-20 DIAGNOSIS — R82.998 FOAMY URINE: ICD-10-CM

## 2023-06-20 RX ORDER — PANTOPRAZOLE SODIUM 20 MG/1
20 TABLET, DELAYED RELEASE ORAL
Qty: 30 TABLET | Refills: 0 | Status: SHIPPED | OUTPATIENT
Start: 2023-06-20

## 2023-06-20 NOTE — PROGRESS NOTES
levothyroxine (SYNTHROID) 100 MCG tablet TAKE 1 TABLET BY MOUTH EVERY DAY 90 tablet 1    Red Yeast Rice 600 MG TABS Take 600 mg by mouth in the morning and at bedtime      metoprolol succinate (TOPROL XL) 25 MG extended release tablet TAKE 1 TABLET BY MOUTH EVERY DAY 90 tablet 1    FLUoxetine (PROZAC) 40 MG capsule TAKE 1 CAPSULE BY MOUTH EVERY DAY 90 capsule 1    furosemide (LASIX) 20 MG tablet TAKE 1 TABLET BY MOUTH EVERY DAY 90 tablet 1    buPROPion (WELLBUTRIN XL) 150 MG extended release tablet TAKE 1 TABLET BY MOUTH IN THE MORNING 90 tablet 1    clopidogrel (PLAVIX) 75 MG tablet Take 1 tablet by mouth daily 90 tablet 3    ASPIRIN LOW DOSE 81 MG EC tablet Take 1 tablet by mouth in the morning. 30 tablet 5    isosorbide mononitrate (IMDUR) 30 MG extended release tablet TAKE 1 TABLET BY MOUTH TWICE DAILY 180 tablet 3    b complex vitamins capsule Take 1 capsule by mouth daily      bisacodyl (DULCOLAX) 5 MG EC tablet Take 1 tablet by mouth daily as needed Take three daily      hydrocortisone (ANUSOL-HC) 2.5 % CREA rectal cream Place rectally 2 times daily as needed for Hemorrhoids 1 each 5     No current facility-administered medications for this visit. Allergies   Allergen Reactions    Sulfa Antibiotics Itching       Health Maintenance   Topic Date Due    Annual Wellness Visit (AWV)  06/03/2023    Depression Monitoring  06/09/2024    DTaP/Tdap/Td vaccine (3 - Td or Tdap) 12/31/2032    DEXA (modify frequency per FRAX score)  Completed    Flu vaccine  Completed    Shingles vaccine  Completed    Pneumococcal 65+ years Vaccine  Completed    COVID-19 Vaccine  Completed    Hepatitis A vaccine  Aged Out    Hib vaccine  Aged Out    Meningococcal (ACWY) vaccine  Aged Out    Lipids  Discontinued       Subjective:      Review of Systems   Constitutional:  Positive for activity change and fatigue. Negative for chills and fever. HENT:  Negative for congestion, ear pain, rhinorrhea, sinus pressure and sore throat.

## 2023-06-21 ENCOUNTER — HOSPITAL ENCOUNTER (OUTPATIENT)
Age: 83
Setting detail: SPECIMEN
Discharge: HOME | End: 2023-06-21
Payer: MEDICARE

## 2023-06-21 DIAGNOSIS — R82.998 FOAMY URINE: ICD-10-CM

## 2023-06-21 LAB
CREAT UR-MCNC: 27.5 MG/DL (ref 28–217)
MICROALBUMIN UR-MCNC: 13 MG/L
MICROALBUMIN/CREAT UR-RTO: 47 MCG/MG CREAT

## 2023-06-21 PROCEDURE — 82570 ASSAY OF URINE CREATININE: CPT

## 2023-06-21 PROCEDURE — 82043 UR ALBUMIN QUANTITATIVE: CPT

## 2023-06-24 ASSESSMENT — ENCOUNTER SYMPTOMS
SORE THROAT: 0
EYES NEGATIVE: 1
NAUSEA: 0
RHINORRHEA: 0
SINUS PRESSURE: 0
SHORTNESS OF BREATH: 0
BACK PAIN: 1
ABDOMINAL DISTENTION: 0
CHEST TIGHTNESS: 0

## 2023-07-10 RX ORDER — LEVOTHYROXINE SODIUM 0.1 MG/1
TABLET ORAL
Qty: 90 TABLET | Refills: 1 | Status: SHIPPED | OUTPATIENT
Start: 2023-07-10

## 2023-07-10 RX ORDER — POTASSIUM CHLORIDE 20 MEQ/1
40 TABLET, EXTENDED RELEASE ORAL 2 TIMES DAILY
Qty: 360 TABLET | Refills: 3 | OUTPATIENT
Start: 2023-07-10

## 2023-07-10 RX ORDER — POTASSIUM CHLORIDE 20 MEQ/1
TABLET, EXTENDED RELEASE ORAL
Qty: 360 TABLET | Refills: 3 | OUTPATIENT
Start: 2023-07-10

## 2023-07-11 DIAGNOSIS — E87.6 HYPOKALEMIA: Primary | ICD-10-CM

## 2023-07-11 RX ORDER — LEVOTHYROXINE SODIUM 0.1 MG/1
TABLET ORAL
Qty: 90 TABLET | Refills: 1 | OUTPATIENT
Start: 2023-07-11

## 2023-07-11 RX ORDER — PANTOPRAZOLE SODIUM 20 MG/1
TABLET, DELAYED RELEASE ORAL
Qty: 30 TABLET | Refills: 0 | OUTPATIENT
Start: 2023-07-11

## 2023-07-11 NOTE — TELEPHONE ENCOUNTER
Need to find out if pt is seeing nephrology Dr. Sabine Braden like advised. Trying to refill potassium med but need updated lab, I expected nephrology would do that soon. I spoke with Dr. Robbie Cosme and he also advised to recheck K level in order to refill appropriately.    Let me know   Ramon Wade

## 2023-07-11 NOTE — TELEPHONE ENCOUNTER
Last OV: 6/9/2023 NP   Last RX:    Next scheduled apt: Visit date not found              Surescript requesting a refill

## 2023-07-12 RX ORDER — ISOSORBIDE MONONITRATE 30 MG/1
TABLET, EXTENDED RELEASE ORAL
Qty: 180 TABLET | Refills: 3 | Status: SHIPPED | OUTPATIENT
Start: 2023-07-12

## 2023-07-12 NOTE — TELEPHONE ENCOUNTER
Call Dr. Marie Davalos and find out what labs are being requested pt going Thursday to do all.      thanks

## 2023-07-12 NOTE — TELEPHONE ENCOUNTER
Spoke with Dr Tobias Mendez office.  They stated since pt was a New patient that they would order labs after being seen

## 2023-07-13 ENCOUNTER — HOSPITAL ENCOUNTER (OUTPATIENT)
Age: 83
Discharge: HOME | End: 2023-07-13
Payer: MEDICARE

## 2023-07-13 DIAGNOSIS — N18.4 CRI (CHRONIC RENAL INSUFFICIENCY), STAGE 4 (SEVERE) (HCC): ICD-10-CM

## 2023-07-13 DIAGNOSIS — E55.9 VITAMIN D DEFICIENCY: ICD-10-CM

## 2023-07-13 DIAGNOSIS — E87.6 HYPOKALEMIA: ICD-10-CM

## 2023-07-13 DIAGNOSIS — E55.9 VITAMIN D DEFICIENCY: Primary | ICD-10-CM

## 2023-07-13 LAB
25(OH)D3 SERPL-MCNC: 52.8 NG/ML
ALBUMIN SERPL-MCNC: 4 G/DL (ref 3.5–5.2)
ALP SERPL-CCNC: 75 U/L (ref 35–104)
ALT SERPL-CCNC: 13 U/L (ref 5–33)
ANION GAP SERPL CALCULATED.3IONS-SCNC: 13 MMOL/L (ref 9–17)
AST SERPL-CCNC: 16 U/L
BILIRUB SERPL-MCNC: 0.1 MG/DL (ref 0.3–1.2)
BUN SERPL-MCNC: 25 MG/DL (ref 8–23)
BUN/CREAT SERPL: 14 (ref 9–20)
CA-I BLD-SCNC: 1.28 MMOL/L (ref 1.13–1.33)
CALCIUM SERPL-MCNC: 8.7 MG/DL (ref 8.6–10.4)
CHLORIDE SERPL-SCNC: 110 MMOL/L (ref 98–107)
CO2 SERPL-SCNC: 18 MMOL/L (ref 20–31)
CREAT SERPL-MCNC: 1.8 MG/DL (ref 0.5–0.9)
GFR SERPL CREATININE-BSD FRML MDRD: 27 ML/MIN/1.73M2
GLUCOSE SERPL-MCNC: 94 MG/DL (ref 70–99)
MAGNESIUM SERPL-MCNC: 2.2 MG/DL (ref 1.6–2.6)
PHOSPHATE SERPL-MCNC: 3.9 MG/DL (ref 2.6–4.5)
POTASSIUM SERPL-SCNC: 3.7 MMOL/L (ref 3.7–5.3)
PROT SERPL-MCNC: 6.3 G/DL (ref 6.4–8.3)
PTH-INTACT SERPL-MCNC: 55.7 PG/ML (ref 14–72)
SODIUM SERPL-SCNC: 141 MMOL/L (ref 135–144)

## 2023-07-13 PROCEDURE — 80053 COMPREHEN METABOLIC PANEL: CPT

## 2023-07-13 PROCEDURE — 82330 ASSAY OF CALCIUM: CPT

## 2023-07-13 PROCEDURE — 84100 ASSAY OF PHOSPHORUS: CPT

## 2023-07-13 PROCEDURE — 83970 ASSAY OF PARATHORMONE: CPT

## 2023-07-13 PROCEDURE — 36415 COLL VENOUS BLD VENIPUNCTURE: CPT

## 2023-07-13 PROCEDURE — 82306 VITAMIN D 25 HYDROXY: CPT

## 2023-07-13 PROCEDURE — 83735 ASSAY OF MAGNESIUM: CPT

## 2023-07-13 RX ORDER — BUPROPION HYDROCHLORIDE 150 MG/1
TABLET ORAL
Qty: 90 TABLET | Refills: 1 | Status: SHIPPED | OUTPATIENT
Start: 2023-07-13

## 2023-07-13 RX ORDER — PANTOPRAZOLE SODIUM 20 MG/1
20 TABLET, DELAYED RELEASE ORAL
Qty: 90 TABLET | Refills: 0 | OUTPATIENT
Start: 2023-07-13

## 2023-07-13 RX ORDER — FLUOXETINE HYDROCHLORIDE 40 MG/1
CAPSULE ORAL
Qty: 90 CAPSULE | Refills: 1 | Status: SHIPPED | OUTPATIENT
Start: 2023-07-13

## 2023-07-13 RX ORDER — METOPROLOL SUCCINATE 25 MG/1
TABLET, EXTENDED RELEASE ORAL
Qty: 90 TABLET | Refills: 1 | Status: SHIPPED | OUTPATIENT
Start: 2023-07-13

## 2023-07-13 RX ORDER — FUROSEMIDE 20 MG/1
TABLET ORAL
Qty: 90 TABLET | Refills: 1 | Status: SHIPPED | OUTPATIENT
Start: 2023-07-13

## 2023-07-17 RX ORDER — POTASSIUM CHLORIDE 1.5 G/1.77G
20 POWDER, FOR SOLUTION ORAL DAILY
Qty: 30 EACH | Refills: 0 | Status: SHIPPED | OUTPATIENT
Start: 2023-07-17

## 2023-07-20 ENCOUNTER — APPOINTMENT (OUTPATIENT)
Dept: CT IMAGING | Age: 83
End: 2023-07-20
Payer: MEDICARE

## 2023-07-20 ENCOUNTER — HOSPITAL ENCOUNTER (EMERGENCY)
Age: 83
Discharge: HOME | End: 2023-07-20
Attending: EMERGENCY MEDICINE
Payer: MEDICARE

## 2023-07-20 VITALS
TEMPERATURE: 97 F | WEIGHT: 138 LBS | RESPIRATION RATE: 15 BRPM | OXYGEN SATURATION: 97 % | HEART RATE: 81 BPM | HEIGHT: 68 IN | SYSTOLIC BLOOD PRESSURE: 137 MMHG | BODY MASS INDEX: 20.92 KG/M2 | DIASTOLIC BLOOD PRESSURE: 67 MMHG

## 2023-07-20 DIAGNOSIS — M54.50 ACUTE BILATERAL LOW BACK PAIN WITHOUT SCIATICA: Primary | ICD-10-CM

## 2023-07-20 LAB
ANION GAP SERPL CALCULATED.3IONS-SCNC: 10 MMOL/L (ref 9–17)
BASOPHILS # BLD: 0 K/UL (ref 0–0.2)
BASOPHILS NFR BLD: 0 % (ref 0–2)
BUN SERPL-MCNC: 23 MG/DL (ref 8–23)
BUN/CREAT SERPL: 14 (ref 9–20)
CALCIUM SERPL-MCNC: 8.8 MG/DL (ref 8.6–10.4)
CHLORIDE SERPL-SCNC: 107 MMOL/L (ref 98–107)
CO2 SERPL-SCNC: 19 MMOL/L (ref 20–31)
CREAT SERPL-MCNC: 1.6 MG/DL (ref 0.5–0.9)
DIFFERENTIAL TYPE: YES
EOSINOPHIL # BLD: 0.1 K/UL (ref 0–0.4)
EOSINOPHILS RELATIVE PERCENT: 2 % (ref 0–5)
ERYTHROCYTE [DISTWIDTH] IN BLOOD BY AUTOMATED COUNT: 18.9 % (ref 12.1–15.2)
GFR SERPL CREATININE-BSD FRML MDRD: 31 ML/MIN/1.73M2
GLUCOSE SERPL-MCNC: 103 MG/DL (ref 70–99)
HCT VFR BLD AUTO: 28.9 % (ref 36–46)
HGB BLD-MCNC: 9.3 G/DL (ref 12–16)
LYMPHOCYTES NFR BLD: 1.1 K/UL (ref 1–4.8)
LYMPHOCYTES RELATIVE PERCENT: 15 % (ref 15–40)
MCH RBC QN AUTO: 27.4 PG (ref 26–34)
MCHC RBC AUTO-ENTMCNC: 32.1 G/DL (ref 31–37)
MCV RBC AUTO: 85.2 FL (ref 80–100)
MONOCYTES NFR BLD: 0.6 K/UL (ref 0–1)
MONOCYTES NFR BLD: 8 % (ref 4–8)
NEUTROPHILS NFR BLD: 75 % (ref 47–75)
NEUTS SEG NFR BLD: 5.8 K/UL (ref 2.5–7)
PLATELET # BLD AUTO: 345 K/UL (ref 140–450)
POTASSIUM SERPL-SCNC: 3.7 MMOL/L (ref 3.7–5.3)
RBC # BLD AUTO: 3.4 M/UL (ref 4–5.2)
SODIUM SERPL-SCNC: 136 MMOL/L (ref 135–144)
WBC OTHER # BLD: 7.7 K/UL (ref 3.5–11)

## 2023-07-20 PROCEDURE — 85027 COMPLETE CBC AUTOMATED: CPT

## 2023-07-20 PROCEDURE — 99284 EMERGENCY DEPT VISIT MOD MDM: CPT

## 2023-07-20 PROCEDURE — 80048 BASIC METABOLIC PNL TOTAL CA: CPT

## 2023-07-20 PROCEDURE — 74176 CT ABD & PELVIS W/O CONTRAST: CPT

## 2023-07-20 PROCEDURE — 6360000002 HC RX W HCPCS: Performed by: EMERGENCY MEDICINE

## 2023-07-20 PROCEDURE — 96372 THER/PROPH/DIAG INJ SC/IM: CPT

## 2023-07-20 RX ORDER — FENTANYL CITRATE 50 UG/ML
100 INJECTION, SOLUTION INTRAMUSCULAR; INTRAVENOUS ONCE
Status: COMPLETED | OUTPATIENT
Start: 2023-07-20 | End: 2023-07-20

## 2023-07-20 RX ADMIN — FENTANYL CITRATE 100 MCG: 50 INJECTION, SOLUTION INTRAMUSCULAR; INTRAVENOUS at 19:21

## 2023-07-20 ASSESSMENT — PAIN SCALES - GENERAL
PAINLEVEL_OUTOF10: 10
PAINLEVEL_OUTOF10: 7
PAINLEVEL_OUTOF10: 6

## 2023-07-20 ASSESSMENT — PAIN DESCRIPTION - PAIN TYPE: TYPE: ACUTE PAIN

## 2023-07-20 ASSESSMENT — PAIN DESCRIPTION - DESCRIPTORS
DESCRIPTORS: ACHING
DESCRIPTORS: ACHING

## 2023-07-20 ASSESSMENT — PAIN DESCRIPTION - LOCATION
LOCATION: BACK
LOCATION: BACK

## 2023-07-20 ASSESSMENT — PAIN DESCRIPTION - ORIENTATION: ORIENTATION: LEFT;LOWER

## 2023-07-20 ASSESSMENT — LIFESTYLE VARIABLES
HOW MANY STANDARD DRINKS CONTAINING ALCOHOL DO YOU HAVE ON A TYPICAL DAY: PATIENT DOES NOT DRINK
HOW OFTEN DO YOU HAVE A DRINK CONTAINING ALCOHOL: NEVER

## 2023-07-20 ASSESSMENT — PAIN DESCRIPTION - FREQUENCY: FREQUENCY: INTERMITTENT

## 2023-07-20 ASSESSMENT — PAIN - FUNCTIONAL ASSESSMENT: PAIN_FUNCTIONAL_ASSESSMENT: 0-10

## 2023-07-21 ENCOUNTER — TELEPHONE (OUTPATIENT)
Dept: PRIMARY CARE CLINIC | Age: 83
End: 2023-07-21

## 2023-07-21 NOTE — ED PROVIDER NOTES
eMERGENCY dEPARTMENT eNCOUnter        [address]    Chief Complaint   Patient presents with    Back Pain     Back pain to lower left side. Denies pain radiating anywhere. Stated she is ESRD with extensive history. Prednisone helps pain. HPI    Ricky Orr is a 78 y.o. female who presents to ED with back pain. Patient has chronic low back pain. Patient states that she has been having worsening pain worse in the left lower back. Patient has history of sciatica. Patient denies falls denies trauma. The pain is positional.  The pain is worse with movement. Has previous history of coronary artery disease coronary artery bypass graft  REVIEW OF SYSTEMS    All systems reviewed and positives are in the HPI. PAST MEDICAL HISTORY    Past Medical History:   Diagnosis Date    CAD (coronary artery disease) 2008    CABG x 3    Chronic back pain     Hyperlipidemia     Hypertension     Hypothyroidism     Macular degeneration     Post PTCA     cardiac stents x 8    Transfusion history        SURGICAL HISTORY    Past Surgical History:   Procedure Laterality Date    ANGIOPLASTY  12/07/2016    Dr. Jerardo Blevins @ [address] [place] [address] x 1     ANGIOPLASTY  07/29/2022    Dr. Jerardo Blevins @ [facility]--Stenting X 2--    COLONOSCOPY  06/11/2013    Sandy GERONIMO    CORONARY ANGIOPLASTY Left 09/02/2022    Dr. Jerardo Blevins @ [facility]--Stenting X 2-also Cardiac rehab    [facility]  2006,11/14/2012    CORONARY ARTERY BYPASS GRAFT  2006    X3    HEMORRHOID SURGERY      OVARY REMOVAL Right     OR NJX DX/THER SBST INTRLMNR LMBR/SAC W/IMG GDN N/A 08/28/2018    EPIDURAL STEROID INJECTION-CUADAL performed by Mark Belcher MD at [address]    laminectomy    UPPER GASTROINTESTINAL ENDOSCOPY  06/11/2013       CURRENT MEDICATIONS      Current Outpatient Rx   Medication Sig Dispense Refill    potassium chloride (KLOR-CON) 20 MEQ packet Take 20 mEq by mouth daily After mixing with water .  30 each 0 FLUoxetine (PROZAC) 40 MG capsule TAKE 1 CAPSULE BY MOUTH EVERY DAY 90 capsule 1    buPROPion (WELLBUTRIN XL) 150 MG extended release tablet TAKE 1 TABLET BY MOUTH IN THE MORNING 90 tablet 1    furosemide (LASIX) 20 MG tablet TAKE 1 TABLET BY MOUTH EVERY DAY 90 tablet 1    metoprolol succinate (TOPROL XL) 25 MG extended release tablet TAKE 1 TABLET BY MOUTH EVERY DAY 90 tablet 1    isosorbide mononitrate (IMDUR) 30 MG extended release tablet TAKE 1 TABLET BY MOUTH TWICE DAILY 180 tablet 3    levothyroxine (SYNTHROID) 100 MCG tablet TAKE 1 TABLET BY MOUTH EVERY DAY 90 tablet 1    pantoprazole (PROTONIX) 20 MG tablet Take 1 tablet by mouth every morning (before breakfast) 30 tablet 0    iron polysaccharides (IFEREX 150) 150 MG capsule Take 1 capsule by mouth every other day 15 capsule 3    amLODIPine (NORVASC) 2.5 MG tablet Take 1 tablet by mouth in the morning and at bedtime 60 tablet 11    Red Yeast Rice 600 MG TABS Take 600 mg by mouth in the morning and at bedtime      clopidogrel (PLAVIX) 75 MG tablet Take 1 tablet by mouth daily 90 tablet 3    ASPIRIN LOW DOSE 81 MG EC tablet Take 1 tablet by mouth in the morning.  30 tablet 5    hydrocortisone (ANUSOL-HC) 2.5 % CREA rectal cream Place rectally 2 times daily as needed for Hemorrhoids 1 each 5    b complex vitamins capsule Take 1 capsule by mouth daily      bisacodyl (DULCOLAX) 5 MG EC tablet Take 1 tablet by mouth daily as needed Take three daily         ALLERGIES      Allergies   Allergen Reactions    Sulfa Antibiotics Itching       FAMILY HISTORY    Family History   Problem Relation Age of Onset    Heart Disease Mother     Cancer Father         prostate    Dementia Father        SOCIAL HISTORY    Social History     Socioeconomic History    Marital status:      Spouse name: None    Number of children: None    Years of education: None    Highest education level: None   Tobacco Use    Smoking status: Former     Packs/day: 1.00     Years: 20.00     Pack

## 2023-07-21 NOTE — ED NOTES
Ticket to ride completed.  The following information was reported off:  Name  Allergies  Orientation Level  Destination  Safety Issues  Code Status  Oxygen Requirements  Special needs including mobility, language, communication       Flaco Matamoros RN  07/20/23 2051

## 2023-07-21 NOTE — TELEPHONE ENCOUNTER
----- Message from JUAN Kumar CNP sent at 7/19/2023  6:24 PM EDT -----  Response to prior note    New script pantoprazole was sent but only in 30 day  supply due to weaning off this med , we discussed in office this med effects the kidney function and not to be used long term.      Thanks

## 2023-07-21 NOTE — TELEPHONE ENCOUNTER
[facility]) ED Follow up Call    Reason for ED visit:  7/20/2023 7/21/2023    Hi Shirley Yang , this is Gilbert [facility]angel [facility], just calling to see how you are doing after your recent ED visit. Did you receive discharge instructions? Yes  Do you understand the discharge instructions? Yes  Did the ED give you any new prescriptions? No:   Were you able to fill your prescriptions? N/A      Do you have one of our red, yellow and green  Zone sheets that help you to determine when you should go to the ED? Not Applicable      Do you need or want to make a follow up appt with your PCP? Yes    Do you have any further needs in the home, e.g. equipment? No        Future Appointments   Date Time Provider [address]   7/25/2023  9:00 AM [facility]   8/22/2023  9:20 AM [facility]   9/11/2023 11:00 AM JUAN Shah  [facility]   10/31/2023 10:00 AM MD Herson Martinez W                 VOICEMAIL DOCUMENTATION. Erase if not used  Hi, this message is for [place]. This is Beatriz Velazquez LPN from Lexi Rubin [facility] office. Just calling to see how you are doing after your recent visit to the Emergency Room. Lexi MARTINEZ-CNP wants to make sure you were able to fill any prescriptions and that you understand your discharge instructions. Please return our call if you need to make a follow up appointment with your provider or have any further needs. Our phone number is [phone]. Have a great day!

## 2023-07-25 ENCOUNTER — OFFICE VISIT (OUTPATIENT)
Dept: PRIMARY CARE CLINIC | Age: 83
End: 2023-07-25

## 2023-07-25 VITALS
HEIGHT: 68 IN | SYSTOLIC BLOOD PRESSURE: 100 MMHG | BODY MASS INDEX: 20.92 KG/M2 | WEIGHT: 138 LBS | HEART RATE: 89 BPM | OXYGEN SATURATION: 97 % | DIASTOLIC BLOOD PRESSURE: 78 MMHG

## 2023-07-25 DIAGNOSIS — N18.4 CRI (CHRONIC RENAL INSUFFICIENCY), STAGE 4 (SEVERE) (HCC): ICD-10-CM

## 2023-07-25 DIAGNOSIS — F41.9 ANXIETY AND DEPRESSION: ICD-10-CM

## 2023-07-25 DIAGNOSIS — I25.10 CORONARY ARTERY DISEASE INVOLVING NATIVE CORONARY ARTERY WITHOUT ANGINA PECTORIS, UNSPECIFIED WHETHER NATIVE OR TRANSPLANTED HEART: ICD-10-CM

## 2023-07-25 DIAGNOSIS — F32.A ANXIETY AND DEPRESSION: ICD-10-CM

## 2023-07-25 DIAGNOSIS — E78.2 MIXED HYPERLIPIDEMIA: ICD-10-CM

## 2023-07-25 DIAGNOSIS — M54.40 ACUTE BILATERAL LOW BACK PAIN WITH SCIATICA, SCIATICA LATERALITY UNSPECIFIED: ICD-10-CM

## 2023-07-25 DIAGNOSIS — I10 PRIMARY HYPERTENSION: Primary | ICD-10-CM

## 2023-07-25 DIAGNOSIS — E03.9 ACQUIRED HYPOTHYROIDISM: ICD-10-CM

## 2023-07-25 RX ORDER — PREDNISONE 20 MG/1
TABLET ORAL
COMMUNITY
Start: 2023-07-10 | End: 2023-07-25 | Stop reason: SDUPTHER

## 2023-07-25 RX ORDER — PREDNISONE 20 MG/1
20 TABLET ORAL DAILY
Qty: 5 TABLET | Refills: 0 | Status: SHIPPED | OUTPATIENT
Start: 2023-07-25 | End: 2023-07-27 | Stop reason: ALTCHOICE

## 2023-07-25 RX ORDER — POTASSIUM CHLORIDE 20 MEQ/1
20 TABLET, EXTENDED RELEASE ORAL DAILY
Qty: 30 TABLET | Refills: 0
Start: 2023-07-25

## 2023-07-25 NOTE — PROGRESS NOTES
[address] [facility] [address]  Dept: [phone]  Dept Fax: [phone]    Last encounter 6/20/2023    Back Pain (ED f/u. Pt states she is still having back pain, mid-lower back (alternates sides), right sciatic nerve pain. Currently rates pain 10/10)       HPI:   Sarah Freeman is a 78 y.o. female who presentstoday for her medical conditions/complaints as noted below. Sarah Freeman is c/o of Back Pain (ED f/u. Pt states she is still having back pain, mid-lower back (alternates sides), right sciatic nerve pain. Currently rates pain 10/10)      HPI  Established patient    Patient is an 78-year-old  female here today to follow-up on some recent labs has expected consult with nephrology due to stage III-IV renal insufficiency with proteinuria patient has severe coronary artery disease denies any chest pain but it was in the ER recently for back pain    Pt offers she is taking prednisone here and there which I informed her was inappropriate to use it that way. Can induce diabetes and strains body on and off. Encouraged pt to take consistently for 5 days in a row. (This can also irritate stomach so we will stay on PPI)     Patient's  is here today we are attempting to clarify medications we decreased the pantoprazole from 40 mg to 20 mg daily we have seen an improved effect in her GFR from this by almost 6 points from 25 to 31 , we discuss hydration and pt not drinking much water encouraged 50 ounces daily she will try to reach this. Will continue on low dose PPI until anemia improves   Lab Results   Component Value Date    WBC 7.7 07/20/2023    HGB 9.3 (L) 07/20/2023    HCT 28.9 (L) 07/20/2023    MCV 85.2 07/20/2023     07/20/2023         Recent refill with potassium K-Lyte pt taking with water and tolerating it okay.  also has Potassium tablets he wants to use up.  Recent level with

## 2023-07-25 NOTE — PATIENT INSTRUCTIONS
Phone: [phone]  Fax: Jung Hitchcock Office Hours:  Monday: [facility] office location 8-5 ([phone]) Offering additional late hours the first Monday of the month until 7 pm.   Tuesday: 8-5 Wednesday: 8-5 Thursday:  Additional hours offered 2 Thursdays a month. Please call to inquire those dates. Fridays: 7:30-4:30   SURVEY:    You may be receiving a survey from [employer] regarding your visit today. Please complete the survey to enable us to provide the highest quality of care to you and your family. If you cannot score us a very good on any question, please call the office to discuss how we could have made your experience a very good one. Thank you.

## 2023-07-27 RX ORDER — PANTOPRAZOLE SODIUM 20 MG/1
20 TABLET, DELAYED RELEASE ORAL
Qty: 30 TABLET | Refills: 0 | OUTPATIENT
Start: 2023-07-27

## 2023-07-27 RX ORDER — PANTOPRAZOLE SODIUM 20 MG/1
20 TABLET, DELAYED RELEASE ORAL
Qty: 30 TABLET | Refills: 0 | Status: SHIPPED | OUTPATIENT
Start: 2023-07-27

## 2023-07-27 ASSESSMENT — ENCOUNTER SYMPTOMS
WHEEZING: 0
CHEST TIGHTNESS: 0
EYES NEGATIVE: 1
SHORTNESS OF BREATH: 0
ABDOMINAL DISTENTION: 0
COUGH: 0
SORE THROAT: 0

## 2023-07-27 NOTE — TELEPHONE ENCOUNTER
Lewis Larkin called in asking if you would send in a refill on this. He knows you are trying to wean Lawerance Pretzel off of this, but he stated that she had a bad day yesterday with indigestion without it.

## 2023-07-28 RX ORDER — PANTOPRAZOLE SODIUM 20 MG/1
TABLET, DELAYED RELEASE ORAL
Qty: 30 TABLET | Refills: 0 | OUTPATIENT
Start: 2023-07-28

## 2023-08-01 ENCOUNTER — TELEPHONE (OUTPATIENT)
Dept: PRIMARY CARE CLINIC | Age: 83
End: 2023-08-01

## 2023-08-01 DIAGNOSIS — M54.40 ACUTE BILATERAL LOW BACK PAIN WITH SCIATICA, SCIATICA LATERALITY UNSPECIFIED: Primary | ICD-10-CM

## 2023-08-01 DIAGNOSIS — Z98.890 HX OF LUMBOSACRAL SPINE SURGERY: ICD-10-CM

## 2023-08-01 NOTE — TELEPHONE ENCOUNTER
Would offer several things:    Please realize she has a L3-S1 prior lumbar decompression and stabilization with candy and screw placed with 2018 last MRI. Can re xray with plain xray to make sure if any further changes. 2. Physical therapy to strengthen and stretch this area. Work on coordination and no falls. 3. Referral to pain management in [facility] Dr. Edmar Ng who has ability to do injections or offer more. He is rehab and pain specialist to focus on her ability to stay functional and active. Since she already took the steroid med. Should not repeat. Also NSAIDS like ibuprofen should not be taken due to worsening kidney disease. Gabapentin was used prior which pt stopped.    Tylenol is okay     Betsy MARTINEZ CNP     Let me know what she is willing to do

## 2023-08-01 NOTE — TELEPHONE ENCOUNTER
Crys Cm called in and stated that Jose Sudeep had seen the kidney dr yesterday and the back pain is not from her kidneys. He stated that she is having a lot of pain. Do you want to see her or have any suggestions? Please advise.

## 2023-08-14 ENCOUNTER — TELEPHONE (OUTPATIENT)
Dept: PRIMARY CARE CLINIC | Age: 83
End: 2023-08-14

## 2023-08-14 NOTE — TELEPHONE ENCOUNTER
Patient needs a new script for her handicap placard - patient has an appointment tomorrow in [place] and they will pick it up then

## 2023-08-15 ENCOUNTER — OFFICE VISIT (OUTPATIENT)
Dept: PRIMARY CARE CLINIC | Age: 83
End: 2023-08-15
Payer: MEDICARE

## 2023-08-15 VITALS
HEART RATE: 80 BPM | BODY MASS INDEX: 21.22 KG/M2 | WEIGHT: 140 LBS | HEIGHT: 68 IN | DIASTOLIC BLOOD PRESSURE: 80 MMHG | SYSTOLIC BLOOD PRESSURE: 138 MMHG | OXYGEN SATURATION: 99 %

## 2023-08-15 DIAGNOSIS — K21.9 GASTROESOPHAGEAL REFLUX DISEASE WITHOUT ESOPHAGITIS: ICD-10-CM

## 2023-08-15 DIAGNOSIS — N18.4 CRI (CHRONIC RENAL INSUFFICIENCY), STAGE 4 (SEVERE) (HCC): ICD-10-CM

## 2023-08-15 DIAGNOSIS — I10 PRIMARY HYPERTENSION: ICD-10-CM

## 2023-08-15 DIAGNOSIS — E03.9 ACQUIRED HYPOTHYROIDISM: ICD-10-CM

## 2023-08-15 DIAGNOSIS — M54.9 NON-TRAUMATIC MID BACK PAIN: Primary | ICD-10-CM

## 2023-08-15 DIAGNOSIS — Z98.890 HX OF LUMBOSACRAL SPINE SURGERY: ICD-10-CM

## 2023-08-15 PROCEDURE — G8399 PT W/DXA RESULTS DOCUMENT: HCPCS | Performed by: NURSE PRACTITIONER

## 2023-08-15 PROCEDURE — G8420 CALC BMI NORM PARAMETERS: HCPCS | Performed by: NURSE PRACTITIONER

## 2023-08-15 PROCEDURE — 3074F SYST BP LT 130 MM HG: CPT | Performed by: NURSE PRACTITIONER

## 2023-08-15 PROCEDURE — 3078F DIAST BP <80 MM HG: CPT | Performed by: NURSE PRACTITIONER

## 2023-08-15 PROCEDURE — 1090F PRES/ABSN URINE INCON ASSESS: CPT | Performed by: NURSE PRACTITIONER

## 2023-08-15 PROCEDURE — 1123F ACP DISCUSS/DSCN MKR DOCD: CPT | Performed by: NURSE PRACTITIONER

## 2023-08-15 PROCEDURE — 99213 OFFICE O/P EST LOW 20 MIN: CPT | Performed by: NURSE PRACTITIONER

## 2023-08-15 PROCEDURE — G8427 DOCREV CUR MEDS BY ELIG CLIN: HCPCS | Performed by: NURSE PRACTITIONER

## 2023-08-15 PROCEDURE — 1036F TOBACCO NON-USER: CPT | Performed by: NURSE PRACTITIONER

## 2023-08-15 RX ORDER — DULOXETIN HYDROCHLORIDE 30 MG/1
30 CAPSULE, DELAYED RELEASE ORAL DAILY
Qty: 30 CAPSULE | Refills: 1 | Status: SHIPPED | OUTPATIENT
Start: 2023-08-15

## 2023-08-15 RX ORDER — PANTOPRAZOLE SODIUM 20 MG/1
20 TABLET, DELAYED RELEASE ORAL
Qty: 90 TABLET | Refills: 0 | Status: SHIPPED | OUTPATIENT
Start: 2023-08-15

## 2023-08-15 NOTE — PROGRESS NOTES
HPI Notes    Name: Katja Grove  : 1940         Chief Complaint:     Chief Complaint   Patient presents with    Back Pain     Pt states her pain is still present       History of Present Illness:        HPI    Patient is an 75-year-old  female here today to discuss concern for back pain more in the mid back goes across the bra strap area on both sides of the back denies any injury or falls  At risk for osteoporosis with age being 78 chronic renal insufficiency stage III, 20 pack smoking history which she quit in , low body weight BMI is 21, natural menopause with estrogen deficiency    2018 Xray lumbar spine  Narrative & Impression  FIVE VIEWS LUMBAR SPINE, 6 IMAGES      HISTORY: Left hip osteoarthritis. COMPARISON: Lumbar spine 2010. FINDINGS: Previous laminectomy and fusion with bilateral pedicle screws and   lateral fusion masses at L3 through S1 intact unchanged. A mild convex right   scoliosis is slightly more prominent with increased degenerative change above   the fusion level now present, at L2-L3. Slight grade 1 anterolisthesis of L4 on   L5 is stable. The hardware is intact. IMPRESSION:     Increased moderately severe degenerative changes above the fusion level at the   L2-L3 level with a slight increase in a convex right lumbar scoliosis. Specimen Collected: 18 09:36 EDT           Pt with recent steroid taper which aggravated her stomach so she is on protonix 40mg daily, she is agreeable next fill to reduce to 20 mg daily for better kidney health. Newly established with Dr. Keith Kin no med changes. Pt initially declined physical therapy but we discussed multiple treatment modalities that area available which will likely help her back pain and spasm. Limited cannot use NSAIDS due to renal decline and also age with hx CABG which is contraindicated.    Pt agreeable to wean off wellbutrin and trial cymbalta med for better

## 2023-08-15 NOTE — PATIENT INSTRUCTIONS
Phone: [phone]  Fax: [facility] Office Hours:  Monday: [facility] office location 8-5 ([phone]) Offering additional late hours the first Monday of the month until 7 pm.   Tuesday: 8-5 Wednesday: 8-5 Thursday:  Additional hours offered 2 Thursdays a month. Please call to inquire those dates. Fridays: 7:30-4:30   SURVEY:    You may be receiving a survey from [employer] regarding your visit today. Please complete the survey to enable us to provide the highest quality of care to you and your family. If you cannot score us a very good on any question, please call the office to discuss how we could have made your experience a very good one. Thank you.

## 2023-08-16 ENCOUNTER — TELEPHONE (OUTPATIENT)
Dept: PRIMARY CARE CLINIC | Age: 83
End: 2023-08-16

## 2023-08-16 DIAGNOSIS — M54.40 ACUTE BILATERAL LOW BACK PAIN WITH SCIATICA, SCIATICA LATERALITY UNSPECIFIED: ICD-10-CM

## 2023-08-16 DIAGNOSIS — M54.9 NON-TRAUMATIC MID BACK PAIN: ICD-10-CM

## 2023-08-16 DIAGNOSIS — Z98.890 HX OF LUMBOSACRAL SPINE SURGERY: Primary | ICD-10-CM

## 2023-08-16 NOTE — TELEPHONE ENCOUNTER
Antonella Barrera called in stating that Dejon Has is having a lot of back pain and was wondering if she should be admitted to the hospital. I recommended that if pain became too bad to return to the ER. He wasn't sure he wanted to do that. He said that if they did not go back to ER that he would call in the morning to see what you would recommend.

## 2023-08-21 ENCOUNTER — HOSPITAL ENCOUNTER (OUTPATIENT)
Dept: PHYSICAL THERAPY | Age: 83
Setting detail: THERAPIES SERIES
Discharge: HOME | End: 2023-08-21
Payer: MEDICARE

## 2023-08-21 PROCEDURE — 97162 PT EVAL MOD COMPLEX 30 MIN: CPT

## 2023-08-21 ASSESSMENT — PAIN SCALES - GENERAL: PAINLEVEL_OUTOF10: 10

## 2023-08-21 ASSESSMENT — ENCOUNTER SYMPTOMS
EYES NEGATIVE: 1
SHORTNESS OF BREATH: 0
BACK PAIN: 1
WHEEZING: 0
SORE THROAT: 0
DIARRHEA: 0
NAUSEA: 0
CHEST TIGHTNESS: 0
COUGH: 0
ABDOMINAL DISTENTION: 0

## 2023-08-21 NOTE — THERAPY EVALUATION
Phone: [phone] [facility]         Fax: [phone]                      Outpatient Physical Therapy                                                                      Evaluation    Date: 2023  Patient: Mikey Zabala  : 1940  ACCT #: [de-identified]    Referring Provider (secondary): Lexi Caryn    Diagnosis: M54.40 (ICD-10-CM) - Acute bilateral low back pain with sciatica, sciatica laterality unspecified  Z98.890 (ICD-10-CM) - Hx of lumbosacral spine surgery    Treatment Diagnosis: Lower back pain  Onset Date: 23  PT Insurance Information: Medicare  Total # of Visits Approved: 8 Per Physician Order  Total # of Visits to Date: 1     Subjective  Subjective: Patient presents with c/o severe back pain. Insidious onset. She does note some falls in the past.  She does have history of L3-4 fusion about 20 years ago. Another back surgery about 10 years ago, does not remember what was done. She does not feel she will be able to do therapy due to the severity of the pain. Her pain will travel down left to the knee, mostly in hip. Not sure if she experiences T/N. She was screened for kidney issues, only found cysts. Was told kidneys are fine. She went to ER, gave Torodol shot and sent home. She is current with pain management but has not seen them since this bout of pain. Her pain is constant, but better when sidelying with knees bent up to chest.  Her pain is worst with standing, but also bad with sitting. Denies pain with cough or sneeze. She is also having shortness of breath, reports her physician thought it was her heart or kidneys. She continues to have some tests for this.   Additional Pertinent Hx: Chronic back pain     History of malignant neoplasm of parotid gland  Dr. Ismael Gillette in [place], Squamous cell cancer   Macular degeneration  right eye injections Dr. Michael Proctor PTCA  cardiac stents x 8   Transfusion history  CAD (coronary

## 2023-08-21 NOTE — PLAN OF CARE
[facility]       Phone: [phone]   Date: 2023                      Outpatient Physical Therapy  Fax: [phone]    ACCT #: [de-identified]                     Plan of Care  [facility]#: [id number]  Patient: Nimco Tanner  : 1940    Referring Provider (secondary): Shirley Troy    Diagnosis: M54.40 (ICD-10-CM) - Acute bilateral low back pain with sciatica, sciatica laterality unspecified  Z98.890 (ICD-10-CM) - Hx of lumbosacral spine surgery  Onset Date: 23  Treatment Diagnosis: Lower back pain    Assessment  Body Structures, Functions, Activity Limitations Requiring Skilled Therapeutic Intervention: Decreased functional mobility , Decreased ADL status, Decreased ROM, Decreased body mechanics, Decreased tolerance to work activity, Decreased strength, Increased pain, Decreased posture  Assessment: Patient presents with acute, high level lower back pain. Insidious onset. She has significant loss of ROM in all planes. Significant loss of functional abilities due to pain. Unable to get relief from any movement based testing. She did get good relief from manual traction. Will start with traction and gentle core exercises. She has significant loss of hip ROM as well, question if her hips are contributing to her pain as well. Modalities PRN.   Therapy Prognosis: Fair    Treatment Plan   Days: 2 times per week Weeks: 4 weeks Total # of Visits Approved: 8    Patient Education/HEP, Back Education, Therapeutic Exercise, Manual Therapy: Myofacial Release/Cupping, Manual Therapy: Mobilization/Manipulation, Neuro Re-ed, Traction, HP/CP, Electrical Stimulation, and Therapeutic Activity     Goals  Short Term Goals  Time Frame for Short Term Goals: 4 visits  Short Term Goal 1: Patient will demonstrate compliance with current HEP to optimize therapy  Long Term Goals  Time Frame for Long Term Goals : 8 visits  Long Term Goal 1: Patient will improve lumbar extension and rotation ROM to 50% limited

## 2023-08-23 DIAGNOSIS — K21.9 GASTROESOPHAGEAL REFLUX DISEASE WITHOUT ESOPHAGITIS: ICD-10-CM

## 2023-08-23 RX ORDER — PANTOPRAZOLE SODIUM 20 MG/1
20 TABLET, DELAYED RELEASE ORAL
Qty: 90 TABLET | Refills: 0 | OUTPATIENT
Start: 2023-08-23

## 2023-08-24 ENCOUNTER — HOSPITAL ENCOUNTER (OUTPATIENT)
Dept: PHYSICAL THERAPY | Age: 83
Setting detail: THERAPIES SERIES
Discharge: HOME | End: 2023-08-24
Payer: MEDICARE

## 2023-08-24 PROCEDURE — 97012 MECHANICAL TRACTION THERAPY: CPT

## 2023-08-24 ASSESSMENT — PAIN SCALES - GENERAL: PAINLEVEL_OUTOF10: 8

## 2023-08-24 NOTE — PROGRESS NOTES
Phone: [phone] [facility]      Fax: [phone]                            Outpatient Physical Therapy                                                                            Daily Note    Date: 2023  Patient Name: Abebe Jim        MRN: [id number]   ACCT#:  [de-identified]  : 1940  (78 y.o.)    Referring Provider (secondary): Miriam Davenport         Diagnosis: M54.40 (ICD-10-CM) - Acute bilateral low back pain with sciatica, sciatica laterality unspecified  Z98.890 (ICD-10-CM) - Hx of lumbosacral spine surgery  Treatment Diagnosis: Lower back pain    Onset Date: 23  PT Insurance Information: Medicare  Total # of Visits Approved: 8 Per Physician Order  Total # of Visits to Date: 2  Plan of Care/Certification Expiration Date: 23    Pre-Treatment Pain:  8/10  Subjective: 1115:  Feeling a little better since IE, but not much. Rates pain 8-10/10 this morning. Had a better day yesterday though. Assessment  Assessment: Excellent response to traction today. Pain reduced to 5/10 from 8/10 after treatment. Will re-assess her response to traction at next visit and progress into core strength if appropriate.     Plan  Continue with current plan of care    Exercises/Modalities/Manual:  See DocFlow Sheet    Education: Education on traction expectations          Goals  (Total # of Visits to Date: 2)   Short Term Goals  Time Frame for Short Term Goals: 4 visits  Short Term Goal 1: Patient will demonstrate compliance with current HEP to optimize therapy    Long Term Goals  Time Frame for Long Term Goals : 8 visits  Long Term Goal 1: Patient will improve lumbar extension and rotation ROM to 50% limited without increased pain  Long Term Goal 2: Patient will improve standing tolerance to 20' with tolerable pain  Long Term Goal 3: Patient will improve oswestry score from 23/45 to 33/45 to demonstrate functional improvement    Post Treatment Pain:  5/10    Time In: 1115

## 2023-08-29 ENCOUNTER — HOSPITAL ENCOUNTER (OUTPATIENT)
Dept: PHYSICAL THERAPY | Age: 83
Setting detail: THERAPIES SERIES
Discharge: HOME | End: 2023-08-29
Payer: MEDICARE

## 2023-08-29 PROCEDURE — 97012 MECHANICAL TRACTION THERAPY: CPT

## 2023-08-29 ASSESSMENT — PAIN SCALES - GENERAL: PAINLEVEL_OUTOF10: 10

## 2023-08-29 NOTE — PROGRESS NOTES
Phone: [phone] [facility]      Fax: [phone]                            Outpatient Physical Therapy                                                                            Daily Note    Date: 2023  Patient Name: Iam Maloney        MRN: [id number]   ACCT#:  [de-identified]  : 1940  (78 y.o.)    Referring Provider (secondary): Curtis Javed         Diagnosis: M54.40 (ICD-10-CM) - Acute bilateral low back pain with sciatica, sciatica laterality unspecified  Z98.890 (ICD-10-CM) - Hx of lumbosacral spine surgery  Treatment Diagnosis: Lower back pain    Onset Date: 23  PT Insurance Information: Medicare  Total # of Visits Approved: 8 Per Physician Order  Total # of Visits to Date: 3  Plan of Care/Certification Expiration Date: 23    Pre-Treatment Pain:  15/10  Subjective: 6665:  Feeling worse since last treatment. Rates pain 15/10 today. Increased difficulty walking into clinic today. Assessment  Assessment: Poor response after last visit. Again, poor response to treatment today, even with reduced force and reduce hold times. Her  is to call in if she worsens again, likely would require more medical testing at that time. If she feels some improvement, will continue therapy.     Plan  Continue with current plan of care    Exercises/Modalities/Manual:  See DocFlow Sheet    Education: education on response to traction and to call in if worse          Goals  (Total # of Visits to Date: 3)   Short Term Goals  Time Frame for Short Term Goals: 4 visits  Short Term Goal 1: Patient will demonstrate compliance with current HEP to optimize therapy    Long Term Goals  Time Frame for Long Term Goals : 8 visits  Long Term Goal 1: Patient will improve lumbar extension and rotation ROM to 50% limited without increased pain  Long Term Goal 2: Patient will improve standing tolerance to 20' with tolerable pain  Long Term Goal 3: Patient will improve oswestry score from

## 2023-08-30 ENCOUNTER — TELEPHONE (OUTPATIENT)
Dept: PRIMARY CARE CLINIC | Age: 83
End: 2023-08-30

## 2023-08-30 DIAGNOSIS — Z78.0 POSTMENOPAUSAL ESTROGEN DEFICIENCY: ICD-10-CM

## 2023-08-30 DIAGNOSIS — M85.852 OSTEOPENIA OF BOTH HIPS: ICD-10-CM

## 2023-08-30 DIAGNOSIS — M85.851 OSTEOPENIA OF BOTH HIPS: ICD-10-CM

## 2023-08-30 DIAGNOSIS — Z87.891 STOPPED SMOKING WITH GREATER THAN 20 PACK YEAR HISTORY: ICD-10-CM

## 2023-08-30 DIAGNOSIS — M54.6 MIDLINE THORACIC BACK PAIN, UNSPECIFIED CHRONICITY: ICD-10-CM

## 2023-08-30 DIAGNOSIS — Z13.820 OSTEOPOROSIS SCREENING: ICD-10-CM

## 2023-08-30 DIAGNOSIS — M51.16 LUMBAR DISC DISEASE WITH RADICULOPATHY: Primary | ICD-10-CM

## 2023-08-30 DIAGNOSIS — Z98.890 HX OF LUMBOSACRAL SPINE SURGERY: ICD-10-CM

## 2023-08-30 RX ORDER — HYDROCODONE BITARTRATE AND ACETAMINOPHEN 5; 325 MG/1; MG/1
1 TABLET ORAL EVERY 8 HOURS PRN
Qty: 12 TABLET | Refills: 0 | Status: SHIPPED | OUTPATIENT
Start: 2023-08-30 | End: 2023-09-04

## 2023-08-30 RX ORDER — LEVOTHYROXINE SODIUM 0.1 MG/1
TABLET ORAL
Qty: 90 TABLET | Refills: 1 | Status: SHIPPED | OUTPATIENT
Start: 2023-08-30

## 2023-08-30 NOTE — DISCHARGE SUMMARY
Phone: [phone] [facility]             Fax: [phone]                            Outpatient Physical Therapy                                                                    Discharge Summary    Patient: Mora Homans  : 1940  ACCT #: [de-identified]   Referring Provider (secondary): Heaven Iqbal      Diagnosis: M54.40 (ICD-10-CM) - Acute bilateral low back pain with sciatica, sciatica laterality unspecified  Z98.890 (ICD-10-CM) - Hx of lumbosacral spine surgery    Date Treatment Initiated: 23  Date of Last Treatment: 23    PT Visit Information  Onset Date: 23  PT Insurance Information: Medicare  Total # of Visits Approved: 8  Total # of Visits to Date: 3  Plan of Care/Certification Expiration Date: 23  Referring Provider (secondary): Heaven Iqbal    Frequency/Duration  Days: 2 times per week  Weeks: 4 weeks    Treatment Received  Patient Education/HEP, Therapeutic Exercise, and Traction    Pain Level: 10    Assessment  Assessment: Poor response after last visit. Again, poor response to treatment today, even with reduced force and reduce hold times. Patient's  called the next day to report that her pain elevated again after treatment. She is not a good candidate for therapy at this time. Will d/c patient and recommend f/u with physician for further medical imaging as you see fit.            Goals  Short Term Goals  Time Frame for Short Term Goals: 4 visits  Short Term Goal 1: Patient will demonstrate compliance with current HEP to optimize therapy    Long Term Goals  Time Frame for Long Term Goals : 8 visits  Long Term Goal 1: Patient will improve lumbar extension and rotation ROM to 50% limited without increased pain  Long Term Goal 2: Patient will improve standing tolerance to 20' with tolerable pain  Long Term Goal 3: Patient will improve oswestry score from 23/45 to 33/45 to demonstrate functional improvement    Reason for Discharge  Poor

## 2023-08-30 NOTE — TELEPHONE ENCOUNTER
Pt's  called today stating the patient can not do the physical therapy. He states it is not helping and at this time. He questions if she may need further testing such as MRI? Call back requested from staff.        Last OV: 8/15/2023    Next scheduled apt: 10/18/2023

## 2023-08-30 NOTE — TELEPHONE ENCOUNTER
I spoke with patient's  on phone patient is having severe back pain even with having a steroid taper recently starting Cymbalta trying physical therapy we will repeat her mid thoracic and also lumbar x-rays which is her dominant area of pain the pain is improved with lying down is worse with standing or moving around    [facility] report is reviewed patient has had gabapentin in the past has not helped with her discomfort she felt she had side effects from that medication so is no longer taking it    Patient has chronic renal insufficiency stage III so she is not able to take NSAIDs due to her CABG procedure in the past she is trying to increase walking but she is thin frame and kyphotic I am unable to offer Ultram because the risk of interactions with both her Cymbalta and Prozac medications    She is currently weaning off wellbutrin I will offer small amount of a Norco medication I cautioned family that this is narcotic encouraged her to take stool softener while she takes this and use it very sparingly I did caution the high risk for falls and not to be driving she is no longer driving she does have a referral appointment with the pain management Dr. Kaur Even in the future her  that he would order x-rays of her    Dustin Darnell MARTINEZ CNP

## 2023-08-31 ENCOUNTER — APPOINTMENT (OUTPATIENT)
Dept: PHYSICAL THERAPY | Age: 83
End: 2023-08-31
Payer: MEDICARE

## 2023-08-31 ENCOUNTER — HOSPITAL ENCOUNTER (OUTPATIENT)
Age: 83
Discharge: HOME | End: 2023-09-02
Payer: MEDICARE

## 2023-08-31 ENCOUNTER — HOSPITAL ENCOUNTER (OUTPATIENT)
Dept: GENERAL RADIOLOGY | Age: 83
Discharge: HOME | End: 2023-09-02
Payer: MEDICARE

## 2023-08-31 DIAGNOSIS — M85.851 OSTEOPENIA OF BOTH HIPS: ICD-10-CM

## 2023-08-31 DIAGNOSIS — Z98.890 HX OF LUMBOSACRAL SPINE SURGERY: ICD-10-CM

## 2023-08-31 DIAGNOSIS — Z87.891 STOPPED SMOKING WITH GREATER THAN 20 PACK YEAR HISTORY: ICD-10-CM

## 2023-08-31 DIAGNOSIS — M85.852 OSTEOPENIA OF BOTH HIPS: ICD-10-CM

## 2023-08-31 DIAGNOSIS — M51.16 LUMBAR DISC DISEASE WITH RADICULOPATHY: ICD-10-CM

## 2023-08-31 DIAGNOSIS — M54.6 MIDLINE THORACIC BACK PAIN, UNSPECIFIED CHRONICITY: ICD-10-CM

## 2023-08-31 PROCEDURE — 72110 X-RAY EXAM L-2 SPINE 4/>VWS: CPT

## 2023-08-31 PROCEDURE — 72070 X-RAY EXAM THORAC SPINE 2VWS: CPT

## 2023-09-05 ENCOUNTER — OFFICE VISIT (OUTPATIENT)
Dept: PRIMARY CARE CLINIC | Age: 83
End: 2023-09-05

## 2023-09-05 VITALS
OXYGEN SATURATION: 96 % | HEIGHT: 68 IN | BODY MASS INDEX: 20.76 KG/M2 | DIASTOLIC BLOOD PRESSURE: 70 MMHG | HEART RATE: 88 BPM | SYSTOLIC BLOOD PRESSURE: 100 MMHG | WEIGHT: 137 LBS

## 2023-09-05 DIAGNOSIS — I25.10 CORONARY ARTERY DISEASE INVOLVING NATIVE CORONARY ARTERY WITHOUT ANGINA PECTORIS, UNSPECIFIED WHETHER NATIVE OR TRANSPLANTED HEART: ICD-10-CM

## 2023-09-05 DIAGNOSIS — Z98.890 HX OF LUMBOSACRAL SPINE SURGERY: ICD-10-CM

## 2023-09-05 DIAGNOSIS — M51.16 LUMBAR DISC DISEASE WITH RADICULOPATHY: Primary | ICD-10-CM

## 2023-09-05 DIAGNOSIS — M85.851 OSTEOPENIA OF BOTH HIPS: ICD-10-CM

## 2023-09-05 DIAGNOSIS — M85.852 OSTEOPENIA OF BOTH HIPS: ICD-10-CM

## 2023-09-05 NOTE — PROGRESS NOTES
adenopathy. Skin:     General: Skin is warm and dry. Findings: No rash. Neurological:      Mental Status: She is alert and oriented to person, place, and time. Psychiatric:         Behavior: Behavior normal.         Thought Content: Thought content normal.         Judgment: Judgment normal.               Data:     Lab Results   Component Value Date/Time     07/20/2023 07:15 PM    K 3.7 07/20/2023 07:15 PM     07/20/2023 07:15 PM    CO2 19 07/20/2023 07:15 PM    BUN 23 07/20/2023 07:15 PM    CREATININE 1.6 07/20/2023 07:15 PM    GLUCOSE 103 07/20/2023 07:15 PM    PROT 6.3 07/13/2023 09:57 AM    LABALBU 4.0 07/13/2023 09:57 AM    BILITOT 0.1 07/13/2023 09:57 AM    ALKPHOS 75 07/13/2023 09:57 AM    AST 16 07/13/2023 09:57 AM    ALT 13 07/13/2023 09:57 AM     Lab Results   Component Value Date/Time    WBC 7.7 07/20/2023 07:15 PM    RBC 3.40 07/20/2023 07:15 PM    HGB 9.3 07/20/2023 07:15 PM    HCT 28.9 07/20/2023 07:15 PM    MCV 85.2 07/20/2023 07:15 PM    MCH 27.4 07/20/2023 07:15 PM    MCHC 32.1 07/20/2023 07:15 PM    RDW 18.9 07/20/2023 07:15 PM     07/20/2023 07:15 PM    MPV NOT REPORTED 10/05/2021 08:15 AM     Lab Results   Component Value Date/Time    TSH 1.56 05/25/2023 11:56 AM     Lab Results   Component Value Date/Time    CHOL 194 05/25/2023 11:56 AM    HDL 68 05/25/2023 11:56 AM    LABA1C 5.7 09/12/2019 08:04 AM          Assessment & Plan        Diagnosis Orders   1. Lumbar disc disease with radiculopathy  MRI LUMBAR SPINE WO CONTRAST      2. Hx of lumbosacral spine surgery  MRI LUMBAR SPINE WO CONTRAST      3. Osteopenia of both hips  MRI LUMBAR SPINE WO CONTRAST      4. Coronary artery disease involving native coronary artery without angina pectoris, unspecified whether native or transplanted heart                        Completed Refills   Requested Prescriptions      No prescriptions requested or ordered in this encounter     No follow-ups on file.   No orders of the defined

## 2023-09-08 ENCOUNTER — HOSPITAL ENCOUNTER (OUTPATIENT)
Dept: GENERAL RADIOLOGY | Age: 83
End: 2023-09-08
Payer: MEDICARE

## 2023-09-08 ENCOUNTER — HOSPITAL ENCOUNTER (OUTPATIENT)
Age: 83
End: 2023-09-08
Payer: MEDICARE

## 2023-09-08 ENCOUNTER — HOSPITAL ENCOUNTER (OUTPATIENT)
Dept: MRI IMAGING | Age: 83
End: 2023-09-08
Payer: MEDICARE

## 2023-09-08 DIAGNOSIS — I10 ESSENTIAL HYPERTENSION: ICD-10-CM

## 2023-09-08 DIAGNOSIS — M85.852 OSTEOPENIA OF BOTH HIPS: ICD-10-CM

## 2023-09-08 DIAGNOSIS — M85.851 OSTEOPENIA OF BOTH HIPS: ICD-10-CM

## 2023-09-08 DIAGNOSIS — Z98.890 HX OF LUMBOSACRAL SPINE SURGERY: ICD-10-CM

## 2023-09-08 DIAGNOSIS — M51.16 LUMBAR DISC DISEASE WITH RADICULOPATHY: ICD-10-CM

## 2023-09-08 PROCEDURE — 72148 MRI LUMBAR SPINE W/O DYE: CPT

## 2023-09-08 PROCEDURE — 71046 X-RAY EXAM CHEST 2 VIEWS: CPT

## 2023-09-10 ASSESSMENT — ENCOUNTER SYMPTOMS
CHEST TIGHTNESS: 0
ABDOMINAL DISTENTION: 0
EYE DISCHARGE: 0
BACK PAIN: 1
COUGH: 0

## 2023-09-11 ENCOUNTER — OFFICE VISIT (OUTPATIENT)
Dept: GASTROENTEROLOGY | Age: 83
End: 2023-09-11
Payer: MEDICARE

## 2023-09-11 VITALS
BODY MASS INDEX: 20.92 KG/M2 | WEIGHT: 138 LBS | HEIGHT: 68 IN | HEART RATE: 95 BPM | RESPIRATION RATE: 18 BRPM | OXYGEN SATURATION: 98 %

## 2023-09-11 DIAGNOSIS — K64.9 HEMORRHOIDS, UNSPECIFIED HEMORRHOID TYPE: Primary | ICD-10-CM

## 2023-09-11 PROCEDURE — 1036F TOBACCO NON-USER: CPT | Performed by: NURSE PRACTITIONER

## 2023-09-11 PROCEDURE — 1123F ACP DISCUSS/DSCN MKR DOCD: CPT | Performed by: NURSE PRACTITIONER

## 2023-09-11 PROCEDURE — 1090F PRES/ABSN URINE INCON ASSESS: CPT | Performed by: NURSE PRACTITIONER

## 2023-09-11 PROCEDURE — G8427 DOCREV CUR MEDS BY ELIG CLIN: HCPCS | Performed by: NURSE PRACTITIONER

## 2023-09-11 PROCEDURE — 99202 OFFICE O/P NEW SF 15 MIN: CPT | Performed by: NURSE PRACTITIONER

## 2023-09-11 PROCEDURE — G8399 PT W/DXA RESULTS DOCUMENT: HCPCS | Performed by: NURSE PRACTITIONER

## 2023-09-11 PROCEDURE — G8420 CALC BMI NORM PARAMETERS: HCPCS | Performed by: NURSE PRACTITIONER

## 2023-09-11 RX ORDER — PREDNISONE 20 MG/1
20 TABLET ORAL DAILY
COMMUNITY

## 2023-09-11 ASSESSMENT — ENCOUNTER SYMPTOMS
ALLERGIC/IMMUNOLOGIC NEGATIVE: 1
CONSTIPATION: 1
RESPIRATORY NEGATIVE: 1

## 2023-09-15 ENCOUNTER — TELEPHONE (OUTPATIENT)
Dept: PRIMARY CARE CLINIC | Age: 83
End: 2023-09-15

## 2023-09-15 NOTE — TELEPHONE ENCOUNTER
Lior Luo called in asking if you had reviewed [facility]'s MRI results yet, they have several questions about it. He also stated the Brianna Wilson is in a lot of pain and does not have an appointment to see pain management until November. He was asking if there is anything that you can do to help her. Please advise.

## 2023-09-17 NOTE — TELEPHONE ENCOUNTER
Good new is there is NO fracture. FINDINGS: Bones are osteopenic. No compression fracture seen. Similar grade 1   anterolisthesis at L4-L5 measuring 7 mm. Posterior decompression at L3-L5. There are pedicle screws and posterior fixation rods extending from L3 to S1. Convex right curvature of the lumbar spine. Prominent disc space narrowing and   plate spur noted at S5-P5. There is slight progression of disc space narrowing   and endplate spur at P0-I6. IMPRESSION:     1. Prior posterior decompression and posterior body fusion at L3-S1. Similar   lucency along the S1 pedicle screws may be related to loosening. 2. Similar grade 1 anterolisthesis at L4-L5. 3. Progressive degenerative disc disease at L1-L2.  4. No acute fracture identified. MRI shows progression of arthritis and disc disease in back. Pt has tried multiple meds with side effects or intolerances (elavil , gabapentin, narcotic (norco), tylenol, NSAID contraindicated due to cardiac disease) , also did not tolerate physical therapy. Most options for back disease treatment have been exhausted. Currently on cymbalta. And has below recommendations. Pt activity is very sedentary and limited due to cardiac disease. Can see pain management which is scheduled in Nov, and also neurosurgeon in [place] due to risk of pedicle screw loosening concern. (With her age and surgical risk unsure if he would be able to offer anything more) ? ?? Can go for eval.

## 2023-10-03 ENCOUNTER — TELEPHONE (OUTPATIENT)
Dept: PRIMARY CARE CLINIC | Age: 83
End: 2023-10-03

## 2023-10-03 DIAGNOSIS — K21.9 GASTROESOPHAGEAL REFLUX DISEASE WITHOUT ESOPHAGITIS: ICD-10-CM

## 2023-10-03 DIAGNOSIS — M54.9 NON-TRAUMATIC MID BACK PAIN: ICD-10-CM

## 2023-10-03 DIAGNOSIS — Z98.890 HX OF LUMBOSACRAL SPINE SURGERY: ICD-10-CM

## 2023-10-03 RX ORDER — PANTOPRAZOLE SODIUM 20 MG/1
20 TABLET, DELAYED RELEASE ORAL
Qty: 90 TABLET | Refills: 0 | Status: SHIPPED | OUTPATIENT
Start: 2023-10-03

## 2023-10-03 RX ORDER — DULOXETIN HYDROCHLORIDE 30 MG/1
30 CAPSULE, DELAYED RELEASE ORAL DAILY
Qty: 30 CAPSULE | Refills: 1 | Status: SHIPPED | OUTPATIENT
Start: 2023-10-03

## 2023-10-03 NOTE — TELEPHONE ENCOUNTER
Colette Leader was asking if you found out if there is anything else that can be done for Dalton's back pain. Please advise.

## 2023-10-05 NOTE — TELEPHONE ENCOUNTER
Yes, I conveyed in the last office visit that I have processed through all the standard meds, treatments offered prior for back pain. Xray  MRI  Steroid  Pain med Norco- not tolerated  Physical therapy-tried unable to tolerate. Has referral and appt to pain management. Activity is limited due to both cardiac issues and back pain, age related changes. Cymbalta for musculoskeletal pain, anxiety and also radicular pain. 9. She has no known cancer   10. No NSAIDS due to cardiac disease   11. Dry needling may be option with physical therapy    Pt can consider massage, chiropractor if she chooses. Can use lidocaine patch topical or thermo patch. Rub on creams: blue emu, salon pas, biofreeze available over the counter. Tylenol can be taken. Keep appt with Dr. Toy Maradiaga pain management .

## 2023-10-10 DIAGNOSIS — K21.9 GASTROESOPHAGEAL REFLUX DISEASE WITHOUT ESOPHAGITIS: ICD-10-CM

## 2023-10-10 DIAGNOSIS — Z98.890 HX OF LUMBOSACRAL SPINE SURGERY: ICD-10-CM

## 2023-10-10 DIAGNOSIS — M54.9 NON-TRAUMATIC MID BACK PAIN: ICD-10-CM

## 2023-10-11 ENCOUNTER — HOSPITAL ENCOUNTER (OUTPATIENT)
Age: 83
Discharge: HOME | End: 2023-10-11
Payer: MEDICARE

## 2023-10-11 LAB
ALBUMIN SERPL-MCNC: 4.1 G/DL (ref 3.5–5.2)
ANION GAP SERPL CALCULATED.3IONS-SCNC: 13 MMOL/L (ref 9–17)
BUN SERPL-MCNC: 22 MG/DL (ref 8–23)
BUN/CREAT SERPL: 17 (ref 9–20)
CALCIUM SERPL-MCNC: 8.9 MG/DL (ref 8.6–10.4)
CHLORIDE SERPL-SCNC: 110 MMOL/L (ref 98–107)
CO2 SERPL-SCNC: 19 MMOL/L (ref 20–31)
CREAT SERPL-MCNC: 1.3 MG/DL (ref 0.5–0.9)
CREAT UR-MCNC: 212.5 MG/DL (ref 28–217)
FREE KAPPA/LAMBDA RATIO: 1.51 (ref 0.26–1.65)
GFR SERPL CREATININE-BSD FRML MDRD: 40 ML/MIN/1.73M2
GLUCOSE SERPL-MCNC: 122 MG/DL (ref 70–99)
KAPPA LC FREE SER-MCNC: 26.7 MG/L (ref 3.7–19.4)
LAMBDA LC FREE SERPL-MCNC: 17.7 MG/L (ref 5.7–26.3)
PHOSPHATE SERPL-MCNC: 3.9 MG/DL (ref 2.6–4.5)
POTASSIUM SERPL-SCNC: 3.1 MMOL/L (ref 3.7–5.3)
SODIUM SERPL-SCNC: 142 MMOL/L (ref 135–144)
TOTAL PROTEIN, URINE: 72 MG/DL
URINE TOTAL PROTEIN CREATININE RATIO: 0.34 (ref 0–0.2)

## 2023-10-11 PROCEDURE — 80069 RENAL FUNCTION PANEL: CPT

## 2023-10-11 PROCEDURE — 82570 ASSAY OF URINE CREATININE: CPT

## 2023-10-11 PROCEDURE — 83521 IG LIGHT CHAINS FREE EACH: CPT

## 2023-10-11 PROCEDURE — 86334 IMMUNOFIX E-PHORESIS SERUM: CPT

## 2023-10-11 PROCEDURE — 86335 IMMUNFIX E-PHORSIS/URINE/CSF: CPT

## 2023-10-11 PROCEDURE — 36415 COLL VENOUS BLD VENIPUNCTURE: CPT

## 2023-10-11 PROCEDURE — 84156 ASSAY OF PROTEIN URINE: CPT

## 2023-10-11 RX ORDER — PANTOPRAZOLE SODIUM 20 MG/1
20 TABLET, DELAYED RELEASE ORAL
Qty: 90 TABLET | Refills: 0 | OUTPATIENT
Start: 2023-10-11

## 2023-10-11 RX ORDER — DULOXETIN HYDROCHLORIDE 30 MG/1
CAPSULE, DELAYED RELEASE ORAL
Qty: 30 CAPSULE | Refills: 0 | OUTPATIENT
Start: 2023-10-11

## 2023-10-13 LAB
INTERPRETATION SERPL IFE-IMP: NORMAL
PATH REV: NORMAL
SPECIMEN TYPE: NORMAL
SPECIMEN VOL UR: NORMAL ML
URINE IFX INTERP: NORMAL
URINE TOTAL PROTEIN: 71 MG/DL

## 2023-10-13 RX ORDER — POTASSIUM CHLORIDE 20 MEQ/1
20 TABLET, EXTENDED RELEASE ORAL DAILY
Qty: 30 TABLET | Refills: 1 | Status: SHIPPED | OUTPATIENT
Start: 2023-10-13

## 2023-10-13 NOTE — TELEPHONE ENCOUNTER
Terry Prose call in asking for this in the pill form instead of the powder. Orcedrickl Prose states that Dinesh Starr can hardly take the powder.

## 2023-10-25 ENCOUNTER — HOSPITAL ENCOUNTER (OUTPATIENT)
Age: 83
End: 2023-10-25
Payer: MEDICARE

## 2023-10-25 ENCOUNTER — HOSPITAL ENCOUNTER (OUTPATIENT)
Age: 83
Discharge: HOME | End: 2023-10-25
Payer: MEDICARE

## 2023-10-25 LAB
ALBUMIN SERPL-MCNC: 3.9 G/DL (ref 3.5–5.2)
ALP SERPL-CCNC: 84 U/L (ref 35–104)
ALT SERPL-CCNC: 14 U/L (ref 5–33)
ANION GAP SERPL CALCULATED.3IONS-SCNC: 13 MMOL/L (ref 9–17)
AST SERPL-CCNC: 18 U/L
BASOPHILS # BLD: 0.03 K/UL (ref 0–0.2)
BASOPHILS NFR BLD: 0 % (ref 0–2)
BILIRUB SERPL-MCNC: 0.2 MG/DL (ref 0.3–1.2)
BUN SERPL-MCNC: 22 MG/DL (ref 8–23)
BUN/CREAT SERPL: 18 (ref 9–20)
CALCIUM SERPL-MCNC: 8.8 MG/DL (ref 8.6–10.4)
CHLORIDE SERPL-SCNC: 112 MMOL/L (ref 98–107)
CHOLEST SERPL-MCNC: 216 MG/DL
CHOLESTEROL/HDL RATIO: 3.2
CO2 SERPL-SCNC: 17 MMOL/L (ref 20–31)
CREAT SERPL-MCNC: 1.2 MG/DL (ref 0.5–0.9)
EKG ATRIAL RATE: 86 BPM
EKG P AXIS: 78 DEGREES
EKG P-R INTERVAL: 130 MS
EKG Q-T INTERVAL: 350 MS
EKG QRS DURATION: 80 MS
EKG QTC CALCULATION (BAZETT): 418 MS
EKG R AXIS: 89 DEGREES
EKG T AXIS: -26 DEGREES
EKG VENTRICULAR RATE: 86 BPM
EOSINOPHIL # BLD: 0.29 K/UL (ref 0–0.4)
EOSINOPHILS RELATIVE PERCENT: 4 % (ref 0–5)
ERYTHROCYTE [DISTWIDTH] IN BLOOD BY AUTOMATED COUNT: 17.1 % (ref 12.1–15.2)
GFR SERPL CREATININE-BSD FRML MDRD: 44 ML/MIN/1.73M2
GLUCOSE SERPL-MCNC: 103 MG/DL (ref 70–99)
HCT VFR BLD AUTO: 32.4 % (ref 36–46)
HDLC SERPL-MCNC: 68 MG/DL
HGB BLD-MCNC: 9.8 G/DL (ref 12–16)
IMM GRANULOCYTES # BLD AUTO: 0.01 K/UL (ref 0–0.3)
IMM GRANULOCYTES NFR BLD: 0 % (ref 0–5)
LDLC SERPL CALC-MCNC: 120 MG/DL (ref 0–130)
LYMPHOCYTES NFR BLD: 0.83 K/UL (ref 1–4.8)
LYMPHOCYTES RELATIVE PERCENT: 12 % (ref 15–40)
MAGNESIUM SERPL-MCNC: 2.3 MG/DL (ref 1.6–2.6)
MCH RBC QN AUTO: 27.5 PG (ref 26–34)
MCHC RBC AUTO-ENTMCNC: 30.2 G/DL (ref 31–37)
MCV RBC AUTO: 90.8 FL (ref 80–100)
MONOCYTES NFR BLD: 0.4 K/UL (ref 0–1)
MONOCYTES NFR BLD: 6 % (ref 4–8)
NEUTROPHILS NFR BLD: 77 % (ref 47–75)
NEUTS SEG NFR BLD: 5.15 K/UL (ref 2.5–7)
PATIENT FASTING?: YES
PLATELET # BLD AUTO: 358 K/UL (ref 140–450)
PMV BLD AUTO: 9 FL (ref 6–12)
POTASSIUM SERPL-SCNC: 3.4 MMOL/L (ref 3.7–5.3)
PROT SERPL-MCNC: 6.2 G/DL (ref 6.4–8.3)
RBC # BLD AUTO: 3.57 M/UL (ref 4–5.2)
SODIUM SERPL-SCNC: 142 MMOL/L (ref 135–144)
TRIGL SERPL-MCNC: 139 MG/DL
TSH SERPL DL<=0.05 MIU/L-ACNC: 1.46 UIU/ML (ref 0.3–5)
WBC OTHER # BLD: 6.7 K/UL (ref 3.5–11)

## 2023-10-25 PROCEDURE — 80061 LIPID PANEL: CPT

## 2023-10-25 PROCEDURE — 85025 COMPLETE CBC W/AUTO DIFF WBC: CPT

## 2023-10-25 PROCEDURE — 93005 ELECTROCARDIOGRAM TRACING: CPT | Performed by: INTERNAL MEDICINE

## 2023-10-25 PROCEDURE — 93010 ELECTROCARDIOGRAM REPORT: CPT | Performed by: INTERNAL MEDICINE

## 2023-10-25 PROCEDURE — 80053 COMPREHEN METABOLIC PANEL: CPT

## 2023-10-25 PROCEDURE — 36415 COLL VENOUS BLD VENIPUNCTURE: CPT

## 2023-10-25 PROCEDURE — 83735 ASSAY OF MAGNESIUM: CPT

## 2023-10-25 PROCEDURE — 84443 ASSAY THYROID STIM HORMONE: CPT

## 2023-10-26 ENCOUNTER — HOSPITAL ENCOUNTER (EMERGENCY)
Age: 83
Discharge: HOME | End: 2023-10-26
Attending: EMERGENCY MEDICINE
Payer: MEDICARE

## 2023-10-26 ENCOUNTER — APPOINTMENT (OUTPATIENT)
Dept: GENERAL RADIOLOGY | Age: 83
End: 2023-10-26
Payer: MEDICARE

## 2023-10-26 VITALS
WEIGHT: 136.3 LBS | BODY MASS INDEX: 20.66 KG/M2 | TEMPERATURE: 97.9 F | SYSTOLIC BLOOD PRESSURE: 137 MMHG | RESPIRATION RATE: 18 BRPM | OXYGEN SATURATION: 98 % | HEIGHT: 68 IN | DIASTOLIC BLOOD PRESSURE: 89 MMHG | HEART RATE: 93 BPM

## 2023-10-26 DIAGNOSIS — I50.9 CONGESTIVE HEART FAILURE, UNSPECIFIED HF CHRONICITY, UNSPECIFIED HEART FAILURE TYPE (HCC): Primary | ICD-10-CM

## 2023-10-26 DIAGNOSIS — M54.9 CHRONIC BACK PAIN, UNSPECIFIED BACK LOCATION, UNSPECIFIED BACK PAIN LATERALITY: ICD-10-CM

## 2023-10-26 DIAGNOSIS — G89.29 CHRONIC BACK PAIN, UNSPECIFIED BACK LOCATION, UNSPECIFIED BACK PAIN LATERALITY: ICD-10-CM

## 2023-10-26 DIAGNOSIS — G89.29 CHRONIC THORACIC BACK PAIN, UNSPECIFIED BACK PAIN LATERALITY: ICD-10-CM

## 2023-10-26 DIAGNOSIS — M54.6 CHRONIC THORACIC BACK PAIN, UNSPECIFIED BACK PAIN LATERALITY: ICD-10-CM

## 2023-10-26 LAB
ALBUMIN SERPL-MCNC: 3.7 G/DL (ref 3.5–5.2)
ALP SERPL-CCNC: 88 U/L (ref 35–104)
ALT SERPL-CCNC: 15 U/L (ref 5–33)
ANION GAP SERPL CALCULATED.3IONS-SCNC: 9 MMOL/L (ref 9–17)
AST SERPL-CCNC: 25 U/L
BASOPHILS # BLD: 0.04 K/UL (ref 0–0.2)
BASOPHILS NFR BLD: 1 % (ref 0–2)
BILIRUB SERPL-MCNC: 0.2 MG/DL (ref 0.3–1.2)
BNP SERPL-MCNC: 793 PG/ML
BUN SERPL-MCNC: 20 MG/DL (ref 8–23)
BUN/CREAT SERPL: 18 (ref 9–20)
CALCIUM SERPL-MCNC: 8.4 MG/DL (ref 8.6–10.4)
CHLORIDE SERPL-SCNC: 107 MMOL/L (ref 98–107)
CO2 SERPL-SCNC: 20 MMOL/L (ref 20–31)
CREAT SERPL-MCNC: 1.1 MG/DL (ref 0.5–0.9)
EOSINOPHIL # BLD: 0.21 K/UL (ref 0–0.4)
EOSINOPHILS RELATIVE PERCENT: 4 % (ref 0–5)
ERYTHROCYTE [DISTWIDTH] IN BLOOD BY AUTOMATED COUNT: 17.1 % (ref 12.1–15.2)
GFR SERPL CREATININE-BSD FRML MDRD: 49 ML/MIN/1.73M2
GLUCOSE SERPL-MCNC: 97 MG/DL (ref 70–99)
HCT VFR BLD AUTO: 31.1 % (ref 36–46)
HGB BLD-MCNC: 9.4 G/DL (ref 12–16)
IMM GRANULOCYTES # BLD AUTO: 0 K/UL (ref 0–0.3)
IMM GRANULOCYTES NFR BLD: 0 % (ref 0–5)
INR PPP: 1
LYMPHOCYTES NFR BLD: 0.49 K/UL (ref 1–4.8)
LYMPHOCYTES RELATIVE PERCENT: 9 % (ref 15–40)
MCH RBC QN AUTO: 27.5 PG (ref 26–34)
MCHC RBC AUTO-ENTMCNC: 30.2 G/DL (ref 31–37)
MCV RBC AUTO: 90.9 FL (ref 80–100)
MONOCYTES NFR BLD: 0.48 K/UL (ref 0–1)
MONOCYTES NFR BLD: 9 % (ref 4–8)
NEUTROPHILS NFR BLD: 78 % (ref 47–75)
NEUTS SEG NFR BLD: 4.36 K/UL (ref 2.5–7)
PLATELET # BLD AUTO: 315 K/UL (ref 140–450)
PMV BLD AUTO: 9.1 FL (ref 6–12)
POTASSIUM SERPL-SCNC: 3.8 MMOL/L (ref 3.7–5.3)
PROT SERPL-MCNC: 5.9 G/DL (ref 6.4–8.3)
PROTHROMBIN TIME: 13.1 SEC (ref 11.5–14.2)
RBC # BLD AUTO: 3.42 M/UL (ref 4–5.2)
SODIUM SERPL-SCNC: 136 MMOL/L (ref 135–144)
TROPONIN I SERPL HS-MCNC: 24 NG/L (ref 0–14)
TROPONIN I SERPL HS-MCNC: 24 NG/L (ref 0–14)
WBC OTHER # BLD: 5.6 K/UL (ref 3.5–11)

## 2023-10-26 PROCEDURE — 93005 ELECTROCARDIOGRAM TRACING: CPT | Performed by: EMERGENCY MEDICINE

## 2023-10-26 PROCEDURE — 71045 X-RAY EXAM CHEST 1 VIEW: CPT

## 2023-10-26 PROCEDURE — 80053 COMPREHEN METABOLIC PANEL: CPT

## 2023-10-26 PROCEDURE — 85610 PROTHROMBIN TIME: CPT

## 2023-10-26 PROCEDURE — 94664 DEMO&/EVAL PT USE INHALER: CPT

## 2023-10-26 PROCEDURE — 99285 EMERGENCY DEPT VISIT HI MDM: CPT

## 2023-10-26 PROCEDURE — 96374 THER/PROPH/DIAG INJ IV PUSH: CPT

## 2023-10-26 PROCEDURE — 85025 COMPLETE CBC W/AUTO DIFF WBC: CPT

## 2023-10-26 PROCEDURE — 6360000002 HC RX W HCPCS: Performed by: EMERGENCY MEDICINE

## 2023-10-26 PROCEDURE — 83880 ASSAY OF NATRIURETIC PEPTIDE: CPT

## 2023-10-26 PROCEDURE — 6370000000 HC RX 637 (ALT 250 FOR IP): Performed by: EMERGENCY MEDICINE

## 2023-10-26 PROCEDURE — 84484 ASSAY OF TROPONIN QUANT: CPT

## 2023-10-26 PROCEDURE — 96375 TX/PRO/DX INJ NEW DRUG ADDON: CPT

## 2023-10-26 PROCEDURE — 36415 COLL VENOUS BLD VENIPUNCTURE: CPT

## 2023-10-26 RX ORDER — IPRATROPIUM BROMIDE AND ALBUTEROL SULFATE 2.5; .5 MG/3ML; MG/3ML
1 SOLUTION RESPIRATORY (INHALATION) ONCE
Status: COMPLETED | OUTPATIENT
Start: 2023-10-26 | End: 2023-10-26

## 2023-10-26 RX ORDER — MORPHINE SULFATE 4 MG/ML
2 INJECTION, SOLUTION INTRAMUSCULAR; INTRAVENOUS ONCE
Status: COMPLETED | OUTPATIENT
Start: 2023-10-26 | End: 2023-10-26

## 2023-10-26 RX ORDER — TRAMADOL HYDROCHLORIDE 50 MG/1
50 TABLET ORAL 2 TIMES DAILY PRN
Qty: 6 TABLET | Refills: 0 | Status: SHIPPED | OUTPATIENT
Start: 2023-10-26 | End: 2023-10-29

## 2023-10-26 RX ADMIN — METHYLPREDNISOLONE SODIUM SUCCINATE 125 MG: 125 INJECTION, POWDER, FOR SOLUTION INTRAMUSCULAR; INTRAVENOUS at 14:18

## 2023-10-26 RX ADMIN — MORPHINE SULFATE 2 MG: 4 INJECTION, SOLUTION INTRAMUSCULAR; INTRAVENOUS at 15:35

## 2023-10-26 RX ADMIN — IPRATROPIUM BROMIDE AND ALBUTEROL SULFATE 1 DOSE: .5; 3 SOLUTION RESPIRATORY (INHALATION) at 14:25

## 2023-10-26 ASSESSMENT — PAIN DESCRIPTION - LOCATION: LOCATION: BACK

## 2023-10-26 ASSESSMENT — PAIN SCALES - GENERAL: PAINLEVEL_OUTOF10: 7

## 2023-10-26 ASSESSMENT — PAIN DESCRIPTION - ORIENTATION: ORIENTATION: LOWER;MID

## 2023-10-26 ASSESSMENT — LIFESTYLE VARIABLES
HOW OFTEN DO YOU HAVE A DRINK CONTAINING ALCOHOL: NEVER
HOW MANY STANDARD DRINKS CONTAINING ALCOHOL DO YOU HAVE ON A TYPICAL DAY: PATIENT DOES NOT DRINK

## 2023-10-26 ASSESSMENT — PAIN - FUNCTIONAL ASSESSMENT
PAIN_FUNCTIONAL_ASSESSMENT: ACTIVITIES ARE NOT PREVENTED
PAIN_FUNCTIONAL_ASSESSMENT: NONE - DENIES PAIN

## 2023-10-26 ASSESSMENT — PAIN DESCRIPTION - DESCRIPTORS: DESCRIPTORS: ACHING

## 2023-10-26 NOTE — ED PROVIDER NOTES
for the following components:    Pro- (*)     All other components within normal limits   TROPONIN - Abnormal; Notable for the following components:    Troponin, High Sensitivity 24 (*)     All other components within normal limits   PROTIME-INR       All other labs were within normal range or not returned as of this dictation.         MIPS          Total Critical Care time was:    EMERGENCY DEPARTMENT COURSE and DIFFERENTIAL DIAGNOSIS/MDM:    Patient Course:   Patient presented to us with a 3-month history of shortness of breath and I did look at her blood work from yesterday there is improvement of the blood work of the kidney today and there is kidney function normal    The CBC as well as showed no acute pathology      I did do an x-ray for the patient that showed no acute pathology as well and there was no leg edema but the patient BNP it was elevated    The patient case was discussed with her cardiologist and he is pierre see her in few days he want the Lasix to be given to her twice a day and that that was instructed to the patient    I did also offer the patient initially in observation in the hospital with treatment and diuresis but she requested to go home and do that at home in case she had no improvement she will come back to the ER    The patient troponin was repeated twice and was negative , the patient had no risk factor for PE     Patient will follow-up with her cardiologist within few days and she also was provided with tramadol for the pain of her back as she have a history of chronic back pain    ED Medications administered this visit:    Medications   ipratropium 0.5 mg-albuterol 2.5 mg (DUONEB) nebulizer solution 1 Dose (1 Dose Inhalation Given 10/26/23 1425)   methylPREDNISolone sodium (PF) (SOLU-MEDROL PF) injection 125 mg (125 mg IntraVENous Given 10/26/23 1418)   morphine sulfate (PF) injection 2 mg (2 mg IntraVENous Given 10/26/23 1535)       New Prescriptions from this visit:

## 2023-10-27 LAB
EKG ATRIAL RATE: 101 BPM
EKG ATRIAL RATE: 78 BPM
EKG P AXIS: 49 DEGREES
EKG P AXIS: 79 DEGREES
EKG P-R INTERVAL: 136 MS
EKG P-R INTERVAL: 150 MS
EKG Q-T INTERVAL: 356 MS
EKG Q-T INTERVAL: 402 MS
EKG QRS DURATION: 76 MS
EKG QRS DURATION: 76 MS
EKG QTC CALCULATION (BAZETT): 458 MS
EKG QTC CALCULATION (BAZETT): 461 MS
EKG R AXIS: 70 DEGREES
EKG R AXIS: 92 DEGREES
EKG T AXIS: 55 DEGREES
EKG T AXIS: 77 DEGREES
EKG VENTRICULAR RATE: 101 BPM
EKG VENTRICULAR RATE: 78 BPM

## 2023-10-27 PROCEDURE — 93010 ELECTROCARDIOGRAM REPORT: CPT | Performed by: INTERNAL MEDICINE

## 2023-10-31 ENCOUNTER — OFFICE VISIT (OUTPATIENT)
Dept: CARDIOLOGY CLINIC | Age: 83
End: 2023-10-31
Payer: MEDICARE

## 2023-10-31 VITALS — HEART RATE: 103 BPM | SYSTOLIC BLOOD PRESSURE: 120 MMHG | OXYGEN SATURATION: 98 % | DIASTOLIC BLOOD PRESSURE: 70 MMHG

## 2023-10-31 DIAGNOSIS — R07.9 CHEST PAIN IN ADULT: Primary | ICD-10-CM

## 2023-10-31 PROCEDURE — 3078F DIAST BP <80 MM HG: CPT | Performed by: INTERNAL MEDICINE

## 2023-10-31 PROCEDURE — 1123F ACP DISCUSS/DSCN MKR DOCD: CPT | Performed by: INTERNAL MEDICINE

## 2023-10-31 PROCEDURE — G8484 FLU IMMUNIZE NO ADMIN: HCPCS | Performed by: INTERNAL MEDICINE

## 2023-10-31 PROCEDURE — G8420 CALC BMI NORM PARAMETERS: HCPCS | Performed by: INTERNAL MEDICINE

## 2023-10-31 PROCEDURE — 1036F TOBACCO NON-USER: CPT | Performed by: INTERNAL MEDICINE

## 2023-10-31 PROCEDURE — 3074F SYST BP LT 130 MM HG: CPT | Performed by: INTERNAL MEDICINE

## 2023-10-31 PROCEDURE — 1090F PRES/ABSN URINE INCON ASSESS: CPT | Performed by: INTERNAL MEDICINE

## 2023-10-31 PROCEDURE — G8428 CUR MEDS NOT DOCUMENT: HCPCS | Performed by: INTERNAL MEDICINE

## 2023-10-31 PROCEDURE — 99214 OFFICE O/P EST MOD 30 MIN: CPT | Performed by: INTERNAL MEDICINE

## 2023-10-31 PROCEDURE — G8399 PT W/DXA RESULTS DOCUMENT: HCPCS | Performed by: INTERNAL MEDICINE

## 2023-10-31 RX ORDER — CLOPIDOGREL BISULFATE 75 MG/1
75 TABLET ORAL DAILY
Qty: 90 TABLET | Refills: 3 | Status: SHIPPED | OUTPATIENT
Start: 2023-10-31

## 2023-10-31 RX ORDER — POTASSIUM CHLORIDE 20 MEQ/1
20 TABLET, EXTENDED RELEASE ORAL 2 TIMES DAILY
Qty: 180 TABLET | Refills: 3 | Status: SHIPPED | OUTPATIENT
Start: 2023-10-31

## 2023-10-31 NOTE — PATIENT INSTRUCTIONS
Recommend going ahead and get dental  Work done - will need to HOLD Plavix and   Asa 5 days prior to procedure    Recommend to see pain Dr. Sonal Sommer day/time for heart cath in Dec 15th     Will call with dental update when done     Will come back week prior to cath  To sign consent/lab/ekg

## 2023-11-02 ENCOUNTER — OFFICE VISIT (OUTPATIENT)
Dept: PAIN MANAGEMENT | Age: 83
End: 2023-11-02
Payer: MEDICARE

## 2023-11-02 VITALS
WEIGHT: 130 LBS | SYSTOLIC BLOOD PRESSURE: 144 MMHG | BODY MASS INDEX: 19.77 KG/M2 | HEART RATE: 73 BPM | RESPIRATION RATE: 18 BRPM | DIASTOLIC BLOOD PRESSURE: 86 MMHG | OXYGEN SATURATION: 98 %

## 2023-11-02 DIAGNOSIS — M51.36 LUMBAR DEGENERATIVE DISC DISEASE: ICD-10-CM

## 2023-11-02 DIAGNOSIS — M54.50 CHRONIC BILATERAL LOW BACK PAIN WITHOUT SCIATICA: ICD-10-CM

## 2023-11-02 DIAGNOSIS — Z79.02 ENCOUNTER FOR CURRENT LONG TERM USE OF ANTIPLATELET DRUG: ICD-10-CM

## 2023-11-02 DIAGNOSIS — M96.1 LUMBAR POST-LAMINECTOMY SYNDROME: ICD-10-CM

## 2023-11-02 DIAGNOSIS — G89.29 CHRONIC BILATERAL LOW BACK PAIN WITHOUT SCIATICA: ICD-10-CM

## 2023-11-02 DIAGNOSIS — M53.3 SACROILIAC JOINT PAIN: Primary | ICD-10-CM

## 2023-11-02 PROCEDURE — 3079F DIAST BP 80-89 MM HG: CPT | Performed by: STUDENT IN AN ORGANIZED HEALTH CARE EDUCATION/TRAINING PROGRAM

## 2023-11-02 PROCEDURE — G8420 CALC BMI NORM PARAMETERS: HCPCS | Performed by: STUDENT IN AN ORGANIZED HEALTH CARE EDUCATION/TRAINING PROGRAM

## 2023-11-02 PROCEDURE — 1123F ACP DISCUSS/DSCN MKR DOCD: CPT | Performed by: STUDENT IN AN ORGANIZED HEALTH CARE EDUCATION/TRAINING PROGRAM

## 2023-11-02 PROCEDURE — 99204 OFFICE O/P NEW MOD 45 MIN: CPT | Performed by: STUDENT IN AN ORGANIZED HEALTH CARE EDUCATION/TRAINING PROGRAM

## 2023-11-02 PROCEDURE — 1090F PRES/ABSN URINE INCON ASSESS: CPT | Performed by: STUDENT IN AN ORGANIZED HEALTH CARE EDUCATION/TRAINING PROGRAM

## 2023-11-02 PROCEDURE — G8427 DOCREV CUR MEDS BY ELIG CLIN: HCPCS | Performed by: STUDENT IN AN ORGANIZED HEALTH CARE EDUCATION/TRAINING PROGRAM

## 2023-11-02 PROCEDURE — G8484 FLU IMMUNIZE NO ADMIN: HCPCS | Performed by: STUDENT IN AN ORGANIZED HEALTH CARE EDUCATION/TRAINING PROGRAM

## 2023-11-02 PROCEDURE — G8399 PT W/DXA RESULTS DOCUMENT: HCPCS | Performed by: STUDENT IN AN ORGANIZED HEALTH CARE EDUCATION/TRAINING PROGRAM

## 2023-11-02 PROCEDURE — 3077F SYST BP >= 140 MM HG: CPT | Performed by: STUDENT IN AN ORGANIZED HEALTH CARE EDUCATION/TRAINING PROGRAM

## 2023-11-02 PROCEDURE — 1036F TOBACCO NON-USER: CPT | Performed by: STUDENT IN AN ORGANIZED HEALTH CARE EDUCATION/TRAINING PROGRAM

## 2023-11-02 RX ORDER — CYCLOBENZAPRINE HCL 5 MG
5 TABLET ORAL NIGHTLY PRN
Qty: 30 TABLET | Refills: 0 | Status: SHIPPED | OUTPATIENT
Start: 2023-11-02 | End: 2023-12-02

## 2023-11-02 NOTE — PROGRESS NOTES
Ov Dr. Collette Lain for 6 month follow up   Has had several ER visits since last visit   Sob when walking   When laying or sitting feel fine   Had sharp CP today   Has appt to see pain  Thursday   ?  If should go d/t sob   Bedside echo done   Needing teeth pulled        Recommend going ahead and get dental  Work done - will need to HOLD Plavix and   Asa 5 days prior to procedure    Recommend to see pain Dr. Sonal Sommer day/time for heart cath in Dec 15th     Will call with dental update when done     Will come back week prior to cath  To sign consent/lab/ekg
which we placed a 2.75 x 18 mm Xience stent with an EF of 55%. On 07/25/2022, because of shortness of breath and chest pain, we did a catheterization on 07/29/2022, which showed occlusion of both LAD and right coronary artery and I opened the LAD and stented it with a 2.5 x 30 mm drug-eluting Xience stent which was very prolonged. I then brought her back and did a staged angioplasty of the right coronary artery on 09/02/2022, where we reopened the right coronary artery and placed a 2.75 x 22 and 4.0 x 22 mm Resolute stents in a very long procedure. Her shortness of breath improved at that time. She was able to walk around as much as she could with her chronic back issues. I saw her on 04/25/2023, and at that time, she was again doing well. She still had shortness of breath but it had markedly improved. In the last three months, her shortness of breath has markedly worsened. She can do very little activity before she gets short of breath. She does have chronic back pain, which also limits her activity but her shortness of breath seems to be worse. She went to the emergency room on 10/26/2023, with her shortness of breath and her Lasix was increased to 20 mg b.i.d.  I am seeing her today in followup. Her shortness of breath did not change. When she sits down or lays down, she feels good. She still has occasional sharp discomfort but her main issue is marked shortness of breath with any activity. When I see her today, she is comfortable. She is here with her . She is very articulate and interactive. She has had no syncope or near syncope, lightheadedness or dizziness. She has an appointment with her pain specialist tomorrow. She is also pending having her teeth removed because of damage from radiation therapy years ago. CARDIAC RISK FACTORS:  Known CAD:  Positive. Bypass Surgery:  Positive. PTCA:  Positive. Hypertension:  Positive. Hyperlipidemia:  Positive.   Other

## 2023-11-02 NOTE — PATIENT INSTRUCTIONS
SURVEY:    You may be receiving a survey from [employer] regarding your visit today. Please complete the survey to enable us to provide the highest quality of care to you and your family. If you cannot score us a very good on any question, please call the office to discuss how we could have made your experience a very good one. Thank you.   Hussain Jamil [address]  MD Alesha Davis MD Merdis Barman, MD Jodine Neighbor, DO Anay Carrillo APRN-CN  JUAN Nguyen-CNP  [address], PM  S[place], 1 [facility] 270, 1100 [facility], 425 [facility], [address] [place] [address], 601 [facility], [address] [place] [address]

## 2023-11-02 NOTE — PROGRESS NOTES
on pain and disability scale. The patient reports worsening quality of life. PLAN:  Medications: For nonopioid therapy, the following medications were prescribed:    -Start Flexeril 5 mg nightly as needed    Opioid therapy:  -Not indicated    Interventions:  -Plan for bilateral sacroiliac joint injections  -No need to hold ASA or Plavix    Imaging:  -Reviewed lumbar MRI with patient in room    Behavioral Therapies:  -Continue daily stretching and home exercise program    Referrals:  -None    Follow-up Plan:  -After procedure    Patient was offered intervention where appropriate. Multi-modal Pain Therapy: The patient was explicitly considered for multimodal and interdisciplinary therapy. Non-opioid and non-pharmacological opportunities to enhance analgesia and quality of life have been and will continue to be pursued.     Phyllis Simmons, DO  Interventional Pain Management/PM&R   [address]    Orders Placed This Encounter    SI JOINT INJECTIONS / SI NERVE BLOCK     Standing Status:   Future     Standing Expiration Date:   11/2/2024     Scheduling Instructions:      Bilateral sacroiliac joint injections      No need to hold ASA or Plavix    cyclobenzaprine (FLEXERIL) 5 MG tablet     Sig: Take 1 tablet by mouth nightly as needed for Muscle spasms     Dispense:  30 tablet     Refill:  0

## 2023-11-30 NOTE — PROGRESS NOTES
[facility]   Preadmission Testing    Name: Ac Thomas  : 1940  Patient Phone: [phone] (home)     Procedure: BILATERAL SACROILIAC JOINT INJECTION - Bilateral  Date of Procedure: 2023    Surgeon: Aminta Dubose DO    Ht:  172.7 cm (5' 8\")  Wt: 59 kg (130 lb)  Wt method: Stated    Allergies: Allergies   Allergen Reactions    Sulfa Antibiotics Itching                There were no vitals filed for this visit. No LMP recorded. Patient is postmenopausal.    Do you take blood thinners? [x] Yes    [] No         Instructed to stop blood thinners prior to procedure? [x] Yes    [] No      [] N/A    []Eliquis - 3 days  []Xarelto - 3 days  []Pradaxa - 5 days  []Coumadin - 5 days   [x]Plavix - 7 days  []ASA 325mg - 7 days  []Brillinta - 5 days  []Pletal - 2 days   Have you had a respiratory infection or sore throat in last 4 weeks before surgery?     [] Yes    [x] No     Patient instructed on: [x] NPO Status - if receiving sedation  [x] Meds to Take  [x] Ride Home - sedation/ epidurals  []No Jewelry/Contact Lenses/Nail Polish     DOS Patient Needs [] HCG   [] Blood Sugar  [] PT/INR

## 2023-12-07 ENCOUNTER — HOSPITAL ENCOUNTER (OUTPATIENT)
Age: 83
Setting detail: OUTPATIENT SURGERY
Discharge: HOME | End: 2023-12-07
Attending: STUDENT IN AN ORGANIZED HEALTH CARE EDUCATION/TRAINING PROGRAM | Admitting: STUDENT IN AN ORGANIZED HEALTH CARE EDUCATION/TRAINING PROGRAM
Payer: MEDICARE

## 2023-12-07 ENCOUNTER — APPOINTMENT (OUTPATIENT)
Dept: GENERAL RADIOLOGY | Age: 83
End: 2023-12-07
Attending: STUDENT IN AN ORGANIZED HEALTH CARE EDUCATION/TRAINING PROGRAM
Payer: MEDICARE

## 2023-12-07 VITALS
HEART RATE: 86 BPM | OXYGEN SATURATION: 98 % | HEIGHT: 68 IN | TEMPERATURE: 98.3 F | RESPIRATION RATE: 17 BRPM | DIASTOLIC BLOOD PRESSURE: 86 MMHG | SYSTOLIC BLOOD PRESSURE: 151 MMHG | BODY MASS INDEX: 19.4 KG/M2 | WEIGHT: 128 LBS

## 2023-12-07 PROCEDURE — 6360000002 HC RX W HCPCS: Performed by: STUDENT IN AN ORGANIZED HEALTH CARE EDUCATION/TRAINING PROGRAM

## 2023-12-07 PROCEDURE — 76000 FLUOROSCOPY <1 HR PHYS/QHP: CPT

## 2023-12-07 PROCEDURE — 3600000052 HC PAIN LEVEL 2 BASE: Performed by: STUDENT IN AN ORGANIZED HEALTH CARE EDUCATION/TRAINING PROGRAM

## 2023-12-07 PROCEDURE — 27096 INJECT SACROILIAC JOINT: CPT | Performed by: STUDENT IN AN ORGANIZED HEALTH CARE EDUCATION/TRAINING PROGRAM

## 2023-12-07 PROCEDURE — 2709999900 HC NON-CHARGEABLE SUPPLY: Performed by: STUDENT IN AN ORGANIZED HEALTH CARE EDUCATION/TRAINING PROGRAM

## 2023-12-07 PROCEDURE — 2500000003 HC RX 250 WO HCPCS: Performed by: STUDENT IN AN ORGANIZED HEALTH CARE EDUCATION/TRAINING PROGRAM

## 2023-12-07 PROCEDURE — 6360000004 HC RX CONTRAST MEDICATION: Performed by: STUDENT IN AN ORGANIZED HEALTH CARE EDUCATION/TRAINING PROGRAM

## 2023-12-07 PROCEDURE — 7100000010 HC PHASE II RECOVERY - FIRST 15 MIN: Performed by: STUDENT IN AN ORGANIZED HEALTH CARE EDUCATION/TRAINING PROGRAM

## 2023-12-07 RX ORDER — TRIAMCINOLONE ACETONIDE 40 MG/ML
INJECTION, SUSPENSION INTRA-ARTICULAR; INTRAMUSCULAR PRN
Status: DISCONTINUED | OUTPATIENT
Start: 2023-12-07 | End: 2023-12-07 | Stop reason: ALTCHOICE

## 2023-12-07 RX ORDER — LIDOCAINE HYDROCHLORIDE 10 MG/ML
INJECTION, SOLUTION EPIDURAL; INFILTRATION; INTRACAUDAL; PERINEURAL PRN
Status: DISCONTINUED | OUTPATIENT
Start: 2023-12-07 | End: 2023-12-07 | Stop reason: ALTCHOICE

## 2023-12-07 ASSESSMENT — PAIN DESCRIPTION - DESCRIPTORS: DESCRIPTORS: SHARP;SPASM;STABBING

## 2023-12-07 ASSESSMENT — PAIN - FUNCTIONAL ASSESSMENT: PAIN_FUNCTIONAL_ASSESSMENT: 0-10

## 2023-12-07 NOTE — H&P
Pain Pre-Op H&P Note    SUBJECTIVE:  No chief complaint on file. History of Present Illness:   Robin Block is a 78 y.o. female who presents with chronic low back pain. Past Medical History:   Diagnosis Date    CAD (coronary artery disease) 2008    CABG x 3    Chronic back pain     History of malignant neoplasm of parotid gland 2020    Dr. Anders Ferguson in [place], Squamous cell cancer    Hyperlipidemia     red yeast rice    Hypertension     onset after open heart surgery    Hypothyroidism     acquired.  hx neck radiation on L    Macular degeneration 2022    right eye injections Dr. Miguel Medrano PTCA     cardiac stents x 8    Transfusion history        Past Surgical History:   Procedure Laterality Date    ANGIOPLASTY  12/07/2016    Dr. Anai Rinaldi @ [address] [place] [address] x 1     ANGIOPLASTY  07/29/2022    Dr. Anai Rinaldi @ [facility]--Stenting X 2--    COLONOSCOPY  06/11/2013    [facility]    CORONARY ANGIOPLASTY Left 09/02/2022    Dr. Anai Rinaldi @ [facility]--Stenting X 2-also Cardiac rehab    [facility]  2006,11/14/2012    CORONARY ARTERY BYPASS GRAFT  2006    X3    HEMORRHOID SURGERY      OVARY REMOVAL Right     PAROTIDECTOMY Left 2020    squamous cell cancer , surgical removal and radiation    LA NJX DX/THER SBST INTRLMNR LMBR/SAC W/IMG GDN N/A 08/28/2018    EPIDURAL STEROID INJECTION-CUADAL performed by Seymour Monroe MD at [facility] Left     Parotid gland removed due to Squamous cell cancer -radiation    SPINE SURGERY  2010    laminectomy    UPPER GASTROINTESTINAL ENDOSCOPY  06/11/2013       Family History   Problem Relation Age of Onset    Heart Disease Mother     Cancer Father         prostate    Dementia Father        Social History     Socioeconomic History    Marital status:      Spouse name: Not on file    Number of children: Not on file    Years of education: Not on file    Highest education level: Not on file   Occupational History    Not on file

## 2023-12-07 NOTE — OP NOTE
PROCEDURE PERFORMED: Bilateral Sacroiliac joint injection    PREOPERATIVE DIAGNOSIS: Lumbago and sacroiliac joint pain    INDICATIONS: Chronic low back pain    The patient's history and physical exam were reviewed. The risk, benefits, and alternatives of the procedure were discussed and all questions were answered to the patient's satisfaction. The patient agreed to proceed and written informed consent was obtained. POSTOPERATIVE DIAGNOSIS: Same    PHYSICIAN:  Dr. Doug Flores DO    ANESTHESIA:  LOCAL    ASSISTANT:  NONE    PATHOLOGY:  NONE    ESTIMATED BLOOD LOSS:  N/A    IMPLANTS:  NONE    PROCEDURE DESCRIPTION: Bilateral sacroiliac joint injection using fluoroscopy    The patient was placed on the operative bed in prone position. The area was prepped with  Chlorhexidine. The area was then draped in a sterile fashion. The targeted side of the sacroiliac joint was identified and marked under AP fluoroscopy. The fluoroscopic beam was then obliqued until the anterior and posterior margins of the joint were aligned. The inferior margin of the joint was identified. A 25-gauge 3-1/2 inch Quincke needle was directed toward the identified point under fluoroscopic guidance. The joint space was then entered and negative aspiration was confirmed. Then, Omnipaque was injected. An appropriate arthrogram was noted. Then, after negative aspiration, the injectate was easily injected. The injectate consisted of 40 mg triamcinolone and 1 mL lidocaine 1%. The needle was then removed. The same procedure was performed on the opposite side. The needle site was dressed appropriately. The patient was transferred to the postoperative care unit in stable condition. Written discharge instructions were given to the patient. COMPLICATIONS:  There were no apparent complications. The patient tolerated the procedure well.

## 2023-12-07 NOTE — PROGRESS NOTES

## 2023-12-08 DIAGNOSIS — I25.10 CORONARY ARTERY DISEASE INVOLVING NATIVE CORONARY ARTERY OF NATIVE HEART WITHOUT ANGINA PECTORIS: ICD-10-CM

## 2023-12-08 DIAGNOSIS — R07.9 CHEST PAIN IN ADULT: Primary | ICD-10-CM

## 2023-12-08 DIAGNOSIS — Z98.890 HX OF LUMBOSACRAL SPINE SURGERY: ICD-10-CM

## 2023-12-08 DIAGNOSIS — M54.9 NON-TRAUMATIC MID BACK PAIN: ICD-10-CM

## 2023-12-08 DIAGNOSIS — E78.49 OTHER HYPERLIPIDEMIA: ICD-10-CM

## 2023-12-08 DIAGNOSIS — K21.9 GASTROESOPHAGEAL REFLUX DISEASE WITHOUT ESOPHAGITIS: ICD-10-CM

## 2023-12-08 DIAGNOSIS — D64.9 ANEMIA, UNSPECIFIED TYPE: ICD-10-CM

## 2023-12-08 DIAGNOSIS — N18.31 STAGE 3A CHRONIC KIDNEY DISEASE (HCC): ICD-10-CM

## 2023-12-08 DIAGNOSIS — I10 ESSENTIAL HYPERTENSION: ICD-10-CM

## 2023-12-08 RX ORDER — PANTOPRAZOLE SODIUM 20 MG/1
20 TABLET, DELAYED RELEASE ORAL
Qty: 90 TABLET | Refills: 0 | Status: SHIPPED | OUTPATIENT
Start: 2023-12-08

## 2023-12-08 RX ORDER — DULOXETIN HYDROCHLORIDE 30 MG/1
30 CAPSULE, DELAYED RELEASE ORAL DAILY
Qty: 30 CAPSULE | Refills: 1 | Status: SHIPPED | OUTPATIENT
Start: 2023-12-08

## 2023-12-08 RX ORDER — HYDROCORTISONE 25 MG/G
CREAM TOPICAL 2 TIMES DAILY PRN
Qty: 1 EACH | Refills: 5 | Status: SHIPPED | OUTPATIENT
Start: 2023-12-08

## 2023-12-11 ENCOUNTER — HOSPITAL ENCOUNTER (OUTPATIENT)
Age: 83
Discharge: HOME | End: 2023-12-11
Payer: MEDICARE

## 2023-12-11 ENCOUNTER — OFFICE VISIT (OUTPATIENT)
Dept: CARDIOLOGY CLINIC | Age: 83
End: 2023-12-11

## 2023-12-11 DIAGNOSIS — I10 ESSENTIAL HYPERTENSION: ICD-10-CM

## 2023-12-11 DIAGNOSIS — I25.10 CORONARY ARTERY DISEASE INVOLVING NATIVE CORONARY ARTERY OF NATIVE HEART WITHOUT ANGINA PECTORIS: ICD-10-CM

## 2023-12-11 DIAGNOSIS — D64.9 ANEMIA, UNSPECIFIED TYPE: ICD-10-CM

## 2023-12-11 DIAGNOSIS — N18.31 STAGE 3A CHRONIC KIDNEY DISEASE (HCC): ICD-10-CM

## 2023-12-11 DIAGNOSIS — E78.49 OTHER HYPERLIPIDEMIA: ICD-10-CM

## 2023-12-11 DIAGNOSIS — R07.9 CHEST PAIN IN ADULT: ICD-10-CM

## 2023-12-11 DIAGNOSIS — R07.9 CHEST PAIN IN ADULT: Primary | ICD-10-CM

## 2023-12-11 LAB
ALBUMIN SERPL-MCNC: 4 G/DL (ref 3.5–5.2)
ALP SERPL-CCNC: 66 U/L (ref 35–104)
ALT SERPL-CCNC: 13 U/L (ref 5–33)
ANION GAP SERPL CALCULATED.3IONS-SCNC: 12 MMOL/L (ref 9–17)
AST SERPL-CCNC: 14 U/L
BASOPHILS # BLD: 0.01 K/UL (ref 0–0.2)
BASOPHILS NFR BLD: 0 % (ref 0–2)
BILIRUB SERPL-MCNC: 0.1 MG/DL (ref 0.3–1.2)
BUN SERPL-MCNC: 30 MG/DL (ref 8–23)
BUN/CREAT SERPL: 20 (ref 9–20)
CALCIUM SERPL-MCNC: 9.1 MG/DL (ref 8.6–10.4)
CHLORIDE SERPL-SCNC: 106 MMOL/L (ref 98–107)
CO2 SERPL-SCNC: 19 MMOL/L (ref 20–31)
CREAT SERPL-MCNC: 1.5 MG/DL (ref 0.5–0.9)
EOSINOPHIL # BLD: 0.11 K/UL (ref 0–0.4)
EOSINOPHILS RELATIVE PERCENT: 1 % (ref 0–5)
ERYTHROCYTE [DISTWIDTH] IN BLOOD BY AUTOMATED COUNT: 16.2 % (ref 12.1–15.2)
GFR SERPL CREATININE-BSD FRML MDRD: 34 ML/MIN/1.73M2
GLUCOSE SERPL-MCNC: 100 MG/DL (ref 70–99)
HCT VFR BLD AUTO: 31.7 % (ref 36–46)
HGB BLD-MCNC: 9.6 G/DL (ref 12–16)
IMM GRANULOCYTES # BLD AUTO: 0.02 K/UL (ref 0–0.3)
IMM GRANULOCYTES NFR BLD: 0 % (ref 0–5)
LYMPHOCYTES NFR BLD: 1.32 K/UL (ref 1–4.8)
LYMPHOCYTES RELATIVE PERCENT: 11 % (ref 15–40)
MCH RBC QN AUTO: 27 PG (ref 26–34)
MCHC RBC AUTO-ENTMCNC: 30.3 G/DL (ref 31–37)
MCV RBC AUTO: 89 FL (ref 80–100)
MONOCYTES NFR BLD: 0.92 K/UL (ref 0–1)
MONOCYTES NFR BLD: 8 % (ref 4–8)
NEUTROPHILS NFR BLD: 80 % (ref 47–75)
NEUTS SEG NFR BLD: 9.66 K/UL (ref 2.5–7)
PLATELET # BLD AUTO: 429 K/UL (ref 140–450)
PMV BLD AUTO: 9.1 FL (ref 6–12)
POTASSIUM SERPL-SCNC: 3.2 MMOL/L (ref 3.7–5.3)
PROT SERPL-MCNC: 6.2 G/DL (ref 6.4–8.3)
RBC # BLD AUTO: 3.56 M/UL (ref 4–5.2)
SODIUM SERPL-SCNC: 137 MMOL/L (ref 135–144)
WBC OTHER # BLD: 12 K/UL (ref 3.5–11)

## 2023-12-11 PROCEDURE — 80061 LIPID PANEL: CPT

## 2023-12-11 PROCEDURE — 93005 ELECTROCARDIOGRAM TRACING: CPT

## 2023-12-11 PROCEDURE — 36415 COLL VENOUS BLD VENIPUNCTURE: CPT

## 2023-12-11 PROCEDURE — 85025 COMPLETE CBC W/AUTO DIFF WBC: CPT

## 2023-12-11 PROCEDURE — 80053 COMPREHEN METABOLIC PANEL: CPT

## 2023-12-11 RX ORDER — POTASSIUM CHLORIDE 20 MEQ/1
20 TABLET, EXTENDED RELEASE ORAL 3 TIMES DAILY
Status: ON HOLD | COMMUNITY

## 2023-12-11 NOTE — PROGRESS NOTES
Pt here to set up heart cath for Fri.  Dec 15 at 8am  Consent signed    Cholo Horta done today   Dr informed of lab results  Will do lower contrast for cath   Pt to increase potassium to 20 meq   TID   Pt informed

## 2023-12-12 LAB
CHOLEST SERPL-MCNC: 220 MG/DL
CHOLESTEROL/HDL RATIO: 2.8
EKG ATRIAL RATE: 88 BPM
EKG P AXIS: 79 DEGREES
EKG P-R INTERVAL: 124 MS
EKG Q-T INTERVAL: 376 MS
EKG QRS DURATION: 76 MS
EKG QTC CALCULATION (BAZETT): 454 MS
EKG R AXIS: 82 DEGREES
EKG T AXIS: 86 DEGREES
EKG VENTRICULAR RATE: 88 BPM
HDLC SERPL-MCNC: 79 MG/DL
LDLC SERPL CALC-MCNC: 121 MG/DL (ref 0–130)
TRIGL SERPL-MCNC: 100 MG/DL

## 2023-12-12 PROCEDURE — 93010 ELECTROCARDIOGRAM REPORT: CPT | Performed by: INTERNAL MEDICINE

## 2023-12-15 ENCOUNTER — PROCEDURE VISIT (OUTPATIENT)
Dept: CARDIOLOGY CLINIC | Age: 83
End: 2023-12-15

## 2023-12-15 DIAGNOSIS — R07.9 CHEST PAIN IN ADULT: Primary | ICD-10-CM

## 2023-12-15 DIAGNOSIS — I10 ESSENTIAL HYPERTENSION: ICD-10-CM

## 2023-12-15 DIAGNOSIS — I25.10 CORONARY ARTERY DISEASE INVOLVING NATIVE CORONARY ARTERY OF NATIVE HEART WITHOUT ANGINA PECTORIS: ICD-10-CM

## 2023-12-15 DIAGNOSIS — R94.31 ABNORMAL EKG: ICD-10-CM

## 2023-12-16 ENCOUNTER — APPOINTMENT (OUTPATIENT)
Dept: GENERAL RADIOLOGY | Age: 83
End: 2023-12-16
Payer: MEDICARE

## 2023-12-16 ENCOUNTER — HOSPITAL ENCOUNTER (EMERGENCY)
Age: 83
Discharge: ANOTHER HOSPITAL | End: 2023-12-16
Attending: FAMILY MEDICINE
Payer: MEDICARE

## 2023-12-16 ENCOUNTER — APPOINTMENT (OUTPATIENT)
Dept: CT IMAGING | Age: 83
End: 2023-12-16
Payer: MEDICARE

## 2023-12-16 VITALS
HEART RATE: 90 BPM | HEIGHT: 68 IN | DIASTOLIC BLOOD PRESSURE: 97 MMHG | SYSTOLIC BLOOD PRESSURE: 169 MMHG | OXYGEN SATURATION: 100 % | RESPIRATION RATE: 18 BRPM | TEMPERATURE: 98 F | BODY MASS INDEX: 20.16 KG/M2 | WEIGHT: 133 LBS

## 2023-12-16 DIAGNOSIS — S27.0XXA TRAUMATIC PNEUMOTHORAX, INITIAL ENCOUNTER: Primary | ICD-10-CM
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
## 2023-12-16 PROBLEM — J93.9 PNEUMOTHORAX: Status: ACTIVE | Noted: 2023-12-16

## 2023-12-16 LAB
ANION GAP SERPL CALCULATED.3IONS-SCNC: 17 MMOL/L (ref 9–17)
BASOPHILS # BLD: 0.01 K/UL (ref 0–0.2)
BASOPHILS NFR BLD: 0 % (ref 0–2)
BUN SERPL-MCNC: 25 MG/DL (ref 8–23)
BUN/CREAT SERPL: 17 (ref 9–20)
CALCIUM SERPL-MCNC: 8.9 MG/DL (ref 8.6–10.4)
CHLORIDE SERPL-SCNC: 108 MMOL/L (ref 98–107)
CO2 SERPL-SCNC: 18 MMOL/L (ref 20–31)
CREAT SERPL-MCNC: 1.5 MG/DL (ref 0.5–0.9)
EOSINOPHIL # BLD: 0.03 K/UL (ref 0–0.4)
EOSINOPHILS RELATIVE PERCENT: 0 % (ref 0–5)
ERYTHROCYTE [DISTWIDTH] IN BLOOD BY AUTOMATED COUNT: 16.4 % (ref 12.1–15.2)
GFR SERPL CREATININE-BSD FRML MDRD: 34 ML/MIN/1.73M2
GLUCOSE SERPL-MCNC: 90 MG/DL (ref 70–99)
HCT VFR BLD AUTO: 30.7 % (ref 36–46)
HGB BLD-MCNC: 9.4 G/DL (ref 12–16)
IMM GRANULOCYTES # BLD AUTO: 0.04 K/UL (ref 0–0.3)
IMM GRANULOCYTES NFR BLD: 0 % (ref 0–5)
INR PPP: 1
LYMPHOCYTES NFR BLD: 1.08 K/UL (ref 1–4.8)
LYMPHOCYTES RELATIVE PERCENT: 7 % (ref 15–40)
MCH RBC QN AUTO: 27.1 PG (ref 26–34)
MCHC RBC AUTO-ENTMCNC: 30.6 G/DL (ref 31–37)
MCV RBC AUTO: 88.5 FL (ref 80–100)
MONOCYTES NFR BLD: 1.23 K/UL (ref 0–1)
MONOCYTES NFR BLD: 8 % (ref 4–8)
NEUTROPHILS NFR BLD: 85 % (ref 47–75)
NEUTS SEG NFR BLD: 13.57 K/UL (ref 2.5–7)
PLATELET # BLD AUTO: 389 K/UL (ref 140–450)
PMV BLD AUTO: 9.2 FL (ref 6–12)
POTASSIUM SERPL-SCNC: 3.2 MMOL/L (ref 3.7–5.3)
PROTHROMBIN TIME: 13.3 SEC (ref 11.5–14.2)
RBC # BLD AUTO: 3.47 M/UL (ref 4–5.2)
SODIUM SERPL-SCNC: 143 MMOL/L (ref 135–144)
WBC OTHER # BLD: 16 K/UL (ref 3.5–11)

## 2023-12-16 PROCEDURE — 6370000000 HC RX 637 (ALT 250 FOR IP): Performed by: EMERGENCY MEDICINE

## 2023-12-16 PROCEDURE — 80048 BASIC METABOLIC PNL TOTAL CA: CPT

## 2023-12-16 PROCEDURE — 71250 CT THORAX DX C-: CPT

## 2023-12-16 PROCEDURE — 71046 X-RAY EXAM CHEST 2 VIEWS: CPT

## 2023-12-16 PROCEDURE — 85610 PROTHROMBIN TIME: CPT

## 2023-12-16 PROCEDURE — 85025 COMPLETE CBC W/AUTO DIFF WBC: CPT

## 2023-12-16 PROCEDURE — 6370000000 HC RX 637 (ALT 250 FOR IP): Performed by: FAMILY MEDICINE

## 2023-12-16 RX ORDER — FENTANYL CITRATE 50 UG/ML
100 INJECTION, SOLUTION INTRAMUSCULAR; INTRAVENOUS ONCE
Status: DISCONTINUED | OUTPATIENT
Start: 2023-12-16 | End: 2023-12-16

## 2023-12-16 RX ORDER — ISOSORBIDE MONONITRATE 30 MG/1
30 TABLET, EXTENDED RELEASE ORAL ONCE
Status: COMPLETED | OUTPATIENT
Start: 2023-12-16 | End: 2023-12-16

## 2023-12-16 RX ORDER — AMLODIPINE BESYLATE 5 MG/1
2.5 TABLET ORAL ONCE
Status: COMPLETED | OUTPATIENT
Start: 2023-12-16 | End: 2023-12-16

## 2023-12-16 RX ORDER — METOPROLOL SUCCINATE 25 MG/1
25 TABLET, EXTENDED RELEASE ORAL ONCE
Status: COMPLETED | OUTPATIENT
Start: 2023-12-16 | End: 2023-12-16

## 2023-12-16 RX ORDER — HYDROCODONE BITARTRATE AND ACETAMINOPHEN 5; 325 MG/1; MG/1
1 TABLET ORAL ONCE
Status: COMPLETED | OUTPATIENT
Start: 2023-12-16 | End: 2023-12-16

## 2023-12-16 RX ADMIN — HYDROCODONE BITARTRATE AND ACETAMINOPHEN 1 TABLET: 5; 325 TABLET ORAL at 09:17

## 2023-12-16 RX ADMIN — ISOSORBIDE MONONITRATE 30 MG: 30 TABLET, EXTENDED RELEASE ORAL at 08:43

## 2023-12-16 RX ADMIN — AMLODIPINE BESYLATE 2.5 MG: 5 TABLET ORAL at 08:43

## 2023-12-16 RX ADMIN — METOPROLOL SUCCINATE 25 MG: 25 TABLET, EXTENDED RELEASE ORAL at 08:42

## 2023-12-16 ASSESSMENT — PAIN SCALES - GENERAL
PAINLEVEL_OUTOF10: 8
PAINLEVEL_OUTOF10: 7

## 2023-12-16 ASSESSMENT — PAIN DESCRIPTION - DESCRIPTORS
DESCRIPTORS: STABBING
DESCRIPTORS: ACHING

## 2023-12-16 ASSESSMENT — PAIN DESCRIPTION - FREQUENCY: FREQUENCY: CONTINUOUS

## 2023-12-16 ASSESSMENT — PAIN DESCRIPTION - LOCATION
LOCATION: BACK
LOCATION: BACK

## 2023-12-16 ASSESSMENT — PAIN DESCRIPTION - PAIN TYPE: TYPE: ACUTE PAIN

## 2023-12-16 ASSESSMENT — PAIN DESCRIPTION - ORIENTATION: ORIENTATION: UPPER

## 2023-12-16 ASSESSMENT — PAIN - FUNCTIONAL ASSESSMENT
PAIN_FUNCTIONAL_ASSESSMENT: ACTIVITIES ARE NOT PREVENTED
PAIN_FUNCTIONAL_ASSESSMENT: 0-10

## 2023-12-16 NOTE — ED NOTES
Patient requesting to go private vehicle. Patient ambulated to bathroom on room air. Patient pulse ox 99 percent after returning from bathroom.       Scot Freeman RN  12/16/23 [id number]

## 2023-12-16 NOTE — ED NOTES
Ticket to ride completed.  The following information was reported off:  Name  Allergies  Orientation Level  Destination  Safety Issues  Code Status  Oxygen Requirements  Special needs including mobility, language, communication       Clorinda Claude, RN  12/16/23 9862

## 2023-12-16 NOTE — ED PROVIDER NOTES
access and basic labs ordered for patient    Updated patient and patient's  on my conversations above, will transfer to [place], working on transport, acknowledged    Patient care transferred to Dr. Erasmo Cordero, ED physician, at 0800 hrs., patient awaiting bed assignment and transport    1)  Number and Complexity of Problems  Problem List This Visit: As above    Differential Diagnosis: Rib fracture, PTX, contusion    Diagnoses Considered but Do Not Suspect: N/A    Pertinent Comorbid Conditions: N/A    2)  Data Reviewed  My EKG interpretation:  As above    Decision Rules/Scores utilized: N/A    Tests considered but not ordered and why: N/A    External Documents Reviewed: [facility]    Imaging that is independently reviewed and interpreted by me as: As above    See more data below for the lab and radiology tests and orders. 3)  Treatment and Disposition    Patient repeat assessment:  As above    Disposition discussion with patient/family: Transfer    Case discussed with consulting clinician:  As above    Social determinants of health impacting treatment or disposition: N/A    Shared Decision Making: N/A    Code Status Discussion: N/A      REASSESSMENT     As above      CRITICAL CARE TIME     Total Critical Care time was 0 minutes, excluding separately reportable procedures. There was a high probability of clinically significant/life threatening deterioration in the patient's condition which required my urgent intervention. PROCEDURES:  Unless otherwise noted below, none     Procedures    FINAL IMPRESSION      1. Traumatic pneumothorax, initial encounter    2. Accidental fall, initial encounter          DISPOSITION/PLAN     DISPOSITION Decision To Transfer 12/16/2023 07:27:30 AM      PATIENT REFERRED TO:  No follow-up provider specified.     DISCHARGE MEDICATIONS:  New Prescriptions    No medications on file       (Please note that portions of this note were completed with a voice recognition program.  Efforts

## 2023-12-19 ENCOUNTER — CLINICAL DOCUMENTATION ONLY (OUTPATIENT)
Facility: CLINIC | Age: 83
End: 2023-12-19

## 2024-01-02 ENCOUNTER — OFFICE VISIT (OUTPATIENT)
Dept: PRIMARY CARE CLINIC | Age: 84
End: 2024-01-02

## 2024-01-02 VITALS
SYSTOLIC BLOOD PRESSURE: 134 MMHG | BODY MASS INDEX: 20.16 KG/M2 | DIASTOLIC BLOOD PRESSURE: 72 MMHG | HEART RATE: 91 BPM | HEIGHT: 68 IN | OXYGEN SATURATION: 98 % | WEIGHT: 133 LBS

## 2024-01-02 DIAGNOSIS — K04.7 TOOTH ABSCESS: ICD-10-CM

## 2024-01-02 DIAGNOSIS — I50.9 CONGESTIVE HEART FAILURE, UNSPECIFIED HF CHRONICITY, UNSPECIFIED HEART FAILURE TYPE (HCC): ICD-10-CM

## 2024-01-02 DIAGNOSIS — C07 PRIMARY PAROTID GLAND SQUAMOUS CELL CARCINOMA (HCC): ICD-10-CM

## 2024-01-02 DIAGNOSIS — M54.9 NON-TRAUMATIC MID BACK PAIN: ICD-10-CM

## 2024-01-02 DIAGNOSIS — N18.4 CRI (CHRONIC RENAL INSUFFICIENCY), STAGE 4 (SEVERE) (HCC): ICD-10-CM

## 2024-01-02 DIAGNOSIS — T16.2XXA ACUTE FOREIGN BODY OF LEFT EAR CANAL, INITIAL ENCOUNTER: ICD-10-CM

## 2024-01-02 DIAGNOSIS — Z09 HOSPITAL DISCHARGE FOLLOW-UP: Primary | ICD-10-CM

## 2024-01-02 DIAGNOSIS — Z98.890 HX OF LUMBOSACRAL SPINE SURGERY: ICD-10-CM

## 2024-01-02 RX ORDER — DULOXETIN HYDROCHLORIDE 30 MG/1
30 CAPSULE, DELAYED RELEASE ORAL DAILY
Qty: 30 CAPSULE | Refills: 1 | Status: SHIPPED | OUTPATIENT
Start: 2024-01-02

## 2024-01-02 RX ORDER — AMOXICILLIN 500 MG/1
500 CAPSULE ORAL 2 TIMES DAILY
Qty: 20 CAPSULE | Refills: 0 | Status: SHIPPED | OUTPATIENT
Start: 2024-01-02 | End: 2024-01-12

## 2024-01-02 ASSESSMENT — PATIENT HEALTH QUESTIONNAIRE - PHQ9
9. THOUGHTS THAT YOU WOULD BE BETTER OFF DEAD, OR OF HURTING YOURSELF: 0
5. POOR APPETITE OR OVEREATING: 0
SUM OF ALL RESPONSES TO PHQ QUESTIONS 1-9: 0
SUM OF ALL RESPONSES TO PHQ9 QUESTIONS 1 & 2: 0
4. FEELING TIRED OR HAVING LITTLE ENERGY: 0
2. FEELING DOWN, DEPRESSED OR HOPELESS: 0
6. FEELING BAD ABOUT YOURSELF - OR THAT YOU ARE A FAILURE OR HAVE LET YOURSELF OR YOUR FAMILY DOWN: 0
7. TROUBLE CONCENTRATING ON THINGS, SUCH AS READING THE NEWSPAPER OR WATCHING TELEVISION: 0
3. TROUBLE FALLING OR STAYING ASLEEP: 0
1. LITTLE INTEREST OR PLEASURE IN DOING THINGS: 0
SUM OF ALL RESPONSES TO PHQ QUESTIONS 1-9: 0
8. MOVING OR SPEAKING SO SLOWLY THAT OTHER PEOPLE COULD HAVE NOTICED. OR THE OPPOSITE, BEING SO FIGETY OR RESTLESS THAT YOU HAVE BEEN MOVING AROUND A LOT MORE THAN USUAL: 0
10. IF YOU CHECKED OFF ANY PROBLEMS, HOW DIFFICULT HAVE THESE PROBLEMS MADE IT FOR YOU TO DO YOUR WORK, TAKE CARE OF THINGS AT HOME, OR GET ALONG WITH OTHER PEOPLE: 0

## 2024-01-02 NOTE — PROGRESS NOTES
DELTASONE     Red Yeast Rice 600 MG Tabs                Medications marked \"taking\" at this time  Outpatient Medications Marked as Taking for the 1/2/24 encounter (Office Visit) with Rita Hair APRN - CNP   Medication Sig Dispense Refill    predniSONE (DELTASONE) 20 MG tablet Take 1 tablet by mouth daily as needed (arthritis pains)      potassium chloride (KLOR-CON M) 20 MEQ extended release tablet Take 1 tablet by mouth 3 times daily      DULoxetine (CYMBALTA) 30 MG extended release capsule Take 1 capsule by mouth daily For anxiety/depression and musculoskeletal aches/pains start on Tuesday 8/22/23 30 capsule 1    pantoprazole (PROTONIX) 20 MG tablet Take 1 tablet by mouth every morning (before breakfast) 90 tablet 0    clopidogrel (PLAVIX) 75 MG tablet Take 1 tablet by mouth daily 90 tablet 3    levothyroxine (SYNTHROID) 100 MCG tablet TAKE 1 TABLET BY MOUTH EVERY DAY 90 tablet 1    FLUoxetine (PROZAC) 40 MG capsule TAKE 1 CAPSULE BY MOUTH EVERY DAY 90 capsule 1    furosemide (LASIX) 20 MG tablet TAKE 1 TABLET BY MOUTH EVERY DAY 90 tablet 1    metoprolol succinate (TOPROL XL) 25 MG extended release tablet TAKE 1 TABLET BY MOUTH EVERY DAY 90 tablet 1    isosorbide mononitrate (IMDUR) 30 MG extended release tablet TAKE 1 TABLET BY MOUTH TWICE DAILY 180 tablet 3    iron polysaccharides (IFEREX 150) 150 MG capsule Take 1 capsule by mouth every other day 15 capsule 3    amLODIPine (NORVASC) 2.5 MG tablet Take 1 tablet by mouth in the morning and at bedtime 60 tablet 11    Red Yeast Rice 600 MG TABS Take 600 mg by mouth in the morning and at bedtime      ASPIRIN LOW DOSE 81 MG EC tablet Take 1 tablet by mouth in the morning. 30 tablet 5        Medications patient taking as of now reconciled against medications ordered at time of hospital discharge: Yes    A comprehensive review of systems was negative except for what was noted in the HPI.    Objective:    /72 (Site: Right Upper Arm, Position: Sitting)

## 2024-01-03 ENCOUNTER — TELEPHONE (OUTPATIENT)
Dept: PRIMARY CARE CLINIC | Age: 84
End: 2024-01-03

## 2024-01-03 NOTE — TELEPHONE ENCOUNTER
[facility] called in stating that they are going to try to get an appointment for Lithia with Dr Tylor Pappas in [place]. He is the ENT that she was seen by 6/13/2023.

## 2024-01-05 RX ORDER — LEVOTHYROXINE SODIUM 0.1 MG/1
TABLET ORAL
Qty: 90 TABLET | Refills: 1 | Status: CANCELLED | OUTPATIENT
Start: 2024-01-05

## 2024-01-05 RX ORDER — POTASSIUM CHLORIDE 20 MEQ/1
20 TABLET, EXTENDED RELEASE ORAL 3 TIMES DAILY
Qty: 60 TABLET | Status: CANCELLED | OUTPATIENT
Start: 2024-01-05

## 2024-01-07 RX ORDER — LEVOTHYROXINE SODIUM 0.1 MG/1
TABLET ORAL
Qty: 90 TABLET | Refills: 1 | Status: SHIPPED | OUTPATIENT
Start: 2024-01-07

## 2024-01-07 RX ORDER — POTASSIUM CHLORIDE 20 MEQ/1
20 TABLET, EXTENDED RELEASE ORAL 2 TIMES DAILY
Qty: 60 TABLET | Refills: 1 | Status: SHIPPED | OUTPATIENT
Start: 2024-01-07

## 2024-01-09 DIAGNOSIS — K21.9 GASTROESOPHAGEAL REFLUX DISEASE WITHOUT ESOPHAGITIS: ICD-10-CM

## 2024-01-10 RX ORDER — METOPROLOL SUCCINATE 25 MG/1
TABLET, EXTENDED RELEASE ORAL
Qty: 90 TABLET | Refills: 1 | Status: SHIPPED | OUTPATIENT
Start: 2024-01-10

## 2024-01-10 RX ORDER — FLUOXETINE HYDROCHLORIDE 40 MG/1
CAPSULE ORAL
Qty: 90 CAPSULE | Refills: 1 | Status: SHIPPED | OUTPATIENT
Start: 2024-01-10

## 2024-01-10 RX ORDER — PANTOPRAZOLE SODIUM 20 MG/1
20 TABLET, DELAYED RELEASE ORAL
Qty: 90 TABLET | Refills: 0 | Status: SHIPPED | OUTPATIENT
Start: 2024-01-10

## 2024-01-10 RX ORDER — FUROSEMIDE 20 MG/1
TABLET ORAL
Qty: 90 TABLET | Refills: 1 | Status: SHIPPED | OUTPATIENT
Start: 2024-01-10

## 2024-01-10 NOTE — TELEPHONE ENCOUNTER
Last OV: 1/2/2024  Last RX: 7/13/2023, 12/8/2023   Next scheduled apt: 1/12/2024      Surescripts requesting refill

## 2024-01-11 ENCOUNTER — TELEPHONE (OUTPATIENT)
Dept: PAIN MANAGEMENT | Age: 84
End: 2024-01-11

## 2024-01-11 ENCOUNTER — OFFICE VISIT (OUTPATIENT)
Dept: PAIN MANAGEMENT | Age: 84
End: 2024-01-11
Payer: MEDICARE

## 2024-01-11 VITALS
HEART RATE: 95 BPM | OXYGEN SATURATION: 99 % | WEIGHT: 132 LBS | BODY MASS INDEX: 20.07 KG/M2 | RESPIRATION RATE: 18 BRPM

## 2024-01-11 DIAGNOSIS — G89.29 CHRONIC BILATERAL LOW BACK PAIN WITHOUT SCIATICA: ICD-10-CM

## 2024-01-11 DIAGNOSIS — M79.18 CHRONIC GLUTEAL PAIN: Primary | ICD-10-CM

## 2024-01-11 DIAGNOSIS — M51.36 LUMBAR DEGENERATIVE DISC DISEASE: ICD-10-CM

## 2024-01-11 DIAGNOSIS — G89.29 CHRONIC GLUTEAL PAIN: Primary | ICD-10-CM

## 2024-01-11 DIAGNOSIS — M53.3 SACROILIAC JOINT PAIN: ICD-10-CM

## 2024-01-11 DIAGNOSIS — M96.1 LUMBAR POST-LAMINECTOMY SYNDROME: ICD-10-CM

## 2024-01-11 DIAGNOSIS — Z79.02 ENCOUNTER FOR CURRENT LONG TERM USE OF ANTIPLATELET DRUG: ICD-10-CM

## 2024-01-11 DIAGNOSIS — M54.50 CHRONIC BILATERAL LOW BACK PAIN WITHOUT SCIATICA: ICD-10-CM

## 2024-01-11 PROCEDURE — G8399 PT W/DXA RESULTS DOCUMENT: HCPCS | Performed by: STUDENT IN AN ORGANIZED HEALTH CARE EDUCATION/TRAINING PROGRAM

## 2024-01-11 PROCEDURE — G8427 DOCREV CUR MEDS BY ELIG CLIN: HCPCS | Performed by: STUDENT IN AN ORGANIZED HEALTH CARE EDUCATION/TRAINING PROGRAM

## 2024-01-11 PROCEDURE — 99214 OFFICE O/P EST MOD 30 MIN: CPT | Performed by: STUDENT IN AN ORGANIZED HEALTH CARE EDUCATION/TRAINING PROGRAM

## 2024-01-11 PROCEDURE — 1090F PRES/ABSN URINE INCON ASSESS: CPT | Performed by: STUDENT IN AN ORGANIZED HEALTH CARE EDUCATION/TRAINING PROGRAM

## 2024-01-11 PROCEDURE — 1036F TOBACCO NON-USER: CPT | Performed by: STUDENT IN AN ORGANIZED HEALTH CARE EDUCATION/TRAINING PROGRAM

## 2024-01-11 PROCEDURE — G8420 CALC BMI NORM PARAMETERS: HCPCS | Performed by: STUDENT IN AN ORGANIZED HEALTH CARE EDUCATION/TRAINING PROGRAM

## 2024-01-11 PROCEDURE — 1123F ACP DISCUSS/DSCN MKR DOCD: CPT | Performed by: STUDENT IN AN ORGANIZED HEALTH CARE EDUCATION/TRAINING PROGRAM

## 2024-01-11 PROCEDURE — 1111F DSCHRG MED/CURRENT MED MERGE: CPT | Performed by: STUDENT IN AN ORGANIZED HEALTH CARE EDUCATION/TRAINING PROGRAM

## 2024-01-11 PROCEDURE — G8484 FLU IMMUNIZE NO ADMIN: HCPCS | Performed by: STUDENT IN AN ORGANIZED HEALTH CARE EDUCATION/TRAINING PROGRAM

## 2024-01-11 NOTE — TELEPHONE ENCOUNTER
SURGERY SCHEDULING FORM  [facility]   [address] [place] [address]    Phone [phone] Fax [phone]      Megan Healyr 1940 female    [address]  [place] [address]   Marital Status:          Home Phone: [phone]      Cell Phone:    Telephone Information:   Mobile [phone]          Surgeon: Brenda   Surgery Date: 2/1/24     Time:     Procedure: left superior cluneal nerve PNS implant using fluoroscopy     Diagnosis: M79.18, G89.29     Important Medical History:  In Epic    Special Inst/Equip: Regular    CPT Codes:    74348  Latex Allergy: No     Cardiac Device:  No    Anesthesia:  Local          Admission Type:  Same Day                        Admit Prior to Day of Surgery: No    Case Location:  Ambulatory            Preadmission Testing:  Phone Call          PAT Date and Time:     Need Preop Cardiac Clearance: No    Does Patient have Cardiologist/physician?     Suzy

## 2024-01-11 NOTE — PROGRESS NOTES
Chronic Pain Clinic Note     Encounter Date: 1/11/2024     SUBJECTIVE:  Chief Complaint   Patient presents with    1 Month Follow-Up    Back Pain     History of Present Illness:   Megan Smith is a 83 y.o. female who presents to follow up after SI injection. She states that she got several weeks of relief. She states that her back pain is back to baseline pain now.    Medication Refill: N/A    Current Complaints of Pain:   Location: low back   Radiation: None  Severity: moderate- severe  Pain Numerical Score - 6-7 when standing, 0 when sitting    Average: 6-10, depending what she is doing      Highest: 10  Lowest: 5-6  Character/Quality: Complains of pain that is sharp  Timing: Keeps awake at night  Associated symptoms: none  Numbness:   Weakness: legs, walks with cane   Exacerbating factors: walking, breathing   Alleviating factors: sitting  Length of time pain has been present: Started on - chronic   Inciting event/injury:   Bowel/Bladder incontinence:   Falls:   Physical Therapy: yes, failed     History of Interventions:   Surgery: Lumbar surgery- has hardware  Injections: SIJ    Imaging:    MRI Lumbar 9/9/23    FINDINGS:     Numerous cysts or cystic lesions are noted throughout both kidneys, which are   suboptimally evaluated without IV contrast, with the largest in the right   kidney midpole anterior cortex measuring 5 cm in AP diameter. A few   subcentimeter hemorrhagic cysts are noted scattered throughout the left kidney,   particularly posteriorly, with hypointense T2 signal.     Mild dextroconvex curvature of the lumbar spine is noted, with its apex at the   L2 level.     Prior L3-S1 bilateral pedicle screw fusion, laminectomies, and L4-L5 interbody   fusion is again noted.     The conus medullaris terminates normally at the L1-L2 level and the visualized   distal spinal cord appears normal.     There is diffuse disc desiccation, with severe degenerative loss of disc   height.     New bone marrow edema

## 2024-01-11 NOTE — TELEPHONE ENCOUNTER
SURGERY SCHEDULING FORM  [facility]   [address] [place] [address]    Phone [phone] Fax [phone]      Megan Healyr 1940 female    [address]  [place] [address]   Marital Status:          Home Phone: [phone]      Cell Phone:    Telephone Information:   Mobile [phone]          Surgeon: Brenda   Surgery Date: 2/22/24     Time:     Procedure: Right superior cluneal nerve PNS implant using fluoroscopy     Diagnosis: M79.18, G89.29     Important Medical History:  In Epic    Special Inst/Equip: Regular    CPT Codes:    60630  Latex Allergy: No     Cardiac Device:  No    Anesthesia:  Local          Admission Type:  Same Day                          Admit Prior to Day of Surgery: No    Case Location:  Ambulatory            Preadmission Testing:  Phone Call          PAT Date and Time:     Need Preop Cardiac Clearance: No    Does Patient have Cardiologist/physician?     Suzy

## 2024-01-12 ENCOUNTER — OFFICE VISIT (OUTPATIENT)
Dept: PRIMARY CARE CLINIC | Age: 84
End: 2024-01-12

## 2024-01-12 VITALS
SYSTOLIC BLOOD PRESSURE: 130 MMHG | HEIGHT: 68 IN | DIASTOLIC BLOOD PRESSURE: 80 MMHG | OXYGEN SATURATION: 98 % | HEART RATE: 84 BPM | BODY MASS INDEX: 20.31 KG/M2 | WEIGHT: 134 LBS

## 2024-01-12 DIAGNOSIS — I10 PRIMARY HYPERTENSION: ICD-10-CM

## 2024-01-12 DIAGNOSIS — F41.9 ANXIETY AND DEPRESSION: ICD-10-CM

## 2024-01-12 DIAGNOSIS — Z97.4 WEARS HEARING AID: Primary | ICD-10-CM

## 2024-01-12 DIAGNOSIS — F32.A ANXIETY AND DEPRESSION: ICD-10-CM

## 2024-01-12 NOTE — PROGRESS NOTES
INJECTION performed by Chapito Gutierrez DO at [facility] OR    COLONOSCOPY  06/11/2013    Sandy GERONIMO    CORONARY ANGIOPLASTY Left 09/02/2022    Dr. Almonte @ [facility]--Stenting X 2-also Cardiac rehab    CORONARY ANGIOPLASTY WITH STENT PLACEMENT  2006,11/14/2012    CORONARY ARTERY BYPASS GRAFT  2006    X3    HEMORRHOID SURGERY      OVARY REMOVAL Right     PAROTIDECTOMY Left 2020    squamous cell cancer , surgical removal and radiation    MS NJX DX/THER SBST INTRLMNR LMBR/SAC W/IMG GDN N/A 08/28/2018    EPIDURAL STEROID INJECTION-CUADAL performed by Edil Khanna MD at [facility] OR    RADICAL NECK DISSECTION Left     Parotid gland removed due to Squamous cell cancer -radiation    SPINE SURGERY  2010    laminectomy    UPPER GASTROINTESTINAL ENDOSCOPY  06/11/2013        Medications:       Prior to Admission medications    Medication Sig Start Date End Date Taking? Authorizing Provider   furosemide (LASIX) 20 MG tablet TAKE 1 TABLET BY MOUTH EVERY DAY 1/10/24  Yes Rita Hair APRN - CNP   FLUoxetine (PROZAC) 40 MG capsule TAKE 1 CAPSULE BY MOUTH EVERY DAY 1/10/24  Yes Rita Hair APRN - CNP   metoprolol succinate (TOPROL XL) 25 MG extended release tablet TAKE 1 TABLET BY MOUTH EVERY DAY 1/10/24  Yes Rita Hair APRN - CNP   pantoprazole (PROTONIX) 20 MG tablet TAKE 1 TABLET BY MOUTH EVERY MORNING BEFORE BREAKFAST 1/10/24  Yes Rita Hair APRN - CNP   levothyroxine (SYNTHROID) 100 MCG tablet TAKE 1 TABLET BY MOUTH EVERY DAY 1/7/24  Yes Rita Hair APRN - CNP   potassium chloride (KLOR-CON M) 20 MEQ extended release tablet Take 1 tablet by mouth 2 times daily For hypokalemia 1/7/24  Yes Rita Hair APRN - CNP   DULoxetine (CYMBALTA) 30 MG extended release capsule Take 1 capsule by mouth daily For anxiety/depression and musculoskeletal aches/pains start on Tuesday 8/22/23 1/2/24  Yes Rita Hair APRN - CNP   predniSONE (DELTASONE) 20 MG tablet Take 1 tablet by mouth daily as needed (arthritis pains)   Yes

## 2024-01-13 ASSESSMENT — ENCOUNTER SYMPTOMS
SHORTNESS OF BREATH: 0
WHEEZING: 0
RHINORRHEA: 0
SORE THROAT: 0
ABDOMINAL DISTENTION: 0
EYES NEGATIVE: 1
COUGH: 0
BACK PAIN: 1
CHEST TIGHTNESS: 0
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
## 2024-01-16 ENCOUNTER — TELEPHONE (OUTPATIENT)
Dept: CARDIAC REHAB | Age: 84
End: 2024-01-16

## 2024-01-16 NOTE — TELEPHONE ENCOUNTER
Unable to reach patient with listed phone number.    Please return call to [phone] for cardiac rehab scheduling.

## 2024-01-22 ENCOUNTER — TELEPHONE (OUTPATIENT)
Dept: CARDIAC REHAB | Age: 84
End: 2024-01-22

## 2024-01-22 ENCOUNTER — TELEPHONE (OUTPATIENT)
Dept: CARDIOLOGY CLINIC | Age: 84
End: 2024-01-22

## 2024-01-22 NOTE — TELEPHONE ENCOUNTER
Patient's  called to report to Dr Almonte that Megan is still so short of breath after have cath and stenting.  If she is just sitting and doing nothing she does ok but to get up and even walk to the bathroom, by the time she gets there she is so out of breath.  Patient reports no chest pain until she gets out of breath then she can feel her heart speed up.  She is concerned. Please advise what she should do.

## 2024-01-22 NOTE — TELEPHONE ENCOUNTER
Spoke with patient and spouse about starting cardiac rehab. They stated she is not ready and that they would call when she is.    Gave patient our number [phone] and instructed to call when ready.

## 2024-02-05 ENCOUNTER — APPOINTMENT (OUTPATIENT)
Dept: GENERAL RADIOLOGY | Age: 84
End: 2024-02-05
Payer: MEDICARE

## 2024-02-05 ENCOUNTER — HOSPITAL ENCOUNTER (EMERGENCY)
Age: 84
Discharge: HOME | End: 2024-02-05
Attending: EMERGENCY MEDICINE
Payer: MEDICARE

## 2024-02-05 VITALS
BODY MASS INDEX: 19.7 KG/M2 | OXYGEN SATURATION: 100 % | HEIGHT: 68 IN | RESPIRATION RATE: 22 BRPM | SYSTOLIC BLOOD PRESSURE: 152 MMHG | TEMPERATURE: 97.8 F | HEART RATE: 94 BPM | DIASTOLIC BLOOD PRESSURE: 77 MMHG | WEIGHT: 130 LBS

## 2024-02-05 DIAGNOSIS — F41.1 ANXIETY STATE: ICD-10-CM

## 2024-02-05 DIAGNOSIS — R06.00 DYSPNEA, UNSPECIFIED TYPE: Primary | ICD-10-CM

## 2024-02-05 LAB
ALBUMIN SERPL-MCNC: 3.8 G/DL (ref 3.5–5.2)
ALP SERPL-CCNC: 83 U/L (ref 35–104)
ALT SERPL-CCNC: 9 U/L (ref 5–33)
ANION GAP SERPL CALCULATED.3IONS-SCNC: 13 MMOL/L (ref 9–17)
AST SERPL-CCNC: 14 U/L
BASOPHILS # BLD: 0.06 K/UL (ref 0–0.2)
BASOPHILS NFR BLD: 1 % (ref 0–2)
BILIRUB SERPL-MCNC: 0.1 MG/DL (ref 0.3–1.2)
BNP SERPL-MCNC: 340 PG/ML
BUN SERPL-MCNC: 22 MG/DL (ref 8–23)
BUN/CREAT SERPL: 17 (ref 9–20)
CALCIUM SERPL-MCNC: 9 MG/DL (ref 8.6–10.4)
CHLORIDE SERPL-SCNC: 110 MMOL/L (ref 98–107)
CO2 SERPL-SCNC: 19 MMOL/L (ref 20–31)
CREAT SERPL-MCNC: 1.3 MG/DL (ref 0.5–0.9)
D DIMER PPP FEU-MCNC: 0.84 UG/ML FEU (ref 0–0.59)
EOSINOPHIL # BLD: 0.31 K/UL (ref 0–0.4)
EOSINOPHILS RELATIVE PERCENT: 5 % (ref 0–5)
ERYTHROCYTE [DISTWIDTH] IN BLOOD BY AUTOMATED COUNT: 16.4 % (ref 12.1–15.2)
GFR SERPL CREATININE-BSD FRML MDRD: 40 ML/MIN/1.73M2
GLUCOSE SERPL-MCNC: 118 MG/DL (ref 70–99)
HCT VFR BLD AUTO: 29.2 % (ref 36–46)
HGB BLD-MCNC: 8.4 G/DL (ref 12–16)
IMM GRANULOCYTES # BLD AUTO: 0.01 K/UL (ref 0–0.3)
IMM GRANULOCYTES NFR BLD: 0 % (ref 0–5)
LYMPHOCYTES NFR BLD: 0.99 K/UL (ref 1–4.8)
LYMPHOCYTES RELATIVE PERCENT: 14 % (ref 15–40)
MCH RBC QN AUTO: 24.9 PG (ref 26–34)
MCHC RBC AUTO-ENTMCNC: 28.8 G/DL (ref 31–37)
MCV RBC AUTO: 86.4 FL (ref 80–100)
MONOCYTES NFR BLD: 0.54 K/UL (ref 0–1)
MONOCYTES NFR BLD: 8 % (ref 4–8)
MORPHOLOGY: ABNORMAL
NEUTROPHILS NFR BLD: 72 % (ref 47–75)
NEUTS SEG NFR BLD: 4.97 K/UL (ref 2.5–7)
PLATELET # BLD AUTO: 521 K/UL (ref 140–450)
PMV BLD AUTO: 9.1 FL (ref 6–12)
POTASSIUM SERPL-SCNC: 4 MMOL/L (ref 3.7–5.3)
PROT SERPL-MCNC: 6.3 G/DL (ref 6.4–8.3)
RBC # BLD AUTO: 3.38 M/UL (ref 4–5.2)
SODIUM SERPL-SCNC: 142 MMOL/L (ref 135–144)
TROPONIN I SERPL HS-MCNC: 26 NG/L (ref 0–14)
WBC OTHER # BLD: 6.9 K/UL (ref 3.5–11)

## 2024-02-05 PROCEDURE — 99285 EMERGENCY DEPT VISIT HI MDM: CPT

## 2024-02-05 PROCEDURE — 85379 FIBRIN DEGRADATION QUANT: CPT

## 2024-02-05 PROCEDURE — 83880 ASSAY OF NATRIURETIC PEPTIDE: CPT

## 2024-02-05 PROCEDURE — 84484 ASSAY OF TROPONIN QUANT: CPT

## 2024-02-05 PROCEDURE — 93005 ELECTROCARDIOGRAM TRACING: CPT | Performed by: EMERGENCY MEDICINE

## 2024-02-05 PROCEDURE — 71045 X-RAY EXAM CHEST 1 VIEW: CPT

## 2024-02-05 PROCEDURE — 36415 COLL VENOUS BLD VENIPUNCTURE: CPT

## 2024-02-05 PROCEDURE — 80053 COMPREHEN METABOLIC PANEL: CPT

## 2024-02-05 PROCEDURE — 85025 COMPLETE CBC W/AUTO DIFF WBC: CPT

## 2024-02-05 ASSESSMENT — PAIN - FUNCTIONAL ASSESSMENT: PAIN_FUNCTIONAL_ASSESSMENT: NONE - DENIES PAIN

## 2024-02-05 NOTE — ED NOTES
Writer walked patient in the [address]. Patient spo2 remained % while walking on room air. Patient breathing WDL and then mid walk very anxious with excessive mouth breathing. Writer informed patient of proper breathing techniques and to stop talking, breathing effort improved almost immediately. Patient returned back to bed and spo2 100% on room air. Writer informed Dr. Marsh who also witnessed patient anxiety while walking.

## 2024-02-05 NOTE — ED PROVIDER NOTES
EMERGENCY DEPARTMENT ENCOUNTER      CHIEF COMPLAINT    Chief Complaint   Patient presents with    Shortness of Breath     Patient Arrives via EMS with complaints of SOB for two weeks. Was going to see her Cardiologist tomorrow but could not wait. Denies chest pain       HPI    Megan Smith is a 83 y.o. female with history of CHF and coronary artery disease who presents to the emergency room via EMS for evaluation of shortness of breath that is worse with exertion over the last 2 weeks with associated palpitations.  She denies chest pain.      PAST MEDICAL HISTORY    Past Medical History:   Diagnosis Date    CAD (coronary artery disease) 2008    CABG x 3    Chronic back pain     History of malignant neoplasm of parotid gland 2020    Dr. Pappas in [place], Squamous cell cancer    Hyperlipidemia     red yeast rice    Hypertension     onset after open heart surgery    Hypothyroidism     acquired. hx neck radiation on L    Macular degeneration 2022    right eye injections Dr. Salcido    Post PTCA     cardiac stents x 8    Transfusion history        SURGICAL HISTORY    Past Surgical History:   Procedure Laterality Date    ANGIOPLASTY  12/07/2016    Dr. Almonte @ [facility] x 1     ANGIOPLASTY  07/29/2022    Dr. Almonte @ [facility]--Stenting X 2--    BACK INJECTION Bilateral 12/7/2023    BILATERAL SACROILIAC JOINT INJECTION performed by Chapito Gutierrez DO at [facility] OR    COLONOSCOPY  06/11/2013    Sandy GERONIMO    CORONARY ANGIOPLASTY Left 09/02/2022    Dr. Almonte @ [facility]--Stenting X 2-also Cardiac rehab    CORONARY ANGIOPLASTY WITH STENT PLACEMENT  2006,11/14/2012    CORONARY ARTERY BYPASS GRAFT  2006    X3    HEMORRHOID SURGERY      OVARY REMOVAL Right     PAROTIDECTOMY Left 2020    squamous cell cancer , surgical removal and radiation    UT NJX DX/THER SBST INTRLMNR LMBR/SAC W/IMG GDN N/A 08/28/2018    EPIDURAL STEROID INJECTION-CUADAL performed by Edil Khanna MD at [facility] OR    RADICAL NECK DISSECTION Left

## 2024-02-06 ENCOUNTER — TELEPHONE (OUTPATIENT)
Dept: PRIMARY CARE CLINIC | Age: 84
End: 2024-02-06

## 2024-02-06 LAB
EKG ATRIAL RATE: 97 BPM
EKG P AXIS: 86 DEGREES
EKG P-R INTERVAL: 132 MS
EKG Q-T INTERVAL: 364 MS
EKG QRS DURATION: 88 MS
EKG QTC CALCULATION (BAZETT): 462 MS
EKG R AXIS: 84 DEGREES
EKG T AXIS: 90 DEGREES
EKG VENTRICULAR RATE: 97 BPM

## 2024-02-06 PROCEDURE — 93010 ELECTROCARDIOGRAM REPORT: CPT | Performed by: INTERNAL MEDICINE

## 2024-02-06 NOTE — TELEPHONE ENCOUNTER
[facility] ED Follow up Call    Reason for ED visit: Dyspnea      2/6/2024    Hi Megan , this is Rehana Briceno LPN from Rita Hair's JUAN-CNP office, just calling to see how you are doing after your recent ED visit.    Did you receive discharge instructions?  Yes  Do you understand the discharge instructions? Yes  Did the ED give you any new prescriptions? No  Were you able to fill your prescriptions? N/A      Do you have one of our red, yellow and green  Zone sheets that help you to determine when you should go to the ED?  No      Do you need or want to make a follow up appt with your PCP?  Seeing cardiology tomorrow    Do you have any further needs in the home, e.g. equipment?  No        Future Appointments   Date Time Provider Department Center   2/7/2024 10:00 AM Shaq Almonte MD [facility]   2/22/2024 12:30 PM Shaq Almonte MD [facility]   3/13/2024  9:00 AM Kendra Harvey APRN - CNP [facility]   4/11/2024  9:30 AM Chapito Gutierrez DO [facility]   4/25/2024  9:30 AM Chapito Gutierrez DO [facility]   6/11/2024 10:00 AM Rita Hair APRN - CNP [facility]

## 2024-02-07 ENCOUNTER — OFFICE VISIT (OUTPATIENT)
Dept: CARDIOLOGY CLINIC | Age: 84
End: 2024-02-07
Payer: MEDICARE

## 2024-02-07 VITALS — SYSTOLIC BLOOD PRESSURE: 130 MMHG | HEART RATE: 103 BPM | OXYGEN SATURATION: 98 % | DIASTOLIC BLOOD PRESSURE: 70 MMHG

## 2024-02-07 DIAGNOSIS — D64.9 ANEMIA, UNSPECIFIED TYPE: Primary | ICD-10-CM

## 2024-02-07 DIAGNOSIS — I10 ESSENTIAL HYPERTENSION: ICD-10-CM

## 2024-02-07 DIAGNOSIS — R06.02 SOB (SHORTNESS OF BREATH): ICD-10-CM

## 2024-02-07 PROCEDURE — G8428 CUR MEDS NOT DOCUMENT: HCPCS | Performed by: INTERNAL MEDICINE

## 2024-02-07 PROCEDURE — 1036F TOBACCO NON-USER: CPT | Performed by: INTERNAL MEDICINE

## 2024-02-07 PROCEDURE — G8420 CALC BMI NORM PARAMETERS: HCPCS | Performed by: INTERNAL MEDICINE

## 2024-02-07 PROCEDURE — 3075F SYST BP GE 130 - 139MM HG: CPT | Performed by: INTERNAL MEDICINE

## 2024-02-07 PROCEDURE — G8399 PT W/DXA RESULTS DOCUMENT: HCPCS | Performed by: INTERNAL MEDICINE

## 2024-02-07 PROCEDURE — G8484 FLU IMMUNIZE NO ADMIN: HCPCS | Performed by: INTERNAL MEDICINE

## 2024-02-07 PROCEDURE — 99214 OFFICE O/P EST MOD 30 MIN: CPT | Performed by: INTERNAL MEDICINE

## 2024-02-07 PROCEDURE — 3078F DIAST BP <80 MM HG: CPT | Performed by: INTERNAL MEDICINE

## 2024-02-07 PROCEDURE — 1090F PRES/ABSN URINE INCON ASSESS: CPT | Performed by: INTERNAL MEDICINE

## 2024-02-07 PROCEDURE — 1123F ACP DISCUSS/DSCN MKR DOCD: CPT | Performed by: INTERNAL MEDICINE

## 2024-02-07 RX ORDER — FERROUS SULFATE 324(65)MG
324 TABLET, DELAYED RELEASE (ENTERIC COATED) ORAL
Qty: 30 TABLET | Refills: 11 | Status: SHIPPED | OUTPATIENT
Start: 2024-02-07

## 2024-02-07 NOTE — PATIENT INSTRUCTIONS
Will ADD Ferrous Sulfate 324 mg one daily     Will STOP Lasix     Monitor BP/HR/O2 daily - different times of the day -  Report readings back to us in 2 weeks    Follow up in 4 weeks with CBC/CMP/TSH/IRON   STUDIES prior

## 2024-02-14 NOTE — PROGRESS NOTES
Ov Dr. Almonte for follow up on sob  Was in ER 2 days ago for sob   \"I'm in bed most of the time\" d/t sob   Same now as what was prior to stent   \"Actually more sob\"   Bp standing 102 sitting 130  Left arm        Will ADD Ferrous Sulfate 324 mg one daily     Will STOP Lasix     Monitor BP/HR/O2 daily - different times of the day -  Report readings back to us in 2 weeks    Follow up in 4 weeks with CBC/CMP/TSH/IRON   STUDIES prior     
Doc#: [id number]

## 2024-02-29 ENCOUNTER — HOSPITAL ENCOUNTER (OUTPATIENT)
Age: 84
Discharge: HOME | End: 2024-02-29
Payer: MEDICARE

## 2024-02-29 DIAGNOSIS — R06.02 SOB (SHORTNESS OF BREATH): ICD-10-CM

## 2024-02-29 DIAGNOSIS — D64.9 ANEMIA, UNSPECIFIED TYPE: ICD-10-CM

## 2024-02-29 DIAGNOSIS — I10 ESSENTIAL HYPERTENSION: ICD-10-CM

## 2024-02-29 LAB
ALBUMIN SERPL-MCNC: 3.6 G/DL (ref 3.5–5.2)
ALP SERPL-CCNC: 92 U/L (ref 35–104)
ALT SERPL-CCNC: 17 U/L (ref 5–33)
ANION GAP SERPL CALCULATED.3IONS-SCNC: 11 MMOL/L (ref 9–17)
AST SERPL-CCNC: 21 U/L
BASOPHILS # BLD: 0.07 K/UL (ref 0–0.2)
BASOPHILS NFR BLD: 1 % (ref 0–2)
BILIRUB SERPL-MCNC: <0.1 MG/DL (ref 0.3–1.2)
BUN SERPL-MCNC: 14 MG/DL (ref 8–23)
BUN/CREAT SERPL: 12 (ref 9–20)
CALCIUM SERPL-MCNC: 8.7 MG/DL (ref 8.6–10.4)
CHLORIDE SERPL-SCNC: 111 MMOL/L (ref 98–107)
CO2 SERPL-SCNC: 19 MMOL/L (ref 20–31)
CREAT SERPL-MCNC: 1.2 MG/DL (ref 0.5–0.9)
EOSINOPHIL # BLD: 0.37 K/UL (ref 0–0.4)
EOSINOPHILS RELATIVE PERCENT: 5 % (ref 0–5)
ERYTHROCYTE [DISTWIDTH] IN BLOOD BY AUTOMATED COUNT: 19.7 % (ref 12.1–15.2)
FERRITIN SERPL-MCNC: 44 NG/ML (ref 13–150)
FOLATE SERPL-MCNC: 14 NG/ML
GFR SERPL CREATININE-BSD FRML MDRD: 44 ML/MIN/1.73M2
GLUCOSE SERPL-MCNC: 95 MG/DL (ref 70–99)
HCT VFR BLD AUTO: 28.1 % (ref 36–46)
HGB BLD-MCNC: 8.1 G/DL (ref 12–16)
IMM GRANULOCYTES # BLD AUTO: 0.01 K/UL (ref 0–0.3)
IMM GRANULOCYTES NFR BLD: 0 % (ref 0–5)
IRON SATN MFR SERPL: 17 % (ref 20–55)
IRON SERPL-MCNC: 55 UG/DL (ref 37–145)
LYMPHOCYTES NFR BLD: 1.18 K/UL (ref 1–4.8)
LYMPHOCYTES RELATIVE PERCENT: 17 % (ref 15–40)
MCH RBC QN AUTO: 25.5 PG (ref 26–34)
MCHC RBC AUTO-ENTMCNC: 28.8 G/DL (ref 31–37)
MCV RBC AUTO: 88.4 FL (ref 80–100)
MONOCYTES NFR BLD: 0.67 K/UL (ref 0–1)
MONOCYTES NFR BLD: 10 % (ref 4–8)
MORPHOLOGY: ABNORMAL
NEUTROPHILS NFR BLD: 67 % (ref 47–75)
NEUTS SEG NFR BLD: 4.61 K/UL (ref 2.5–7)
PLATELET # BLD AUTO: 396 K/UL (ref 140–450)
PMV BLD AUTO: 8.8 FL (ref 6–12)
POTASSIUM SERPL-SCNC: 3.1 MMOL/L (ref 3.7–5.3)
PROT SERPL-MCNC: 5.8 G/DL (ref 6.4–8.3)
RBC # BLD AUTO: 3.18 M/UL (ref 4–5.2)
SODIUM SERPL-SCNC: 141 MMOL/L (ref 135–144)
TIBC SERPL-MCNC: 325 UG/DL (ref 250–450)
TSH SERPL DL<=0.05 MIU/L-ACNC: 3.01 UIU/ML (ref 0.3–5)
UNSATURATED IRON BINDING CAPACITY: 270 UG/DL (ref 112–347)
VIT B12 SERPL-MCNC: 1586 PG/ML (ref 232–1245)
WBC OTHER # BLD: 6.9 K/UL (ref 3.5–11)

## 2024-02-29 PROCEDURE — 80053 COMPREHEN METABOLIC PANEL: CPT

## 2024-02-29 PROCEDURE — 83540 ASSAY OF IRON: CPT

## 2024-02-29 PROCEDURE — 85025 COMPLETE CBC W/AUTO DIFF WBC: CPT

## 2024-02-29 PROCEDURE — 84443 ASSAY THYROID STIM HORMONE: CPT

## 2024-02-29 PROCEDURE — 82728 ASSAY OF FERRITIN: CPT

## 2024-02-29 PROCEDURE — 36415 COLL VENOUS BLD VENIPUNCTURE: CPT

## 2024-02-29 PROCEDURE — 82607 VITAMIN B-12: CPT

## 2024-02-29 PROCEDURE — 82746 ASSAY OF FOLIC ACID SERUM: CPT

## 2024-02-29 PROCEDURE — 83550 IRON BINDING TEST: CPT

## 2024-03-06 ENCOUNTER — OFFICE VISIT (OUTPATIENT)
Dept: CARDIOLOGY CLINIC | Age: 84
End: 2024-03-06

## 2024-03-06 ENCOUNTER — HOSPITAL ENCOUNTER (OUTPATIENT)
Age: 84
Discharge: HOME | End: 2024-03-06
Payer: MEDICARE

## 2024-03-06 VITALS — HEART RATE: 81 BPM | DIASTOLIC BLOOD PRESSURE: 70 MMHG | OXYGEN SATURATION: 98 % | SYSTOLIC BLOOD PRESSURE: 140 MMHG

## 2024-03-06 DIAGNOSIS — I10 ESSENTIAL HYPERTENSION: Primary | ICD-10-CM

## 2024-03-06 DIAGNOSIS — D64.9 ANEMIA, UNSPECIFIED TYPE: ICD-10-CM

## 2024-03-06 DIAGNOSIS — R06.02 SOB (SHORTNESS OF BREATH): ICD-10-CM

## 2024-03-06 DIAGNOSIS — I10 ESSENTIAL HYPERTENSION: ICD-10-CM

## 2024-03-06 LAB
ANION GAP SERPL CALCULATED.3IONS-SCNC: 11 MMOL/L (ref 9–17)
BASOPHILS # BLD: 0.05 K/UL (ref 0–0.2)
BASOPHILS NFR BLD: 1 % (ref 0–2)
BUN SERPL-MCNC: 16 MG/DL (ref 8–23)
BUN/CREAT SERPL: 15 (ref 9–20)
CALCIUM SERPL-MCNC: 9.3 MG/DL (ref 8.6–10.4)
CHLORIDE SERPL-SCNC: 109 MMOL/L (ref 98–107)
CO2 SERPL-SCNC: 19 MMOL/L (ref 20–31)
CREAT SERPL-MCNC: 1.1 MG/DL (ref 0.5–0.9)
EOSINOPHIL # BLD: 0.05 K/UL (ref 0–0.4)
EOSINOPHILS RELATIVE PERCENT: 1 % (ref 0–5)
ERYTHROCYTE [DISTWIDTH] IN BLOOD BY AUTOMATED COUNT: 20.1 % (ref 12.1–15.2)
GFR SERPL CREATININE-BSD FRML MDRD: 49 ML/MIN/1.73M2
GLUCOSE SERPL-MCNC: 100 MG/DL (ref 70–99)
HCT VFR BLD AUTO: 29.6 % (ref 36–46)
HGB BLD-MCNC: 8.6 G/DL (ref 12–16)
IMM GRANULOCYTES # BLD AUTO: 0.01 K/UL (ref 0–0.3)
IMM GRANULOCYTES NFR BLD: 0 % (ref 0–5)
LYMPHOCYTES NFR BLD: 1.23 K/UL (ref 1–4.8)
LYMPHOCYTES RELATIVE PERCENT: 16 % (ref 15–40)
MCH RBC QN AUTO: 25.6 PG (ref 26–34)
MCHC RBC AUTO-ENTMCNC: 29.1 G/DL (ref 31–37)
MCV RBC AUTO: 88.1 FL (ref 80–100)
MONOCYTES NFR BLD: 0.68 K/UL (ref 0–1)
MONOCYTES NFR BLD: 9 % (ref 4–8)
MORPHOLOGY: ABNORMAL
MORPHOLOGY: ABNORMAL
NEUTROPHILS NFR BLD: 74 % (ref 47–75)
NEUTS SEG NFR BLD: 5.84 K/UL (ref 2.5–7)
PLATELET # BLD AUTO: 449 K/UL (ref 140–450)
PMV BLD AUTO: 9.2 FL (ref 6–12)
POTASSIUM SERPL-SCNC: 3.4 MMOL/L (ref 3.7–5.3)
RBC # BLD AUTO: 3.36 M/UL (ref 4–5.2)
SODIUM SERPL-SCNC: 139 MMOL/L (ref 135–144)
WBC OTHER # BLD: 7.9 K/UL (ref 3.5–11)

## 2024-03-06 PROCEDURE — 85025 COMPLETE CBC W/AUTO DIFF WBC: CPT

## 2024-03-06 PROCEDURE — 36415 COLL VENOUS BLD VENIPUNCTURE: CPT

## 2024-03-06 PROCEDURE — 80048 BASIC METABOLIC PNL TOTAL CA: CPT

## 2024-03-06 RX ORDER — FUROSEMIDE 20 MG/1
20 TABLET ORAL 2 TIMES DAILY
COMMUNITY

## 2024-03-06 NOTE — PROGRESS NOTES
Ov Dr. Almonte for 4 week follow up   Feeling about the same   Taking 1/4 of lasix as needed   (Felt like had to go but coulnt   So she took 1/4 tablet)   Still sob with activity   Needing 14 teeth removed   Needing colonoscopy d/t hemorrhoid-bleeding   No pain from broken rib      Will get CBC and BMP today    Will follow up end of April with cbc/cmp/tsh  And Iron studies

## 2024-03-13 ENCOUNTER — OFFICE VISIT (OUTPATIENT)
Dept: GASTROENTEROLOGY | Age: 84
End: 2024-03-13
Payer: MEDICARE

## 2024-03-13 VITALS
DIASTOLIC BLOOD PRESSURE: 74 MMHG | BODY MASS INDEX: 21.22 KG/M2 | RESPIRATION RATE: 18 BRPM | WEIGHT: 140 LBS | SYSTOLIC BLOOD PRESSURE: 136 MMHG | HEIGHT: 68 IN | OXYGEN SATURATION: 100 % | HEART RATE: 96 BPM

## 2024-03-13 DIAGNOSIS — K64.9 HEMORRHOIDS, UNSPECIFIED HEMORRHOID TYPE: Primary | ICD-10-CM

## 2024-03-13 DIAGNOSIS — K62.5 RECTAL BLEEDING: ICD-10-CM

## 2024-03-13 PROCEDURE — 99213 OFFICE O/P EST LOW 20 MIN: CPT | Performed by: NURSE PRACTITIONER

## 2024-03-13 PROCEDURE — G8484 FLU IMMUNIZE NO ADMIN: HCPCS | Performed by: NURSE PRACTITIONER

## 2024-03-13 PROCEDURE — G8420 CALC BMI NORM PARAMETERS: HCPCS | Performed by: NURSE PRACTITIONER

## 2024-03-13 PROCEDURE — 1036F TOBACCO NON-USER: CPT | Performed by: NURSE PRACTITIONER

## 2024-03-13 PROCEDURE — G8399 PT W/DXA RESULTS DOCUMENT: HCPCS | Performed by: NURSE PRACTITIONER

## 2024-03-13 PROCEDURE — 3078F DIAST BP <80 MM HG: CPT | Performed by: NURSE PRACTITIONER

## 2024-03-13 PROCEDURE — 1123F ACP DISCUSS/DSCN MKR DOCD: CPT | Performed by: NURSE PRACTITIONER

## 2024-03-13 PROCEDURE — G8427 DOCREV CUR MEDS BY ELIG CLIN: HCPCS | Performed by: NURSE PRACTITIONER

## 2024-03-13 PROCEDURE — 1090F PRES/ABSN URINE INCON ASSESS: CPT | Performed by: NURSE PRACTITIONER

## 2024-03-13 PROCEDURE — 3075F SYST BP GE 130 - 139MM HG: CPT | Performed by: NURSE PRACTITIONER

## 2024-03-13 NOTE — PATIENT INSTRUCTIONS
SURVEY:    You may be receiving a survey from [facility] regarding your visit today.    Please complete the survey to enable us to provide the highest quality of care to you and your family.    If you cannot score us a very good on any question, please call the office to discuss how we could have made your experience a very good one.    Thank you.  [facility]  MD Marielena Hammond, MD Chapito Gutierrez, DO  Shaq Trujillo, MD Kendra Harvey, APRN-CNP  Amada Bergeron, Practice Manager  Mia, CMA  Yumiko, CCMRAKAN Mullen, CMA  Wilder, PSC   Kristie, CLINTONN     .

## 2024-03-13 NOTE — PROGRESS NOTES
file     Lack of Transportation (Non-Medical): No   Physical Activity: Inactive (6/9/2023)    Exercise Vital Sign     Days of Exercise per Week: 0 days     Minutes of Exercise per Session: 0 min   Stress: Not on file   Social Connections: Not on file   Intimate Partner Violence: Not on file   Depression: Not at risk (1/2/2024)    PHQ-2     PHQ-2 Score: 0   Housing Stability: Unknown (3/2/2023)    Housing Stability Vital Sign     Unable to Pay for Housing in the Last Year: Not on file     Number of Places Lived in the Last Year: Not on file     Unstable Housing in the Last Year: No   Interpersonal Safety: Not on file   Utilities: Not on file       Review of Systems  Constitutional: Negative.  Negative for fever and unexpected weight change.   HENT: Negative.     Respiratory: Negative.     Cardiovascular: Negative.    Gastrointestinal:  Positive for Hemorrhoids and bleeding   Endocrine: Negative.    Musculoskeletal: Negative.    Skin: Negative.    Allergic/Immunologic: Anemia    Neurological: Negative.    Hematological: Negative.    Psychiatric/Behavioral: Negative.       Physical Exam  VS:  /74 (Site: Right Upper Arm, Position: Sitting, Cuff Size: Medium Adult)   Pulse 96   Resp 18   Ht 1.727 m (5' 8\")   Wt 63.5 kg (140 lb)   SpO2 100%   BMI 21.29 kg/m²  Body mass index is 21.29 kg/m².    General Appearance/Constitutional:  Alert, NAD.Orientation age-appropriate.  Elderly female.  HEENT:  NCAT.   Pulmonary/Chest: clear to auscultation bilaterally.  Cardiovascular: normal rate, regular rhythm.  Lymphatics: No cervical lymphadenopathy noted.  Abdomen: soft, non-tender, non-distended, normal bowel sounds.  Skin:  Normal turgor.  Warm, dry, without visible rash, unless noted elsewhere.  Extremities: no lower extremity edema.  Musculoskeletal: normal range of motion, no joint swelling.  Ambulatory with cane.  Neurologic:  no cranial nerve deficit, gait, coordination and speech normal.      Lab Results

## 2024-03-19 RX ORDER — POTASSIUM CHLORIDE 20 MEQ/1
20 TABLET, EXTENDED RELEASE ORAL 2 TIMES DAILY
Qty: 60 TABLET | Refills: 1 | Status: SHIPPED | OUTPATIENT
Start: 2024-03-19

## 2024-03-26 RX ORDER — PREDNISONE 20 MG/1
TABLET ORAL
Qty: 5 TABLET | Refills: 0 | OUTPATIENT
Start: 2024-03-26

## 2024-04-04 ENCOUNTER — OFFICE VISIT (OUTPATIENT)
Dept: SURGERY | Age: 84
End: 2024-04-04
Payer: MEDICARE

## 2024-04-04 VITALS
WEIGHT: 140 LBS | DIASTOLIC BLOOD PRESSURE: 81 MMHG | SYSTOLIC BLOOD PRESSURE: 138 MMHG | HEART RATE: 91 BPM | BODY MASS INDEX: 21.29 KG/M2

## 2024-04-04 DIAGNOSIS — R06.02 SHORTNESS OF BREATH: ICD-10-CM

## 2024-04-04 DIAGNOSIS — Z98.890 HX OF LUMBOSACRAL SPINE SURGERY: ICD-10-CM

## 2024-04-04 DIAGNOSIS — I25.10 CORONARY ARTERY DISEASE INVOLVING NATIVE CORONARY ARTERY OF NATIVE HEART WITHOUT ANGINA PECTORIS: ICD-10-CM

## 2024-04-04 DIAGNOSIS — M54.9 NON-TRAUMATIC MID BACK PAIN: ICD-10-CM

## 2024-04-04 DIAGNOSIS — D50.0 IRON DEFICIENCY ANEMIA DUE TO CHRONIC BLOOD LOSS: ICD-10-CM

## 2024-04-04 DIAGNOSIS — K62.5 RECTAL BLEEDING: Primary | ICD-10-CM

## 2024-04-04 DIAGNOSIS — K64.9 HEMORRHOIDS, UNSPECIFIED HEMORRHOID TYPE: ICD-10-CM

## 2024-04-04 PROCEDURE — 3075F SYST BP GE 130 - 139MM HG: CPT | Performed by: SURGERY

## 2024-04-04 PROCEDURE — 1036F TOBACCO NON-USER: CPT | Performed by: SURGERY

## 2024-04-04 PROCEDURE — G8399 PT W/DXA RESULTS DOCUMENT: HCPCS | Performed by: SURGERY

## 2024-04-04 PROCEDURE — G8420 CALC BMI NORM PARAMETERS: HCPCS | Performed by: SURGERY

## 2024-04-04 PROCEDURE — 1090F PRES/ABSN URINE INCON ASSESS: CPT | Performed by: SURGERY

## 2024-04-04 PROCEDURE — 1123F ACP DISCUSS/DSCN MKR DOCD: CPT | Performed by: SURGERY

## 2024-04-04 PROCEDURE — 3079F DIAST BP 80-89 MM HG: CPT | Performed by: SURGERY

## 2024-04-04 PROCEDURE — 99215 OFFICE O/P EST HI 40 MIN: CPT | Performed by: SURGERY

## 2024-04-04 PROCEDURE — G8427 DOCREV CUR MEDS BY ELIG CLIN: HCPCS | Performed by: SURGERY

## 2024-04-04 RX ORDER — TRIAMCINOLONE ACETONIDE 1 MG/G
OINTMENT TOPICAL 2 TIMES DAILY
COMMUNITY

## 2024-04-04 RX ORDER — DULOXETIN HYDROCHLORIDE 30 MG/1
CAPSULE, DELAYED RELEASE ORAL
Qty: 30 CAPSULE | Refills: 1 | Status: SHIPPED | OUTPATIENT
Start: 2024-04-04

## 2024-04-04 ASSESSMENT — ENCOUNTER SYMPTOMS
ANAL BLEEDING: 1
COUGH: 0
VOMITING: 0
BACK PAIN: 1
CONSTIPATION: 1
RHINORRHEA: 1
ABDOMINAL PAIN: 0
NAUSEA: 0

## 2024-04-09 DIAGNOSIS — K21.9 GASTROESOPHAGEAL REFLUX DISEASE WITHOUT ESOPHAGITIS: ICD-10-CM

## 2024-04-09 RX ORDER — PANTOPRAZOLE SODIUM 20 MG/1
20 TABLET, DELAYED RELEASE ORAL
Qty: 90 TABLET | Refills: 0 | Status: SHIPPED | OUTPATIENT
Start: 2024-04-09

## 2024-04-09 RX ORDER — AMLODIPINE BESYLATE 2.5 MG/1
2.5 TABLET ORAL 2 TIMES DAILY
Qty: 60 TABLET | Refills: 11 | Status: SHIPPED | OUTPATIENT
Start: 2024-04-09

## 2024-04-09 NOTE — TELEPHONE ENCOUNTER
Last OV: 6/9/2023  HTN,  anxiety, depression   Last RX:    Next scheduled apt: Visit date not found              Surescript requesting a refill

## 2024-04-11 ENCOUNTER — OFFICE VISIT (OUTPATIENT)
Dept: PRIMARY CARE CLINIC | Age: 84
End: 2024-04-11

## 2024-04-11 VITALS
HEART RATE: 97 BPM | DIASTOLIC BLOOD PRESSURE: 80 MMHG | OXYGEN SATURATION: 97 % | WEIGHT: 140 LBS | BODY MASS INDEX: 21.22 KG/M2 | SYSTOLIC BLOOD PRESSURE: 132 MMHG | HEIGHT: 68 IN

## 2024-04-11 DIAGNOSIS — K02.9 DENTAL DECAY: ICD-10-CM

## 2024-04-11 DIAGNOSIS — I25.10 CORONARY ARTERY DISEASE INVOLVING NATIVE CORONARY ARTERY WITHOUT ANGINA PECTORIS, UNSPECIFIED WHETHER NATIVE OR TRANSPLANTED HEART: ICD-10-CM

## 2024-04-11 DIAGNOSIS — Z98.890 HX OF LUMBOSACRAL SPINE SURGERY: ICD-10-CM

## 2024-04-11 DIAGNOSIS — F41.9 ANXIETY AND DEPRESSION: ICD-10-CM

## 2024-04-11 DIAGNOSIS — F32.A ANXIETY AND DEPRESSION: ICD-10-CM

## 2024-04-11 DIAGNOSIS — M54.41 CHRONIC MIDLINE LOW BACK PAIN WITH BILATERAL SCIATICA: Primary | ICD-10-CM

## 2024-04-11 DIAGNOSIS — M54.42 CHRONIC MIDLINE LOW BACK PAIN WITH BILATERAL SCIATICA: Primary | ICD-10-CM

## 2024-04-11 DIAGNOSIS — G89.29 CHRONIC MIDLINE LOW BACK PAIN WITH BILATERAL SCIATICA: Primary | ICD-10-CM

## 2024-04-11 RX ORDER — PREGABALIN 25 MG/1
25 CAPSULE ORAL NIGHTLY PRN
Qty: 30 CAPSULE | Refills: 0 | Status: SHIPPED | OUTPATIENT
Start: 2024-04-11 | End: 2024-05-11

## 2024-04-11 RX ORDER — TRIAMCINOLONE ACETONIDE 1 MG/G
OINTMENT TOPICAL DAILY PRN
Qty: 30 G | Refills: 1 | Status: SHIPPED | OUTPATIENT
Start: 2024-04-11

## 2024-04-11 RX ORDER — PREDNISONE 20 MG/1
20 TABLET ORAL DAILY PRN
Status: CANCELLED | OUTPATIENT
Start: 2024-04-11

## 2024-04-11 SDOH — ECONOMIC STABILITY: FOOD INSECURITY: WITHIN THE PAST 12 MONTHS, THE FOOD YOU BOUGHT JUST DIDN'T LAST AND YOU DIDN'T HAVE MONEY TO GET MORE.: NEVER TRUE

## 2024-04-11 SDOH — ECONOMIC STABILITY: INCOME INSECURITY: HOW HARD IS IT FOR YOU TO PAY FOR THE VERY BASICS LIKE FOOD, HOUSING, MEDICAL CARE, AND HEATING?: NOT HARD AT ALL

## 2024-04-11 SDOH — ECONOMIC STABILITY: FOOD INSECURITY: WITHIN THE PAST 12 MONTHS, YOU WORRIED THAT YOUR FOOD WOULD RUN OUT BEFORE YOU GOT MONEY TO BUY MORE.: NEVER TRUE

## 2024-04-11 NOTE — PATIENT INSTRUCTIONS
THANK YOU FOR TRUSTING US WITH YOUR HEALTHCARE !!    The associates caring for you today were:    Family Practice Provider: Rita MARTINEZ-VISHAL    Clinical Team Nurse: Rehana Briceno LPN    Clinical Team Medical Assistant: Radha Olivo MA    Our goal is to provide you with EXCEPTIONAL patient care, and we strive to do this for EVERY patient, EVERY visit. Since we care about you and your experience, you may receive a patient satisfaction survey from [facility] by postal mail/email/text. We truly welcome your feedback and ask that you complete the survey to let us know how we are doing.    Important Numbers:  [facility] phone [phone]   [place] Office Nurse/MA Line: [phone]  Fax  [phone]   Central [phone]  Billing questions: [phone]  Medical Records Request: [phone]    Manage your healthcare  with [facility]. To activate your account, visit [email or web address]   DIGITAL scheduling available now through my chart.  Schedule your next appointment at your convenience through your my chart.       [place] Office Hours:  Monday: [place] office location 8-5 ([phone]) Offering additional late hours the first Monday of the month until 7 pm.   Tuesday: 8: :  812 Noon, closed  of the month   : 7:30-4:30   SURVEY:    You may be receiving a survey from [facility] regarding your visit today.    Please complete the survey to enable us to provide the highest quality of care to you and your family.    If you cannot score us a very good on any question, please call the office to discuss how we could have made your experience a very good one.    Thank you.

## 2024-04-11 NOTE — PROGRESS NOTES
Requested Prescriptions     Signed Prescriptions Disp Refills    triamcinolone (KENALOG) 0.1 % ointment 30 g 1     Sig: Apply topically daily as needed (left ear eczema) Apply topically 2 times daily.    pregabalin (LYRICA) 25 MG capsule 30 capsule 0     Sig: Take 1 capsule by mouth nightly as needed (low back pain and radiculopathy) for up to 30 days. Max Daily Amount: 25 mg     No follow-ups on file.  Orders Placed This Encounter   Medications    triamcinolone (KENALOG) 0.1 % ointment     Sig: Apply topically daily as needed (left ear eczema) Apply topically 2 times daily.     Dispense:  30 g     Refill:  1    pregabalin (LYRICA) 25 MG capsule     Sig: Take 1 capsule by mouth nightly as needed (low back pain and radiculopathy) for up to 30 days. Max Daily Amount: 25 mg     Dispense:  30 capsule     Refill:  0     No orders of the defined types were placed in this encounter.        Patient Instructions   THANK YOU FOR TRUSTING US WITH YOUR HEALTHCARE !!    The associates caring for you today were:    Family Practice Provider: Rita MARTINEZ-VISHAL    Clinical Team Nurse: Rehana Briceno LPN    Clinical Team Medical Assistant: Radha Olivo MA    Our goal is to provide you with EXCEPTIONAL patient care, and we strive to do this for EVERY patient, EVERY visit. Since we care about you and your experience, you may receive a patient satisfaction survey from [facility] by postal mail/email/text. We truly welcome your feedback and ask that you complete the survey to let us know how we are doing.    Important Numbers:  [facility] phone [phone]   [place] Office Nurse/MA Line: [phone]  Fax  [phone]   Central [phone]  Billing questions: [phone]  Medical Records Request: [phone]    Manage your healthcare  with [facility]. To activate your account, visit [email or web address]   DIGITAL scheduling

## 2024-04-17 ENCOUNTER — TELEPHONE (OUTPATIENT)
Dept: PRIMARY CARE CLINIC | Age: 84
End: 2024-04-17

## 2024-04-17 NOTE — TELEPHONE ENCOUNTER
Eye irritation can be cause by rubbing or causing scratch, is she sensitive to light with that Left eye?     Also if not may trial alaway 1 drop once a day,  which is over the counter , an antihistamine eye drop for couple days and see if it improves.     Eye pain should see eye Dr. Rita MARTINEZ CNP

## 2024-04-17 NOTE — TELEPHONE ENCOUNTER
Left Eye     Pt spouse called in stating that pt has complaints of a watery left eye. Pt forgot to tell Rita about this at most recent appointment. Pt says that the eye is not red, itchy, or sticky.     Pt unable to come in for appointment due to recent tooth removal.

## 2024-04-19 NOTE — PROGRESS NOTES
[facility] ED  Emergency Department Encounter  Emergency Medicine Resident     Pt Name:Jessica Avendano  MRN: [id number]  Birthdate 1948  Date of evaluation: 4/19/24  PCP:  Milton Randhawa APRN - CNP  Note Started: 1:14 PM EDT      CHIEF COMPLAINT       Chief Complaint   Patient presents with    Shortness of Breath    Hypoglycemia       HISTORY OF PRESENT ILLNESS  (Location/Symptom, Timing/Onset, Context/Setting, Quality, Duration, Modifying Factors, Severity.)      Jessica Avendano is a 75 y.o. female who presents with altered mental status, shortness of breath, hypoglycemia.  Patient has multiple medical comorbidities, atrial fibrillation, she is anticoagulated.  Patient is DNR CC at family's request.  Patient is unable to provide any additional history.  Apparently patient has been in the care of skilled nursing facility, pending hospice placement at this time.  There are some insurance issues which have delayed definitive placement.  Patient was seen in the [place] emergency department within the past 30 days, she was discharged.  Patient presented today via EMS, allegedly had a blood sugar in the 20s, she was given D10, improved to greater than 100.  Patient was altered for EMS    PAST MEDICAL / SURGICAL / SOCIAL / FAMILY HISTORY      has a past medical history of AICD (automatic cardioverter/defibrillator) present, Atrial fibrillation (HCC), Atrial fibrillation (HCC), Cancer (HCC), CHF (congestive heart failure) (HCC), Chronic kidney disease, COVID-19, H/O cardiovascular stress test, Hx of long term use of blood thinners, Hypertension, Nonischemic dilated cardiomyopathy (HCC), Other screening mammogram, and Poor historian.       has a past surgical history that includes knee surgery (Right); transesophageal echocardiogram (11/10/2017); other surgical history (10/30/2018); pr office/outpt visit,procedure only (Right, 10/30/2018); Cardiac catheterization (08/2017); Cardiac defibrillator  Cookie Bazzi M.D. [address] [place] [address]  [phone]          2020          Marbin Kelly DO  [address] [address]      RE:   Kindra Bibjuan. Gulshanabler  :  1940      Dear Dr. Carlos Aaron:    CHIEF COMPLAINT:  1. Coronary artery disease. 2.  Hypertension. HISTORY OF PRESENT ILLNESS:  I had the pleasure of seeing Mrs. Rohit Elias in our office on 2020. She is a pleasant 69-year-old female who had chest pain and a catheterization by Dr. Aramis Booker in  that showed 80% disease in the ostial right coronary artery with unremarkable  circumflex, LAD had 80% disease, EF was 45% to 50%. She had subsequent open heart surgery by Dr. Don Roberts on 2006, with a LIMA to the LAD, a vein graft to the diagonal, and a vein graft to the right coronary artery. In 2006, she had a catheterization that showed EF of 50%, with an occluded vein graft to the right coronary artery, occluded vein graft to the diagonal, the LIMA was patent to the LAD, and we dilated her right coronary artery placing a 3.0 x 16 mm drug-eluting Taxus stent. I did a catheterization on 2012 that showed diminutive left internal mammary artery to the LAD with 90% disease in the LAD and diagonal bifurcation, with an FFR of 0.79, and 90% disease in the ostium of the right coronary artery, with 40% circumflex, the vein grafts were all occluded, EF of 60%. We did staged angioplasty and stenting of the LAD and diagonal, placing a 3.5 x 24 mm Promus stent in the proximal LAD and dilated the diagonal through the stent struts. We brought her back on 2012, and did complex angioplasty of the ostium of the right coronary artery, placing a 3.0 x 20 mm Promus stent.     On 11/15/2016, she had shortness of breath and loss of energy and we tried conservative management but this continued to worsen, and therefore, I did a catheterization on 12/07/2016, that showed 95% to 99% disease in the proximal right coronary artery, which we dilated with a  3.25 x 15 mm Trek balloon within the stent, and 90% disease in the LAD beyond the stent, in which I placed a 2.75 x 18 mm Xience stent. She had 70% disease in the OM branch of the circumflex, EF was 50% to 55%. She gets myalgias with all statins and gets nausea with Livalo, and therefore, she does not tolerate any statins and her hyperlipidemia is untreated. 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 This was followed by radiation therapy. She has done well since that time. There is no evidence of reoccurrence. She has had no PND, orthopnea or pedal edema. No syncope or near syncope. Denies any unusual chest pain or any unusual shortness of breath. She really has no complaints as I see her today. Her appetite is slowly improving and her weight is improving. RISK FACTORS FOR CORONARY ARTERY DISEASE:  Known CAD:  Positive. Hypertension:  Positive. Hyperlipidemia:  Positive. PTCA:  Positive. Bypass Surgery:  Positive. Other Family Members:  Positive. Peripheral Vascular Disease:  Positive. Diabetes:  Negative. MEDICATIONS AT HOME:  She is currently on aspirin 81 mg daily, Wellbutrin  mg daily, calcium daily, Diflucan 150 mg p.r.n., Prozac 40 mg daily, Lasix 20 mg daily, Synthroid 75 mcg daily, Toprol-XL 25 mg daily, Prilosec 20 mg daily, potassium 20 mEq b.i.d., Deltasone 20 mg p.r.n. for arthritis. PAST MEDICAL HISTORY:  1.  Back surgery and spinal fusion on 10/01/2010.  2.  Peripheral vascular disease with 40% disease in the right internal carotid artery and with no disease in the left internal carotid artery. 3.  Severe arthritis. 4.  Status post oophorectomy. 5.  Depression. 6.  Euthyroid, on treatment.   7.  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      Social History     Socioeconomic History    Marital status:      Spouse name: Not on file    Number of children: 1    Years of education: Not on file    Highest education level: Not on file   Occupational History    Not on file   Tobacco Use    Smoking status: Former     Current packs/day: 0.00     Average packs/day: 0.5 packs/day for 20.0 years (10.0 ttl pk-yrs)     Types: Cigarettes     Start date: 10/4/1967     Quit date: 10/4/1987     Years since quittin.5    Smokeless tobacco: Never   Vaping Use    Vaping Use: Never used   Substance and Sexual Activity    Alcohol use: Yes     Alcohol/week: 1.0 standard drink of alcohol     Types: 1 Glasses of wine per week     Comment: rare    Drug use: No    Sexual activity: Not on file   Other Topics Concern    Not on file   Social History Narrative    Not on file     Social Determinants of Health     Financial Resource Strain: Low Risk  (2024)    Overall Financial Resource Strain (CARDIA)     Difficulty of Paying Living Expenses: Not hard at all   Food Insecurity: No Food Insecurity (2024)    Hunger Vital Sign     Worried About Running Out of Food in the Last Year: Never true     Ran Out of Food in the Last Year: Never true   Transportation Needs: Unknown (2024)    PRAPARE - Transportation     Lack of Transportation (Medical): Not on file     Lack of Transportation (Non-Medical): No   Physical Activity: Inactive (3/31/2024)    Exercise Vital Sign     Days of Exercise per Week: 0 days     Minutes of Exercise per Session: 0 min   Stress: No Stress Concern Present (2019)    [employer] [place] [employer] - Occupational Stress Questionnaire     Feeling of Stress : Not at all   Social Connections:  above.  8.  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 9.  Bypass surgery and stenting, as stated previously. FAMILY HISTORY:  Mother had MI. Father had MI.    SOCIAL HISTORY:  She is 78years old, 2 children. Does not smoke or drink alcohol. She and her  lived in [place] for 20 years where he was stationed at [employer] base and came back 26 years ago. They have 2 sons, one in [place] and one in [place]. She and her  would usually go to [place] in winter, which they did not do this year. She remains active. REVIEW OF SYSTEMS:  Cardiac as above. Other systems reviewed including constitutional, eyes, ears, nose and throat, cardiovascular, respiratory, GI, , musculoskeletal, integumentary, neurologic, endocrine, hematologic and allergic/immunologic are negative except for what is described above. No weight loss or weight gain. No change in bowel habits. No blood in stools. No fevers, sweats or chills. PHYSICAL EXAMINATION:  VITAL SIGNS:  Her blood pressure was 150/80 with a heart rate of 83 and regular. Respiratory rate 18. O2 saturation 98%. Weight 142 pounds. GENERAL:  She is a pleasant 78-year-old female. Denied pain. She was oriented to person, place and time. Answered questions appropriately. SKIN:  No unusual skin changes. HEENT:  The pupils are equally round and intact. Mucous membranes were dry. NECK:  No JVD. Good carotid pulses. No carotid bruits. No lymphadenopathy or thyromegaly. CARDIOVASCULAR EXAM:  S1 and S2 were normal.  No S3 or S4. Soft systolic blowing type murmur. No diastolic murmur. PMI was normal.  No lift, thrust, or pericardial friction rub. LUNGS:  Quite clear to auscultation and percussion. ABDOMEN:  Soft and nontender. Good bowel sounds. EXTREMITIES:  Good femoral pulses. Good pedal pulses. No pedal edema. Skin was warm and dry. No calf tenderness. Nail beds pink.   Good cap refill. PULSES:  Bilateral symmetrical radial, brachial and carotid pulses. No carotid bruits. Good femoral and pedal pulses. NEUROLOGIC EXAM:  Within normal limits. PSYCHIATRIC EXAM:  Within normal limits. LABORATORY DATA:  From 03/09/2020, sodium was 143, potassium 4.3, BUN 23, creatinine 1.3. Her GFR was 39. Cholesterol 228 with an HDL of 85, , triglycerides 89. ALT was 19, AST was 21. TSH 2.27. Vitamin D was 51.6. White count 6.7, hemoglobin 12.1 with a platelet count of 936,184. EKG showed normal sinus rhythm and was normal.    Chest x-ray was unremarkable. Bedside echocardiogram showed normal LV function, EF in the 50% range. IMPRESSION:  1. Coronary artery disease. 2.  Asymptomatic from a cardiac standpoint. 3.  Catheterization in 2005 showing 80% LAD and diagonal, unremarkable circumflex, 80% right coronary artery, EF of 45% to 50%. 4.  Open heart surgery on 05/12/2006 by Dr. Giulia Daley, with a LIMA to the LAD, a vein graft to the diagonal, and a vein graft to the right coronary artery. 5.  Catheterization in 07/2006, showing occluded vein graft to the right coronary artery and diagonal, patent LIMA to the LAD, with angioplasty of the ostium of the right coronary artery, placing a 3.0 x 16 mm Taxus stent. 6.  Catheterization on 11/07/2012, showing nonfunctional LIMA to the LAD with occluded vein grafts, with 90% disease in the LAD and 90% ostium of the right coronary artery with in-stent stenosis, mild disease in the circumflex, EF of 60%, with angioplasty of the LAD placing a 3.5 x 24 mm Promus stent. 7.  Staged angioplasty of the right coronary artery on 11/14/2012, placing a 3.0 x 20 mm Promus stent in the ostium.   8.  Last catheterization on 12/07/2016 after angina that showed 95% disease in the proximal right coronary artery with in-stent stenosis that was intervened upon with angioplasty alone, using 3.25 x 15 mm Trek balloon and 90% disease in the proximal LAD beyond the previous stent, which was stented with a 2.75 x 18 mm Xience stent, EF of 55%. 9.  Hyperlipidemia, unable to take any statins, untreated, with Praluent being not feasible financially. 10.  Peripheral vascular disease with 40% to 60% disease in the right internal carotid artery. 11.  Chronic renal insufficiency with a creatinine at 1.3, about at baseline. 12.  Euthyroid, on treatment. 13.  Squamous cell carcinoma of the parotid gland, status post left parotidectomy along with neck dissection, with no reoccurrence. PLAN:  1. See in 1 year. 2.  Keep same medications. DISCUSSION:  Mrs. Radha Lopez is doing amazingly well. She has made a good recovery from her surgery and there is no evidence that there has been any reoccurrence of her squamous cell carcinoma. I made no change in medications. It is reasonable for me to see her in a year. If she would start developing any unusual shortness of breath, loss of energy, of course, I would want to see her earlier. At this time, she is doing well. Thank you very much for allowing me the privilege of seeing Mrs. Smith. If you have any questions on my thoughts, please do not hesitate to contact me.      Sincerely,        Cathy Shirley    D: 03/16/2020 10:29:25     T: 03/16/2020 10:33:50     [id number]  Job#: [id number]   Doc#: [id number] mentation changes Reason for Exam: failure to thrive FINDINGS: AICD/pacer device is stable.  Stable cardiomegaly. Cardiomediastinal silhouette and bony thorax are unchanged.  Mild left basilar atelectasis stable.  No acute consolidation or pulmonary edema. No evidence of pleural effusion or pneumothorax.     Stable cardiomegaly.  No acute process.      EKG:  A-fib RVR with a rate of 130 to 140 bpm  Right axis deviation  No obvious acute ST elevations or depressions consistent with acute ischemia or infarct    EMERGENCY DEPARTMENT COURSE:      ED Course as of 04/19/24 1548   Fri Apr 19, 2024   1258 Patient is a DNR CC no intubation. Hypoglycemia on EMS eval, given d10, improved. Tachy at bedside, afib rvr [KK]   1302 Right lower extremity mottling, cyanotic extremities, patient has somewhat labored breathing, shallow respirations.  O2 saturations difficult to obtain, prior to arrival improved to 95%.  Currently on nasal cannula 4 L.  Will start fluids, point-of-care labs.  Septic workup. [KK]   1336 Lactate markedly elevated to 18, white blood cell count 16.7, no obvious source of infection at this time, sepsis labs drawn [KK]   1406 Family at bedside, discussed goals of care, for now we will continue empiric antibiotics, treatment for agitation, comfort care.  Patient is still tachycardic, pH is 7.03, she is tachypneic.  Lactate is elevated, will give 2 mg Ativan [KK]   1415 Discussed case with family, will focus on comfort care at this time, will attempt to have patient placed with hospice care, hospice evaluation.  Patient is in critical condition, hypoxic respiratory failure.  Sepsis, lactate markedly elevated.  DNR/DNI at this time.  Son at bedside agrees with course of care and plan.  Verbalized understanding, all questions answered [KK]   1521 Discussed case with social work/, will reach out to patient's hospice to arrange further care. [KK]   1531 Discussed case with paolo, pt will be admitted.

## 2024-04-25 ENCOUNTER — HOSPITAL ENCOUNTER (OUTPATIENT)
Age: 84
Discharge: HOME | End: 2024-04-25
Payer: MEDICARE

## 2024-04-25 DIAGNOSIS — R06.02 SOB (SHORTNESS OF BREATH): ICD-10-CM

## 2024-04-25 DIAGNOSIS — I10 ESSENTIAL HYPERTENSION: ICD-10-CM

## 2024-04-25 DIAGNOSIS — D64.9 ANEMIA, UNSPECIFIED TYPE: ICD-10-CM

## 2024-04-25 LAB
ALBUMIN SERPL-MCNC: 3.5 G/DL (ref 3.5–5.2)
ALP SERPL-CCNC: 86 U/L (ref 35–104)
ALT SERPL-CCNC: 12 U/L (ref 5–33)
ANION GAP SERPL CALCULATED.3IONS-SCNC: 12 MMOL/L (ref 9–17)
AST SERPL-CCNC: 18 U/L
BASOPHILS # BLD: 0.04 K/UL (ref 0–0.2)
BASOPHILS NFR BLD: 1 % (ref 0–2)
BILIRUB SERPL-MCNC: <0.1 MG/DL (ref 0.3–1.2)
BUN SERPL-MCNC: 16 MG/DL (ref 8–23)
BUN/CREAT SERPL: 13 (ref 9–20)
CALCIUM SERPL-MCNC: 8.5 MG/DL (ref 8.6–10.4)
CHLORIDE SERPL-SCNC: 113 MMOL/L (ref 98–107)
CO2 SERPL-SCNC: 18 MMOL/L (ref 20–31)
CREAT SERPL-MCNC: 1.2 MG/DL (ref 0.5–0.9)
EOSINOPHIL # BLD: 0.19 K/UL (ref 0–0.4)
EOSINOPHILS RELATIVE PERCENT: 3 % (ref 0–5)
ERYTHROCYTE [DISTWIDTH] IN BLOOD BY AUTOMATED COUNT: 18.6 % (ref 12.1–15.2)
FERRITIN SERPL-MCNC: 42 NG/ML (ref 13–150)
FOLATE SERPL-MCNC: >20 NG/ML (ref 4.8–24.2)
GFR SERPL CREATININE-BSD FRML MDRD: 44 ML/MIN/1.73M2
GLUCOSE SERPL-MCNC: 105 MG/DL (ref 70–99)
HCT VFR BLD AUTO: 28.1 % (ref 36–46)
HGB BLD-MCNC: 8.1 G/DL (ref 12–16)
IMM GRANULOCYTES # BLD AUTO: 0.01 K/UL (ref 0–0.3)
IMM GRANULOCYTES NFR BLD: 0 % (ref 0–5)
IRON SATN MFR SERPL: 8 % (ref 20–55)
IRON SERPL-MCNC: 28 UG/DL (ref 37–145)
LYMPHOCYTES NFR BLD: 0.8 K/UL (ref 1–4.8)
LYMPHOCYTES RELATIVE PERCENT: 14 % (ref 15–40)
MCH RBC QN AUTO: 25.2 PG (ref 26–34)
MCHC RBC AUTO-ENTMCNC: 28.8 G/DL (ref 31–37)
MCV RBC AUTO: 87.3 FL (ref 80–100)
MONOCYTES NFR BLD: 0.5 K/UL (ref 0–1)
MONOCYTES NFR BLD: 9 % (ref 4–8)
MORPHOLOGY: ABNORMAL
NEUTROPHILS NFR BLD: 73 % (ref 47–75)
NEUTS SEG NFR BLD: 4.22 K/UL (ref 2.5–7)
PLATELET # BLD AUTO: 378 K/UL (ref 140–450)
PMV BLD AUTO: 8.7 FL (ref 6–12)
POTASSIUM SERPL-SCNC: 4 MMOL/L (ref 3.7–5.3)
PROT SERPL-MCNC: 5.9 G/DL (ref 6.4–8.3)
RBC # BLD AUTO: 3.22 M/UL (ref 4–5.2)
SODIUM SERPL-SCNC: 143 MMOL/L (ref 135–144)
TIBC SERPL-MCNC: 336 UG/DL (ref 250–450)
TSH SERPL DL<=0.05 MIU/L-ACNC: 3.61 UIU/ML (ref 0.3–5)
UNSATURATED IRON BINDING CAPACITY: 308 UG/DL (ref 112–347)
VIT B12 SERPL-MCNC: 1115 PG/ML (ref 232–1245)
WBC OTHER # BLD: 5.8 K/UL (ref 3.5–11)

## 2024-04-25 PROCEDURE — 82746 ASSAY OF FOLIC ACID SERUM: CPT

## 2024-04-25 PROCEDURE — 82607 VITAMIN B-12: CPT

## 2024-04-25 PROCEDURE — 84443 ASSAY THYROID STIM HORMONE: CPT

## 2024-04-25 PROCEDURE — 36415 COLL VENOUS BLD VENIPUNCTURE: CPT

## 2024-04-25 PROCEDURE — 80053 COMPREHEN METABOLIC PANEL: CPT

## 2024-04-25 PROCEDURE — 83550 IRON BINDING TEST: CPT

## 2024-04-25 PROCEDURE — 83540 ASSAY OF IRON: CPT

## 2024-04-25 PROCEDURE — 85025 COMPLETE CBC W/AUTO DIFF WBC: CPT

## 2024-04-25 PROCEDURE — 82728 ASSAY OF FERRITIN: CPT

## 2024-04-30 ENCOUNTER — OFFICE VISIT (OUTPATIENT)
Dept: CARDIOLOGY CLINIC | Age: 84
End: 2024-04-30
Payer: MEDICARE

## 2024-04-30 VITALS — DIASTOLIC BLOOD PRESSURE: 60 MMHG | SYSTOLIC BLOOD PRESSURE: 124 MMHG | HEART RATE: 94 BPM | OXYGEN SATURATION: 94 %

## 2024-04-30 DIAGNOSIS — E78.49 OTHER HYPERLIPIDEMIA: ICD-10-CM

## 2024-04-30 DIAGNOSIS — E55.9 VITAMIN D DEFICIENCY DISEASE: ICD-10-CM

## 2024-04-30 DIAGNOSIS — I25.10 CORONARY ARTERY DISEASE INVOLVING NATIVE CORONARY ARTERY OF NATIVE HEART WITHOUT ANGINA PECTORIS: ICD-10-CM

## 2024-04-30 DIAGNOSIS — I10 ESSENTIAL HYPERTENSION: Primary | ICD-10-CM

## 2024-04-30 PROCEDURE — G8420 CALC BMI NORM PARAMETERS: HCPCS | Performed by: INTERNAL MEDICINE

## 2024-04-30 PROCEDURE — 99214 OFFICE O/P EST MOD 30 MIN: CPT | Performed by: INTERNAL MEDICINE

## 2024-04-30 PROCEDURE — 1036F TOBACCO NON-USER: CPT | Performed by: INTERNAL MEDICINE

## 2024-04-30 PROCEDURE — G8399 PT W/DXA RESULTS DOCUMENT: HCPCS | Performed by: INTERNAL MEDICINE

## 2024-04-30 PROCEDURE — 3074F SYST BP LT 130 MM HG: CPT | Performed by: INTERNAL MEDICINE

## 2024-04-30 PROCEDURE — G8428 CUR MEDS NOT DOCUMENT: HCPCS | Performed by: INTERNAL MEDICINE

## 2024-04-30 PROCEDURE — 3078F DIAST BP <80 MM HG: CPT | Performed by: INTERNAL MEDICINE

## 2024-04-30 PROCEDURE — 1090F PRES/ABSN URINE INCON ASSESS: CPT | Performed by: INTERNAL MEDICINE

## 2024-04-30 PROCEDURE — 1123F ACP DISCUSS/DSCN MKR DOCD: CPT | Performed by: INTERNAL MEDICINE

## 2024-04-30 NOTE — PROGRESS NOTES
Ov Dr. Almonte for follow up   C/o noticed feet or swollen today   Occ chest pain   No colonoscopy yet   Had top teeth removed      Follow up in 6 months

## 2024-05-02 NOTE — PROGRESS NOTES
Alfredo Almonte MD  [facility]  [facility]  [address] [id number] [phone]        2024        Rita Hair, CNP   [address] [place] [address]       RE:  NERI SMITH  :  1940      Dear Danika:    CHIEF COMPLAINT:    Fatigue.  Shortness of breath.  Coronary artery disease.    HISTORY OF PRESENT ILLNESS:  I met with Neri Smith and her  in the office on 2024.  To recap her history, she had a catheterization by Dr. Reynoso in  that showed 80% disease in the ostium of the right coronary artery with unremarkable circumflex, LAD had 80% disease, EF 45% and she had open-heart surgery by Dr. Jefferson Medrano on May 12, 2006, with a LIMA to LAD, vein graft to the diagonal, vein graft to the right coronary artery.    On 2007, a catheterization showed occluded vein graft to the right coronary artery, occluded vein graft to diagonal, patent LIMA to LAD, and we stented her right coronary artery with a 3.0 x 16 mm Taxus stent.    On 2012, she had a catheterization that showed nonfunctional LIMA to LAD with 90% disease in LAD and diagonal at the bifurcation site with 90% disease in the ostium of the right coronary artery, 40% circumflex with all vein grafts and the LIMA included, EF of 60%.  We did angioplasty and stent in the LAD and diagonal placing 3.5 x 24 mm PROMUS stent in the LAD and diagonal through the stent struts and brought her back on 2012, with a complex angioplasty of the right coronary artery placing 3.0 x 20 mm PROMUS stent.    On 2016, the catheterization showed 95% to 99% disease in the proximal right coronary artery and was dilated with balloon alone, she had 90% in the LAD stent beyond the stent, which we placed and therefore, we placed in another 2.75 x 18 mm Xience stent with an EF of 55%.    We did a catheterization on 2022, that showed

## 2024-05-14 DIAGNOSIS — M54.9 NON-TRAUMATIC MID BACK PAIN: ICD-10-CM

## 2024-05-14 DIAGNOSIS — Z98.890 HX OF LUMBOSACRAL SPINE SURGERY: ICD-10-CM

## 2024-05-14 RX ORDER — POTASSIUM CHLORIDE 20 MEQ/1
TABLET, EXTENDED RELEASE ORAL
Qty: 60 TABLET | Refills: 2 | Status: SHIPPED | OUTPATIENT
Start: 2024-05-14 | End: 2024-05-17 | Stop reason: SDUPTHER

## 2024-05-15 RX ORDER — DULOXETIN HYDROCHLORIDE 30 MG/1
CAPSULE, DELAYED RELEASE ORAL
Qty: 30 CAPSULE | Refills: 1 | OUTPATIENT
Start: 2024-05-15

## 2024-05-17 ENCOUNTER — OFFICE VISIT (OUTPATIENT)
Dept: PRIMARY CARE CLINIC | Age: 84
End: 2024-05-17

## 2024-05-17 VITALS
BODY MASS INDEX: 21.22 KG/M2 | DIASTOLIC BLOOD PRESSURE: 80 MMHG | HEIGHT: 68 IN | SYSTOLIC BLOOD PRESSURE: 120 MMHG | WEIGHT: 140 LBS | OXYGEN SATURATION: 99 % | HEART RATE: 95 BPM

## 2024-05-17 DIAGNOSIS — M48.061 SPINAL STENOSIS OF LUMBAR REGION WITHOUT NEUROGENIC CLAUDICATION: Primary | ICD-10-CM

## 2024-05-17 DIAGNOSIS — K05.00 GINGIVITIS, ACUTE: ICD-10-CM

## 2024-05-17 DIAGNOSIS — M54.42 CHRONIC LEFT-SIDED LOW BACK PAIN WITH LEFT-SIDED SCIATICA: ICD-10-CM

## 2024-05-17 DIAGNOSIS — K64.9 HEMORRHOIDS, UNSPECIFIED HEMORRHOID TYPE: ICD-10-CM

## 2024-05-17 DIAGNOSIS — G89.29 CHRONIC LEFT-SIDED LOW BACK PAIN WITH LEFT-SIDED SCIATICA: ICD-10-CM

## 2024-05-17 RX ORDER — POTASSIUM CHLORIDE 20 MEQ/1
20 TABLET, EXTENDED RELEASE ORAL 2 TIMES DAILY
Qty: 60 TABLET | Refills: 2 | Status: SHIPPED | OUTPATIENT
Start: 2024-05-17

## 2024-05-17 RX ORDER — AMOXICILLIN 500 MG/1
500 CAPSULE ORAL 2 TIMES DAILY
Qty: 14 CAPSULE | Refills: 0 | Status: SHIPPED | OUTPATIENT
Start: 2024-05-17 | End: 2024-05-24

## 2024-05-17 RX ORDER — TRAMADOL HYDROCHLORIDE 50 MG/1
50 TABLET ORAL EVERY 12 HOURS PRN
Qty: 14 TABLET | Refills: 0 | Status: SHIPPED | OUTPATIENT
Start: 2024-05-17 | End: 2024-05-24

## 2024-05-17 ASSESSMENT — ENCOUNTER SYMPTOMS
EYES NEGATIVE: 1
WHEEZING: 0
SORE THROAT: 0
CHEST TIGHTNESS: 0
SHORTNESS OF BREATH: 0
BACK PAIN: 1

## 2024-05-17 NOTE — PROGRESS NOTES
HPI Notes    Name: Megan Smith  : 1940         Chief Complaint:     Chief Complaint   Patient presents with    Back Pain     Pt states her arthritis is so bad, and she's in a lot of pain. She is here today asking for a refill on prednisone    Leg Pain       History of Present Illness:        HPI    Pt with 2 weeks ago teeth pulled and got an infection currently on amoxicillin 875 mg bid which she finished yesterday. Wearing dentures today on top. Feels sore yet to pt and was to see dentist today but dentist was ill and cancelled.   Pt concerned she needs more antibiotics due to still soreness on left side upper teeth area. No fever.       Here today with c/o arthritis in back and leg pain. Pt with chronic back pain was seeing Dr. Gutierrez with pain management hx ultram use in past, hx prednisone use in past too, pt with prednisone prior script with empty bottle brought today she took prednisone last couple days informed pt prednisone with risk elevating blood sugar and causing infection to worsen.          MRI Lumbar 23     FINDINGS:     Numerous cysts or cystic lesions are noted throughout both kidneys, which are   suboptimally evaluated without IV contrast, with the largest in the right   kidney midpole anterior cortex measuring 5 cm in AP diameter. A few   subcentimeter hemorrhagic cysts are noted scattered throughout the left kidney,   particularly posteriorly, with hypointense T2 signal.     Mild dextroconvex curvature of the lumbar spine is noted, with its apex at the   L2 level.     Prior L3-S1 bilateral pedicle screw fusion, laminectomies, and L4-L5 interbody   fusion is again noted.     The conus medullaris terminates normally at the L1-L2 level and the visualized   distal spinal cord appears normal.     There is diffuse disc desiccation, with severe degenerative loss of disc   height.     New bone marrow edema is noted in the L1-L2 vertebral endplates and the right   aspect of the superior

## 2024-05-17 NOTE — PATIENT INSTRUCTIONS
THANK YOU FOR TRUSTING US WITH YOUR HEALTHCARE !!    The associates caring for you today were:    Family Practice Provider: Rita MARTINEZ-VISHAL    Clinical Team Nurse: Reahna Briceno LPN    Clinical Team Medical Assistant: Radha Olivo MA    Our goal is to provide you with EXCEPTIONAL patient care, and we strive to do this for EVERY patient, EVERY visit. Since we care about you and your experience, you may receive a patient satisfaction survey from Alcira Jasmine by postal mail/email/text. We truly welcome your feedback and ask that you complete the survey to let us know how we are doing.    Important Numbers:  [facility] phone [phone]   [place] Office Nurse/MA Line: [phone]  Fax  [phone]   Central [phone]  Billing questions: [phone]  Medical Records Request: [phone]    Manage your healthcare  with [facility] My[facility]. To activate your account, visit [email or web address]   DIGITAL scheduling available now through my chart.  Schedule your next appointment at your convenience through your my chart.       [place] Office Hours:  Monday: [place] office location 8-5 ([phone]) Offering additional late hours the first Monday of the month until 7 pm.   Tuesday: 8: :  812 Noon, closed  of the month   : 7:30-4:30   SURVEY:    You may be receiving a survey from Alcira Jasmine regarding your visit today.    Please complete the survey to enable us to provide the highest quality of care to you and your family.    If you cannot score us a very good on any question, please call the office to discuss how we could have made your experience a very good one.    Thank you.

## 2024-05-27 NOTE — TELEPHONE ENCOUNTER
Last OV 4/11/24    Next OV 6/11/24    Requesting refill on potassium chloride thru [facility]rescript[facility].  Rx pending   
83

## 2024-05-29 ENCOUNTER — OFFICE VISIT (OUTPATIENT)
Dept: PRIMARY CARE CLINIC | Age: 84
End: 2024-05-29

## 2024-05-29 VITALS
SYSTOLIC BLOOD PRESSURE: 110 MMHG | OXYGEN SATURATION: 99 % | HEART RATE: 88 BPM | WEIGHT: 129 LBS | DIASTOLIC BLOOD PRESSURE: 72 MMHG | BODY MASS INDEX: 19.55 KG/M2 | HEIGHT: 68 IN

## 2024-05-29 DIAGNOSIS — M54.42 CHRONIC LEFT-SIDED LOW BACK PAIN WITH LEFT-SIDED SCIATICA: ICD-10-CM

## 2024-05-29 DIAGNOSIS — D17.24 LIPOMA OF LEFT LOWER EXTREMITY: Primary | ICD-10-CM

## 2024-05-29 DIAGNOSIS — F41.9 ANXIETY AND DEPRESSION: ICD-10-CM

## 2024-05-29 DIAGNOSIS — G89.29 CHRONIC LEFT-SIDED LOW BACK PAIN WITH LEFT-SIDED SCIATICA: ICD-10-CM

## 2024-05-29 DIAGNOSIS — F32.A ANXIETY AND DEPRESSION: ICD-10-CM

## 2024-05-29 NOTE — PATIENT INSTRUCTIONS
THANK YOU FOR TRUSTING US WITH YOUR HEALTHCARE !!    The associates caring for you today were:    Family Practice Provider: Rita MARTINEZ-VISHAL    Clinical Team Nurse: Rehana Briceno LPN    Clinical Team Medical Assistant: Radha Olivo MA    Our goal is to provide you with EXCEPTIONAL patient care, and we strive to do this for EVERY patient, EVERY visit. Since we care about you and your experience, you may receive a patient satisfaction survey from Alcira Jasmine by postal mail/email/text. We truly welcome your feedback and ask that you complete the survey to let us know how we are doing.    Important Numbers:  [facility] phone [phone]   [place] Office Nurse/MA Line: [phone]  Fax  [phone]   Central [phone]  Billing questions: [phone]  Medical Records Request: [phone]    Manage your healthcare  with [facility] MyChart. To activate your account, visit [email or web address]   DIGITAL scheduling available now through my chart.  Schedule your next appointment at your convenience through your my chart.       [place] Office Hours:  Monday: [place] office location 8-5 ([phone]) Offering additional late hours the first Monday of the month until 7 pm.   Tuesday: 8: :  812 Noon, closed  of the month   : 7:30-4:30   SURVEY:    You may be receiving a survey from Alcira Jasmine regarding your visit today.    Please complete the survey to enable us to provide the highest quality of care to you and your family.    If you cannot score us a very good on any question, please call the office to discuss how we could have made your experience a very good one.    Thank you.

## 2024-05-29 NOTE — PROGRESS NOTES
Requested Prescriptions      No prescriptions requested or ordered in this encounter     No follow-ups on file.  No orders of the defined types were placed in this encounter.    No orders of the defined types were placed in this encounter.        Patient Instructions   THANK YOU FOR TRUSTING US WITH YOUR HEALTHCARE !!    The associates caring for you today were:    Family Practice Provider: Rita MARTINEZ-CNP    Clinical Team Nurse: Rehana Briceno LPN    Clinical Team Medical Assistant: Radha Olivo MA    Our goal is to provide you with EXCEPTIONAL patient care, and we strive to do this for EVERY patient, EVERY visit. Since we care about you and your experience, you may receive a patient satisfaction survey from [employer] by postal mail/email/text. We truly welcome your feedback and ask that you complete the survey to let us know how we are doing.    Important Numbers:  [facility] phone [phone]   [place] [facility] Nurse/MA Line: [phone]  Fax  [phone]   Central [phone]  Billing questions: [phone]  Medical Records Request: [phone]    Manage your healthcare  with [facility] My[facility]. To activate your account, visit [email or web address]   DIGITAL scheduling available now through my chart.  Schedule your next appointment at your convenience through your my chart.       [place] Office Hours:  Monday: [place] office location 8-5 ([phone]) Offering additional late hours the first Monday of the month until 7 pm.   Tuesday: : :   Noon, closed  of the month   Fridays: 7:30-4:30   SURVEY:    You may be receiving a survey from [employer] regarding your visit today.    Please complete the survey to enable us to provide the highest quality of care to you and your family.    If you cannot score us a very good on any question, please call the office to

## 2024-05-30 ENCOUNTER — TELEPHONE (OUTPATIENT)
Dept: PAIN MANAGEMENT | Age: 84
End: 2024-05-30

## 2024-05-30 ENCOUNTER — OFFICE VISIT (OUTPATIENT)
Dept: PAIN MANAGEMENT | Age: 84
End: 2024-05-30
Payer: MEDICARE

## 2024-05-30 VITALS
HEART RATE: 88 BPM | WEIGHT: 140 LBS | RESPIRATION RATE: 18 BRPM | BODY MASS INDEX: 21.29 KG/M2 | OXYGEN SATURATION: 98 %

## 2024-05-30 DIAGNOSIS — G89.29 CHRONIC BILATERAL LOW BACK PAIN WITHOUT SCIATICA: ICD-10-CM

## 2024-05-30 DIAGNOSIS — Z79.02 ENCOUNTER FOR CURRENT LONG TERM USE OF ANTIPLATELET DRUG: ICD-10-CM

## 2024-05-30 DIAGNOSIS — M79.18 CHRONIC GLUTEAL PAIN: ICD-10-CM

## 2024-05-30 DIAGNOSIS — M53.3 SACROILIAC JOINT PAIN: Primary | ICD-10-CM

## 2024-05-30 DIAGNOSIS — M54.50 CHRONIC BILATERAL LOW BACK PAIN WITHOUT SCIATICA: ICD-10-CM

## 2024-05-30 DIAGNOSIS — G89.29 CHRONIC GLUTEAL PAIN: ICD-10-CM

## 2024-05-30 DIAGNOSIS — M51.36 LUMBAR DEGENERATIVE DISC DISEASE: ICD-10-CM

## 2024-05-30 DIAGNOSIS — M96.1 LUMBAR POST-LAMINECTOMY SYNDROME: ICD-10-CM

## 2024-05-30 PROCEDURE — 1090F PRES/ABSN URINE INCON ASSESS: CPT | Performed by: STUDENT IN AN ORGANIZED HEALTH CARE EDUCATION/TRAINING PROGRAM

## 2024-05-30 PROCEDURE — 1123F ACP DISCUSS/DSCN MKR DOCD: CPT | Performed by: STUDENT IN AN ORGANIZED HEALTH CARE EDUCATION/TRAINING PROGRAM

## 2024-05-30 PROCEDURE — G8399 PT W/DXA RESULTS DOCUMENT: HCPCS | Performed by: STUDENT IN AN ORGANIZED HEALTH CARE EDUCATION/TRAINING PROGRAM

## 2024-05-30 PROCEDURE — 99214 OFFICE O/P EST MOD 30 MIN: CPT | Performed by: STUDENT IN AN ORGANIZED HEALTH CARE EDUCATION/TRAINING PROGRAM

## 2024-05-30 PROCEDURE — G8427 DOCREV CUR MEDS BY ELIG CLIN: HCPCS | Performed by: STUDENT IN AN ORGANIZED HEALTH CARE EDUCATION/TRAINING PROGRAM

## 2024-05-30 PROCEDURE — G8420 CALC BMI NORM PARAMETERS: HCPCS | Performed by: STUDENT IN AN ORGANIZED HEALTH CARE EDUCATION/TRAINING PROGRAM

## 2024-05-30 PROCEDURE — 1036F TOBACCO NON-USER: CPT | Performed by: STUDENT IN AN ORGANIZED HEALTH CARE EDUCATION/TRAINING PROGRAM

## 2024-05-30 NOTE — PROGRESS NOTES
Chronic Pain Clinic Note     Encounter Date: 5/30/2024     SUBJECTIVE:  Chief Complaint   Patient presents with    Back Pain     History of Present Illness:   Megan Smith is a 83 y.o. female who presents to follow up of back pain. The patient would like to discuss an SIJ. She was not cleared cardiac wise for a PNS.    Medication Refill: N/A    Current Complaints of Pain:   Location: low back   Radiation: None  Severity: moderate- severe  Pain Numerical Score - 7   Average: 6-10, depending what she is doing     Highest: 9-10  Lowest: 5-6  Character/Quality: Complains of pain that is sharp  Timing: Keeps awake at night, worse in evenings   Associated symptoms: none  Numbness:   Weakness: legs, walks with cane   Exacerbating factors: walking, breathing   Alleviating factors: sitting  Length of time pain has been present: Started on - chronic   Inciting event/injury:   Bowel/Bladder incontinence:   Falls:   Physical Therapy: yes, failed     History of Interventions:   Surgery: Lumbar surgery- has hardware  Injections: SIJ    Imaging:    MRI Lumbar 9/9/23    FINDINGS:     Numerous cysts or cystic lesions are noted throughout both kidneys, which are   suboptimally evaluated without IV contrast, with the largest in the right   kidney midpole anterior cortex measuring 5 cm in AP diameter. A few   subcentimeter hemorrhagic cysts are noted scattered throughout the left kidney,   particularly posteriorly, with hypointense T2 signal.     Mild dextroconvex curvature of the lumbar spine is noted, with its apex at the   L2 level.     Prior L3-S1 bilateral pedicle screw fusion, laminectomies, and L4-L5 interbody   fusion is again noted.     The conus medullaris terminates normally at the L1-L2 level and the visualized   distal spinal cord appears normal.     There is diffuse disc desiccation, with severe degenerative loss of disc   height.     New bone marrow edema is noted in the L1-L2 vertebral endplates and the right

## 2024-05-30 NOTE — TELEPHONE ENCOUNTER
SURGERY SCHEDULING FORM  [facility]   [address] [place] [address]    Phone [phone] Fax [phone]      Megan Healyr 1940 female    [address]  [place] [address]   Marital Status:           Home Phone: [phone]      Cell Phone:    Telephone Information:   Mobile [phone]          Surgeon: Brenda   Surgery Date: 6/6/24     Time:     Procedure: bilateral sacroilliac joint injection    Diagnosis: M53.3     Important Medical History:  In Epic    Special Inst/Equip: Regular    CPT Codes:    35961  Latex Allergy: No     Cardiac Device:  No    Anesthesia:  Local          Admission Type:  Same Day                          Admit Prior to Day of Surgery: No    Case Location:  Ambulatory            Preadmission Testing:  Phone Call          PAT Date and Time:     Need Preop Cardiac Clearance: No, no need to hold ASA or plavix     Does Patient have Cardiologist/physician?     Michaelaa

## 2024-05-31 DIAGNOSIS — Z98.890 HX OF LUMBOSACRAL SPINE SURGERY: ICD-10-CM

## 2024-05-31 DIAGNOSIS — M54.9 NON-TRAUMATIC MID BACK PAIN: ICD-10-CM

## 2024-05-31 RX ORDER — DULOXETIN HYDROCHLORIDE 30 MG/1
30 CAPSULE, DELAYED RELEASE ORAL 2 TIMES DAILY
Qty: 60 CAPSULE | Refills: 2 | Status: ON HOLD | OUTPATIENT
Start: 2024-05-31

## 2024-05-31 ASSESSMENT — ENCOUNTER SYMPTOMS
RHINORRHEA: 0
SORE THROAT: 0
BACK PAIN: 1
EYES NEGATIVE: 1
ABDOMINAL PAIN: 0
COUGH: 0

## 2024-05-31 NOTE — TELEPHONE ENCOUNTER
Kvng called in asking for a new prescription. With [place] now taking this 2 x daily she will run out. He also stated that she seems to be doing better with the added dose.

## 2024-06-01 ENCOUNTER — APPOINTMENT (OUTPATIENT)
Dept: GENERAL RADIOLOGY | Age: 84
End: 2024-06-01
Payer: MEDICARE

## 2024-06-01 ENCOUNTER — HOSPITAL ENCOUNTER (INPATIENT)
Age: 84
LOS: 4 days | Discharge: NURSING, REHAB OR LONG-TERM CARE | End: 2024-06-05
Attending: EMERGENCY MEDICINE | Admitting: INTERNAL MEDICINE
Payer: MEDICARE

## 2024-06-01 ENCOUNTER — APPOINTMENT (OUTPATIENT)
Dept: CT IMAGING | Age: 84
End: 2024-06-01
Payer: MEDICARE

## 2024-06-01 DIAGNOSIS — R94.31 ABNORMAL ELECTROCARDIOGRAPHY: ICD-10-CM

## 2024-06-01 DIAGNOSIS — M54.42 CHRONIC MIDLINE LOW BACK PAIN WITH BILATERAL SCIATICA: ICD-10-CM

## 2024-06-01 DIAGNOSIS — Z87.81 S/P RIGHT HIP FRACTURE: ICD-10-CM

## 2024-06-01 DIAGNOSIS — D50.9 IRON DEFICIENCY ANEMIA, UNSPECIFIED IRON DEFICIENCY ANEMIA TYPE: ICD-10-CM

## 2024-06-01 DIAGNOSIS — G89.29 CHRONIC MIDLINE LOW BACK PAIN WITH BILATERAL SCIATICA: ICD-10-CM

## 2024-06-01 DIAGNOSIS — S72.001A CLOSED FRACTURE OF RIGHT HIP, INITIAL ENCOUNTER (HCC): Primary | ICD-10-CM

## 2024-06-01 DIAGNOSIS — M54.41 CHRONIC MIDLINE LOW BACK PAIN WITH BILATERAL SCIATICA: ICD-10-CM

## 2024-06-01 LAB
ALBUMIN SERPL-MCNC: 3.7 G/DL (ref 3.5–5.2)
ALP SERPL-CCNC: 91 U/L (ref 35–104)
ALT SERPL-CCNC: 13 U/L (ref 5–33)
ANION GAP SERPL CALCULATED.3IONS-SCNC: 18 MMOL/L (ref 9–17)
AST SERPL-CCNC: 21 U/L
BASOPHILS # BLD: 0.05 K/UL (ref 0–0.2)
BASOPHILS NFR BLD: 1 % (ref 0–2)
BILIRUB SERPL-MCNC: <0.1 MG/DL (ref 0.3–1.2)
BILIRUB UR QL STRIP: NEGATIVE
BUN SERPL-MCNC: 18 MG/DL (ref 8–23)
BUN/CREAT SERPL: 15 (ref 9–20)
CALCIUM SERPL-MCNC: 8.6 MG/DL (ref 8.6–10.4)
CHLORIDE SERPL-SCNC: 106 MMOL/L (ref 98–107)
CLARITY UR: CLEAR
CO2 SERPL-SCNC: 14 MMOL/L (ref 20–31)
COLOR UR: YELLOW
COMMENT: ABNORMAL
CREAT SERPL-MCNC: 1.2 MG/DL (ref 0.5–0.9)
EOSINOPHIL # BLD: 0.21 K/UL (ref 0–0.4)
EOSINOPHILS RELATIVE PERCENT: 4 % (ref 0–5)
ERYTHROCYTE [DISTWIDTH] IN BLOOD BY AUTOMATED COUNT: 17.3 % (ref 12.1–15.2)
GFR, ESTIMATED: 44 ML/MIN/1.73M2
GLUCOSE SERPL-MCNC: 105 MG/DL (ref 70–99)
GLUCOSE UR STRIP-MCNC: NEGATIVE MG/DL
HCT VFR BLD AUTO: 31.2 % (ref 36–46)
HGB BLD-MCNC: 9.3 G/DL (ref 12–16)
HGB UR QL STRIP.AUTO: NEGATIVE
IMM GRANULOCYTES # BLD AUTO: 0.01 K/UL (ref 0–0.3)
IMM GRANULOCYTES NFR BLD: 0 % (ref 0–5)
INR PPP: 1
KETONES UR STRIP-MCNC: NEGATIVE MG/DL
LEUKOCYTE ESTERASE UR QL STRIP: NEGATIVE
LYMPHOCYTES NFR BLD: 1 K/UL (ref 1–4.8)
LYMPHOCYTES RELATIVE PERCENT: 17 % (ref 15–40)
MCH RBC QN AUTO: 25.9 PG (ref 26–34)
MCHC RBC AUTO-ENTMCNC: 29.8 G/DL (ref 31–37)
MCV RBC AUTO: 86.9 FL (ref 80–100)
MONOCYTES NFR BLD: 0.44 K/UL (ref 0–1)
MONOCYTES NFR BLD: 8 % (ref 4–8)
NEUTROPHILS NFR BLD: 70 % (ref 47–75)
NEUTS SEG NFR BLD: 4.15 K/UL (ref 2.5–7)
NITRITE UR QL STRIP: NEGATIVE
PARTIAL THROMBOPLASTIN TIME: 21.9 SEC (ref 23.9–33.8)
PH UR STRIP: 5 [PH] (ref 5–8)
PLATELET # BLD AUTO: 388 K/UL (ref 140–450)
PMV BLD AUTO: 9.3 FL (ref 6–12)
POTASSIUM SERPL-SCNC: 3.8 MMOL/L (ref 3.7–5.3)
PROT SERPL-MCNC: 6.2 G/DL (ref 6.4–8.3)
PROT UR STRIP-MCNC: ABNORMAL MG/DL
PROTHROMBIN TIME: 12.7 SEC (ref 11.5–14.2)
RBC # BLD AUTO: 3.59 M/UL (ref 4–5.2)
SODIUM SERPL-SCNC: 138 MMOL/L (ref 135–144)
SP GR UR STRIP: 1.01 (ref 1–1.03)
UROBILINOGEN UR STRIP-ACNC: NORMAL EU/DL (ref 0–1)
WBC OTHER # BLD: 5.9 K/UL (ref 3.5–11)

## 2024-06-01 PROCEDURE — 51702 INSERT TEMP BLADDER CATH: CPT

## 2024-06-01 PROCEDURE — 6360000002 HC RX W HCPCS: Performed by: INTERNAL MEDICINE

## 2024-06-01 PROCEDURE — 70450 CT HEAD/BRAIN W/O DYE: CPT

## 2024-06-01 PROCEDURE — 85730 THROMBOPLASTIN TIME PARTIAL: CPT

## 2024-06-01 PROCEDURE — 6370000000 HC RX 637 (ALT 250 FOR IP): Performed by: INTERNAL MEDICINE

## 2024-06-01 PROCEDURE — 2580000003 HC RX 258: Performed by: INTERNAL MEDICINE

## 2024-06-01 PROCEDURE — 1200000000 HC SEMI PRIVATE

## 2024-06-01 PROCEDURE — 81003 URINALYSIS AUTO W/O SCOPE: CPT

## 2024-06-01 PROCEDURE — 93005 ELECTROCARDIOGRAM TRACING: CPT | Performed by: FAMILY MEDICINE

## 2024-06-01 PROCEDURE — 96374 THER/PROPH/DIAG INJ IV PUSH: CPT

## 2024-06-01 PROCEDURE — 6360000002 HC RX W HCPCS: Performed by: FAMILY MEDICINE

## 2024-06-01 PROCEDURE — 96375 TX/PRO/DX INJ NEW DRUG ADDON: CPT

## 2024-06-01 PROCEDURE — 80053 COMPREHEN METABOLIC PANEL: CPT

## 2024-06-01 PROCEDURE — 99285 EMERGENCY DEPT VISIT HI MDM: CPT

## 2024-06-01 PROCEDURE — 71045 X-RAY EXAM CHEST 1 VIEW: CPT

## 2024-06-01 PROCEDURE — 73502 X-RAY EXAM HIP UNI 2-3 VIEWS: CPT

## 2024-06-01 PROCEDURE — 94761 N-INVAS EAR/PLS OXIMETRY MLT: CPT

## 2024-06-01 PROCEDURE — 6360000002 HC RX W HCPCS: Performed by: EMERGENCY MEDICINE

## 2024-06-01 PROCEDURE — 85610 PROTHROMBIN TIME: CPT

## 2024-06-01 PROCEDURE — 73501 X-RAY EXAM HIP UNI 1 VIEW: CPT

## 2024-06-01 PROCEDURE — 85025 COMPLETE CBC W/AUTO DIFF WBC: CPT

## 2024-06-01 RX ORDER — SODIUM BICARBONATE 650 MG/1
650 TABLET ORAL 4 TIMES DAILY
Status: CANCELLED | OUTPATIENT
Start: 2024-06-01

## 2024-06-01 RX ORDER — FERROUS SULFATE 325(65) MG
324 TABLET ORAL
Status: DISCONTINUED | OUTPATIENT
Start: 2024-06-02 | End: 2024-06-05 | Stop reason: HOSPADM

## 2024-06-01 RX ORDER — SODIUM CHLORIDE 9 MG/ML
INJECTION, SOLUTION INTRAVENOUS CONTINUOUS
Status: CANCELLED | OUTPATIENT
Start: 2024-06-01 | End: 2024-06-03

## 2024-06-01 RX ORDER — HYDROCORTISONE 25 MG/G
CREAM TOPICAL 2 TIMES DAILY PRN
Status: CANCELLED | OUTPATIENT
Start: 2024-06-01

## 2024-06-01 RX ORDER — PREGABALIN 25 MG/1
25 CAPSULE ORAL NIGHTLY PRN
Status: CANCELLED | OUTPATIENT
Start: 2024-06-01

## 2024-06-01 RX ORDER — FLUOXETINE 10 MG/1
40 CAPSULE ORAL DAILY
Status: CANCELLED | OUTPATIENT
Start: 2024-06-01

## 2024-06-01 RX ORDER — METOPROLOL SUCCINATE 25 MG/1
25 TABLET, EXTENDED RELEASE ORAL DAILY
Status: CANCELLED | OUTPATIENT
Start: 2024-06-01

## 2024-06-01 RX ORDER — MORPHINE SULFATE 4 MG/ML
1 INJECTION, SOLUTION INTRAMUSCULAR; INTRAVENOUS EVERY 4 HOURS PRN
Status: CANCELLED | OUTPATIENT
Start: 2024-06-01

## 2024-06-01 RX ORDER — SODIUM CHLORIDE 0.9 % (FLUSH) 0.9 %
5-40 SYRINGE (ML) INJECTION EVERY 12 HOURS SCHEDULED
Status: CANCELLED | OUTPATIENT
Start: 2024-06-01

## 2024-06-01 RX ORDER — SODIUM CHLORIDE 9 MG/ML
INJECTION, SOLUTION INTRAVENOUS PRN
Status: DISCONTINUED | OUTPATIENT
Start: 2024-06-01 | End: 2024-06-05 | Stop reason: HOSPADM

## 2024-06-01 RX ORDER — LIDOCAINE 4 G/G
1 PATCH TOPICAL DAILY
Status: DISCONTINUED | OUTPATIENT
Start: 2024-06-01 | End: 2024-06-05 | Stop reason: HOSPADM

## 2024-06-01 RX ORDER — FERROUS SULFATE 324(65)MG
324 TABLET, DELAYED RELEASE (ENTERIC COATED) ORAL
Status: CANCELLED | OUTPATIENT
Start: 2024-06-01

## 2024-06-01 RX ORDER — LEVOTHYROXINE SODIUM 0.1 MG/1
100 TABLET ORAL DAILY
Status: CANCELLED | OUTPATIENT
Start: 2024-06-01

## 2024-06-01 RX ORDER — SODIUM CHLORIDE 0.9 % (FLUSH) 0.9 %
5-40 SYRINGE (ML) INJECTION PRN
Status: CANCELLED | OUTPATIENT
Start: 2024-06-01

## 2024-06-01 RX ORDER — LIDOCAINE 4 G/G
1 PATCH TOPICAL DAILY
Status: CANCELLED | OUTPATIENT
Start: 2024-06-01

## 2024-06-01 RX ORDER — CLOPIDOGREL BISULFATE 75 MG/1
75 TABLET ORAL DAILY
Status: CANCELLED | OUTPATIENT
Start: 2024-06-01

## 2024-06-01 RX ORDER — PREGABALIN 25 MG/1
25 CAPSULE ORAL NIGHTLY PRN
Status: DISCONTINUED | OUTPATIENT
Start: 2024-06-01 | End: 2024-06-05 | Stop reason: HOSPADM

## 2024-06-01 RX ORDER — SODIUM BICARBONATE 650 MG/1
650 TABLET ORAL 4 TIMES DAILY
Status: DISCONTINUED | OUTPATIENT
Start: 2024-06-01 | End: 2024-06-04

## 2024-06-01 RX ORDER — DULOXETIN HYDROCHLORIDE 30 MG/1
30 CAPSULE, DELAYED RELEASE ORAL 2 TIMES DAILY
Status: DISCONTINUED | OUTPATIENT
Start: 2024-06-01 | End: 2024-06-05 | Stop reason: HOSPADM

## 2024-06-01 RX ORDER — AMLODIPINE BESYLATE 5 MG/1
2.5 TABLET ORAL 2 TIMES DAILY
Status: CANCELLED | OUTPATIENT
Start: 2024-06-01

## 2024-06-01 RX ORDER — ISOSORBIDE MONONITRATE 30 MG/1
30 TABLET, EXTENDED RELEASE ORAL DAILY
Status: DISCONTINUED | OUTPATIENT
Start: 2024-06-01 | End: 2024-06-05 | Stop reason: HOSPADM

## 2024-06-01 RX ORDER — ONDANSETRON 4 MG/1
4 TABLET, ORALLY DISINTEGRATING ORAL EVERY 8 HOURS PRN
Status: DISCONTINUED | OUTPATIENT
Start: 2024-06-01 | End: 2024-06-05 | Stop reason: HOSPADM

## 2024-06-01 RX ORDER — ASPIRIN 81 MG/1
81 TABLET ORAL DAILY
Status: DISCONTINUED | OUTPATIENT
Start: 2024-06-01 | End: 2024-06-03

## 2024-06-01 RX ORDER — CLOPIDOGREL BISULFATE 75 MG/1
75 TABLET ORAL DAILY
Status: DISCONTINUED | OUTPATIENT
Start: 2024-06-01 | End: 2024-06-05 | Stop reason: HOSPADM

## 2024-06-01 RX ORDER — ONDANSETRON 2 MG/ML
4 INJECTION INTRAMUSCULAR; INTRAVENOUS EVERY 6 HOURS PRN
Status: CANCELLED | OUTPATIENT
Start: 2024-06-01

## 2024-06-01 RX ORDER — ACETAMINOPHEN 325 MG/1
650 TABLET ORAL EVERY 6 HOURS PRN
Status: DISCONTINUED | OUTPATIENT
Start: 2024-06-01 | End: 2024-06-05 | Stop reason: HOSPADM

## 2024-06-01 RX ORDER — ONDANSETRON 2 MG/ML
4 INJECTION INTRAMUSCULAR; INTRAVENOUS EVERY 6 HOURS PRN
Status: DISCONTINUED | OUTPATIENT
Start: 2024-06-01 | End: 2024-06-05 | Stop reason: HOSPADM

## 2024-06-01 RX ORDER — PANTOPRAZOLE SODIUM 20 MG/1
20 TABLET, DELAYED RELEASE ORAL
Status: CANCELLED | OUTPATIENT
Start: 2024-06-01

## 2024-06-01 RX ORDER — PANTOPRAZOLE SODIUM 20 MG/1
20 TABLET, DELAYED RELEASE ORAL
Status: DISCONTINUED | OUTPATIENT
Start: 2024-06-02 | End: 2024-06-05 | Stop reason: HOSPADM

## 2024-06-01 RX ORDER — SODIUM CHLORIDE 0.9 % (FLUSH) 0.9 %
5-40 SYRINGE (ML) INJECTION PRN
Status: DISCONTINUED | OUTPATIENT
Start: 2024-06-01 | End: 2024-06-05 | Stop reason: HOSPADM

## 2024-06-01 RX ORDER — FENTANYL CITRATE 50 UG/ML
50 INJECTION, SOLUTION INTRAMUSCULAR; INTRAVENOUS ONCE
Status: COMPLETED | OUTPATIENT
Start: 2024-06-01 | End: 2024-06-01

## 2024-06-01 RX ORDER — ONDANSETRON 4 MG/1
4 TABLET, ORALLY DISINTEGRATING ORAL EVERY 8 HOURS PRN
Status: CANCELLED | OUTPATIENT
Start: 2024-06-01

## 2024-06-01 RX ORDER — OXYCODONE HYDROCHLORIDE AND ACETAMINOPHEN 5; 325 MG/1; MG/1
1 TABLET ORAL EVERY 6 HOURS PRN
Status: DISCONTINUED | OUTPATIENT
Start: 2024-06-01 | End: 2024-06-05 | Stop reason: HOSPADM

## 2024-06-01 RX ORDER — DULOXETIN HYDROCHLORIDE 30 MG/1
30 CAPSULE, DELAYED RELEASE ORAL 2 TIMES DAILY
Status: CANCELLED | OUTPATIENT
Start: 2024-06-01

## 2024-06-01 RX ORDER — LEVOTHYROXINE SODIUM 0.1 MG/1
100 TABLET ORAL DAILY
Status: DISCONTINUED | OUTPATIENT
Start: 2024-06-01 | End: 2024-06-05 | Stop reason: HOSPADM

## 2024-06-01 RX ORDER — ENOXAPARIN SODIUM 100 MG/ML
40 INJECTION SUBCUTANEOUS DAILY
Status: COMPLETED | OUTPATIENT
Start: 2024-06-01 | End: 2024-06-02

## 2024-06-01 RX ORDER — SODIUM CHLORIDE 9 MG/ML
INJECTION, SOLUTION INTRAVENOUS PRN
Status: CANCELLED | OUTPATIENT
Start: 2024-06-01

## 2024-06-01 RX ORDER — ACETAMINOPHEN 650 MG/1
650 SUPPOSITORY RECTAL EVERY 6 HOURS PRN
Status: CANCELLED | OUTPATIENT
Start: 2024-06-01

## 2024-06-01 RX ORDER — MORPHINE SULFATE 4 MG/ML
4 INJECTION, SOLUTION INTRAMUSCULAR; INTRAVENOUS ONCE
Status: COMPLETED | OUTPATIENT
Start: 2024-06-01 | End: 2024-06-01

## 2024-06-01 RX ORDER — METOPROLOL SUCCINATE 25 MG/1
25 TABLET, EXTENDED RELEASE ORAL DAILY
Status: DISCONTINUED | OUTPATIENT
Start: 2024-06-01 | End: 2024-06-05 | Stop reason: HOSPADM

## 2024-06-01 RX ORDER — FLUOXETINE 10 MG/1
40 CAPSULE ORAL DAILY
Status: DISCONTINUED | OUTPATIENT
Start: 2024-06-01 | End: 2024-06-05 | Stop reason: HOSPADM

## 2024-06-01 RX ORDER — POTASSIUM CHLORIDE 20 MEQ/1
20 TABLET, EXTENDED RELEASE ORAL 2 TIMES DAILY
Status: CANCELLED | OUTPATIENT
Start: 2024-06-01

## 2024-06-01 RX ORDER — POLYETHYLENE GLYCOL 3350 17 G/17G
17 POWDER, FOR SOLUTION ORAL DAILY PRN
Status: DISCONTINUED | OUTPATIENT
Start: 2024-06-01 | End: 2024-06-05 | Stop reason: HOSPADM

## 2024-06-01 RX ORDER — ACETAMINOPHEN 325 MG/1
650 TABLET ORAL EVERY 6 HOURS PRN
Status: CANCELLED | OUTPATIENT
Start: 2024-06-01

## 2024-06-01 RX ORDER — POLYETHYLENE GLYCOL 3350 17 G/17G
17 POWDER, FOR SOLUTION ORAL DAILY PRN
Status: CANCELLED | OUTPATIENT
Start: 2024-06-01

## 2024-06-01 RX ORDER — FUROSEMIDE 20 MG/1
10 TABLET ORAL DAILY
Status: DISCONTINUED | OUTPATIENT
Start: 2024-06-01 | End: 2024-06-05 | Stop reason: HOSPADM

## 2024-06-01 RX ORDER — ACETAMINOPHEN 650 MG/1
650 SUPPOSITORY RECTAL EVERY 6 HOURS PRN
Status: DISCONTINUED | OUTPATIENT
Start: 2024-06-01 | End: 2024-06-05 | Stop reason: HOSPADM

## 2024-06-01 RX ORDER — SODIUM CHLORIDE 0.9 % (FLUSH) 0.9 %
5-40 SYRINGE (ML) INJECTION EVERY 12 HOURS SCHEDULED
Status: DISCONTINUED | OUTPATIENT
Start: 2024-06-01 | End: 2024-06-05 | Stop reason: HOSPADM

## 2024-06-01 RX ORDER — AMLODIPINE BESYLATE 5 MG/1
2.5 TABLET ORAL 2 TIMES DAILY
Status: DISCONTINUED | OUTPATIENT
Start: 2024-06-01 | End: 2024-06-03

## 2024-06-01 RX ORDER — HYDROCORTISONE 25 MG/G
CREAM TOPICAL 2 TIMES DAILY PRN
Status: DISCONTINUED | OUTPATIENT
Start: 2024-06-01 | End: 2024-06-05 | Stop reason: HOSPADM

## 2024-06-01 RX ORDER — ASPIRIN 81 MG/1
81 TABLET ORAL DAILY
Status: CANCELLED | OUTPATIENT
Start: 2024-06-01

## 2024-06-01 RX ORDER — ISOSORBIDE MONONITRATE 30 MG/1
30 TABLET, EXTENDED RELEASE ORAL DAILY
Status: CANCELLED | OUTPATIENT
Start: 2024-06-01

## 2024-06-01 RX ADMIN — LEVOTHYROXINE SODIUM 100 MCG: 100 TABLET ORAL at 15:05

## 2024-06-01 RX ADMIN — FENTANYL CITRATE 50 MCG: 50 INJECTION, SOLUTION INTRAMUSCULAR; INTRAVENOUS at 06:55

## 2024-06-01 RX ADMIN — HYDROMORPHONE HYDROCHLORIDE 1 MG: 1 INJECTION, SOLUTION INTRAMUSCULAR; INTRAVENOUS; SUBCUTANEOUS at 12:48

## 2024-06-01 RX ADMIN — METOPROLOL SUCCINATE 25 MG: 25 TABLET, FILM COATED, EXTENDED RELEASE ORAL at 15:05

## 2024-06-01 RX ADMIN — HYDROMORPHONE HYDROCHLORIDE 1 MG: 1 INJECTION, SOLUTION INTRAMUSCULAR; INTRAVENOUS; SUBCUTANEOUS at 19:21

## 2024-06-01 RX ADMIN — DULOXETINE HYDROCHLORIDE 30 MG: 30 CAPSULE, DELAYED RELEASE ORAL at 15:05

## 2024-06-01 RX ADMIN — HYDROMORPHONE HYDROCHLORIDE 1 MG: 1 INJECTION, SOLUTION INTRAMUSCULAR; INTRAVENOUS; SUBCUTANEOUS at 09:10

## 2024-06-01 RX ADMIN — SODIUM BICARBONATE 650 MG: 650 TABLET ORAL at 17:19

## 2024-06-01 RX ADMIN — OXYCODONE AND ACETAMINOPHEN 1 TABLET: 5; 325 TABLET ORAL at 15:05

## 2024-06-01 RX ADMIN — AMLODIPINE BESYLATE 2.5 MG: 5 TABLET ORAL at 15:06

## 2024-06-01 RX ADMIN — SODIUM BICARBONATE 650 MG: 650 TABLET ORAL at 19:22

## 2024-06-01 RX ADMIN — ENOXAPARIN SODIUM 40 MG: 100 INJECTION SUBCUTANEOUS at 15:07

## 2024-06-01 RX ADMIN — POTASSIUM BICARBONATE 20 MEQ: 391 TABLET, EFFERVESCENT ORAL at 19:21

## 2024-06-01 RX ADMIN — FUROSEMIDE 10 MG: 20 TABLET ORAL at 15:07

## 2024-06-01 RX ADMIN — SODIUM CHLORIDE, PRESERVATIVE FREE 10 ML: 5 INJECTION INTRAVENOUS at 19:22

## 2024-06-01 RX ADMIN — ISOSORBIDE MONONITRATE 30 MG: 30 TABLET, EXTENDED RELEASE ORAL at 15:05

## 2024-06-01 RX ADMIN — DULOXETINE HYDROCHLORIDE 30 MG: 30 CAPSULE, DELAYED RELEASE ORAL at 19:22

## 2024-06-01 RX ADMIN — POTASSIUM BICARBONATE 20 MEQ: 391 TABLET, EFFERVESCENT ORAL at 15:06

## 2024-06-01 RX ADMIN — OXYCODONE AND ACETAMINOPHEN 1 TABLET: 5; 325 TABLET ORAL at 22:12

## 2024-06-01 RX ADMIN — MORPHINE SULFATE 4 MG: 4 INJECTION, SOLUTION INTRAMUSCULAR; INTRAVENOUS at 08:10

## 2024-06-01 RX ADMIN — AMLODIPINE BESYLATE 2.5 MG: 5 TABLET ORAL at 19:22

## 2024-06-01 ASSESSMENT — PAIN DESCRIPTION - ORIENTATION
ORIENTATION: RIGHT

## 2024-06-01 ASSESSMENT — PAIN DESCRIPTION - LOCATION
LOCATION: HIP

## 2024-06-01 ASSESSMENT — PAIN SCALES - GENERAL
PAINLEVEL_OUTOF10: 4
PAINLEVEL_OUTOF10: 9
PAINLEVEL_OUTOF10: 5
PAINLEVEL_OUTOF10: 10
PAINLEVEL_OUTOF10: 4
PAINLEVEL_OUTOF10: 10
PAINLEVEL_OUTOF10: 8
PAINLEVEL_OUTOF10: 6
PAINLEVEL_OUTOF10: 9
PAINLEVEL_OUTOF10: 0
PAINLEVEL_OUTOF10: 0

## 2024-06-01 ASSESSMENT — PAIN - FUNCTIONAL ASSESSMENT
PAIN_FUNCTIONAL_ASSESSMENT: PREVENTS OR INTERFERES SOME ACTIVE ACTIVITIES AND ADLS
PAIN_FUNCTIONAL_ASSESSMENT: PREVENTS OR INTERFERES WITH ALL ACTIVE AND SOME PASSIVE ACTIVITIES
PAIN_FUNCTIONAL_ASSESSMENT: PREVENTS OR INTERFERES SOME ACTIVE ACTIVITIES AND ADLS
PAIN_FUNCTIONAL_ASSESSMENT: INTOLERABLE, UNABLE TO DO ANY ACTIVE OR PASSIVE ACTIVITIES
PAIN_FUNCTIONAL_ASSESSMENT: PREVENTS OR INTERFERES SOME ACTIVE ACTIVITIES AND ADLS
PAIN_FUNCTIONAL_ASSESSMENT: 0-10
PAIN_FUNCTIONAL_ASSESSMENT: INTOLERABLE, UNABLE TO DO ANY ACTIVE OR PASSIVE ACTIVITIES

## 2024-06-01 ASSESSMENT — PAIN DESCRIPTION - FREQUENCY
FREQUENCY: CONTINUOUS

## 2024-06-01 ASSESSMENT — PAIN DESCRIPTION - DESCRIPTORS
DESCRIPTORS: ACHING
DESCRIPTORS: ACHING
DESCRIPTORS: ACHING;SHARP;THROBBING
DESCRIPTORS: ACHING
DESCRIPTORS: ACHING;SHARP
DESCRIPTORS: ACHING
DESCRIPTORS: ACHING

## 2024-06-01 ASSESSMENT — PAIN DESCRIPTION - DIRECTION: RADIATING_TOWARDS: DOWN RIGHT LEG

## 2024-06-01 ASSESSMENT — PAIN DESCRIPTION - ONSET
ONSET: ON-GOING

## 2024-06-01 ASSESSMENT — PAIN DESCRIPTION - PAIN TYPE
TYPE: ACUTE PAIN

## 2024-06-01 NOTE — CONSULTS
Orthopedic Consult    Requesting Physician: Dr. Marsh    CHIEF COMPLAINT:  right hip pain    HISTORY OF PRESENT ILLNESS:      The patient is a 83 y.o. female  who presents with right hip pain after falling at home.  She was found lying in her dining room by her .  The patient was then transported to the ED via EMS.  She is complaining of right hip pain.  She does have a significant cardiac history and anticoagulated on plavix and aspirin.  Her cardiologist, Dr. Almonte was consulted and agreed patient could proceed with surgery.  She has no other complaints.    PastMedical History:    Past Medical History:   Diagnosis Date    CAD (coronary artery disease) 2008    CABG x 3    Chronic back pain     History of malignant neoplasm of parotid gland 2020    Dr. Pappas in [place], Squamous cell cancer    Hyperlipidemia     red yeast rice    Hypertension     onset after open heart surgery    Hypothyroidism     acquired. hx neck radiation on L    Macular degeneration 2022    right eye injections Dr. Salcido    Post PTCA     cardiac stents x 8    Transfusion history        Past Surgical History:  Past Surgical History:   Procedure Laterality Date    ANGIOPLASTY  12/07/2016    Dr. Almonte @ [facility] x 1     ANGIOPLASTY  07/29/2022    Dr. Almonte @ [facility]--Stenting X 2--    BACK INJECTION Bilateral 12/7/2023    BILATERAL SACROILIAC JOINT INJECTION performed by Chapito Gutierrez DO at [facility] OR    COLONOSCOPY  06/11/2013    Sandy GERONIMO    CORONARY ANGIOPLASTY Left 09/02/2022    Dr. Almonte @ [facility]--Stenting X 2-also Cardiac rehab    CORONARY ANGIOPLASTY WITH STENT PLACEMENT  2006,11/14/2012    CORONARY ARTERY BYPASS GRAFT  2006    X3    HEMORRHOID SURGERY      OVARY REMOVAL Right     PAROTIDECTOMY Left 2020    squamous cell cancer , surgical removal and radiation    OK NJX DX/THER SBST INTRLMNR LMBR/SAC W/IMG GDN N/A 08/28/2018    EPIDURAL STEROID INJECTION-CUADAL performed by Edil Khanna MD at [facility] OR     significant pain with internal/external rotation.  Unable to perform straight leg raise.  Shortening and external rotation of leg noted.  Thigh and calf supple.  No calf tenderness or palpable cords.  Skin is intact with no breakdown.  Palpable pedal pulses and brisk capillary refill.  Intact sensation to light touch.    DATA:  CBC:  Lab Results   Component Value Date/Time    WBC 5.9 06/01/2024 05:49 AM    HGB 9.3 06/01/2024 05:49 AM     06/01/2024 05:49 AM     BMP:    Lab Results   Component Value Date/Time     06/01/2024 05:49 AM    K 3.8 06/01/2024 05:49 AM     06/01/2024 05:49 AM    CO2 14 06/01/2024 05:49 AM    BUN 18 06/01/2024 05:49 AM    CREATININE 1.2 06/01/2024 05:49 AM    CALCIUM 8.6 06/01/2024 05:49 AM    GLUCOSE 105 06/01/2024 05:49 AM     PT/INR:    Lab Results   Component Value Date/Time    PROTIME 12.7 06/01/2024 05:49 AM    INR 1.0 06/01/2024 05:49 AM         Radiology:   X-ray 3 views of right hip:  IMPRESSION:  1. Acute intertrochanteric fracture of the right hip with varus angulation.  2. Severe osteoarthritis of the hips bilaterally.    ASSESSMENT:Active Problems:    * No active hospital problems. *  Resolved Problems:    * No resolved hospital problems. *       PLAN:  1)  Labs/vitals/xrays reviewed.  I have discussed treatment options with the patient and her  and they have agreed to proceed with surgical intervention involving a right femur intramedullary nail fixation. I have explained with surgery in detail with all risks, benefits, and complications including death.  No guarantees given to the outcome. Informed consent was obtained. No anesthesia team available for surgery until Monday and patient is ok waiting and does not want to be transferred.  Hold plavix and aspirin.        Lisa Lala, APRN - CNP    I reviewed history and examined patient and radiographs.  Agree with above.   Plan for right hip IM nail today.    SOLEDAD DU MD  5:15

## 2024-06-01 NOTE — ED PROVIDER NOTES
EMERGENCY DEPARTMENT ENCOUNTER      CHIEF COMPLAINT    Chief Complaint   Patient presents with    Hip Pain     Patient to ER via [employer] [facility] with c/o right hip pain s/p fall 1.5 hrs ago       MADAY Smith is a 83 y.o. female who presents to the emergency room via EMS for evaluation after a fall at home.  She was found lying in her dining room by her .  She reports that she got up to go to the bathroom and fell.  She denies any loss of consciousness with the fall.  Her only complaint is right hip pain.      PAST MEDICAL HISTORY    Past Medical History:   Diagnosis Date    CAD (coronary artery disease) 2008    CABG x 3    Chronic back pain     History of malignant neoplasm of parotid gland 2020    Dr. Pappas in [place], Squamous cell cancer    Hyperlipidemia     red yeast rice    Hypertension     onset after open heart surgery    Hypothyroidism     acquired. hx neck radiation on L    Macular degeneration 2022    right eye injections Dr. Salcido    Post PTCA     cardiac stents x 8    Transfusion history        SURGICAL HISTORY    Past Surgical History:   Procedure Laterality Date    ANGIOPLASTY  12/07/2016    Dr. Almonte @ [facility] x 1     ANGIOPLASTY  07/29/2022    Dr. Almonte @ [facility]--Stenting X 2--    BACK INJECTION Bilateral 12/7/2023    BILATERAL SACROILIAC JOINT INJECTION performed by Chapito Gutierrez DO at [facility] OR    COLONOSCOPY  06/11/2013    Sandy GERONIMO    CORONARY ANGIOPLASTY Left 09/02/2022    Dr. Almonte @ [facility]--Stenting X 2-also Cardiac rehab    CORONARY ANGIOPLASTY WITH STENT PLACEMENT  2006,11/14/2012    CORONARY ARTERY BYPASS GRAFT  2006    X3    HEMORRHOID SURGERY      OVARY REMOVAL Right     PAROTIDECTOMY Left 2020    squamous cell cancer , surgical removal and radiation    LA NJX DX/THER SBST INTRLMNR LMBR/SAC W/IMG GDN N/A 08/28/2018    EPIDURAL STEROID INJECTION-CUADAL performed by Edil Khanna MD at [facility] OR    RADICAL NECK DISSECTION Left     Parotid gland  Year: No     Review of Systems:    All relevant systems are reviewed and are negative with the exception of that stated in the HPI.    PHYSICAL EXAM    VITAL SIGNS: BP (!) 154/85   Pulse 99   Temp 98.2 °F (36.8 °C) (Oral)   Resp 20   Wt 63.5 kg (140 lb)   SpO2 98%   BMI 21.29 kg/m²    Constitutional:  Well developed, Well nourished, No acute distress, Non-toxic appearance.   Eyes:  PERRL, EOMI, Conjunctiva normal, No discharge.   Respiratory:  Normal breath sounds, No respiratory distress, No wheezing, No chest tenderness.   Cardiovascular:  Normal heart rate, Normal rhythm, No murmurs, No rubs, No gallops.   GI:  Bowel sounds normal, Soft, No tenderness, No masses, No pulsatile masses.   Musculoskeletal: Shortening and external rotation of right lower extremity with painful range of motion of right hip.  Integument:  Warm, Dry, No erythema, No rash.   Neurologic:  Alert & oriented x 3, Normal motor function, Normal sensory function, No focal deficits noted.   Psychiatric:  Affect normal, Judgment normal, Mood normal.     Medical Decision Making    Patient presents to the emergency room via EMS for evaluation after a fall at home.  She was found lying in her dining room by her .  She reports that she got up to go to the bathroom and fell.  She denies any loss of consciousness with the fall.  Her only complaint is right hip pain.  Vital signs stable.  Physical exam reveals painful range of motion right hip with shortening of the right lower extremity and outward rotation.  X-ray 2 view of the right hip was performed and results independently reviewed by me.  There is a right intertrochanteric hip fracture noted.  CT scan of brain was performed without contrast and results independently reviewed by me.  There is no acute process noted.  Chest x-ray 1 view was performed and results and plan reviewed by me.  There is increased interstitial markings noted.  No acute consolidation is noted.  Laboratory

## 2024-06-02 LAB
ANION GAP SERPL CALCULATED.3IONS-SCNC: 14 MMOL/L (ref 9–17)
BASOPHILS # BLD: 0.03 K/UL (ref 0–0.2)
BASOPHILS NFR BLD: 0 % (ref 0–2)
BUN SERPL-MCNC: 17 MG/DL (ref 8–23)
BUN/CREAT SERPL: 21 (ref 9–20)
CALCIUM SERPL-MCNC: 8.4 MG/DL (ref 8.6–10.4)
CHLORIDE SERPL-SCNC: 101 MMOL/L (ref 98–107)
CO2 SERPL-SCNC: 20 MMOL/L (ref 20–31)
CREAT SERPL-MCNC: 0.8 MG/DL (ref 0.5–0.9)
EKG ATRIAL RATE: 114 BPM
EKG P AXIS: 82 DEGREES
EKG P-R INTERVAL: 142 MS
EKG Q-T INTERVAL: 320 MS
EKG QRS DURATION: 80 MS
EKG QTC CALCULATION (BAZETT): 441 MS
EKG R AXIS: 75 DEGREES
EKG T AXIS: -51 DEGREES
EKG VENTRICULAR RATE: 114 BPM
EOSINOPHIL # BLD: 0.03 K/UL (ref 0–0.4)
EOSINOPHILS RELATIVE PERCENT: 0 % (ref 0–5)
ERYTHROCYTE [DISTWIDTH] IN BLOOD BY AUTOMATED COUNT: 16.8 % (ref 12.1–15.2)
GFR, ESTIMATED: 72 ML/MIN/1.73M2
GLUCOSE SERPL-MCNC: 119 MG/DL (ref 70–99)
HCT VFR BLD AUTO: 31.3 % (ref 36–46)
HGB BLD-MCNC: 9.5 G/DL (ref 12–16)
IMM GRANULOCYTES # BLD AUTO: 0.02 K/UL (ref 0–0.3)
IMM GRANULOCYTES NFR BLD: 0 % (ref 0–5)
LYMPHOCYTES NFR BLD: 0.63 K/UL (ref 1–4.8)
LYMPHOCYTES RELATIVE PERCENT: 5 % (ref 15–40)
MCH RBC QN AUTO: 25.7 PG (ref 26–34)
MCHC RBC AUTO-ENTMCNC: 30.4 G/DL (ref 31–37)
MCV RBC AUTO: 84.8 FL (ref 80–100)
MONOCYTES NFR BLD: 1.02 K/UL (ref 0–1)
MONOCYTES NFR BLD: 9 % (ref 4–8)
NEUTROPHILS NFR BLD: 85 % (ref 47–75)
NEUTS SEG NFR BLD: 9.83 K/UL (ref 2.5–7)
PLATELET # BLD AUTO: 432 K/UL (ref 140–450)
PMV BLD AUTO: 9.4 FL (ref 6–12)
POTASSIUM SERPL-SCNC: 4.1 MMOL/L (ref 3.7–5.3)
RBC # BLD AUTO: 3.69 M/UL (ref 4–5.2)
SODIUM SERPL-SCNC: 135 MMOL/L (ref 135–144)
WBC OTHER # BLD: 11.6 K/UL (ref 3.5–11)

## 2024-06-02 PROCEDURE — 6360000002 HC RX W HCPCS: Performed by: INTERNAL MEDICINE

## 2024-06-02 PROCEDURE — 94761 N-INVAS EAR/PLS OXIMETRY MLT: CPT

## 2024-06-02 PROCEDURE — 93010 ELECTROCARDIOGRAM REPORT: CPT | Performed by: INTERNAL MEDICINE

## 2024-06-02 PROCEDURE — 80048 BASIC METABOLIC PNL TOTAL CA: CPT

## 2024-06-02 PROCEDURE — 2580000003 HC RX 258: Performed by: INTERNAL MEDICINE

## 2024-06-02 PROCEDURE — 1200000000 HC SEMI PRIVATE

## 2024-06-02 PROCEDURE — 36415 COLL VENOUS BLD VENIPUNCTURE: CPT

## 2024-06-02 PROCEDURE — 85025 COMPLETE CBC W/AUTO DIFF WBC: CPT

## 2024-06-02 PROCEDURE — 6370000000 HC RX 637 (ALT 250 FOR IP): Performed by: INTERNAL MEDICINE

## 2024-06-02 RX ORDER — 0.9 % SODIUM CHLORIDE 0.9 %
250 INTRAVENOUS SOLUTION INTRAVENOUS ONCE
Status: COMPLETED | OUTPATIENT
Start: 2024-06-02 | End: 2024-06-02

## 2024-06-02 RX ORDER — SODIUM CHLORIDE 9 MG/ML
INJECTION, SOLUTION INTRAVENOUS CONTINUOUS
Status: DISCONTINUED | OUTPATIENT
Start: 2024-06-02 | End: 2024-06-04

## 2024-06-02 RX ADMIN — SODIUM BICARBONATE 650 MG: 650 TABLET ORAL at 08:43

## 2024-06-02 RX ADMIN — AMLODIPINE BESYLATE 2.5 MG: 5 TABLET ORAL at 08:43

## 2024-06-02 RX ADMIN — SODIUM CHLORIDE: 9 INJECTION, SOLUTION INTRAVENOUS at 05:39

## 2024-06-02 RX ADMIN — POTASSIUM BICARBONATE 20 MEQ: 391 TABLET, EFFERVESCENT ORAL at 20:21

## 2024-06-02 RX ADMIN — POTASSIUM BICARBONATE 20 MEQ: 391 TABLET, EFFERVESCENT ORAL at 08:42

## 2024-06-02 RX ADMIN — HYDROMORPHONE HYDROCHLORIDE 1 MG: 1 INJECTION, SOLUTION INTRAMUSCULAR; INTRAVENOUS; SUBCUTANEOUS at 05:26

## 2024-06-02 RX ADMIN — FLUOXETINE 40 MG: 10 CAPSULE ORAL at 08:43

## 2024-06-02 RX ADMIN — AMLODIPINE BESYLATE 2.5 MG: 5 TABLET ORAL at 20:21

## 2024-06-02 RX ADMIN — PANTOPRAZOLE SODIUM 20 MG: 20 TABLET, DELAYED RELEASE ORAL at 05:26

## 2024-06-02 RX ADMIN — HYDROMORPHONE HYDROCHLORIDE 1 MG: 1 INJECTION, SOLUTION INTRAMUSCULAR; INTRAVENOUS; SUBCUTANEOUS at 14:10

## 2024-06-02 RX ADMIN — HYDROMORPHONE HYDROCHLORIDE 1 MG: 1 INJECTION, SOLUTION INTRAMUSCULAR; INTRAVENOUS; SUBCUTANEOUS at 09:33

## 2024-06-02 RX ADMIN — HYDROMORPHONE HYDROCHLORIDE 1 MG: 1 INJECTION, SOLUTION INTRAMUSCULAR; INTRAVENOUS; SUBCUTANEOUS at 21:55

## 2024-06-02 RX ADMIN — SODIUM CHLORIDE, PRESERVATIVE FREE 10 ML: 5 INJECTION INTRAVENOUS at 08:44

## 2024-06-02 RX ADMIN — METOPROLOL SUCCINATE 25 MG: 25 TABLET, FILM COATED, EXTENDED RELEASE ORAL at 08:43

## 2024-06-02 RX ADMIN — ISOSORBIDE MONONITRATE 30 MG: 30 TABLET, EXTENDED RELEASE ORAL at 08:43

## 2024-06-02 RX ADMIN — SODIUM BICARBONATE 650 MG: 650 TABLET ORAL at 12:18

## 2024-06-02 RX ADMIN — SODIUM CHLORIDE: 9 INJECTION, SOLUTION INTRAVENOUS at 17:16

## 2024-06-02 RX ADMIN — OXYCODONE AND ACETAMINOPHEN 1 TABLET: 5; 325 TABLET ORAL at 20:22

## 2024-06-02 RX ADMIN — LEVOTHYROXINE SODIUM 100 MCG: 100 TABLET ORAL at 05:26

## 2024-06-02 RX ADMIN — SODIUM BICARBONATE 650 MG: 650 TABLET ORAL at 16:57

## 2024-06-02 RX ADMIN — HYDROMORPHONE HYDROCHLORIDE 1 MG: 1 INJECTION, SOLUTION INTRAMUSCULAR; INTRAVENOUS; SUBCUTANEOUS at 01:01

## 2024-06-02 RX ADMIN — HYDROMORPHONE HYDROCHLORIDE 1 MG: 1 INJECTION, SOLUTION INTRAMUSCULAR; INTRAVENOUS; SUBCUTANEOUS at 17:31

## 2024-06-02 RX ADMIN — OXYCODONE AND ACETAMINOPHEN 1 TABLET: 5; 325 TABLET ORAL at 12:18

## 2024-06-02 RX ADMIN — DULOXETINE HYDROCHLORIDE 30 MG: 30 CAPSULE, DELAYED RELEASE ORAL at 20:22

## 2024-06-02 RX ADMIN — OXYCODONE AND ACETAMINOPHEN 1 TABLET: 5; 325 TABLET ORAL at 04:25

## 2024-06-02 RX ADMIN — SODIUM CHLORIDE 250 ML: 9 INJECTION, SOLUTION INTRAVENOUS at 05:39

## 2024-06-02 RX ADMIN — DULOXETINE HYDROCHLORIDE 30 MG: 30 CAPSULE, DELAYED RELEASE ORAL at 08:43

## 2024-06-02 RX ADMIN — ENOXAPARIN SODIUM 40 MG: 100 INJECTION SUBCUTANEOUS at 08:44

## 2024-06-02 RX ADMIN — FERROUS SULFATE TAB 325 MG (65 MG ELEMENTAL FE) 324 MG: 325 (65 FE) TAB at 08:43

## 2024-06-02 RX ADMIN — SODIUM BICARBONATE 650 MG: 650 TABLET ORAL at 20:21

## 2024-06-02 ASSESSMENT — PAIN DESCRIPTION - PAIN TYPE
TYPE: ACUTE PAIN

## 2024-06-02 ASSESSMENT — PAIN DESCRIPTION - ONSET
ONSET: ON-GOING

## 2024-06-02 ASSESSMENT — PAIN DESCRIPTION - LOCATION
LOCATION: HIP

## 2024-06-02 ASSESSMENT — PAIN DESCRIPTION - ORIENTATION
ORIENTATION: RIGHT
ORIENTATION: LEFT
ORIENTATION: RIGHT

## 2024-06-02 ASSESSMENT — PAIN SCALES - GENERAL
PAINLEVEL_OUTOF10: 9
PAINLEVEL_OUTOF10: 10
PAINLEVEL_OUTOF10: 7
PAINLEVEL_OUTOF10: 9
PAINLEVEL_OUTOF10: 0
PAINLEVEL_OUTOF10: 8
PAINLEVEL_OUTOF10: 8
PAINLEVEL_OUTOF10: 3
PAINLEVEL_OUTOF10: 9
PAINLEVEL_OUTOF10: 7
PAINLEVEL_OUTOF10: 6
PAINLEVEL_OUTOF10: 3
PAINLEVEL_OUTOF10: 5
PAINLEVEL_OUTOF10: 1

## 2024-06-02 ASSESSMENT — PAIN DESCRIPTION - FREQUENCY
FREQUENCY: CONTINUOUS

## 2024-06-02 ASSESSMENT — PAIN - FUNCTIONAL ASSESSMENT
PAIN_FUNCTIONAL_ASSESSMENT: PREVENTS OR INTERFERES WITH MANY ACTIVE NOT PASSIVE ACTIVITIES
PAIN_FUNCTIONAL_ASSESSMENT: PREVENTS OR INTERFERES SOME ACTIVE ACTIVITIES AND ADLS
PAIN_FUNCTIONAL_ASSESSMENT: INTOLERABLE, UNABLE TO DO ANY ACTIVE OR PASSIVE ACTIVITIES
PAIN_FUNCTIONAL_ASSESSMENT: PREVENTS OR INTERFERES SOME ACTIVE ACTIVITIES AND ADLS
PAIN_FUNCTIONAL_ASSESSMENT: INTOLERABLE, UNABLE TO DO ANY ACTIVE OR PASSIVE ACTIVITIES
PAIN_FUNCTIONAL_ASSESSMENT: PREVENTS OR INTERFERES SOME ACTIVE ACTIVITIES AND ADLS
PAIN_FUNCTIONAL_ASSESSMENT: INTOLERABLE, UNABLE TO DO ANY ACTIVE OR PASSIVE ACTIVITIES
PAIN_FUNCTIONAL_ASSESSMENT: PREVENTS OR INTERFERES WITH ALL ACTIVE AND SOME PASSIVE ACTIVITIES
PAIN_FUNCTIONAL_ASSESSMENT: PREVENTS OR INTERFERES SOME ACTIVE ACTIVITIES AND ADLS
PAIN_FUNCTIONAL_ASSESSMENT: 0-10

## 2024-06-02 ASSESSMENT — PAIN DESCRIPTION - DESCRIPTORS
DESCRIPTORS: ACHING
DESCRIPTORS: ACHING;DISCOMFORT
DESCRIPTORS: ACHING
DESCRIPTORS: ACHING;DISCOMFORT;NAGGING
DESCRIPTORS: ACHING

## 2024-06-02 NOTE — PROGRESS NOTES
Dr. Dani heredia served for increased pain, Dilaudid effectiveness seems to be wearing off around 3 hours.  Ok to increase frequency to q 3 hours.  Capnography to be applied as well.

## 2024-06-02 NOTE — H&P
Hospital Medicine  History and Physical    Patient:  Megan Smith  MRN: [id number]    CHIEF COMPLAINT:  Right hip pain.    History Obtained From:  patient  PCP: Rita Hair APRN - CNP    HISTORY OF PRESENT ILLNESS:   The patient is a 83 y.o. female who presents to the ER with right hip pain after falling.  According to Megan, she woke to go to the restroom.  She was walking with a cane and fell.  She is not sure exactly what happened that resulted in her falling.  Upon falling she had immediate pain in the right hip.  She denies hitting her head or having loss of consciousness.  She was able to yell for her  who called 911.  Prior to her fall Megan states she was doing okay.  No recent cold symptoms or cough.  She had not been short of breath and denies any increased swelling in her legs.  Megan states she has been eating and drinking well.  No trouble moving her bowel and had been  urinating without issue.      In the ER her CMP was normal other than a TCO2 of 14, Creatinine at 1.2, Glucose 105, Total Protein was 6.2.  CBC showed a WBC at 5.9, Hgb 9.3, and Plt ct at 388.  INR 1.0 with a PTT at 21.9.  UA was negative for infection.     CT head showed:  Significant degradation of image quality from extensive motion artifact. Within   constraints of exam, there is no evidence of acute intracranial hemorrhage or   acute intracranial process. Repeat CT imaging should be obtained if symptoms   continue.    CXR showed:  Mild increased interstitial markings, which could be new pulmonary   vascular congestion. No airspace consolidation.    Xray of the hip and pelvis showed:  1. Acute intertrochanteric fracture of the right hip with varus angulation.  2. Severe osteoarthritis of the hips bilaterally.      ECG showed:  Sinus tachycardia  ST & T wave abnormality, consider inferior ischemia  Abnormal ECG    At [place] we did not have a Surgical team over the weekend to perform surgery.  The family requested to go  patient's advanced care plan is not present because:  (select)   ( ) I confirmed today that the patient does not wish or was not able to name a surrogate decision maker or provide an Advance Care Plan.   ( ) Hospice care is currently being provided or has been provided this calender year.  ( )  I did not confirm today the presence of an Advance Care Plan or surrogate decision maker documented within the patient's medical record. (Does not satisfy [facility] performance).            Shekhar Louise MD, MD  Admitting Hospitalist

## 2024-06-03 ENCOUNTER — ANESTHESIA EVENT (OUTPATIENT)
Dept: OPERATING ROOM | Age: 84
End: 2024-06-03
Payer: MEDICARE

## 2024-06-03 ENCOUNTER — ANESTHESIA (OUTPATIENT)
Dept: OPERATING ROOM | Age: 84
End: 2024-06-03
Payer: MEDICARE

## 2024-06-03 ENCOUNTER — TELEPHONE (OUTPATIENT)
Dept: PAIN MANAGEMENT | Age: 84
End: 2024-06-03

## 2024-06-03 ENCOUNTER — APPOINTMENT (OUTPATIENT)
Dept: GENERAL RADIOLOGY | Age: 84
End: 2024-06-03
Payer: MEDICARE

## 2024-06-03 PROCEDURE — 2709999900 HC NON-CHARGEABLE SUPPLY: Performed by: ORTHOPAEDIC SURGERY

## 2024-06-03 PROCEDURE — 3600000004 HC SURGERY LEVEL 4 BASE: Performed by: ORTHOPAEDIC SURGERY

## 2024-06-03 PROCEDURE — 2500000003 HC RX 250 WO HCPCS: Performed by: NURSE ANESTHETIST, CERTIFIED REGISTERED

## 2024-06-03 PROCEDURE — 2580000003 HC RX 258: Performed by: INTERNAL MEDICINE

## 2024-06-03 PROCEDURE — 3700000000 HC ANESTHESIA ATTENDED CARE: Performed by: ORTHOPAEDIC SURGERY

## 2024-06-03 PROCEDURE — 6360000002 HC RX W HCPCS: Performed by: ORTHOPAEDIC SURGERY

## 2024-06-03 PROCEDURE — C1769 GUIDE WIRE: HCPCS | Performed by: ORTHOPAEDIC SURGERY

## 2024-06-03 PROCEDURE — 36415 COLL VENOUS BLD VENIPUNCTURE: CPT

## 2024-06-03 PROCEDURE — 86850 RBC ANTIBODY SCREEN: CPT

## 2024-06-03 PROCEDURE — 2720000010 HC SURG SUPPLY STERILE: Performed by: ORTHOPAEDIC SURGERY

## 2024-06-03 PROCEDURE — 86900 BLOOD TYPING SEROLOGIC ABO: CPT

## 2024-06-03 PROCEDURE — 6360000002 HC RX W HCPCS: Performed by: INTERNAL MEDICINE

## 2024-06-03 PROCEDURE — 6360000002 HC RX W HCPCS: Performed by: NURSE ANESTHETIST, CERTIFIED REGISTERED

## 2024-06-03 PROCEDURE — 1200000000 HC SEMI PRIVATE

## 2024-06-03 PROCEDURE — 0QS606Z REPOSITION RIGHT UPPER FEMUR WITH INTRAMEDULLARY INTERNAL FIXATION DEVICE, OPEN APPROACH: ICD-10-PCS | Performed by: ORTHOPAEDIC SURGERY

## 2024-06-03 PROCEDURE — 99223 1ST HOSP IP/OBS HIGH 75: CPT | Performed by: INTERNAL MEDICINE

## 2024-06-03 PROCEDURE — 76942 ECHO GUIDE FOR BIOPSY: CPT | Performed by: NURSE ANESTHETIST, CERTIFIED REGISTERED

## 2024-06-03 PROCEDURE — 6370000000 HC RX 637 (ALT 250 FOR IP): Performed by: INTERNAL MEDICINE

## 2024-06-03 PROCEDURE — 86901 BLOOD TYPING SEROLOGIC RH(D): CPT

## 2024-06-03 PROCEDURE — A4217 STERILE WATER/SALINE, 500 ML: HCPCS | Performed by: ORTHOPAEDIC SURGERY

## 2024-06-03 PROCEDURE — 3700000001 HC ADD 15 MINUTES (ANESTHESIA): Performed by: ORTHOPAEDIC SURGERY

## 2024-06-03 PROCEDURE — C1713 ANCHOR/SCREW BN/BN,TIS/BN: HCPCS | Performed by: ORTHOPAEDIC SURGERY

## 2024-06-03 PROCEDURE — 6370000000 HC RX 637 (ALT 250 FOR IP): Performed by: ORTHOPAEDIC SURGERY

## 2024-06-03 PROCEDURE — 94761 N-INVAS EAR/PLS OXIMETRY MLT: CPT

## 2024-06-03 PROCEDURE — 7100000001 HC PACU RECOVERY - ADDTL 15 MIN: Performed by: ORTHOPAEDIC SURGERY

## 2024-06-03 PROCEDURE — 93005 ELECTROCARDIOGRAM TRACING: CPT | Performed by: INTERNAL MEDICINE

## 2024-06-03 PROCEDURE — 2580000003 HC RX 258: Performed by: ORTHOPAEDIC SURGERY

## 2024-06-03 PROCEDURE — 3600000014 HC SURGERY LEVEL 4 ADDTL 15MIN: Performed by: ORTHOPAEDIC SURGERY

## 2024-06-03 PROCEDURE — 2500000003 HC RX 250 WO HCPCS: Performed by: INTERNAL MEDICINE

## 2024-06-03 PROCEDURE — 7100000000 HC PACU RECOVERY - FIRST 15 MIN: Performed by: ORTHOPAEDIC SURGERY

## 2024-06-03 DEVICE — IMPLANTABLE DEVICE: Type: IMPLANTABLE DEVICE | Site: HIP | Status: FUNCTIONAL

## 2024-06-03 DEVICE — SCREW LK F/IM NAIL 5X34MM XL25 ST: Type: IMPLANTABLE DEVICE | Site: HIP | Status: FUNCTIONAL

## 2024-06-03 RX ORDER — DILTIAZEM HYDROCHLORIDE 5 MG/ML
10 INJECTION INTRAVENOUS ONCE
Status: COMPLETED | OUTPATIENT
Start: 2024-06-03 | End: 2024-06-03

## 2024-06-03 RX ORDER — ASPIRIN 81 MG/1
81 TABLET ORAL DAILY
Status: DISCONTINUED | OUTPATIENT
Start: 2024-06-04 | End: 2024-06-05 | Stop reason: HOSPADM

## 2024-06-03 RX ORDER — FENTANYL CITRATE 50 UG/ML
INJECTION, SOLUTION INTRAMUSCULAR; INTRAVENOUS PRN
Status: DISCONTINUED | OUTPATIENT
Start: 2024-06-03 | End: 2024-06-03 | Stop reason: SDUPTHER

## 2024-06-03 RX ORDER — MIDAZOLAM HYDROCHLORIDE 2 MG/2ML
INJECTION, SOLUTION INTRAMUSCULAR; INTRAVENOUS PRN
Status: DISCONTINUED | OUTPATIENT
Start: 2024-06-03 | End: 2024-06-03

## 2024-06-03 RX ORDER — SODIUM CHLORIDE, SODIUM LACTATE, POTASSIUM CHLORIDE, CALCIUM CHLORIDE 600; 310; 30; 20 MG/100ML; MG/100ML; MG/100ML; MG/100ML
INJECTION, SOLUTION INTRAVENOUS CONTINUOUS
Status: DISCONTINUED | OUTPATIENT
Start: 2024-06-03 | End: 2024-06-04

## 2024-06-03 RX ORDER — DEXAMETHASONE SODIUM PHOSPHATE 10 MG/ML
INJECTION, SOLUTION INTRAMUSCULAR; INTRAVENOUS PRN
Status: DISCONTINUED | OUTPATIENT
Start: 2024-06-03 | End: 2024-06-03 | Stop reason: SDUPTHER

## 2024-06-03 RX ORDER — METOPROLOL TARTRATE 1 MG/ML
5 INJECTION, SOLUTION INTRAVENOUS ONCE
Status: COMPLETED | OUTPATIENT
Start: 2024-06-03 | End: 2024-06-03

## 2024-06-03 RX ORDER — ONDANSETRON 2 MG/ML
INJECTION INTRAMUSCULAR; INTRAVENOUS PRN
Status: DISCONTINUED | OUTPATIENT
Start: 2024-06-03 | End: 2024-06-03 | Stop reason: SDUPTHER

## 2024-06-03 RX ORDER — CEFAZOLIN SODIUM 2 G/50ML
2000 SOLUTION INTRAVENOUS
Status: COMPLETED | OUTPATIENT
Start: 2024-06-03 | End: 2024-06-04

## 2024-06-03 RX ORDER — PROPOFOL 10 MG/ML
INJECTION, EMULSION INTRAVENOUS CONTINUOUS PRN
Status: DISCONTINUED | OUTPATIENT
Start: 2024-06-03 | End: 2024-06-03 | Stop reason: SDUPTHER

## 2024-06-03 RX ORDER — LIDOCAINE HYDROCHLORIDE 10 MG/ML
INJECTION, SOLUTION EPIDURAL; INFILTRATION; INTRACAUDAL; PERINEURAL PRN
Status: DISCONTINUED | OUTPATIENT
Start: 2024-06-03 | End: 2024-06-03 | Stop reason: SDUPTHER

## 2024-06-03 RX ORDER — ENOXAPARIN SODIUM 100 MG/ML
40 INJECTION SUBCUTANEOUS DAILY
Status: DISCONTINUED | OUTPATIENT
Start: 2024-06-04 | End: 2024-06-05 | Stop reason: HOSPADM

## 2024-06-03 RX ORDER — PROPOFOL 10 MG/ML
INJECTION, EMULSION INTRAVENOUS PRN
Status: DISCONTINUED | OUTPATIENT
Start: 2024-06-03 | End: 2024-06-03 | Stop reason: SDUPTHER

## 2024-06-03 RX ORDER — METOPROLOL TARTRATE 1 MG/ML
5 INJECTION, SOLUTION INTRAVENOUS EVERY 6 HOURS
Status: COMPLETED | OUTPATIENT
Start: 2024-06-03 | End: 2024-06-03

## 2024-06-03 RX ORDER — ROPIVACAINE HYDROCHLORIDE 5 MG/ML
INJECTION, SOLUTION EPIDURAL; INFILTRATION; PERINEURAL
Status: COMPLETED | OUTPATIENT
Start: 2024-06-03 | End: 2024-06-03

## 2024-06-03 RX ORDER — 0.9 % SODIUM CHLORIDE 0.9 %
500 INTRAVENOUS SOLUTION INTRAVENOUS ONCE
Status: COMPLETED | OUTPATIENT
Start: 2024-06-03 | End: 2024-06-03

## 2024-06-03 RX ORDER — MAGNESIUM HYDROXIDE 1200 MG/15ML
LIQUID ORAL CONTINUOUS PRN
Status: COMPLETED | OUTPATIENT
Start: 2024-06-03 | End: 2024-06-03

## 2024-06-03 RX ADMIN — FENTANYL CITRATE 50 MCG: 50 INJECTION, SOLUTION INTRAMUSCULAR; INTRAVENOUS at 18:11

## 2024-06-03 RX ADMIN — HYDROMORPHONE HYDROCHLORIDE 1 MG: 1 INJECTION, SOLUTION INTRAMUSCULAR; INTRAVENOUS; SUBCUTANEOUS at 11:10

## 2024-06-03 RX ADMIN — LIDOCAINE HYDROCHLORIDE 50 MG: 10 INJECTION, SOLUTION EPIDURAL; INFILTRATION; INTRACAUDAL; PERINEURAL at 17:19

## 2024-06-03 RX ADMIN — ONDANSETRON 4 MG: 2 INJECTION INTRAMUSCULAR; INTRAVENOUS at 08:10

## 2024-06-03 RX ADMIN — PROPOFOL 150 MG: 10 INJECTION, EMULSION INTRAVENOUS at 17:19

## 2024-06-03 RX ADMIN — PROPOFOL 140 MCG/KG/MIN: 10 INJECTION, EMULSION INTRAVENOUS at 17:19

## 2024-06-03 RX ADMIN — HYDROMORPHONE HYDROCHLORIDE 1 MG: 1 INJECTION, SOLUTION INTRAMUSCULAR; INTRAVENOUS; SUBCUTANEOUS at 21:33

## 2024-06-03 RX ADMIN — SODIUM CHLORIDE: 9 INJECTION, SOLUTION INTRAVENOUS at 07:39

## 2024-06-03 RX ADMIN — SODIUM CHLORIDE, PRESERVATIVE FREE 10 ML: 5 INJECTION INTRAVENOUS at 20:46

## 2024-06-03 RX ADMIN — DULOXETINE HYDROCHLORIDE 30 MG: 30 CAPSULE, DELAYED RELEASE ORAL at 20:46

## 2024-06-03 RX ADMIN — METOPROLOL TARTRATE 5 MG: 5 INJECTION INTRAVENOUS at 13:14

## 2024-06-03 RX ADMIN — SODIUM CHLORIDE, POTASSIUM CHLORIDE, SODIUM LACTATE AND CALCIUM CHLORIDE: 600; 310; 30; 20 INJECTION, SOLUTION INTRAVENOUS at 18:17

## 2024-06-03 RX ADMIN — SODIUM CHLORIDE 500 ML: 9 INJECTION, SOLUTION INTRAVENOUS at 07:43

## 2024-06-03 RX ADMIN — DILTIAZEM HYDROCHLORIDE 10 MG: 5 INJECTION, SOLUTION INTRAVENOUS at 07:56

## 2024-06-03 RX ADMIN — HYDROMORPHONE HYDROCHLORIDE 1 MG: 1 INJECTION, SOLUTION INTRAMUSCULAR; INTRAVENOUS; SUBCUTANEOUS at 08:06

## 2024-06-03 RX ADMIN — HYDROMORPHONE HYDROCHLORIDE 1 MG: 1 INJECTION, SOLUTION INTRAMUSCULAR; INTRAVENOUS; SUBCUTANEOUS at 04:09

## 2024-06-03 RX ADMIN — ONDANSETRON 4 MG: 2 INJECTION INTRAMUSCULAR; INTRAVENOUS at 17:50

## 2024-06-03 RX ADMIN — POTASSIUM BICARBONATE 20 MEQ: 391 TABLET, EFFERVESCENT ORAL at 20:46

## 2024-06-03 RX ADMIN — HYDROMORPHONE HYDROCHLORIDE 1 MG: 1 INJECTION, SOLUTION INTRAMUSCULAR; INTRAVENOUS; SUBCUTANEOUS at 01:01

## 2024-06-03 RX ADMIN — DEXAMETHASONE SODIUM PHOSPHATE 10 MG: 10 INJECTION, SOLUTION INTRAMUSCULAR; INTRAVENOUS at 16:40

## 2024-06-03 RX ADMIN — HYDROMORPHONE HYDROCHLORIDE 1 MG: 1 INJECTION, SOLUTION INTRAMUSCULAR; INTRAVENOUS; SUBCUTANEOUS at 15:29

## 2024-06-03 RX ADMIN — CEFAZOLIN SODIUM 2000 MG: 2 SOLUTION INTRAVENOUS at 17:11

## 2024-06-03 RX ADMIN — SODIUM CHLORIDE, POTASSIUM CHLORIDE, SODIUM LACTATE AND CALCIUM CHLORIDE: 600; 310; 30; 20 INJECTION, SOLUTION INTRAVENOUS at 16:16

## 2024-06-03 RX ADMIN — METOPROLOL TARTRATE 5 MG: 5 INJECTION INTRAVENOUS at 19:41

## 2024-06-03 RX ADMIN — FENTANYL CITRATE 50 MCG: 50 INJECTION, SOLUTION INTRAMUSCULAR; INTRAVENOUS at 16:31

## 2024-06-03 RX ADMIN — ACETAMINOPHEN 650 MG: 650 SUPPOSITORY RECTAL at 11:41

## 2024-06-03 RX ADMIN — SODIUM BICARBONATE 650 MG: 650 TABLET ORAL at 20:46

## 2024-06-03 RX ADMIN — METOPROLOL TARTRATE 5 MG: 5 INJECTION INTRAVENOUS at 07:43

## 2024-06-03 RX ADMIN — SODIUM CHLORIDE: 9 INJECTION, SOLUTION INTRAVENOUS at 19:52

## 2024-06-03 RX ADMIN — ROPIVACAINE HYDROCHLORIDE 30 ML: 5 INJECTION, SOLUTION EPIDURAL; INFILTRATION; PERINEURAL at 16:40

## 2024-06-03 ASSESSMENT — PAIN DESCRIPTION - LOCATION
LOCATION: HIP

## 2024-06-03 ASSESSMENT — PAIN DESCRIPTION - DESCRIPTORS
DESCRIPTORS: ACHING
DESCRIPTORS: ACHING;SHARP
DESCRIPTORS: ACHING
DESCRIPTORS: ACHING
DESCRIPTORS: ACHING;NAGGING;SORE
DESCRIPTORS: ACHING
DESCRIPTORS: ACHING

## 2024-06-03 ASSESSMENT — PAIN SCALES - GENERAL
PAINLEVEL_OUTOF10: 9
PAINLEVEL_OUTOF10: 9
PAINLEVEL_OUTOF10: 8
PAINLEVEL_OUTOF10: 8
PAINLEVEL_OUTOF10: 0
PAINLEVEL_OUTOF10: 10
PAINLEVEL_OUTOF10: 0
PAINLEVEL_OUTOF10: 10
PAINLEVEL_OUTOF10: 0
PAINLEVEL_OUTOF10: 0
PAINLEVEL_OUTOF10: 3
PAINLEVEL_OUTOF10: 0
PAINLEVEL_OUTOF10: 0
PAINLEVEL_OUTOF10: 3
PAINLEVEL_OUTOF10: 0
PAINLEVEL_OUTOF10: 9
PAINLEVEL_OUTOF10: 0
PAINLEVEL_OUTOF10: 0

## 2024-06-03 ASSESSMENT — PAIN - FUNCTIONAL ASSESSMENT
PAIN_FUNCTIONAL_ASSESSMENT: PREVENTS OR INTERFERES SOME ACTIVE ACTIVITIES AND ADLS
PAIN_FUNCTIONAL_ASSESSMENT: INTOLERABLE, UNABLE TO DO ANY ACTIVE OR PASSIVE ACTIVITIES
PAIN_FUNCTIONAL_ASSESSMENT: PREVENTS OR INTERFERES WITH ALL ACTIVE AND SOME PASSIVE ACTIVITIES
PAIN_FUNCTIONAL_ASSESSMENT: PREVENTS OR INTERFERES SOME ACTIVE ACTIVITIES AND ADLS
PAIN_FUNCTIONAL_ASSESSMENT: INTOLERABLE, UNABLE TO DO ANY ACTIVE OR PASSIVE ACTIVITIES

## 2024-06-03 ASSESSMENT — PAIN DESCRIPTION - FREQUENCY: FREQUENCY: CONTINUOUS

## 2024-06-03 ASSESSMENT — PAIN DESCRIPTION - PAIN TYPE
TYPE: ACUTE PAIN
TYPE: ACUTE PAIN

## 2024-06-03 ASSESSMENT — PAIN DESCRIPTION - ORIENTATION
ORIENTATION: RIGHT

## 2024-06-03 ASSESSMENT — PAIN DESCRIPTION - ONSET
ONSET: ON-GOING
ONSET: ON-GOING

## 2024-06-03 NOTE — CONSULTS
[facility]                [address] [address]                              CONSULTATION      PATIENT NAME: NERI MEDINA             : 1940  MED REC NO: [id number]                          ROOM: [id number]  ACCOUNT NO: [id number]                       ADMIT DATE: 2024  PROVIDER: Shaq Almonte MD      REASON FOR CONSULT:  Cardiac clearance for right hip repair.    HISTORY OF PRESENT ILLNESS:  The patient is a pleasant 83-year-old complex female who awoke and went to the restroom.  She slipped and fell fracturing with an acute intertrochanteric fracture of the proximal right femur.  She is pending right hip repair this afternoon by Dr. Lunsford.  I was asked to see her for cardiac clearance.    She is complex and has a history of severe coronary artery disease.    She had a catheterization by Dr. Reynoso in , that showed 80% disease in the ostium of the right coronary artery with unremarkable circumflex, LAD had 80% disease, EF was 45%, and she had open-heart surgery by Dr. Jefferson Medrano on May 12, 2006, with a LIMA to the LAD, a vein graft to the diagonal, and vein graft to the right coronary artery.    In 2007, catheterization showed occluded vein graft to the right coronary artery and occluded vein graft to the diagonal with a patent LIMA to LAD, and we stented her right coronary artery with a 3.0 x 16 mm Taxus stent.    On 2012, a catheterization showed nonfunctional LIMA to the LAD with 90% disease in the LAD and diagonal of the bifurcation site with 90% disease in the ostium of the right coronary artery, 40% in the circumflex with all her vein grafts and LIMA occluded.  EF 60% with angioplasty and stent placement in the LAD and diagonal placing 3.5 x 24 mm Promus stent in the LAD and diagonal through the stent struts.  We brought her back on 2012, and did complex angioplasty of the right coronary artery placing 3.0 x

## 2024-06-03 NOTE — TELEPHONE ENCOUNTER
Patient's spouse contacted the office this morning to advise the patient had experienced a fall over the weekend and broke her hip. She was scheduled to have injection with Dr. Gutierrez this week, and will have to postpone at this time. Please advise. Thank you

## 2024-06-03 NOTE — ANESTHESIA PRE PROCEDURE
Department of Anesthesiology  Preprocedure Note       Name:  Megan Smith   Age:  83 y.o.  :  1940                                          MRN:  [id number]         Date:  6/3/2024      Surgeon: Surgeon(s):  Dany Lunsford MD    Procedure: Procedure(s):  FEMUR INTRAMEDULLARY NAIL RENATA INSERTION ANTEGRADE RIGHT    Medications prior to admission:   Prior to Admission medications    Medication Sig Start Date End Date Taking? Authorizing Provider   DULoxetine (CYMBALTA) 30 MG extended release capsule Take 1 capsule by mouth 2 times daily 24   Rita Hair APRN - CNP   Acetaminophen (TYLENOL) 325 MG CAPS Take by mouth    Opal Bynum MD   hydrocortisone 2.5 % cream Apply 1 inch topically daily as needed (hemorrhoid irritation) Apply topically 2 times daily. 24   Rita Hair APRN - CNP   potassium chloride (KLOR-CON M) 20 MEQ extended release tablet Take 1 tablet by mouth 2 times daily 24   Rita Hair APRN - CNP   triamcinolone (KENALOG) 0.1 % ointment Apply topically daily as needed (left ear eczema) Apply topically 2 times daily. 24   Rita Hair APRN - CNP   pregabalin (LYRICA) 25 MG capsule Take 1 capsule by mouth nightly as needed (low back pain and radiculopathy) for up to 30 days. Max Daily Amount: 25 mg 24  Rita Hair APRN - CNP   pantoprazole (PROTONIX) 20 MG tablet TAKE 1 TABLET BY MOUTH EVERY MORNING BEFORE BREAKFAST 24   Rita Hair APRN - CNP   amLODIPine (NORVASC) 2.5 MG tablet Take 1 tablet by mouth in the morning and at bedtime 24   Shaq Almonte MD   furosemide (LASIX) 20 MG tablet Take 1 tablet by mouth 2 times daily (Taking 1/4 tablet as needed)    ProviderOpal MD   ferrous sulfate 324 (65 Fe) MG EC tablet Take 1 tablet by mouth daily (with breakfast) 24   Shaq Almonte MD   FLUoxetine (PROZAC) 40 MG capsule TAKE 1 CAPSULE BY MOUTH EVERY DAY 1/10/24   Rita Hair APRN - CNP   metoprolol

## 2024-06-03 NOTE — PROGRESS NOTES
RN case manager and SW met with pt briefly and then called spouse to complete assessment. Pt is alert and pleasantly confused. Pt is a 83 year old  female admitted for right hip fracture. Pt lives with her spouse and son  in their home in [place]. Pt has a cane and walker to use at home. Pt was not using any services prior to admission. Pt relies on her spouse for transportation.     Pt is a DNR CC and follows with Rita Hair CNP as PCP. Pt has a living will on file and spouse is unsure if they have a POA. ACP note completed with spouse. Pt has insurance that helps to cover the cost of her medications and spouse manages her medications for her.     Pt is scheduled for surgery this evening. Spouse reports that he has spoken with [facility] and has a meeting set up with them tomorrow to finalize her SNF stay there. [facility] sent referral information to [facility] at spouse's request. Freedom of choice list for SNF also provided to pt and spouse and left in pt's room. [facility] has accepted pt for placement when she is medically stable. SW following. Yumiko Santos MSW LSW 6/3/2024

## 2024-06-03 NOTE — ACP (ADVANCE CARE PLANNING)
resuscitate)?\" no       [x] Yes   [] No   Educated Patient / Decision Maker regarding differences between Advance Directives and portable DNR orders.    Length of ACP Conversation in minutes:  5    Conversation Outcomes:  ACP discussion completed    Follow-up plan:    [] Schedule follow-up conversation to continue planning  [] Referred individual to Provider for additional questions/concerns   [] Advised patient/agent/surrogate to review completed ACP document and update if needed with changes in condition, patient preferences or care setting    [x] This note routed to one or more involved healthcare providers

## 2024-06-03 NOTE — PROGRESS NOTES
Patient returns to room from surgery at this time. Patient is resting in bed with eyes closed and denies needs or discomfort at this time. Donnelly in place and draining appropriately. IV fluids infusing. Telemetry monitor in place. SCD's in place. Two aquacels to Right hip that are clean, dry, and intact.

## 2024-06-03 NOTE — PROGRESS NOTES
Comprehensive Nutrition Assessment    Type and Reason for Visit:  Initial    Nutrition Recommendations/Plan:   Add ONS post op for healing and strengthening.      Malnutrition Assessment:  Malnutrition Status:  At risk for malnutrition (Comment) (06/03/24 0877)    Context:  Acute Illness     Findings of the 6 clinical characteristics of malnutrition:  Energy Intake:  Mild decrease in energy intake (Comment) (NPO)  Weight Loss:  No significant weight loss     Body Fat Loss:  No significant body fat loss     Muscle Mass Loss:  No significant muscle mass loss    Fluid Accumulation:  No significant fluid accumulation     Strength:  Not Performed    Nutrition Assessment:    Increased nutrient needs r/t acute injury or trauma, AEB post fall with pending surgical intervention for right hip. Asleep this AM and allowed to do so with noted pain earlier. Prior to NPO took PO well per I/O data. Thinner in appearance without reports of weight losses pta. Will add an ONS to aid with post op healing and strengthening.    Nutrition Related Findings:    thinner appearing. Wound Type: None       Current Nutrition Intake & Therapies:    Average Meal Intake: % (prior to NPO)  Average Supplements Intake: None Ordered  Diet NPO    Anthropometric Measures:  Height: 172.7 cm (5' 8\")  Ideal Body Weight (IBW): 140 lbs (64 kg)    Admission Body Weight: 63.5 kg (140 lb)  Current Body Weight: 67 kg (147 lb 12.8 oz), 105.6 % IBW. Weight Source: Bed Scale  Current BMI (kg/m2): 22.5  Usual Body Weight: 63.5 kg (140 lb) (a month ago)  % Weight Change (Calculated): 5.6  Weight Adjustment For: No Adjustment                 BMI Categories: Normal Weight (BMI 18.5-24.9)    Estimated Daily Nutrient Needs:  Energy Requirements Based On: Kcal/kg  Weight Used for Energy Requirements: Current  Energy (kcal/day): 7947-4598 (27-32)  Weight Used for Protein Requirements: Current  Protein (g/day):  (1.3-1.5)  Method Used for Fluid Requirements:

## 2024-06-03 NOTE — ANESTHESIA PROCEDURE NOTES
Peripheral Block    Patient location during procedure: pre-op  Reason for block: post-op pain management and at surgeon's request  Start time: 6/3/2024 4:35 PM  End time: 6/3/2024 4:45 PM  Staffing  Performed: resident/CRNA   Resident/CRNA: Attila Beckham APRN - CRNA  Performed by: Attila Beckham APRN - CRNA  Authorized by: Attila Beckham APRN - CRNA    Preanesthetic Checklist  Completed: patient identified, IV checked, site marked, risks and benefits discussed, surgical/procedural consents, equipment checked, pre-op evaluation, timeout performed, anesthesia consent given, oxygen available, monitors applied/VS acknowledged, fire risk safety assessment completed and verbalized and blood product R/B/A discussed and consented  Peripheral Block   Patient position: supine  Prep: ChloraPrep  Provider prep: mask and sterile gloves  Patient monitoring: cardiac monitor, continuous pulse ox, frequent blood pressure checks, IV access, oxygen and responsive to questions  Block type: Fascia iliaca  Laterality: right  Injection technique: single-shot  Guidance: ultrasound guided  Local infiltration: decadron  Local infiltration: decadron  Dose: 1 mL    Needle   Needle type: Other   Needle gauge: 22 G  Needle localization: ultrasound guidance  Needle length: 8 cmOther needle type: pajunk  Assessment   Injection assessment: negative aspiration for heme, no paresthesia on injection and no intravascular symptoms  Paresthesia pain: none  Slow fractionated injection: yes  Hemodynamics: stable  Outcomes: patient tolerated procedure well    Medications Administered  ropivacaine (NAROPIN) injection 0.5% - Perineural   30 mL - 6/3/2024 4:40:00 PM

## 2024-06-03 NOTE — CARE COORDINATION
Case Management Assessment  Initial Evaluation    Date/Time of Evaluation: 6/3/2024 2:13 PM  Assessment Completed by: Tam Shaw RN    If patient is discharged prior to next notation, then this note serves as note for discharge by case management.    Patient Name: Megan Smith                   YOB: 1940  Diagnosis: Closed fracture of right hip, initial encounter ([facility]) [S72.001A]  S/P right hip fracture [Z87.81]                   Date / Time: 6/1/2024  5:35 AM    Patient Admission Status: Inpatient   Readmission Risk (Low < 19, Mod (19-27), High > 27): Readmission Risk Score: 17.7    Current PCP: Rita Hair, APRN - CNP  PCP verified by CM? (P) Yes (Rita Hair)    Chart Reviewed: Yes      History Provided by: (P) Patient, Significant Other  Patient Orientation: (P) Place, Person    Patient Cognition: (P) Short Term Memory Deficit    Hospitalization in the last 30 days (Readmission):  No    If yes, Readmission Assessment in  Navigator will be completed.    Advance Directives:      Code Status: DNR-CC   Patient's Primary Decision Maker is: (P) Legal Next of Kin    Primary Decision Maker: Kvng Smith - Spouse - [phone]    Discharge Planning:    Patient lives with: (P) Spouse/Significant Other Type of Home: (P) House  Primary Care Giver: (P) Self  Patient Support Systems include: (P) Spouse/Significant Other   Current Financial resources: (P) Medicare  Current community resources: (P) None  Current services prior to admission: (P) None            Current DME:              Type of Home Care services:  (P) None    ADLS  Prior functional level: (P) Independent in ADLs/IADLs  Current functional level: (P) Assistance with the following:, Bathing, Dressing, Toileting, Cooking, Housework, Shopping, Mobility    PT AM-PAC:   /24  OT AM-PAC:   /24    Family can provide assistance at DC: (P) No  Would you like Case Management to discuss the discharge plan with any other family

## 2024-06-03 NOTE — PROGRESS NOTES
2020- Pt alert to self, situation, and place. Disoriented to time. Pt with increased disorientation throughout conversation. Medications administered as ordered. /84, , Temp 99.3, SpO2 98% on room air. Left forearm PIV infusing continuously 75mL/hr normal saline without incident. Right AC PIV saline locked. Pt reports 5 out of 10 pain in right hip. RLE with limited movement d/t injury. PRN Percocet administered per orders. SCD on LLE. Telemetry shows Sinus tachycardia. Electrodes replaced. Denture care provided. Donnelly catheter patent and draining. Urine lou with sediment. 120cc of urine emptied. Lights dimmed for patient comfort with no further needs at this time.     2052- ETCO2 34mmHg.     2155- Pt moaning and yelling out d/t 8 out of 10 pain in right hip. PRN Dilaudid administered per orders. Pt diaphoretic. Cool wet wash cloth placed on pts forehead for comfort. /84, , Temp 98.9.     0100- Pt moaning and crying d/t 8 out of 10 pain in right hip. PRN Dilaudid administered per orders.     0400- CHG bath complete. Pts mouth swabbed and moisturizer applied. Pt continues to cry and yell out in pain.

## 2024-06-03 NOTE — PROGRESS NOTES
Patient calling out in pain and ripped tele patches off. Telemetry re applied and educated patient that cardiac monitor needs to stay on. HR back up to 120's. 2nd IV placed in LFA for surgery. Dilaudid given for pain at 1110. Patient appears diaphoretic. Oral temp 99.9f. Rectal suppository given at 1141. Bed alarm on. Call light in reach. Patient resting in bed with eyes closed.

## 2024-06-03 NOTE — PROGRESS NOTES
Patients temp came down to 98.8f after rectal tylenol suppository. Resting comfortably with eyes closed in bed. Scheduled lopressor given at 1314. Patients HR has improved and is now maintaining 99-110s.

## 2024-06-03 NOTE — TELEPHONE ENCOUNTER
Please cancel procedure, patient had a fall over the weekend and has a hip fracture at this time.

## 2024-06-03 NOTE — OP NOTE
Operative Note        PROCEDURE NOTE     Megan Smith  YOB: 1940, Age: 83 y.o.  [id number]    Date of Service:  6/3/24    Pre-Op Diagnosis:   Right hip intertrochanteric fracture    Post-Op Diagnosis: Same       Procedure(s):  Right hip intramedullary nailing    Surgeon(s):  Dany Lunsford MD    Assistant:   * No surgical staff found *    Anesthesia: General plus regional block    Estimated Blood Loss (mL): less than 100     Complications: None    Specimens:   * No specimens in log *    Implants:  * No implants in log *      Drains: * No LDAs found *    Findings: Reduced hip fracture with appropriate implant placement and lengths.    Detailed Description of Procedure:   After informed consent was obtained the patient was brought to the operating room where general anesthetic was administered.  Preoperatively regional block was placed.  Patient was placed on the fracture table and using traction and manipulation reduction was performed and confirmed on multiple fluoroscopy views.  The right hip and leg were prepped and draped in the usual sterile fashion.  A 5 cm incision was made proximal to the greater trochanter in line with the femur.  Hemostasis achieved with Bovie.  Fascia was incised in line with the incision.  The guidepin for the Synthes TFNa was placed on the greater trochanter and the appropriate position on the AP and lateral planes and then advanced and then overreamed in the intertrochanteric region.  The Synthes short nail was sized to 10 mm in diameter and the nail was then placed.  Through a more distal incision the guidepin was placed in the appropriate position in the femoral head.  This was measured, overreamed and then the 100 millimeter spiral blade was placed locked into position and then a half turn counterclockwise was placed to allow for compression.  The fracture was compressed.  Through the more distal incision the locking screw was placed in the standard

## 2024-06-03 NOTE — PROGRESS NOTES
Hospitalist Progress Note  6/3/2024 7:05 AM  Subjective:   Admit Date: 6/1/2024  PCP: Rita Hair, APRN - CNP    Interval History:     The patient states that she is having some pain in the right hip but it is better this morning compared to last night.  She reports no chest pain, no cough, no shortness of breath, no nausea or vomiting    Diet: Diet NPO  Medications:   Scheduled Meds:   metoprolol  5 mg IntraVENous Once    sodium chloride  500 mL IntraVENous Once    amLODIPine  2.5 mg Oral BID    [Held by provider] aspirin  81 mg Oral Daily    [Held by provider] clopidogrel  75 mg Oral Daily    DULoxetine  30 mg Oral BID    ferrous sulfate  324 mg Oral Daily with breakfast    FLUoxetine  40 mg Oral Daily    isosorbide mononitrate  30 mg Oral Daily    levothyroxine  100 mcg Oral Daily    lidocaine  1 patch TransDERmal Daily    metoprolol succinate  25 mg Oral Daily    pantoprazole  20 mg Oral QAM AC    potassium bicarb-citric acid  20 mEq Oral BID    sodium chloride flush  5-40 mL IntraVENous 2 times per day    [Held by provider] furosemide  10 mg Oral Daily    sodium bicarbonate  650 mg Oral 4x Daily     Continuous Infusions:   sodium chloride 75 mL/hr at 06/02/24 2110    sodium chloride       PRN Medications: HYDROmorphone, hydrocortisone, pregabalin, sodium chloride flush, sodium chloride, ondansetron **OR** ondansetron, polyethylene glycol, acetaminophen **OR** acetaminophen, oxyCODONE-acetaminophen    Objective:   Vitals: BP (!) 153/87   Pulse (!) 121   Temp 98.9 °F (37.2 °C) (Axillary)   Resp 18   Ht 1.727 m (5' 8\")   Wt 67 kg (147 lb 12.8 oz)   SpO2 94%   BMI 22.47 kg/m²   BMI: Body mass index is 22.47 kg/m².    CBC:   Recent Labs     06/01/24  0549 06/02/24  0521   WBC 5.9 11.6*   HGB 9.3* 9.5*    432     BMP:    Recent Labs     06/01/24  0549 06/02/24  0521    135   K 3.8 4.1    101   CO2 14* 20   BUN 18 17   CREATININE 1.2* 0.8   GLUCOSE 105* 119*     Hepatic:   Recent Labs

## 2024-06-03 NOTE — PROGRESS NOTES
Cardiology  Full note dictated    Cleared for hip repair  Sinus tachycardia  (repeat EKG pending)  Functional class I at this time  CAD    She does have severe CAD and had PTCA of RCA on 12-15-23 with good result.  She has normal LV function, EF 55%.    However, has been doing well.  I last saw her on 4-30-24, and she was symptom free.    She is tachycardic and have given IV Lopressor 5 mg and will repeat EKG to make sure she is not in atrial fibrillation.    She has normal LVF and will give her a 500 cc bolus of NS.    Thanks, Shaq Almonte MD

## 2024-06-03 NOTE — PROGRESS NOTES
This RN spoke with patient's  Kvng via telephone. Patient has DNR-CC scanned into chart. Per patients  who is her primary decision maker this is correct. Notifies RN that he will be in today around 4 pm.

## 2024-06-03 NOTE — ANESTHESIA POSTPROCEDURE EVALUATION
Department of Anesthesiology  Postprocedure Note    Patient: Megan Smith  MRN: [id number]  YOB: 1940  Date of evaluation: 6/3/2024    Procedure Summary       Date: 06/03/24 Room / Location: [address]    Anesthesia Start: 1714 Anesthesia Stop: 1845    Procedure: FEMUR INTRAMEDULLARY NAIL RENATA INSERTION ANTEGRADE RIGHT (Right: Hip) Diagnosis:       Closed disp apophyseal fracture of right femur with delayed healing      (Closed disp apophyseal fracture of right femur with delayed healing [S72.131G])    Surgeons: Dany Lunsford MD Responsible Provider: Attila Beckham APRN - CRNA    Anesthesia Type: general, TIVA ASA Status: 3            Anesthesia Type: No value filed.    Marleni Phase I: Marleni Score: 10    Marleni Phase II:      Anesthesia Post Evaluation    Patient location during evaluation: PACU  Patient participation: complete - patient participated  Level of consciousness: awake and lethargic  Pain score: 0  Airway patency: patent  Nausea & Vomiting: no nausea and no vomiting  Cardiovascular status: hemodynamically stable  Respiratory status: acceptable, room air and spontaneous ventilation  Hydration status: euvolemic  Multimodal analgesia pain management approach  Pain management: adequate and satisfactory to patient    No notable events documented.

## 2024-06-03 NOTE — DISCHARGE INSTR - COC
Continuity of Care Form    Patient Name: Megan Smith   :  1940  MRN:  [id number]    Admit date:  2024  Discharge date:  2024    Code Status Order: DNR-CC   Advance Directives:   Advance Care Flowsheet Documentation       Date/Time Healthcare Directive Type of Healthcare Directive Copy in Chart Healthcare Agent Appointed Healthcare Agent's Name Healthcare Agent's Phone Number    24 0232 Yes, patient has an advance directive for healthcare treatment Living will Yes, copy in chart Spouse Kvng Smith [phone]            Admitting Physician:  Shekhar Louise MD  PCP: Rita Hair, APRN - CNP    Discharging Nurse: ***  Discharging Hospital Unit/Room#: 0268/0268-01  Discharging Unit Phone Number: [phone]    Emergency Contact:   Extended Emergency Contact Information  Primary Emergency Contact: Kvng Smith  Address: [address] [place] [address]  Home Phone: [phone]  Relation: Spouse    Past Surgical History:  Past Surgical History:   Procedure Laterality Date    ANGIOPLASTY  2016    Dr. Almonte @ [facility] x 1     ANGIOPLASTY  2022    Dr. Almonte @ [facility]--Stenting X 2--    BACK INJECTION Bilateral 2023    BILATERAL SACROILIAC JOINT INJECTION performed by Chapito Gutierrez DO at [facility] OR    COLONOSCOPY  2013    Sandy GERONIMO    CORONARY ANGIOPLASTY Left 2022    Dr. Almonte @ [facility]--Stenting X 2-also Cardiac rehab    CORONARY ANGIOPLASTY WITH STENT PLACEMENT  ,2012    CORONARY ARTERY BYPASS GRAFT  2006    X3    HEMORRHOID SURGERY      OVARY REMOVAL Right     PAROTIDECTOMY Left     squamous cell cancer , surgical removal and radiation    [place] NJX DX/THER SBST INTRLMNR LMBR/SAC W/IMG GDN N/A 2018    EPIDURAL STEROID INJECTION-CUADAL performed by Edil Khanna MD at [facility] OR    RADICAL NECK DISSECTION Left     Parotid gland removed due to Squamous cell cancer -radiation    SPINE SURGERY       95%   BMI 22.47 kg/m²     Last documented pain score (0-10 scale): Pain Level: 0  Last Weight:   Wt Readings from Last 1 Encounters:   06/03/24 67 kg (147 lb 12.8 oz)     Mental Status:  disoriented, oriented, and alert    IV Access:  - None    Nursing Mobility/ADLs:  Walking   Dependent  Transfer  Dependent  Bathing  Dependent  Dressing  Dependent  Toileting  Dependent  Feeding  Dependent  Med Admin  Dependent  Med Delivery   whole    Wound Care Documentation and Therapy:  Incision 12/07/23 Sacrum (Active)   Number of days: 178        Elimination:  Continence:   Bowel: Yes  Bladder: Catheter in place  Urinary Catheter: Insertion Date: 06/01/2024    Colostomy/Ileostomy/Ileal Conduit: No       Date of Last BM: 06/05/2024    Intake/Output Summary (Last 24 hours) at 6/3/2024 1337  Last data filed at 6/3/2024 0348  Gross per 24 hour   Intake 1283.05 ml   Output 645 ml   Net 638.05 ml     I/O last 3 completed shifts:  In: 1723.1 [P.O.:640; I.V.:1083.1]  Out: 1545 [Urine:1545]    Safety Concerns:     History of Falls (last 30 days) and At Risk for Falls    Impairments/Disabilities:      Vision and Hearing    Nutrition Therapy:  Current Nutrition Therapy:   - Oral Diet:  General    Routes of Feeding: Oral  Liquids: Thin Liquids  Daily Fluid Restriction: no  Last Modified Barium Swallow with Video (Video Swallowing Test): not done    Treatments at the Time of Hospital Discharge:   Respiratory Treatments: ***  Oxygen Therapy:  is not on home oxygen therapy.  Ventilator:    - No ventilator support    Rehab Therapies: Physical Therapy and Occupational Therapy  Weight Bearing Status/Restrictions: No weight bearing restrictions  Other Medical Equipment (for information only, NOT a DME order):  walker  Other Treatments: ***    Patient's personal belongings (please select all that are sent with patient):  Hearing Aides bilateral    RN SIGNATURE:  Electronically signed by Marilyn Bales RN on 6/5/24 at 12:55 PM EDT    CASE

## 2024-06-03 NOTE — PROGRESS NOTES
Lumbar xray with moderate arthritis/ joint space narrowing. The hips and pelvis xrays are normal. Please use the PT referral for further treatment. Thank you. Patient taken over to pre op area. Bedside report given to Sharmin MCCOY RN and Yahaira MCCOY RN. Patients  over to pre op with patient.

## 2024-06-04 LAB
ANION GAP SERPL CALCULATED.3IONS-SCNC: 15 MMOL/L (ref 9–17)
BUN SERPL-MCNC: 25 MG/DL (ref 8–23)
BUN/CREAT SERPL: 25 (ref 9–20)
CALCIUM SERPL-MCNC: 8.3 MG/DL (ref 8.6–10.4)
CHLORIDE SERPL-SCNC: 103 MMOL/L (ref 98–107)
CO2 SERPL-SCNC: 18 MMOL/L (ref 20–31)
CREAT SERPL-MCNC: 1 MG/DL (ref 0.5–0.9)
EKG ATRIAL RATE: 114 BPM
EKG ATRIAL RATE: 129 BPM
EKG P AXIS: 82 DEGREES
EKG P AXIS: 97 DEGREES
EKG P-R INTERVAL: 152 MS
EKG P-R INTERVAL: 160 MS
EKG Q-T INTERVAL: 308 MS
EKG Q-T INTERVAL: 324 MS
EKG QRS DURATION: 74 MS
EKG QRS DURATION: 78 MS
EKG QTC CALCULATION (BAZETT): 424 MS
EKG QTC CALCULATION (BAZETT): 474 MS
EKG R AXIS: 64 DEGREES
EKG R AXIS: 80 DEGREES
EKG T AXIS: 41 DEGREES
EKG T AXIS: 97 DEGREES
EKG VENTRICULAR RATE: 114 BPM
EKG VENTRICULAR RATE: 129 BPM
ERYTHROCYTE [DISTWIDTH] IN BLOOD BY AUTOMATED COUNT: 16.4 % (ref 12.1–15.2)
GFR, ESTIMATED: 55 ML/MIN/1.73M2
GLUCOSE SERPL-MCNC: 119 MG/DL (ref 70–99)
HCT VFR BLD AUTO: 25.9 % (ref 36–46)
HCT VFR BLD AUTO: 26.5 % (ref 36–46)
HGB BLD-MCNC: 7.9 G/DL (ref 12–16)
HGB BLD-MCNC: 8 G/DL (ref 12–16)
MCH RBC QN AUTO: 26.1 PG (ref 26–34)
MCHC RBC AUTO-ENTMCNC: 30.5 G/DL (ref 31–37)
MCV RBC AUTO: 85.5 FL (ref 80–100)
PLATELET # BLD AUTO: 326 K/UL (ref 140–450)
PMV BLD AUTO: 9.6 FL (ref 6–12)
POTASSIUM SERPL-SCNC: 4.3 MMOL/L (ref 3.7–5.3)
RBC # BLD AUTO: 3.03 M/UL (ref 4–5.2)
SODIUM SERPL-SCNC: 136 MMOL/L (ref 135–144)
WBC OTHER # BLD: 9.4 K/UL (ref 3.5–11)

## 2024-06-04 PROCEDURE — 97166 OT EVAL MOD COMPLEX 45 MIN: CPT

## 2024-06-04 PROCEDURE — 6370000000 HC RX 637 (ALT 250 FOR IP): Performed by: ORTHOPAEDIC SURGERY

## 2024-06-04 PROCEDURE — 6370000000 HC RX 637 (ALT 250 FOR IP): Performed by: INTERNAL MEDICINE

## 2024-06-04 PROCEDURE — 94761 N-INVAS EAR/PLS OXIMETRY MLT: CPT

## 2024-06-04 PROCEDURE — 85027 COMPLETE CBC AUTOMATED: CPT

## 2024-06-04 PROCEDURE — 80048 BASIC METABOLIC PNL TOTAL CA: CPT

## 2024-06-04 PROCEDURE — 2580000003 HC RX 258: Performed by: ORTHOPAEDIC SURGERY

## 2024-06-04 PROCEDURE — 36415 COLL VENOUS BLD VENIPUNCTURE: CPT

## 2024-06-04 PROCEDURE — 85014 HEMATOCRIT: CPT

## 2024-06-04 PROCEDURE — 93010 ELECTROCARDIOGRAM REPORT: CPT | Performed by: INTERNAL MEDICINE

## 2024-06-04 PROCEDURE — 97162 PT EVAL MOD COMPLEX 30 MIN: CPT

## 2024-06-04 PROCEDURE — 85018 HEMOGLOBIN: CPT

## 2024-06-04 PROCEDURE — 1200000000 HC SEMI PRIVATE

## 2024-06-04 PROCEDURE — 6360000002 HC RX W HCPCS: Performed by: ORTHOPAEDIC SURGERY

## 2024-06-04 RX ORDER — SODIUM BICARBONATE 650 MG/1
650 TABLET ORAL 2 TIMES DAILY
Status: DISCONTINUED | OUTPATIENT
Start: 2024-06-04 | End: 2024-06-05 | Stop reason: HOSPADM

## 2024-06-04 RX ADMIN — POTASSIUM BICARBONATE 20 MEQ: 391 TABLET, EFFERVESCENT ORAL at 19:52

## 2024-06-04 RX ADMIN — SODIUM BICARBONATE 650 MG: 650 TABLET ORAL at 08:05

## 2024-06-04 RX ADMIN — ASPIRIN 81 MG: 81 TABLET, COATED ORAL at 08:04

## 2024-06-04 RX ADMIN — DULOXETINE HYDROCHLORIDE 30 MG: 30 CAPSULE, DELAYED RELEASE ORAL at 08:06

## 2024-06-04 RX ADMIN — ISOSORBIDE MONONITRATE 30 MG: 30 TABLET, EXTENDED RELEASE ORAL at 08:03

## 2024-06-04 RX ADMIN — ENOXAPARIN SODIUM 40 MG: 100 INJECTION SUBCUTANEOUS at 18:42

## 2024-06-04 RX ADMIN — DULOXETINE HYDROCHLORIDE 30 MG: 30 CAPSULE, DELAYED RELEASE ORAL at 19:52

## 2024-06-04 RX ADMIN — SODIUM CHLORIDE, PRESERVATIVE FREE 10 ML: 5 INJECTION INTRAVENOUS at 19:52

## 2024-06-04 RX ADMIN — POTASSIUM BICARBONATE 20 MEQ: 391 TABLET, EFFERVESCENT ORAL at 08:04

## 2024-06-04 RX ADMIN — METOPROLOL SUCCINATE 25 MG: 25 TABLET, FILM COATED, EXTENDED RELEASE ORAL at 08:05

## 2024-06-04 RX ADMIN — HYDROMORPHONE HYDROCHLORIDE 1 MG: 1 INJECTION, SOLUTION INTRAMUSCULAR; INTRAVENOUS; SUBCUTANEOUS at 11:00

## 2024-06-04 RX ADMIN — LEVOTHYROXINE SODIUM 100 MCG: 100 TABLET ORAL at 05:52

## 2024-06-04 RX ADMIN — SODIUM CHLORIDE, PRESERVATIVE FREE 10 ML: 5 INJECTION INTRAVENOUS at 08:06

## 2024-06-04 RX ADMIN — HYDROMORPHONE HYDROCHLORIDE 1 MG: 1 INJECTION, SOLUTION INTRAMUSCULAR; INTRAVENOUS; SUBCUTANEOUS at 19:52

## 2024-06-04 RX ADMIN — SODIUM BICARBONATE 650 MG: 650 TABLET ORAL at 19:52

## 2024-06-04 RX ADMIN — OXYCODONE AND ACETAMINOPHEN 1 TABLET: 5; 325 TABLET ORAL at 08:05

## 2024-06-04 RX ADMIN — SODIUM CHLORIDE: 9 INJECTION, SOLUTION INTRAVENOUS at 00:39

## 2024-06-04 RX ADMIN — HYDROMORPHONE HYDROCHLORIDE 1 MG: 1 INJECTION, SOLUTION INTRAMUSCULAR; INTRAVENOUS; SUBCUTANEOUS at 03:35

## 2024-06-04 RX ADMIN — FLUOXETINE 40 MG: 10 CAPSULE ORAL at 08:03

## 2024-06-04 RX ADMIN — PANTOPRAZOLE SODIUM 20 MG: 20 TABLET, DELAYED RELEASE ORAL at 05:52

## 2024-06-04 RX ADMIN — FERROUS SULFATE TAB 325 MG (65 MG ELEMENTAL FE) 324 MG: 325 (65 FE) TAB at 08:06

## 2024-06-04 ASSESSMENT — PAIN DESCRIPTION - LOCATION
LOCATION: HIP

## 2024-06-04 ASSESSMENT — PAIN DESCRIPTION - ORIENTATION
ORIENTATION: RIGHT

## 2024-06-04 ASSESSMENT — PAIN DESCRIPTION - DESCRIPTORS
DESCRIPTORS: ACHING
DESCRIPTORS: SHARP
DESCRIPTORS: ACHING
DESCRIPTORS: SHARP
DESCRIPTORS: SHARP

## 2024-06-04 ASSESSMENT — PAIN SCALES - WONG BAKER
WONGBAKER_NUMERICALRESPONSE: NO HURT
WONGBAKER_NUMERICALRESPONSE: NO HURT

## 2024-06-04 ASSESSMENT — PAIN SCALES - GENERAL
PAINLEVEL_OUTOF10: 0
PAINLEVEL_OUTOF10: 7
PAINLEVEL_OUTOF10: 4
PAINLEVEL_OUTOF10: 8
PAINLEVEL_OUTOF10: 2
PAINLEVEL_OUTOF10: 8
PAINLEVEL_OUTOF10: 0
PAINLEVEL_OUTOF10: 10
PAINLEVEL_OUTOF10: 7
PAINLEVEL_OUTOF10: 0
PAINLEVEL_OUTOF10: 3

## 2024-06-04 ASSESSMENT — PAIN - FUNCTIONAL ASSESSMENT
PAIN_FUNCTIONAL_ASSESSMENT: ACTIVITIES ARE NOT PREVENTED

## 2024-06-04 ASSESSMENT — PAIN DESCRIPTION - PAIN TYPE: TYPE: SURGICAL PAIN

## 2024-06-04 NOTE — PROGRESS NOTES
Cardiology    Hgb is 7.9.  Will repeat at 7 AM and if less than 8.0 would transfuse 1 unit of PRBC's in view of her cardiac history of severe CAD.    Thanks, Shaq Almonte MD

## 2024-06-04 NOTE — THERAPY EVALUATION
[facility]  Phone: [phone]    Occupational Therapy Evaluation    Date: 2024  Patient Name: Megan Smith        MRN: [id number]    : 1940  (83 y.o.)  Gender: female   Referring Practitioner: Dr. Louise  Diagnosis: Right hip fracture  Additional Pertinent Hx: Admitted to [facility] on 24 due to right hip fracture. Underwent an IM nailing by Dr. Lunsford yesterday and was deemed WBAT. Referred to OT services to assess ability to care for self.    Past Medical History:   Diagnosis Date    CAD (coronary artery disease)     CABG x 3    Chronic back pain     History of malignant neoplasm of parotid gland     Dr. Pappas in [place], Squamous cell cancer    Hyperlipidemia     red yeast rice    Hypertension     onset after open heart surgery    Hypothyroidism     acquired. hx neck radiation on L    Macular degeneration     right eye injections Dr. Salcido    Post PTCA     cardiac stents x 8    Transfusion history      Past Surgical History:   Procedure Laterality Date    ANGIOPLASTY  2016    Dr. Almonte @ [facility] x 1     ANGIOPLASTY  2022    Dr. Almonte @ [facility]--Stenting X 2--    BACK INJECTION Bilateral 2023    BILATERAL SACROILIAC JOINT INJECTION performed by Chapito Gutierrez DO at [facility] OR    COLONOSCOPY  2013    Sandy GERONIMO    CORONARY ANGIOPLASTY Left 2022    Dr. Almonte @ [facility]--Stenting X 2-also Cardiac rehab    CORONARY ANGIOPLASTY WITH STENT PLACEMENT  ,2012    CORONARY ARTERY BYPASS GRAFT  2006    X3    HEMORRHOID SURGERY      OVARY REMOVAL Right     PAROTIDECTOMY Left     squamous cell cancer , surgical removal and radiation    [place] NJX DX/THER SBST INTRLMNR LMBR/SAC W/IMG GDN N/A 2018    EPIDURAL STEROID INJECTION-CUADAL performed by Edil Khanna MD at [facility] OR    RADICAL NECK DISSECTION Left     Parotid gland removed due to Squamous cell cancer -radiation    SPINE SURGERY      laminectomy     mobility this date. Is limited by pain and confusion. Patient would benefit from OT services during hospitalization to increase independence and maximize safety with functional activities. Patient remains in bed with call light/personal items within reach and bed alarm activated upon OT departure. Will continue to address goals and progress patient as able/tolerated.    Goals:     Short term goals:  Time Frame for Short Term Goals: 3 days (6/6/24)  Short Term Goal 1: Patient will complete a commode transfer while using DME PRN with CGA.  Short Term Goal 2: Patient will complete upper body dressing and/or bathing while using adaptive equipment/techniques PRN with SUP.  Short Term Goal 3: Patient will complete lower body dressing and/or bathing while using adaptive equipment/techniques PRN with MIN A.  Short Term Goal 4: To increase activity tolerance for ADLs, patient will engage in 10 minutes of BUE ther ex without a rest break.  Short Term Goal 5: To increase independence and safety with ADLs, patient will tolerate static/dynamic standing x3 minutes without LOB/cues for posture.    Long term goals:  Long Term Goals  Time Frame for Long Term Goals : STG=LTG    Plan:     Times Per Day: Once a day (1-2x/day on weekdays; 0-1x/day on weekends; no holidays)  Discharge recommendation: SNF         Time In: 1118  Time Out: 1148  Timed Coded Minutes: 0  Total Treatment Time: 30    YENY Delgado      Date: 6/4/2024

## 2024-06-04 NOTE — PROGRESS NOTES
AKHIL spoke with [facility] this date and anticipate potential discharge to them tomorrow. Allison met with spouse this morning and went over all of their insurance benefits. [facility] is able to accept whenever pt is medically stable for discharge. Yumiko Santos MSJONATHAN LSW 6/4/2024

## 2024-06-04 NOTE — THERAPY EVALUATION
[facility]  Physical Therapy  Evaluation  Date: 2024  Patient Name: Megan Smith        MRN: [id number]    : 1940  (83 y.o.)  Gender: female   Referring Practitioner: Dr. Lunsford  Diagnosis: Right hip fracture  Additional Pertinent Hx: Patient fell at home sustaining right hip fracture on 24 and underwent IM nailing on 6/3/24.  Referred to PT for FWB as golden mobility   Past Medical History:   Diagnosis Date    CAD (coronary artery disease)     CABG x 3    Chronic back pain     History of malignant neoplasm of parotid gland     Dr. Pappas in [place], Squamous cell cancer    Hyperlipidemia     red yeast rice    Hypertension     onset after open heart surgery    Hypothyroidism     acquired. hx neck radiation on L    Macular degeneration     right eye injections Dr. Salcido    Post PTCA     cardiac stents x 8    Transfusion history      Past Surgical History:   Procedure Laterality Date    ANGIOPLASTY  2016    Dr. Almonte @ [facility] x 1     ANGIOPLASTY  2022    Dr. Almonte @ [facility]--Stenting X 2--    BACK INJECTION Bilateral 2023    BILATERAL SACROILIAC JOINT INJECTION performed by Chapito Gutierrez DO at [facility] OR    COLONOSCOPY  2013    Sandy GERONIMO    CORONARY ANGIOPLASTY Left 2022    Dr. Almonte @ [facility]--Stenting X 2-also Cardiac rehab    CORONARY ANGIOPLASTY WITH STENT PLACEMENT  ,2012    CORONARY ARTERY BYPASS GRAFT  2006    X3    FEMUR SURGERY Right 6/3/2024    FEMUR INTRAMEDULLARY NAIL RENATA INSERTION ANTEGRADE RIGHT performed by Dany Lunsford MD at [facility] OR    HEMORRHOID SURGERY      OVARY REMOVAL Right     PAROTIDECTOMY Left     squamous cell cancer , surgical removal and radiation    WI NJX DX/THER SBST INTRLMNR LMBR/SAC W/IMG GDN N/A 2018    EPIDURAL STEROID INJECTION-CUADAL performed by Edil Khanna MD at [facility] OR    RADICAL NECK DISSECTION Left     Parotid gland removed due to Squamous cell cancer -radiation     SPINE SURGERY  2010    laminectomy    UPPER GASTROINTESTINAL ENDOSCOPY  06/11/2013       Restrictions         Subjective         Pain Level: 0    Orientation  Overall Orientation Status: Within Functional Limits (confused on exact dates/times)    Home Living / Prior Level of Function  Social/Functional History  Lives With: Spouse  Type of Home: House  Home Layout: Two level, Able to Live on Main level with bedroom/bathroom  Home Access: Stairs to enter with rails  Entrance Stairs - Number of Steps: 5  Entrance Stairs - Rails: Both  Bathroom Shower/Tub: Tub/Shower unit, Shower chair with back  Bathroom Toilet: Handicap height  Bathroom Equipment: Grab bars in shower, Grab bars around toilet  Home Equipment: Cane, Walker - Rolling  Receives Help From: Family  ADL Assistance: Independent  Homemaking Assistance: Independent  Ambulation Assistance: Needs assistance (via SPC/quad cane)  Transfer Assistance: Independent  Active : No  Patient's  Info:  drives  Mode of Transportation: Car  Occupation: Retired    Objective                 PROM RLE (degrees)  RLE PROM: WFL  RLE General PROM: Limited assessment due to recent surgery however was able to sit at bedside without signficant difficulty     Strength RLE  Comment: Not formally tested following recent surgery; able to complete active ankle pumps and fair quad sets  Strength LLE  Strength LLE: WFL  Comment: Generally 4/5             Bed mobility  Supine to Sit: Moderate assistance, 2 Person assistance  Sit to Supine: Moderate assistance, 2 Person assistance  Supine to Sit: Moderate assistance (x2)  Sit to Supine: Moderate assistance, Maximal assistance (x2 for trunk and LE managements)  Sit to Stand: Minimal Assistance (x 2)  Stand to Sit: Minimal Assistance (x 2)              Balance  Sitting - Static: Good  Sitting - Dynamic: Fair, +  Standing - Static: Fair    Assessment  Activity Tolerance: Patient tolerated evaluation without incident   Chart

## 2024-06-04 NOTE — PROGRESS NOTES
Department of Orthopedic Surgery  Lisa Lala NP-C Progress Note      SUBJECTIVE  Patient is post-op day #1 from right IM nail.  She reports feeling \"strange\".  Otherwise she denies any pain and has no other complaints.    OBJECTIVE  BP (!) 140/71   Pulse (!) 109   Temp 98.2 °F (36.8 °C) (Oral)   Resp 16   Ht 1.727 m (5' 8\")   Wt 67.1 kg (147 lb 14.9 oz)   SpO2 97%   BMI 22.49 kg/m²     Physical    Vitals stable  Dressings C/D/I  Mild swelling  Calf and thigh Supple  DNVI    LABS   HG 7.9 HCT 25.9 WBC WNL      ASSESSMENT AND PLAN      Labs/vitals reviewed.  HG down to 7.9.  Appreciate Dr. Khan's note, he plans to recheck hg at 7, and agree with blood transfusion if lower than 8.  Continue pain control.  PT/OT today.  Discharge planning.

## 2024-06-04 NOTE — ANESTHESIA POST-OP
Patient sitting semi-recumbent in bed. Appears diaphoretic but states that she just got up to the walker with assistance of physical therapy. States that she \"feels much better today\". Vital signs are stable and she reports full sensation in her operative leg. No issues with her anesthetic care over the last 24hrs.

## 2024-06-04 NOTE — PROGRESS NOTES
Hospitalist Progress Note  6/4/2024 8:17 AM  Subjective:   Admit Date: 6/1/2024  PCP: Rita Hair, APRN - CNP    Interval History:     Patient states pain in the right hip is a little better today.  She is eating breakfast.  She has no other complaints    Diet: ADULT ORAL NUTRITION SUPPLEMENT; Breakfast; Standard High Calorie/High Protein Oral Supplement  ADULT DIET; Regular  Medications:   Scheduled Meds:   aspirin  81 mg Oral Daily    enoxaparin  40 mg SubCUTAneous Daily    [Held by provider] clopidogrel  75 mg Oral Daily    DULoxetine  30 mg Oral BID    ferrous sulfate  324 mg Oral Daily with breakfast    FLUoxetine  40 mg Oral Daily    isosorbide mononitrate  30 mg Oral Daily    levothyroxine  100 mcg Oral Daily    lidocaine  1 patch TransDERmal Daily    metoprolol succinate  25 mg Oral Daily    pantoprazole  20 mg Oral QAM AC    potassium bicarb-citric acid  20 mEq Oral BID    sodium chloride flush  5-40 mL IntraVENous 2 times per day    [Held by provider] furosemide  10 mg Oral Daily    sodium bicarbonate  650 mg Oral 4x Daily     Continuous Infusions:   lactated ringers IV soln 75 mL/hr at 06/03/24 1817    sodium chloride 75 mL/hr at 06/04/24 0039    sodium chloride       PRN Medications: HYDROmorphone, hydrocortisone, pregabalin, sodium chloride flush, sodium chloride, ondansetron **OR** ondansetron, polyethylene glycol, acetaminophen **OR** acetaminophen, oxyCODONE-acetaminophen    Objective:   Vitals: BP (!) 141/79   Pulse (!) 102   Temp 99.3 °F (37.4 °C) (Oral)   Resp 18   Ht 1.727 m (5' 8\")   Wt 67.1 kg (147 lb 14.9 oz)   SpO2 98%   BMI 22.49 kg/m²   BMI: Body mass index is 22.49 kg/m².    CBC:   Recent Labs     06/02/24  0521 06/04/24  0345 06/04/24  0700   WBC 11.6* 9.4  --    HGB 9.5* 7.9* 8.0*    326  --      BMP:    Recent Labs     06/02/24  0521 06/04/24  0345    136   K 4.1 4.3    103   CO2 20 18*   BUN 17 25*   CREATININE 0.8 1.0*   GLUCOSE 119* 119*         Physical  management  Hypothyroidism-continue levothyroxine  GERD-on Protonix     Differential Diagnosis: Hip fracture, cardiac arrhythmia, dehydration     Condition is improving / unchanged / worsening: Improved     Condition is a treatment goal: No     Chronic condition is / is not having mild / moderate, severe exacerbation, progression or side effects of treatment:                   Medical Necessity:  In patient is appropriate for this patient due to the need for surgical repair of fractured hip and discharge planning           DVT prophylaxis:              [x] Lovenox              [] SCDs              [] SQ Heparin              [] Encourage ambulation, low risk for DVT, no chemical or mechanical                          prophylaxis necessary                 [] Already on Anticoagulation         Geo Sharpe MD, MD  Rounding Hospitalist

## 2024-06-04 NOTE — PROGRESS NOTES
Patient repositioned in bed and denies needs at this time. SCD's in place. IV's saline locked. Telemetry monitor in place. Donnelly in place and draining appropriately.

## 2024-06-05 VITALS
HEART RATE: 95 BPM | BODY MASS INDEX: 22.39 KG/M2 | TEMPERATURE: 98 F | HEIGHT: 68 IN | DIASTOLIC BLOOD PRESSURE: 75 MMHG | WEIGHT: 147.71 LBS | RESPIRATION RATE: 16 BRPM | OXYGEN SATURATION: 95 % | SYSTOLIC BLOOD PRESSURE: 165 MMHG

## 2024-06-05 LAB
ABO/RH: NORMAL
ANTIBODY SCREEN: NEGATIVE
ARM BAND NUMBER: NORMAL
BLOOD BANK DISPENSE STATUS: NORMAL
BLOOD BANK SAMPLE EXPIRATION: NORMAL
BPU ID: NORMAL
COMPONENT: NORMAL
CROSSMATCH RESULT: NORMAL
ERYTHROCYTE [DISTWIDTH] IN BLOOD BY AUTOMATED COUNT: 16.2 % (ref 12.1–15.2)
HCT VFR BLD AUTO: 26.1 % (ref 36–46)
HGB BLD-MCNC: 7.9 G/DL (ref 12–16)
MCH RBC QN AUTO: 25.9 PG (ref 26–34)
MCHC RBC AUTO-ENTMCNC: 30.3 G/DL (ref 31–37)
MCV RBC AUTO: 85.6 FL (ref 80–100)
PLATELET # BLD AUTO: 357 K/UL (ref 140–450)
PMV BLD AUTO: 9.4 FL (ref 6–12)
RBC # BLD AUTO: 3.05 M/UL (ref 4–5.2)
SARS-COV-2 RDRP RESP QL NAA+PROBE: NOT DETECTED
SPECIMEN DESCRIPTION: NORMAL
TRANSFUSION STATUS: NORMAL
UNIT DIVISION: 0
WBC OTHER # BLD: 9.3 K/UL (ref 3.5–11)

## 2024-06-05 PROCEDURE — 6370000000 HC RX 637 (ALT 250 FOR IP): Performed by: INTERNAL MEDICINE

## 2024-06-05 PROCEDURE — 87635 SARS-COV-2 COVID-19 AMP PRB: CPT

## 2024-06-05 PROCEDURE — P9016 RBC LEUKOCYTES REDUCED: HCPCS

## 2024-06-05 PROCEDURE — 94761 N-INVAS EAR/PLS OXIMETRY MLT: CPT

## 2024-06-05 PROCEDURE — 36415 COLL VENOUS BLD VENIPUNCTURE: CPT

## 2024-06-05 PROCEDURE — 85027 COMPLETE CBC AUTOMATED: CPT

## 2024-06-05 PROCEDURE — 6360000002 HC RX W HCPCS: Performed by: ORTHOPAEDIC SURGERY

## 2024-06-05 PROCEDURE — 30233N1 TRANSFUSION OF NONAUTOLOGOUS RED BLOOD CELLS INTO PERIPHERAL VEIN, PERCUTANEOUS APPROACH: ICD-10-PCS | Performed by: INTERNAL MEDICINE

## 2024-06-05 PROCEDURE — 6370000000 HC RX 637 (ALT 250 FOR IP): Performed by: ORTHOPAEDIC SURGERY

## 2024-06-05 PROCEDURE — 36430 TRANSFUSION BLD/BLD COMPNT: CPT

## 2024-06-05 PROCEDURE — 2580000003 HC RX 258: Performed by: ORTHOPAEDIC SURGERY

## 2024-06-05 RX ORDER — DILTIAZEM HYDROCHLORIDE 120 MG/1
120 CAPSULE, COATED, EXTENDED RELEASE ORAL DAILY
Status: DISCONTINUED | OUTPATIENT
Start: 2024-06-05 | End: 2024-06-05 | Stop reason: HOSPADM

## 2024-06-05 RX ORDER — FUROSEMIDE 20 MG/1
10 TABLET ORAL DAILY
Qty: 60 TABLET | Refills: 3 | DISCHARGE
Start: 2024-06-05

## 2024-06-05 RX ORDER — PREGABALIN 25 MG/1
25 CAPSULE ORAL NIGHTLY PRN
Qty: 30 CAPSULE | Refills: 0 | Status: SHIPPED | OUTPATIENT
Start: 2024-06-05 | End: 2024-07-05

## 2024-06-05 RX ORDER — DILTIAZEM HYDROCHLORIDE 120 MG/1
120 CAPSULE, COATED, EXTENDED RELEASE ORAL DAILY
Qty: 30 CAPSULE | Refills: 3 | DISCHARGE
Start: 2024-06-05

## 2024-06-05 RX ORDER — ISOSORBIDE MONONITRATE 30 MG/1
30 TABLET, EXTENDED RELEASE ORAL DAILY
Qty: 30 TABLET | Refills: 3 | DISCHARGE
Start: 2024-06-05

## 2024-06-05 RX ORDER — ONDANSETRON 4 MG/1
4 TABLET, ORALLY DISINTEGRATING ORAL EVERY 8 HOURS PRN
DISCHARGE
Start: 2024-06-05

## 2024-06-05 RX ORDER — POLYETHYLENE GLYCOL 3350 17 G/17G
17 POWDER, FOR SOLUTION ORAL DAILY PRN
Qty: 527 G | Refills: 0 | DISCHARGE
Start: 2024-06-05 | End: 2024-07-05

## 2024-06-05 RX ORDER — SODIUM BICARBONATE 650 MG/1
650 TABLET ORAL 2 TIMES DAILY
DISCHARGE
Start: 2024-06-05

## 2024-06-05 RX ORDER — OXYCODONE HYDROCHLORIDE AND ACETAMINOPHEN 5; 325 MG/1; MG/1
1 TABLET ORAL EVERY 6 HOURS PRN
Qty: 20 TABLET | Refills: 0 | Status: SHIPPED | OUTPATIENT
Start: 2024-06-05 | End: 2024-06-05

## 2024-06-05 RX ORDER — OXYCODONE HYDROCHLORIDE AND ACETAMINOPHEN 5; 325 MG/1; MG/1
1 TABLET ORAL EVERY 6 HOURS PRN
Qty: 20 TABLET | Refills: 0 | Status: SHIPPED | OUTPATIENT
Start: 2024-06-05 | End: 2024-06-10

## 2024-06-05 RX ORDER — PREGABALIN 25 MG/1
25 CAPSULE ORAL NIGHTLY PRN
Qty: 30 CAPSULE | Refills: 0 | Status: SHIPPED | OUTPATIENT
Start: 2024-06-05 | End: 2024-06-05

## 2024-06-05 RX ADMIN — METOPROLOL SUCCINATE 25 MG: 25 TABLET, FILM COATED, EXTENDED RELEASE ORAL at 09:22

## 2024-06-05 RX ADMIN — SODIUM BICARBONATE 650 MG: 650 TABLET ORAL at 09:22

## 2024-06-05 RX ADMIN — FERROUS SULFATE TAB 325 MG (65 MG ELEMENTAL FE) 324 MG: 325 (65 FE) TAB at 09:21

## 2024-06-05 RX ADMIN — ISOSORBIDE MONONITRATE 30 MG: 30 TABLET, EXTENDED RELEASE ORAL at 09:22

## 2024-06-05 RX ADMIN — ASPIRIN 81 MG: 81 TABLET, COATED ORAL at 09:22

## 2024-06-05 RX ADMIN — DULOXETINE HYDROCHLORIDE 30 MG: 30 CAPSULE, DELAYED RELEASE ORAL at 09:22

## 2024-06-05 RX ADMIN — PANTOPRAZOLE SODIUM 20 MG: 20 TABLET, DELAYED RELEASE ORAL at 05:37

## 2024-06-05 RX ADMIN — HYDROMORPHONE HYDROCHLORIDE 1 MG: 1 INJECTION, SOLUTION INTRAMUSCULAR; INTRAVENOUS; SUBCUTANEOUS at 04:38

## 2024-06-05 RX ADMIN — ENOXAPARIN SODIUM 40 MG: 100 INJECTION SUBCUTANEOUS at 09:21

## 2024-06-05 RX ADMIN — SODIUM CHLORIDE, PRESERVATIVE FREE 10 ML: 5 INJECTION INTRAVENOUS at 09:22

## 2024-06-05 RX ADMIN — OXYCODONE AND ACETAMINOPHEN 1 TABLET: 5; 325 TABLET ORAL at 11:58

## 2024-06-05 RX ADMIN — FLUOXETINE 40 MG: 10 CAPSULE ORAL at 09:21

## 2024-06-05 RX ADMIN — POTASSIUM BICARBONATE 20 MEQ: 391 TABLET, EFFERVESCENT ORAL at 09:22

## 2024-06-05 RX ADMIN — LEVOTHYROXINE SODIUM 100 MCG: 100 TABLET ORAL at 05:37

## 2024-06-05 RX ADMIN — DILTIAZEM HYDROCHLORIDE 120 MG: 120 CAPSULE, COATED, EXTENDED RELEASE ORAL at 10:34

## 2024-06-05 ASSESSMENT — PAIN SCALES - GENERAL
PAINLEVEL_OUTOF10: 2
PAINLEVEL_OUTOF10: 7
PAINLEVEL_OUTOF10: 0
PAINLEVEL_OUTOF10: 0
PAINLEVEL_OUTOF10: 10

## 2024-06-05 ASSESSMENT — PAIN DESCRIPTION - LOCATION
LOCATION: HIP
LOCATION: HIP;LEG
LOCATION: HIP

## 2024-06-05 ASSESSMENT — PAIN DESCRIPTION - DESCRIPTORS
DESCRIPTORS: ACHING

## 2024-06-05 ASSESSMENT — PAIN DESCRIPTION - ORIENTATION
ORIENTATION: RIGHT

## 2024-06-05 ASSESSMENT — PAIN - FUNCTIONAL ASSESSMENT
PAIN_FUNCTIONAL_ASSESSMENT: ACTIVITIES ARE NOT PREVENTED
PAIN_FUNCTIONAL_ASSESSMENT: 0-10
PAIN_FUNCTIONAL_ASSESSMENT: 0-10

## 2024-06-05 NOTE — PROGRESS NOTES
Spiritual Services Interventions  [id number]   6/5/2024  Tylor Lancaster    Megan Gaffneyblack  83 y.o. year old female    Encounter Summary  Encounter Overview/Reason: (P) Initial Encounter  Service Provided For: (P) Patient  Referral/Consult From: (P) Rounding  Last Encounter : (P) 06/05/24  Complexity of Encounter: (P) Moderate  Begin Time: (P) 0845  End Time : (P) 0855  Total Time Calculated: (P) 10 min     Spiritual/Emotional needs  Type: (P) Spiritual Support                    Assessment/Intervention/Outcome  Assessment: (P) Calm  Intervention: (P) Discussed belief system/Amish practices/yuryi, Discussed illness injury and it’s impact  Outcome: (P) Encouraged, Engaged in conversation

## 2024-06-05 NOTE — PLAN OF CARE
[facility]  Phone: [phone]    Inpatient Occupational Therapy Plan of Care  OT Orders Received and Evaluation Complete    Date: 2024  Patient Name: Megan Smith        MRN: [id number]    : 1940  (83 y.o.)  Referring Practitioner: Dr. Louise  Diagnosis: Right hip fracture  Treatment Diagnosis: Right hip fracture    Identified Problem Areas:     Performance deficits / Impairments: Decreased functional mobility , Decreased ADL status, Decreased strength, Decreased cognition, Decreased endurance, Decreased balance, Decreased high-level IADLs, Decreased posture     Justification for Skilled Services:     [x] Complete Daily Tasks Safely  [x] Improve Balance   [x]  Return to Prior Level of Function  [x] Family/Caregiver Education    [x] Improve UE strength  [x] Patient Education: [x]Adaptive Equipment   [x]Home Exercise Program and Progression    Treatment Plan:     Times Per Day: Once a day (1-2x/day on weekdays; 0-1x/day on weekends; no holidays)  Discharge recommendation: SNF    [] Modalities:  [x] Therapeutic Exercise   [x] Therapeutic Activity  [] Splinting:     [] Home Safety Evaluation         [x] ADL Retraining                       [] Muscle Re-education [] Cognitive Retraining            [] Sensory Integration  [x] Patient Education [x] Home Exercise Program [] Fine Motor Coordination    Goals:     Short term goals:  Time Frame for Short Term Goals: 3 days (24)  Short Term Goal 1: Patient will complete a commode transfer while using DME PRN with CGA.  Short Term Goal 2: Patient will complete upper body dressing and/or bathing while using adaptive equipment/techniques PRN with SUP.  Short Term Goal 3: Patient will complete lower body dressing and/or bathing while using adaptive equipment/techniques PRN with MIN A.  Short Term Goal 4: To increase activity tolerance for ADLs, patient will engage in 10 minutes of BUE ther ex without a rest break.  Short Term Goal 5: To increase 
  Problem: Discharge Planning  Goal: Discharge to home or other facility with appropriate resources  6/2/2024 2343 by Chanda Arredondo RN  Outcome: Progressing  6/2/2024 1046 by Heriberto Aponte RN  Outcome: Progressing     Problem: Skin/Tissue Integrity  Goal: Absence of new skin breakdown  Description: 1.  Monitor for areas of redness and/or skin breakdown  2.  Assess vascular access sites hourly  3.  Every 4-6 hours minimum:  Change oxygen saturation probe site  4.  Every 4-6 hours:  If on nasal continuous positive airway pressure, respiratory therapy assess nares and determine need for appliance change or resting period.  6/2/2024 2343 by Chanda Arredondo RN  Outcome: Progressing  6/2/2024 1046 by Heriberto Aponte RN  Outcome: Progressing     Problem: Safety - Adult  Goal: Free from fall injury  Outcome: Progressing     Problem: ABCDS Injury Assessment  Goal: Absence of physical injury  Outcome: Progressing     Problem: Pain  Goal: Verbalizes/displays adequate comfort level or baseline comfort level  Outcome: Progressing     Problem: Neurosensory - Adult  Goal: Achieves maximal functionality and self care  Outcome: Progressing     Problem: Skin/Tissue Integrity - Adult  Goal: Skin integrity remains intact  Outcome: Progressing     Problem: Musculoskeletal - Adult  Goal: Return mobility to safest level of function  Outcome: Progressing  Goal: Return ADL status to a safe level of function  Outcome: Progressing     Problem: Genitourinary - Adult  Goal: Urinary catheter remains patent  Outcome: Progressing     Problem: Metabolic/Fluid and Electrolytes - Adult  Goal: Electrolytes maintained within normal limits  Outcome: Progressing     Problem: Confusion  Goal: Confusion, delirium, dementia, or psychosis is improved or at baseline  Description: INTERVENTIONS:  1. Assess for possible contributors to thought disturbance, including medications, impaired vision or hearing, underlying metabolic abnormalities, 
  Problem: Discharge Planning  Goal: Discharge to home or other facility with appropriate resources  Outcome: Progressing     Problem: Skin/Tissue Integrity  Goal: Absence of new skin breakdown  Description: 1.  Monitor for areas of redness and/or skin breakdown  2.  Assess vascular access sites hourly  3.  Every 4-6 hours minimum:  Change oxygen saturation probe site  4.  Every 4-6 hours:  If on nasal continuous positive airway pressure, respiratory therapy assess nares and determine need for appliance change or resting period.  Outcome: Progressing     Problem: Safety - Adult  Goal: Free from fall injury  Outcome: Progressing     
  Problem: Discharge Planning  Goal: Discharge to home or other facility with appropriate resources  Outcome: Progressing     Problem: Skin/Tissue Integrity  Goal: Absence of new skin breakdown  Description: 1.  Monitor for areas of redness and/or skin breakdown  2.  Assess vascular access sites hourly  3.  Every 4-6 hours minimum:  Change oxygen saturation probe site  4.  Every 4-6 hours:  If on nasal continuous positive airway pressure, respiratory therapy assess nares and determine need for appliance change or resting period.  Outcome: Progressing     Problem: Safety - Adult  Goal: Free from fall injury  Outcome: Progressing     Problem: ABCDS Injury Assessment  Goal: Absence of physical injury  Outcome: Progressing     Problem: Pain  Goal: Verbalizes/displays adequate comfort level or baseline comfort level  Outcome: Progressing     Problem: Neurosensory - Adult  Goal: Achieves maximal functionality and self care  Outcome: Progressing     Problem: Skin/Tissue Integrity - Adult  Goal: Skin integrity remains intact  Outcome: Progressing     Problem: Musculoskeletal - Adult  Goal: Return mobility to safest level of function  Outcome: Progressing  Goal: Return ADL status to a safe level of function  Outcome: Progressing     Problem: Genitourinary - Adult  Goal: Urinary catheter remains patent  Outcome: Progressing     Problem: Metabolic/Fluid and Electrolytes - Adult  Goal: Electrolytes maintained within normal limits  Outcome: Progressing     Problem: Confusion  Goal: Confusion, delirium, dementia, or psychosis is improved or at baseline  Description: INTERVENTIONS:  1. Assess for possible contributors to thought disturbance, including medications, impaired vision or hearing, underlying metabolic abnormalities, dehydration, psychiatric diagnoses, and notify attending LIP  2. [place] high risk fall precautions, as indicated  3. Provide frequent short contacts to provide reality reorientation, refocusing and 
[facility]  Inpatient Physical Therapy  Plan of Care  Date: 2024  Patient Name: Megan Smith        : 1940  (83 y.o.)  Referring Practitioner: Dr. Lunsford  Admission Date: 2024  Referral Date : 24  Diagnosis: Right hip fracture  Treatment Diagnosis: Weakness; difficulty with amb  PT Orders Received and Evaluation Complete  Identified Problem Areas:  Therapy Prognosis: Good    Justification for Skilled Services:  [] Reduce Falls   [x] Improve Ambulation  [x]  Complete Daily Tasks Safely   [x] Improve Balance   [x] Improve LE strength  []  Return to Prior Level of Function  [x] Improve Functional Mobility   []  Family/Caregiver Education  [x] Patient Education: []Assistive Devices []Home Exercise Program and Progression    Plan  Frequency: 1-2x/day Daily M-F, 1x/day Sat-Sun    Duration: 3 days  [] Modalities:  [x] Therapeutic Exercise   [x] Gait Training  [x] Therapeutic Activity    [] Home Safety Evaluation         [] Massage                        [] Neuromuscular Re-education [] Back Education             [x] Patient Education [] Home Exercise Program  Discharge Recommendations: Subacute/Skilled Nursing Facility    Rehab Potential:  [x]  Good [] Fair   []  Poor    Goals  Short Term Goals  Time Frame for Short Term Goals: 3 days - expirs 24  Short Term Goal 1: Transfer supine to sit at EOB with mod assist x 1 to complete self-care tasks  Short Term Goal 2: Transfer bed to chair with wh walker and mod assist x 1  Short Term Goal 3: Good dynamic sitting balance to complete self-care tasks at bedside    Long Term Goals  Long Term Goal 1: Continue to assess and set appropropriate goals    Upon Swingbed Transfer this Plan of Care will be followed until revision is complete.    SHERON MANUEL, PT,    Date: 2024   
Electrolytes - Adult  Goal: Electrolytes maintained within normal limits  Outcome: Progressing

## 2024-06-05 NOTE — PROGRESS NOTES
Accompanied Patient via wheelchair over to the [facility].   had visited earlier today and aware of Patient discharge plan per nursing.    Otilia De La Cruz, JACINDA  6/5/2024

## 2024-06-05 NOTE — PROGRESS NOTES
Report called to [facility], pt up x2 heavy assist; attempts to take a step but is unable; wheelchair placed behind pt and pt is able to sit and reposition herself for comfort. Pt discharged to [facility] via wheelchair with x2 assist with all documented belongings.

## 2024-06-05 NOTE — PROGRESS NOTES
Cardiology    She had a run of SVT up to 200 bpm.  Will add Cardizem  daily.  Would place 7 day ePatch.    Thanks, Shaq Almonte MD

## 2024-06-05 NOTE — DISCHARGE SUMMARY
Hospitalist Discharge Summary    Patient:  Megan Smith  YOB: 1940    MRN: [id number]   Acct: [id number]    Primary Care Physician: Rita Hair APRN - CNP    Admit date:  6/1/2024    Discharge date:  6/5/2024       Discharge Diagnoses:   1.S/P right hip intramedullary nailing on 6/3/2024 by Dr. Lunsford for acute intertrochanteric fracture of the proximal right femur with mild anterior apex angulation.   2.  Acute on chronic anemia with hemoglobin 7.9, s/p 1 unit PRBC transfusion 6/5/24  3.  Severe CAD, s/p CABG on 5/12/2006  4.  Runs of SVTs  5.  CKD stage IIIb  6.  Hypertension  7.  Chronic metabolic acidosis  8.  History of depression and anxiety  9.  History of lumbar spinal stenosis  10.  Hypothyroidism  11.  GERD    Discharge Medications:        Diet:  regular    Activity:  Activity as tolerated (Patient may move about with assist as indicated or with supervision.)    Follow-up:   With the provider at [facility]    Consultants: Cardiology Dr. Almonte, orthopedic surgery Dr. Lunsford      Recent Labs     06/05/24  0425   WBC 9.3   HGB 7.9*        BMP:    Recent Labs     06/04/24  0345      K 4.3      CO2 18*   BUN 25*   CREATININE 1.0*   GLUCOSE 119*     Calcium:  Recent Labs     06/04/24  0345   CALCIUM 8.3*     Physical Exam:    Vitals:Patient Vitals for the past 24 hrs:   BP Temp Temp src Pulse Resp SpO2 Weight   06/05/24 1218 (!) 165/75 98 °F (36.7 °C) Oral 95 16 95 % --   06/05/24 1158 -- -- -- -- 18 -- --   06/05/24 1138 (!) 161/80 97.4 °F (36.3 °C) Oral (!) 115 16 94 % --   06/05/24 1108 132/78 97.8 °F (36.6 °C) Oral 81 18 95 % --   06/05/24 1105 -- -- -- -- -- 94 % --   06/05/24 1038 129/69 97.6 °F (36.4 °C) Oral 97 18 94 % --   06/05/24 1008 (!) 141/78 97.4 °F (36.3 °C) Oral (!) 103 18 95 % --   06/05/24 0952 (!) 141/64 97.8 °F (36.6 °C) Oral (!) 102 18 93 % --   06/05/24 0808 (!) 151/75 98.8 °F (37.1 °C) Temporal (!) 105 18 94 % --   06/05/24 0520 -- -- -- -- 16

## 2024-06-05 NOTE — PROGRESS NOTES
Patient to discharge to the [facility] at 2:30 p.m.  [facility] staff to accompany Patient at the request of [facility] Heydi trevino.  Discharge paperwork FAXED to facility per this writer.  Attempted to contact Patient  to inform of confirmed discharge plans but was unable to reach him at this time and no machine to leave a message.  SONW to continue to try.    JACINDA Gaming  6/5/2024

## 2024-06-05 NOTE — CONSENT
Informed Consent for Blood Component Transfusion Note    I have discussed with the patient the rationale for blood component transfusion; its benefits in treating or preventing fatigue, organ damage, or death; and its risk which includes mild transfusion reactions, rare risk of blood borne infection, or more serious but rare reactions. I have discussed the alternatives to transfusion, including the risk and consequences of not receiving transfusion. The patient had an opportunity to ask questions and had agreed to proceed with transfusion of blood components.    Electronically signed by Geo Sharpe MD on 6/5/24 at 8:08 AM EDT

## 2024-06-06 ENCOUNTER — HOSPITAL ENCOUNTER (OUTPATIENT)
Age: 84
Setting detail: SPECIMEN
Discharge: HOME | End: 2024-06-06
Payer: MEDICARE

## 2024-06-06 ENCOUNTER — HOSPITAL ENCOUNTER (OUTPATIENT)
Age: 84
Discharge: HOME | End: 2024-06-08
Attending: INTERNAL MEDICINE
Payer: MEDICARE

## 2024-06-06 ENCOUNTER — COMMUNITY CARE MANAGEMENT (OUTPATIENT)
Facility: CLINIC | Age: 84
End: 2024-06-06

## 2024-06-06 DIAGNOSIS — R94.31 ABNORMAL ELECTROCARDIOGRAPHY: ICD-10-CM

## 2024-06-06 LAB
ALBUMIN SERPL-MCNC: 3 G/DL (ref 3.5–5.2)
ALP SERPL-CCNC: 215 U/L (ref 35–104)
ALT SERPL-CCNC: 53 U/L (ref 5–33)
ANION GAP SERPL CALCULATED.3IONS-SCNC: 17 MMOL/L (ref 9–17)
AST SERPL-CCNC: 84 U/L
BASOPHILS # BLD: 0.06 K/UL (ref 0–0.2)
BASOPHILS NFR BLD: 1 % (ref 0–2)
BILIRUB SERPL-MCNC: 0.4 MG/DL (ref 0.3–1.2)
BUN SERPL-MCNC: 23 MG/DL (ref 8–23)
BUN/CREAT SERPL: 21 (ref 9–20)
CALCIUM SERPL-MCNC: 8.5 MG/DL (ref 8.6–10.4)
CHLORIDE SERPL-SCNC: 99 MMOL/L (ref 98–107)
CO2 SERPL-SCNC: 20 MMOL/L (ref 20–31)
CREAT SERPL-MCNC: 1.1 MG/DL (ref 0.5–0.9)
EOSINOPHIL # BLD: 0.19 K/UL (ref 0–0.4)
EOSINOPHILS RELATIVE PERCENT: 2 % (ref 0–5)
ERYTHROCYTE [DISTWIDTH] IN BLOOD BY AUTOMATED COUNT: 17 % (ref 12.1–15.2)
GFR, ESTIMATED: 49 ML/MIN/1.73M2
GLUCOSE SERPL-MCNC: 101 MG/DL (ref 70–99)
HCT VFR BLD AUTO: 29.7 % (ref 36–46)
HGB BLD-MCNC: 9.2 G/DL (ref 12–16)
IMM GRANULOCYTES # BLD AUTO: 0.05 K/UL (ref 0–0.3)
IMM GRANULOCYTES NFR BLD: 1 % (ref 0–5)
LYMPHOCYTES NFR BLD: 0.83 K/UL (ref 1–4.8)
LYMPHOCYTES RELATIVE PERCENT: 10 % (ref 15–40)
MCH RBC QN AUTO: 26.1 PG (ref 26–34)
MCHC RBC AUTO-ENTMCNC: 31 G/DL (ref 31–37)
MCV RBC AUTO: 84.1 FL (ref 80–100)
MONOCYTES NFR BLD: 0.84 K/UL (ref 0–1)
MONOCYTES NFR BLD: 10 % (ref 4–8)
MORPHOLOGY: ABNORMAL
NEUTROPHILS NFR BLD: 77 % (ref 47–75)
NEUTS SEG NFR BLD: 6.67 K/UL (ref 2.5–7)
PLATELET # BLD AUTO: 390 K/UL (ref 140–450)
PMV BLD AUTO: 10.1 FL (ref 6–12)
POTASSIUM SERPL-SCNC: 4.1 MMOL/L (ref 3.7–5.3)
PROT SERPL-MCNC: 5.3 G/DL (ref 6.4–8.3)
RBC # BLD AUTO: 3.53 M/UL (ref 4–5.2)
SODIUM SERPL-SCNC: 136 MMOL/L (ref 135–144)
WBC OTHER # BLD: 8.6 K/UL (ref 3.5–11)

## 2024-06-06 PROCEDURE — 85025 COMPLETE CBC W/AUTO DIFF WBC: CPT

## 2024-06-06 PROCEDURE — 93242 EXT ECG>48HR<7D RECORDING: CPT

## 2024-06-06 PROCEDURE — 80053 COMPREHEN METABOLIC PANEL: CPT

## 2024-06-06 NOTE — PROGRESS NOTES
The patient was educated on the use of a epatch monitor. The patient's comprehension was low. The patient was not able to verbalize recall. The patient was instructed on how and when to return the monitor.  [place] caregiver with patient and instructions given to them verbally.

## 2024-06-08 ENCOUNTER — HOSPITAL ENCOUNTER (OUTPATIENT)
Age: 84
Setting detail: SPECIMEN
Discharge: HOME | End: 2024-06-08
Payer: MEDICARE

## 2024-06-08 LAB
AMYLASE SERPL-CCNC: 124 U/L (ref 28–100)
LIPASE SERPL-CCNC: 121 U/L (ref 13–60)

## 2024-06-08 PROCEDURE — 83690 ASSAY OF LIPASE: CPT

## 2024-06-08 PROCEDURE — 82150 ASSAY OF AMYLASE: CPT

## 2024-06-13 ENCOUNTER — HOSPITAL ENCOUNTER (OUTPATIENT)
Age: 84
Setting detail: SPECIMEN
Discharge: HOME | End: 2024-06-13
Payer: MEDICARE

## 2024-06-13 LAB
ALBUMIN SERPL-MCNC: 3.2 G/DL (ref 3.5–5.2)
ALP SERPL-CCNC: 176 U/L (ref 35–104)
ALT SERPL-CCNC: 20 U/L (ref 5–33)
ANION GAP SERPL CALCULATED.3IONS-SCNC: 12 MMOL/L (ref 9–17)
AST SERPL-CCNC: 18 U/L
BASOPHILS # BLD: 0.05 K/UL (ref 0–0.2)
BASOPHILS NFR BLD: 0 % (ref 0–2)
BILIRUB SERPL-MCNC: 0.3 MG/DL (ref 0.3–1.2)
BUN SERPL-MCNC: 22 MG/DL (ref 8–23)
BUN/CREAT SERPL: 16 (ref 9–20)
CALCIUM SERPL-MCNC: 8.4 MG/DL (ref 8.6–10.4)
CHLORIDE SERPL-SCNC: 100 MMOL/L (ref 98–107)
CO2 SERPL-SCNC: 24 MMOL/L (ref 20–31)
CREAT SERPL-MCNC: 1.4 MG/DL (ref 0.5–0.9)
EOSINOPHIL # BLD: 0.08 K/UL (ref 0–0.4)
EOSINOPHILS RELATIVE PERCENT: 1 % (ref 0–5)
ERYTHROCYTE [DISTWIDTH] IN BLOOD BY AUTOMATED COUNT: 17.4 % (ref 12.1–15.2)
GFR, ESTIMATED: 37 ML/MIN/1.73M2
GLUCOSE SERPL-MCNC: 82 MG/DL (ref 70–99)
HCT VFR BLD AUTO: 30.6 % (ref 36–46)
HGB BLD-MCNC: 9 G/DL (ref 12–16)
IMM GRANULOCYTES # BLD AUTO: 0.09 K/UL (ref 0–0.3)
IMM GRANULOCYTES NFR BLD: 1 % (ref 0–5)
LYMPHOCYTES NFR BLD: 1.09 K/UL (ref 1–4.8)
LYMPHOCYTES RELATIVE PERCENT: 10 % (ref 15–40)
MCH RBC QN AUTO: 25.9 PG (ref 26–34)
MCHC RBC AUTO-ENTMCNC: 29.4 G/DL (ref 31–37)
MCV RBC AUTO: 87.9 FL (ref 80–100)
MONOCYTES NFR BLD: 0.84 K/UL (ref 0–1)
MONOCYTES NFR BLD: 7 % (ref 4–8)
NEUTROPHILS NFR BLD: 81 % (ref 47–75)
NEUTS SEG NFR BLD: 9.24 K/UL (ref 2.5–7)
PLATELET # BLD AUTO: 664 K/UL (ref 140–450)
PMV BLD AUTO: 9.8 FL (ref 6–12)
POTASSIUM SERPL-SCNC: 4.6 MMOL/L (ref 3.7–5.3)
PROT SERPL-MCNC: 6 G/DL (ref 6.4–8.3)
RBC # BLD AUTO: 3.48 M/UL (ref 4–5.2)
SODIUM SERPL-SCNC: 136 MMOL/L (ref 135–144)
WBC OTHER # BLD: 11.4 K/UL (ref 3.5–11)

## 2024-06-13 PROCEDURE — 80053 COMPREHEN METABOLIC PANEL: CPT

## 2024-06-13 PROCEDURE — 85025 COMPLETE CBC W/AUTO DIFF WBC: CPT

## 2024-06-14 ENCOUNTER — HOSPITAL ENCOUNTER (OUTPATIENT)
Age: 84
End: 2024-06-14
Payer: MEDICARE

## 2024-06-14 ENCOUNTER — HOSPITAL ENCOUNTER (OUTPATIENT)
Dept: GENERAL RADIOLOGY | Age: 84
End: 2024-06-14
Payer: MEDICARE

## 2024-06-14 ENCOUNTER — APPOINTMENT (OUTPATIENT)
Dept: GENERAL RADIOLOGY | Age: 84
End: 2024-06-14
Payer: MEDICARE

## 2024-06-14 ENCOUNTER — HOSPITAL ENCOUNTER (EMERGENCY)
Age: 84
Discharge: HOME | End: 2024-06-14
Attending: FAMILY MEDICINE
Payer: MEDICARE

## 2024-06-14 VITALS
HEIGHT: 68 IN | WEIGHT: 147 LBS | SYSTOLIC BLOOD PRESSURE: 138 MMHG | DIASTOLIC BLOOD PRESSURE: 67 MMHG | RESPIRATION RATE: 16 BRPM | TEMPERATURE: 98.2 F | HEART RATE: 88 BPM | OXYGEN SATURATION: 96 % | BODY MASS INDEX: 22.28 KG/M2

## 2024-06-14 DIAGNOSIS — S72.141D CLOSED DISPLACED INTERTROCHANTERIC FRACTURE OF RIGHT FEMUR WITH ROUTINE HEALING, SUBSEQUENT ENCOUNTER: ICD-10-CM

## 2024-06-14 DIAGNOSIS — R07.9 CHEST PAIN, UNSPECIFIED TYPE: Primary | ICD-10-CM

## 2024-06-14 DIAGNOSIS — M25.551 RIGHT HIP PAIN: ICD-10-CM

## 2024-06-14 LAB
ANION GAP SERPL CALCULATED.3IONS-SCNC: 12 MMOL/L (ref 9–17)
BASOPHILS # BLD: 0.04 K/UL (ref 0–0.2)
BASOPHILS NFR BLD: 0 % (ref 0–2)
BUN SERPL-MCNC: 23 MG/DL (ref 8–23)
BUN/CREAT SERPL: 19 (ref 9–20)
CALCIUM SERPL-MCNC: 8.2 MG/DL (ref 8.6–10.4)
CHLORIDE SERPL-SCNC: 106 MMOL/L (ref 98–107)
CO2 SERPL-SCNC: 21 MMOL/L (ref 20–31)
CREAT SERPL-MCNC: 1.2 MG/DL (ref 0.5–0.9)
EOSINOPHIL # BLD: 0.06 K/UL (ref 0–0.4)
EOSINOPHILS RELATIVE PERCENT: 1 % (ref 0–5)
ERYTHROCYTE [DISTWIDTH] IN BLOOD BY AUTOMATED COUNT: 17.3 % (ref 12.1–15.2)
GFR, ESTIMATED: 44 ML/MIN/1.73M2
HCT VFR BLD AUTO: 27.1 % (ref 36–46)
HGB BLD-MCNC: 8.5 G/DL (ref 12–16)
IMM GRANULOCYTES # BLD AUTO: 0.13 K/UL (ref 0–0.3)
IMM GRANULOCYTES NFR BLD: 1 % (ref 0–5)
LYMPHOCYTES NFR BLD: 0.9 K/UL (ref 1–4.8)
LYMPHOCYTES RELATIVE PERCENT: 8 % (ref 15–40)
MCH RBC QN AUTO: 27.1 PG (ref 26–34)
MCHC RBC AUTO-ENTMCNC: 31.4 G/DL (ref 31–37)
MCV RBC AUTO: 86.3 FL (ref 80–100)
MONOCYTES NFR BLD: 0.76 K/UL (ref 0–1)
MONOCYTES NFR BLD: 7 % (ref 4–8)
NEUTROPHILS NFR BLD: 83 % (ref 47–75)
NEUTS SEG NFR BLD: 9.45 K/UL (ref 2.5–7)
PLATELET # BLD AUTO: 564 K/UL (ref 140–450)
PMV BLD AUTO: 9.2 FL (ref 6–12)
RBC # BLD AUTO: 3.14 M/UL (ref 4–5.2)
SODIUM SERPL-SCNC: 139 MMOL/L (ref 135–144)
TROPONIN I SERPL HS-MCNC: 29 NG/L (ref 0–14)
TROPONIN I SERPL HS-MCNC: 31 NG/L (ref 0–14)
WBC OTHER # BLD: 11.3 K/UL (ref 3.5–11)

## 2024-06-14 PROCEDURE — 71045 X-RAY EXAM CHEST 1 VIEW: CPT

## 2024-06-14 PROCEDURE — 80048 BASIC METABOLIC PNL TOTAL CA: CPT

## 2024-06-14 PROCEDURE — 93005 ELECTROCARDIOGRAM TRACING: CPT | Performed by: FAMILY MEDICINE

## 2024-06-14 PROCEDURE — 85025 COMPLETE CBC W/AUTO DIFF WBC: CPT

## 2024-06-14 PROCEDURE — 99285 EMERGENCY DEPT VISIT HI MDM: CPT

## 2024-06-14 PROCEDURE — 6370000000 HC RX 637 (ALT 250 FOR IP): Performed by: FAMILY MEDICINE

## 2024-06-14 PROCEDURE — 36415 COLL VENOUS BLD VENIPUNCTURE: CPT

## 2024-06-14 PROCEDURE — 84484 ASSAY OF TROPONIN QUANT: CPT

## 2024-06-14 PROCEDURE — 73502 X-RAY EXAM HIP UNI 2-3 VIEWS: CPT

## 2024-06-14 RX ORDER — OXYCODONE HYDROCHLORIDE AND ACETAMINOPHEN 5; 325 MG/1; MG/1
1 TABLET ORAL EVERY 4 HOURS PRN
COMMUNITY

## 2024-06-14 RX ORDER — DOCUSATE SODIUM 100 MG/1
100 CAPSULE, LIQUID FILLED ORAL 2 TIMES DAILY
COMMUNITY

## 2024-06-14 RX ORDER — OXYCODONE HYDROCHLORIDE AND ACETAMINOPHEN 5; 325 MG/1; MG/1
1 TABLET ORAL ONCE
Status: COMPLETED | OUTPATIENT
Start: 2024-06-14 | End: 2024-06-14

## 2024-06-14 RX ADMIN — OXYCODONE AND ACETAMINOPHEN 1 TABLET: 5; 325 TABLET ORAL at 12:10

## 2024-06-14 ASSESSMENT — PAIN - FUNCTIONAL ASSESSMENT: PAIN_FUNCTIONAL_ASSESSMENT: 0-10

## 2024-06-14 ASSESSMENT — PAIN DESCRIPTION - PAIN TYPE
TYPE: ACUTE PAIN
TYPE: ACUTE PAIN

## 2024-06-14 ASSESSMENT — PAIN SCALES - GENERAL
PAINLEVEL_OUTOF10: 5
PAINLEVEL_OUTOF10: 8
PAINLEVEL_OUTOF10: 10

## 2024-06-14 ASSESSMENT — PAIN DESCRIPTION - LOCATION
LOCATION: HIP

## 2024-06-14 ASSESSMENT — HEART SCORE: ECG: NORMAL

## 2024-06-14 ASSESSMENT — PAIN DESCRIPTION - ORIENTATION
ORIENTATION: RIGHT
ORIENTATION: RIGHT

## 2024-06-14 ASSESSMENT — PAIN DESCRIPTION - DESCRIPTORS
DESCRIPTORS: SORE
DESCRIPTORS: SORE;CRAMPING

## 2024-06-14 NOTE — DISCHARGE INSTRUCTIONS
Currently, your Percocet pain medication to scheduled every 6 hours as needed, although I cannot change the scheduling for that medication, consideration should be made for regular medication every 6-8 hours for the next couple of days and then slowly weaning back down to a as needed basis, given the increased fall risk as well as addiction risk of this medication.    Close follow-up with your physical therapist at the [facility], as well as your orthopedic surgeon, follow-up with PCP as needed, return to ER if any symptoms change worse or other concerns.

## 2024-06-14 NOTE — ED PROVIDER NOTES
troponin level of feel this is likely is due to patient's pain, discussed exacerbation of pain though no disruption of the hardware or any new fracture in her hip, acknowledged    hsTnT 29, showing a decrease from prior    Discussed with patient and patient's  at bedside overall workup, discussed exacerbation of patient's pain, noting that she is 2 weeks postsurgical from pin placement in the right hip, we discussed her pain medication as prescribed, currently every 4 hours as needed, consideration for changing that to a scheduled basis every 6-8 hours for now for a few days and then backing off more to a as needed basis, however this would have to be done through either her orthopedic surgeon or facility provider, patient can follow-up with her orthopedic surgeon and her PCP as needed, return to ER symptoms change worse other concerns, acknowledged    1)  Number and Complexity of Problems  Problem List This Visit: As above    Differential Diagnosis: Hip reinjury, exacerbation of pain, cardiac NOS    Diagnoses Considered but Do Not Suspect: N/A    Pertinent Comorbid Conditions: As above    2)  Data Reviewed  My EKG interpretation:  As above    Decision Rules/Scores utilized: Heart score    Tests considered but not ordered and why: N/A    External Documents Reviewed: N/A    Imaging that is independently reviewed and interpreted by me as: N/A    See more data below for the lab and radiology tests and orders.    3)  Treatment and Disposition    Patient repeat assessment:  As above    Disposition discussion with patient/family: Discharge    Case discussed with consulting clinician:  As above    Social determinants of health impacting treatment or disposition: N/A    Shared Decision Making: N/A    Code Status Discussion: N/A      REASSESSMENT     As above      CRITICAL CARE TIME     Total Critical Care time was 0 minutes, excluding separately reportable procedures.  There was a high probability of clinically

## 2024-06-15 LAB
EKG ATRIAL RATE: 81 BPM
EKG P AXIS: 73 DEGREES
EKG P-R INTERVAL: 134 MS
EKG QRS DURATION: 84 MS
EKG QTC CALCULATION (BAZETT): 473 MS
EKG R AXIS: 56 DEGREES
EKG T AXIS: 77 DEGREES
EKG VENTRICULAR RATE: 81 BPM

## 2024-06-15 PROCEDURE — 93010 ELECTROCARDIOGRAM REPORT: CPT | Performed by: INTERNAL MEDICINE

## 2024-06-16 ENCOUNTER — HOSPITAL ENCOUNTER (OUTPATIENT)
Age: 84
Setting detail: SPECIMEN
Discharge: HOME | End: 2024-06-16

## 2024-06-16 LAB
AMYLASE SERPL-CCNC: 98 U/L (ref 28–100)
ANION GAP SERPL CALCULATED.3IONS-SCNC: 19 MMOL/L (ref 9–17)
BUN SERPL-MCNC: 22 MG/DL (ref 8–23)
BUN/CREAT SERPL: 16 (ref 9–20)
CALCIUM SERPL-MCNC: 8.7 MG/DL (ref 8.6–10.4)
CHLORIDE SERPL-SCNC: 103 MMOL/L (ref 98–107)
CO2 SERPL-SCNC: 16 MMOL/L (ref 20–31)
CREAT SERPL-MCNC: 1.4 MG/DL (ref 0.5–0.9)
GFR, ESTIMATED: 37 ML/MIN/1.73M2
GLUCOSE SERPL-MCNC: 108 MG/DL (ref 70–99)
LIPASE SERPL-CCNC: 72 U/L (ref 13–60)
POTASSIUM SERPL-SCNC: 4.6 MMOL/L (ref 3.7–5.3)
SODIUM SERPL-SCNC: 138 MMOL/L (ref 135–144)

## 2024-06-20 ENCOUNTER — HOSPITAL ENCOUNTER (OUTPATIENT)
Age: 84
Setting detail: SPECIMEN
Discharge: HOME | End: 2024-06-20
Payer: MEDICARE

## 2024-06-20 LAB
ALBUMIN SERPL-MCNC: 3.3 G/DL (ref 3.5–5.2)
ALP SERPL-CCNC: 178 U/L (ref 35–104)
ALT SERPL-CCNC: 9 U/L (ref 5–33)
ANION GAP SERPL CALCULATED.3IONS-SCNC: 15 MMOL/L (ref 9–17)
AST SERPL-CCNC: 13 U/L
BASOPHILS # BLD: 0.06 K/UL (ref 0–0.2)
BASOPHILS NFR BLD: 1 % (ref 0–2)
BILIRUB SERPL-MCNC: 0.2 MG/DL (ref 0.3–1.2)
BUN SERPL-MCNC: 23 MG/DL (ref 8–23)
BUN/CREAT SERPL: 19 (ref 9–20)
CALCIUM SERPL-MCNC: 8.7 MG/DL (ref 8.6–10.4)
CHLORIDE SERPL-SCNC: 104 MMOL/L (ref 98–107)
CO2 SERPL-SCNC: 20 MMOL/L (ref 20–31)
CREAT SERPL-MCNC: 1.2 MG/DL (ref 0.5–0.9)
EOSINOPHIL # BLD: 0.14 K/UL (ref 0–0.4)
EOSINOPHILS RELATIVE PERCENT: 2 % (ref 0–5)
ERYTHROCYTE [DISTWIDTH] IN BLOOD BY AUTOMATED COUNT: 18.6 % (ref 12.1–15.2)
GFR, ESTIMATED: 44 ML/MIN/1.73M2
GLUCOSE SERPL-MCNC: 92 MG/DL (ref 70–99)
HCT VFR BLD AUTO: 28.6 % (ref 36–46)
HGB BLD-MCNC: 8.5 G/DL (ref 12–16)
IMM GRANULOCYTES # BLD AUTO: 0.02 K/UL (ref 0–0.3)
IMM GRANULOCYTES NFR BLD: 0 % (ref 0–5)
LYMPHOCYTES NFR BLD: 1.32 K/UL (ref 1–4.8)
LYMPHOCYTES RELATIVE PERCENT: 16 % (ref 15–40)
MCH RBC QN AUTO: 26.6 PG (ref 26–34)
MCHC RBC AUTO-ENTMCNC: 29.7 G/DL (ref 31–37)
MCV RBC AUTO: 89.4 FL (ref 80–100)
MONOCYTES NFR BLD: 0.73 K/UL (ref 0–1)
MONOCYTES NFR BLD: 9 % (ref 4–8)
NEUTROPHILS NFR BLD: 72 % (ref 47–75)
NEUTS SEG NFR BLD: 5.86 K/UL (ref 2.5–7)
PLATELET # BLD AUTO: 550 K/UL (ref 140–450)
PMV BLD AUTO: 9.4 FL (ref 6–12)
POTASSIUM SERPL-SCNC: 4.4 MMOL/L (ref 3.7–5.3)
PROT SERPL-MCNC: 5.5 G/DL (ref 6.4–8.3)
RBC # BLD AUTO: 3.2 M/UL (ref 4–5.2)
SODIUM SERPL-SCNC: 139 MMOL/L (ref 135–144)
WBC OTHER # BLD: 8.1 K/UL (ref 3.5–11)

## 2024-06-20 PROCEDURE — 80053 COMPREHEN METABOLIC PANEL: CPT

## 2024-06-20 PROCEDURE — 85025 COMPLETE CBC W/AUTO DIFF WBC: CPT

## 2024-06-27 ENCOUNTER — HOSPITAL ENCOUNTER (OUTPATIENT)
Age: 84
Setting detail: SPECIMEN
Discharge: HOME | End: 2024-06-27
Payer: MEDICARE

## 2024-06-27 LAB
ALBUMIN SERPL-MCNC: 3.3 G/DL (ref 3.5–5.2)
ALP SERPL-CCNC: 156 U/L (ref 35–104)
ALT SERPL-CCNC: 7 U/L (ref 5–33)
ANION GAP SERPL CALCULATED.3IONS-SCNC: 12 MMOL/L (ref 9–17)
AST SERPL-CCNC: 12 U/L
BASOPHILS # BLD: 0.05 K/UL (ref 0–0.2)
BASOPHILS NFR BLD: 1 % (ref 0–2)
BILIRUB SERPL-MCNC: 0.2 MG/DL (ref 0.3–1.2)
BUN SERPL-MCNC: 38 MG/DL (ref 8–23)
BUN/CREAT SERPL: 29 (ref 9–20)
CALCIUM SERPL-MCNC: 8.9 MG/DL (ref 8.6–10.4)
CHLORIDE SERPL-SCNC: 109 MMOL/L (ref 98–107)
CO2 SERPL-SCNC: 24 MMOL/L (ref 20–31)
CREAT SERPL-MCNC: 1.3 MG/DL (ref 0.5–0.9)
EOSINOPHIL # BLD: 0.16 K/UL (ref 0–0.4)
EOSINOPHILS RELATIVE PERCENT: 2 % (ref 0–5)
ERYTHROCYTE [DISTWIDTH] IN BLOOD BY AUTOMATED COUNT: 19.7 % (ref 12.1–15.2)
GFR, ESTIMATED: 40 ML/MIN/1.73M2
GLUCOSE SERPL-MCNC: 103 MG/DL (ref 70–99)
HCT VFR BLD AUTO: 29 % (ref 36–46)
HGB BLD-MCNC: 8.5 G/DL (ref 12–16)
IMM GRANULOCYTES # BLD AUTO: 0.02 K/UL (ref 0–0.3)
IMM GRANULOCYTES NFR BLD: 0 % (ref 0–5)
LYMPHOCYTES NFR BLD: 1.25 K/UL (ref 1–4.8)
LYMPHOCYTES RELATIVE PERCENT: 18 % (ref 15–40)
MCH RBC QN AUTO: 27 PG (ref 26–34)
MCHC RBC AUTO-ENTMCNC: 29.3 G/DL (ref 31–37)
MCV RBC AUTO: 92.1 FL (ref 80–100)
MONOCYTES NFR BLD: 0.71 K/UL (ref 0–1)
MONOCYTES NFR BLD: 10 % (ref 4–8)
MORPHOLOGY: ABNORMAL
NEUTROPHILS NFR BLD: 69 % (ref 47–75)
NEUTS SEG NFR BLD: 4.94 K/UL (ref 2.5–7)
PLATELET # BLD AUTO: 390 K/UL (ref 140–450)
PMV BLD AUTO: 9.7 FL (ref 6–12)
POTASSIUM SERPL-SCNC: 4.5 MMOL/L (ref 3.7–5.3)
PROT SERPL-MCNC: 5.4 G/DL (ref 6.4–8.3)
RBC # BLD AUTO: 3.15 M/UL (ref 4–5.2)
SODIUM SERPL-SCNC: 145 MMOL/L (ref 135–144)
WBC OTHER # BLD: 7.1 K/UL (ref 3.5–11)

## 2024-06-27 PROCEDURE — 85025 COMPLETE CBC W/AUTO DIFF WBC: CPT

## 2024-06-27 PROCEDURE — 80053 COMPREHEN METABOLIC PANEL: CPT

## 2024-07-02 ENCOUNTER — HOSPITAL ENCOUNTER (OUTPATIENT)
Age: 84
Setting detail: SPECIMEN
Discharge: HOME | End: 2024-07-02
Payer: MEDICARE

## 2024-07-02 LAB
AMYLASE SERPL-CCNC: 106 U/L (ref 28–100)
LIPASE SERPL-CCNC: 85 U/L (ref 13–60)

## 2024-07-02 PROCEDURE — 82150 ASSAY OF AMYLASE: CPT

## 2024-07-02 PROCEDURE — 83690 ASSAY OF LIPASE: CPT

## 2024-07-08 ENCOUNTER — HOSPITAL ENCOUNTER (OUTPATIENT)
Age: 84
Setting detail: SPECIMEN
Discharge: HOME | End: 2024-07-08
Payer: MEDICARE

## 2024-07-08 LAB
-: ABNORMAL
BACTERIA URNS QL MICRO: ABNORMAL
BILIRUB UR QL STRIP: NEGATIVE
CLARITY UR: ABNORMAL
COLOR UR: YELLOW
COMMENT: ABNORMAL
EPI CELLS #/AREA URNS HPF: ABNORMAL /HPF
GLUCOSE UR STRIP-MCNC: NEGATIVE MG/DL
HGB UR QL STRIP.AUTO: ABNORMAL
KETONES UR STRIP-MCNC: NEGATIVE MG/DL
LEUKOCYTE ESTERASE UR QL STRIP: ABNORMAL
NITRITE UR QL STRIP: POSITIVE
PH UR STRIP: 5 [PH] (ref 5–8)
PROT UR STRIP-MCNC: ABNORMAL MG/DL
RBC #/AREA URNS HPF: ABNORMAL /HPF (ref 0–2)
SP GR UR STRIP: 1.02 (ref 1–1.03)
UROBILINOGEN UR STRIP-ACNC: NORMAL EU/DL (ref 0–1)
WBC #/AREA URNS HPF: ABNORMAL /HPF

## 2024-07-08 PROCEDURE — 87088 URINE BACTERIA CULTURE: CPT

## 2024-07-08 PROCEDURE — 81001 URINALYSIS AUTO W/SCOPE: CPT

## 2024-07-08 PROCEDURE — 87086 URINE CULTURE/COLONY COUNT: CPT

## 2024-07-08 PROCEDURE — 87186 SC STD MICRODIL/AGAR DIL: CPT

## 2024-07-11 LAB
MICROORGANISM SPEC CULT: ABNORMAL
SPECIMEN DESCRIPTION: ABNORMAL

## 2024-07-12 ENCOUNTER — HOSPITAL ENCOUNTER (OUTPATIENT)
Dept: GENERAL RADIOLOGY | Age: 84
End: 2024-07-12
Payer: MEDICARE

## 2024-07-12 ENCOUNTER — HOSPITAL ENCOUNTER (OUTPATIENT)
Age: 84
End: 2024-07-12
Payer: MEDICARE

## 2024-07-12 DIAGNOSIS — S72.144A CLOSED NONDISPLACED INTERTROCHANTERIC FRACTURE OF RIGHT FEMUR, INITIAL ENCOUNTER (HCC): ICD-10-CM

## 2024-07-12 PROCEDURE — 73502 X-RAY EXAM HIP UNI 2-3 VIEWS: CPT

## 2024-08-02 ENCOUNTER — TELEPHONE (OUTPATIENT)
Dept: PRIMARY CARE CLINIC | Age: 84
End: 2024-08-02

## 2024-08-02 NOTE — TELEPHONE ENCOUNTER
Pt's  called to cancel pt's appt for 8/21. Pt broke her hip on 6/1 and is still @ the [facility] recovering.  stated she would probably be there long term because she's not walking, getting around in a wheelchair

## 2024-08-12 ENCOUNTER — HOSPITAL ENCOUNTER (INPATIENT)
Age: 84
LOS: 4 days | Discharge: NURSING, REHAB OR LONG-TERM CARE | DRG: 177 | End: 2024-08-16
Attending: EMERGENCY MEDICINE | Admitting: INTERNAL MEDICINE
Payer: MEDICARE

## 2024-08-12 ENCOUNTER — APPOINTMENT (OUTPATIENT)
Dept: GENERAL RADIOLOGY | Age: 84
DRG: 177 | End: 2024-08-12
Payer: MEDICARE

## 2024-08-12 DIAGNOSIS — U07.1 COVID-19 VIRUS INFECTION: ICD-10-CM

## 2024-08-12 DIAGNOSIS — I48.92 ATRIAL FLUTTER, UNSPECIFIED TYPE (HCC): ICD-10-CM

## 2024-08-12 DIAGNOSIS — J96.01 ACUTE HYPOXEMIC RESPIRATORY FAILURE (HCC): Primary | ICD-10-CM

## 2024-08-12 DIAGNOSIS — I50.21 ACUTE SYSTOLIC CONGESTIVE HEART FAILURE (HCC): ICD-10-CM

## 2024-08-12 DIAGNOSIS — R06.02 SHORTNESS OF BREATH: ICD-10-CM

## 2024-08-12 LAB
-: ABNORMAL
ALBUMIN SERPL-MCNC: 3.7 G/DL (ref 3.5–5.2)
ALP SERPL-CCNC: 319 U/L (ref 35–104)
ALT SERPL-CCNC: 73 U/L (ref 5–33)
ANION GAP SERPL CALCULATED.3IONS-SCNC: 18 MMOL/L (ref 9–17)
AST SERPL-CCNC: 37 U/L
BASOPHILS # BLD: 0.06 K/UL (ref 0–0.2)
BASOPHILS NFR BLD: 1 % (ref 0–2)
BILIRUB SERPL-MCNC: 0.2 MG/DL (ref 0.3–1.2)
BILIRUB UR QL STRIP: NEGATIVE
BNP SERPL-MCNC: 8359 PG/ML
BUN SERPL-MCNC: 15 MG/DL (ref 8–23)
BUN/CREAT SERPL: 12 (ref 9–20)
CALCIUM SERPL-MCNC: 8.8 MG/DL (ref 8.6–10.4)
CASTS #/AREA URNS LPF: ABNORMAL /LPF
CHLORIDE SERPL-SCNC: 99 MMOL/L (ref 98–107)
CLARITY UR: CLEAR
CO2 SERPL-SCNC: 19 MMOL/L (ref 20–31)
COLOR UR: YELLOW
COMMENT: ABNORMAL
CREAT SERPL-MCNC: 1.3 MG/DL (ref 0.5–0.9)
EOSINOPHIL # BLD: 0.05 K/UL (ref 0–0.4)
EOSINOPHILS RELATIVE PERCENT: 1 % (ref 0–5)
EPI CELLS #/AREA URNS HPF: ABNORMAL /HPF
ERYTHROCYTE [DISTWIDTH] IN BLOOD BY AUTOMATED COUNT: 16.4 % (ref 12.1–15.2)
GFR, ESTIMATED: 40 ML/MIN/1.73M2
GLUCOSE SERPL-MCNC: 167 MG/DL (ref 70–99)
GLUCOSE UR STRIP-MCNC: NEGATIVE MG/DL
HCT VFR BLD AUTO: 38.3 % (ref 36–46)
HGB BLD-MCNC: 11.5 G/DL (ref 12–16)
HGB UR QL STRIP.AUTO: ABNORMAL
IMM GRANULOCYTES # BLD AUTO: 0.21 K/UL (ref 0–0.3)
IMM GRANULOCYTES NFR BLD: 2 % (ref 0–5)
KETONES UR STRIP-MCNC: NEGATIVE MG/DL
LEUKOCYTE ESTERASE UR QL STRIP: NEGATIVE
LYMPHOCYTES NFR BLD: 1.84 K/UL (ref 1–4.8)
LYMPHOCYTES RELATIVE PERCENT: 20 % (ref 15–40)
MCH RBC QN AUTO: 28.4 PG (ref 26–34)
MCHC RBC AUTO-ENTMCNC: 30 G/DL (ref 31–37)
MCV RBC AUTO: 94.6 FL (ref 80–100)
MONOCYTES NFR BLD: 0.46 K/UL (ref 0–1)
MONOCYTES NFR BLD: 5 % (ref 4–8)
MUCOUS THREADS URNS QL MICRO: ABNORMAL
NEUTROPHILS NFR BLD: 71 % (ref 47–75)
NEUTS SEG NFR BLD: 6.44 K/UL (ref 2.5–7)
NITRITE UR QL STRIP: NEGATIVE
PH UR STRIP: 5 [PH] (ref 5–8)
PLATELET # BLD AUTO: 584 K/UL (ref 140–450)
PMV BLD AUTO: 9.3 FL (ref 6–12)
POTASSIUM SERPL-SCNC: 4.8 MMOL/L (ref 3.7–5.3)
PROT SERPL-MCNC: 7 G/DL (ref 6.4–8.3)
PROT UR STRIP-MCNC: ABNORMAL MG/DL
RBC # BLD AUTO: 4.05 M/UL (ref 4–5.2)
RBC #/AREA URNS HPF: ABNORMAL /HPF (ref 0–2)
SODIUM SERPL-SCNC: 136 MMOL/L (ref 135–144)
SP GR UR STRIP: 1.02 (ref 1–1.03)
TROPONIN I SERPL HS-MCNC: 122 NG/L (ref 0–14)
TROPONIN I SERPL HS-MCNC: 64 NG/L (ref 0–14)
UROBILINOGEN UR STRIP-ACNC: NORMAL EU/DL (ref 0–1)
WBC #/AREA URNS HPF: ABNORMAL /HPF
WBC OTHER # BLD: 9.1 K/UL (ref 3.5–11)

## 2024-08-12 PROCEDURE — 99285 EMERGENCY DEPT VISIT HI MDM: CPT

## 2024-08-12 PROCEDURE — 2700000000 HC OXYGEN THERAPY PER DAY

## 2024-08-12 PROCEDURE — 84484 ASSAY OF TROPONIN QUANT: CPT

## 2024-08-12 PROCEDURE — 93005 ELECTROCARDIOGRAM TRACING: CPT | Performed by: EMERGENCY MEDICINE

## 2024-08-12 PROCEDURE — 36415 COLL VENOUS BLD VENIPUNCTURE: CPT

## 2024-08-12 PROCEDURE — 1200000000 HC SEMI PRIVATE

## 2024-08-12 PROCEDURE — 94761 N-INVAS EAR/PLS OXIMETRY MLT: CPT

## 2024-08-12 PROCEDURE — XW033E5 INTRODUCTION OF REMDESIVIR ANTI-INFECTIVE INTO PERIPHERAL VEIN, PERCUTANEOUS APPROACH, NEW TECHNOLOGY GROUP 5: ICD-10-PCS | Performed by: INTERNAL MEDICINE

## 2024-08-12 PROCEDURE — 85025 COMPLETE CBC W/AUTO DIFF WBC: CPT

## 2024-08-12 PROCEDURE — 94669 MECHANICAL CHEST WALL OSCILL: CPT

## 2024-08-12 PROCEDURE — 2580000003 HC RX 258: Performed by: INTERNAL MEDICINE

## 2024-08-12 PROCEDURE — 6360000002 HC RX W HCPCS: Performed by: INTERNAL MEDICINE

## 2024-08-12 PROCEDURE — 83880 ASSAY OF NATRIURETIC PEPTIDE: CPT

## 2024-08-12 PROCEDURE — 71045 X-RAY EXAM CHEST 1 VIEW: CPT

## 2024-08-12 PROCEDURE — 51702 INSERT TEMP BLADDER CATH: CPT

## 2024-08-12 PROCEDURE — 94660 CPAP INITIATION&MGMT: CPT

## 2024-08-12 PROCEDURE — 81001 URINALYSIS AUTO W/SCOPE: CPT

## 2024-08-12 PROCEDURE — 6370000000 HC RX 637 (ALT 250 FOR IP): Performed by: INTERNAL MEDICINE

## 2024-08-12 PROCEDURE — 96375 TX/PRO/DX INJ NEW DRUG ADDON: CPT

## 2024-08-12 PROCEDURE — 2500000003 HC RX 250 WO HCPCS: Performed by: EMERGENCY MEDICINE

## 2024-08-12 PROCEDURE — 94640 AIRWAY INHALATION TREATMENT: CPT

## 2024-08-12 PROCEDURE — 80053 COMPREHEN METABOLIC PANEL: CPT

## 2024-08-12 PROCEDURE — 6360000002 HC RX W HCPCS: Performed by: EMERGENCY MEDICINE

## 2024-08-12 PROCEDURE — 96374 THER/PROPH/DIAG INJ IV PUSH: CPT

## 2024-08-12 RX ORDER — POLYETHYLENE GLYCOL 3350 17 G/17G
17 POWDER, FOR SOLUTION ORAL DAILY PRN
Status: DISCONTINUED | OUTPATIENT
Start: 2024-08-12 | End: 2024-08-16 | Stop reason: HOSPADM

## 2024-08-12 RX ORDER — LORAZEPAM 2 MG/ML
0.5 INJECTION INTRAMUSCULAR ONCE
Status: COMPLETED | OUTPATIENT
Start: 2024-08-12 | End: 2024-08-12

## 2024-08-12 RX ORDER — TRAZODONE HYDROCHLORIDE 50 MG/1
75 TABLET ORAL NIGHTLY
COMMUNITY

## 2024-08-12 RX ORDER — DULOXETIN HYDROCHLORIDE 30 MG/1
30 CAPSULE, DELAYED RELEASE ORAL 2 TIMES DAILY
Status: DISCONTINUED | OUTPATIENT
Start: 2024-08-12 | End: 2024-08-16 | Stop reason: HOSPADM

## 2024-08-12 RX ORDER — PANTOPRAZOLE SODIUM 20 MG/1
20 TABLET, DELAYED RELEASE ORAL
Status: DISCONTINUED | OUTPATIENT
Start: 2024-08-13 | End: 2024-08-16 | Stop reason: HOSPADM

## 2024-08-12 RX ORDER — LEVOTHYROXINE SODIUM 0.1 MG/1
100 TABLET ORAL DAILY
Status: DISCONTINUED | OUTPATIENT
Start: 2024-08-13 | End: 2024-08-16 | Stop reason: HOSPADM

## 2024-08-12 RX ORDER — ISOSORBIDE MONONITRATE 30 MG/1
30 TABLET, EXTENDED RELEASE ORAL DAILY
Status: DISCONTINUED | OUTPATIENT
Start: 2024-08-13 | End: 2024-08-16 | Stop reason: HOSPADM

## 2024-08-12 RX ORDER — DEXAMETHASONE 4 MG/1
6 TABLET ORAL DAILY
Status: DISCONTINUED | OUTPATIENT
Start: 2024-08-12 | End: 2024-08-16 | Stop reason: HOSPADM

## 2024-08-12 RX ORDER — CLOPIDOGREL BISULFATE 75 MG/1
75 TABLET ORAL DAILY
Status: DISCONTINUED | OUTPATIENT
Start: 2024-08-13 | End: 2024-08-16 | Stop reason: HOSPADM

## 2024-08-12 RX ORDER — POLYETHYLENE GLYCOL 3350 17 G/17G
17 POWDER, FOR SOLUTION ORAL DAILY PRN
Status: DISCONTINUED | OUTPATIENT
Start: 2024-08-12 | End: 2024-08-12 | Stop reason: SDUPTHER

## 2024-08-12 RX ORDER — SODIUM BICARBONATE 650 MG/1
650 TABLET ORAL 2 TIMES DAILY
Status: DISCONTINUED | OUTPATIENT
Start: 2024-08-12 | End: 2024-08-16 | Stop reason: HOSPADM

## 2024-08-12 RX ORDER — FUROSEMIDE 20 MG/1
10 TABLET ORAL DAILY
Status: DISCONTINUED | OUTPATIENT
Start: 2024-08-13 | End: 2024-08-16 | Stop reason: HOSPADM

## 2024-08-12 RX ORDER — DOCUSATE SODIUM 100 MG/1
100 CAPSULE, LIQUID FILLED ORAL 2 TIMES DAILY
Status: DISCONTINUED | OUTPATIENT
Start: 2024-08-12 | End: 2024-08-16 | Stop reason: HOSPADM

## 2024-08-12 RX ORDER — ASPIRIN 81 MG/1
81 TABLET ORAL DAILY
Status: DISCONTINUED | OUTPATIENT
Start: 2024-08-13 | End: 2024-08-16 | Stop reason: HOSPADM

## 2024-08-12 RX ORDER — POTASSIUM CHLORIDE 7.45 MG/ML
10 INJECTION INTRAVENOUS PRN
Status: DISCONTINUED | OUTPATIENT
Start: 2024-08-12 | End: 2024-08-16 | Stop reason: HOSPADM

## 2024-08-12 RX ORDER — PREGABALIN 25 MG/1
25 CAPSULE ORAL NIGHTLY PRN
Status: DISCONTINUED | OUTPATIENT
Start: 2024-08-12 | End: 2024-08-16 | Stop reason: HOSPADM

## 2024-08-12 RX ORDER — DILTIAZEM HYDROCHLORIDE 5 MG/ML
10 INJECTION INTRAVENOUS ONCE
Status: COMPLETED | OUTPATIENT
Start: 2024-08-12 | End: 2024-08-12

## 2024-08-12 RX ORDER — POLYETHYLENE GLYCOL 3350 17 G/17G
17 POWDER, FOR SOLUTION ORAL DAILY PRN
COMMUNITY

## 2024-08-12 RX ORDER — GUAIFENESIN 600 MG/1
600 TABLET, EXTENDED RELEASE ORAL 2 TIMES DAILY
Status: DISCONTINUED | OUTPATIENT
Start: 2024-08-12 | End: 2024-08-16 | Stop reason: HOSPADM

## 2024-08-12 RX ORDER — ACETAMINOPHEN 325 MG/1
650 TABLET ORAL EVERY 6 HOURS PRN
Status: DISCONTINUED | OUTPATIENT
Start: 2024-08-12 | End: 2024-08-16 | Stop reason: HOSPADM

## 2024-08-12 RX ORDER — DIAZEPAM 5 MG/1
2.5 TABLET ORAL NIGHTLY
COMMUNITY

## 2024-08-12 RX ORDER — SODIUM CHLORIDE 0.9 % (FLUSH) 0.9 %
5-40 SYRINGE (ML) INJECTION EVERY 12 HOURS SCHEDULED
Status: DISCONTINUED | OUTPATIENT
Start: 2024-08-12 | End: 2024-08-16 | Stop reason: HOSPADM

## 2024-08-12 RX ORDER — DILTIAZEM HYDROCHLORIDE 120 MG/1
120 CAPSULE, COATED, EXTENDED RELEASE ORAL DAILY
Status: DISCONTINUED | OUTPATIENT
Start: 2024-08-13 | End: 2024-08-16 | Stop reason: HOSPADM

## 2024-08-12 RX ORDER — METOPROLOL SUCCINATE 25 MG/1
25 TABLET, EXTENDED RELEASE ORAL DAILY
Status: DISCONTINUED | OUTPATIENT
Start: 2024-08-13 | End: 2024-08-16 | Stop reason: HOSPADM

## 2024-08-12 RX ORDER — ACETAMINOPHEN 650 MG/1
650 SUPPOSITORY RECTAL EVERY 6 HOURS PRN
Status: DISCONTINUED | OUTPATIENT
Start: 2024-08-12 | End: 2024-08-16 | Stop reason: HOSPADM

## 2024-08-12 RX ORDER — DEXTROMETHORPHAN HYDROBROMIDE, GUAIFENESIN, AND PHENYLEPHRINE HYDROCHLORIDE 5; 50; 2.5 MG/5ML; MG/5ML; MG/5ML
10 LIQUID ORAL 3 TIMES DAILY PRN
COMMUNITY

## 2024-08-12 RX ORDER — GUAIFENESIN, PSEUDOEPHEDRINE HYDROCHLORIDE 600; 60 MG/1; MG/1
1 TABLET, EXTENDED RELEASE ORAL EVERY 12 HOURS
Status: DISCONTINUED | OUTPATIENT
Start: 2024-08-12 | End: 2024-08-12 | Stop reason: CLARIF

## 2024-08-12 RX ORDER — 0.9 % SODIUM CHLORIDE 0.9 %
30 INTRAVENOUS SOLUTION INTRAVENOUS PRN
Status: DISCONTINUED | OUTPATIENT
Start: 2024-08-12 | End: 2024-08-16 | Stop reason: HOSPADM

## 2024-08-12 RX ORDER — LORAZEPAM 2 MG/ML
0.5 INJECTION INTRAMUSCULAR EVERY 6 HOURS PRN
Status: DISCONTINUED | OUTPATIENT
Start: 2024-08-12 | End: 2024-08-16 | Stop reason: HOSPADM

## 2024-08-12 RX ORDER — ENOXAPARIN SODIUM 100 MG/ML
40 INJECTION SUBCUTANEOUS DAILY
Status: DISCONTINUED | OUTPATIENT
Start: 2024-08-12 | End: 2024-08-16 | Stop reason: HOSPADM

## 2024-08-12 RX ORDER — IPRATROPIUM BROMIDE AND ALBUTEROL SULFATE 2.5; .5 MG/3ML; MG/3ML
1 SOLUTION RESPIRATORY (INHALATION) EVERY 6 HOURS
COMMUNITY

## 2024-08-12 RX ORDER — POTASSIUM CHLORIDE 750 MG/1
20 TABLET, FILM COATED, EXTENDED RELEASE ORAL 2 TIMES DAILY
Status: DISCONTINUED | OUTPATIENT
Start: 2024-08-12 | End: 2024-08-16 | Stop reason: HOSPADM

## 2024-08-12 RX ORDER — OXYCODONE HYDROCHLORIDE AND ACETAMINOPHEN 5; 325 MG/1; MG/1
1 TABLET ORAL EVERY 6 HOURS PRN
Status: DISCONTINUED | OUTPATIENT
Start: 2024-08-12 | End: 2024-08-16 | Stop reason: HOSPADM

## 2024-08-12 RX ORDER — FLUOXETINE 10 MG/1
40 CAPSULE ORAL DAILY
Status: DISCONTINUED | OUTPATIENT
Start: 2024-08-13 | End: 2024-08-16 | Stop reason: HOSPADM

## 2024-08-12 RX ORDER — ONDANSETRON 2 MG/ML
4 INJECTION INTRAMUSCULAR; INTRAVENOUS EVERY 6 HOURS PRN
Status: DISCONTINUED | OUTPATIENT
Start: 2024-08-12 | End: 2024-08-16 | Stop reason: HOSPADM

## 2024-08-12 RX ORDER — MAGNESIUM SULFATE IN WATER 40 MG/ML
2000 INJECTION, SOLUTION INTRAVENOUS PRN
Status: DISCONTINUED | OUTPATIENT
Start: 2024-08-12 | End: 2024-08-16 | Stop reason: HOSPADM

## 2024-08-12 RX ORDER — SODIUM CHLORIDE 0.9 % (FLUSH) 0.9 %
5-40 SYRINGE (ML) INJECTION PRN
Status: DISCONTINUED | OUTPATIENT
Start: 2024-08-12 | End: 2024-08-16 | Stop reason: HOSPADM

## 2024-08-12 RX ORDER — DIAZEPAM 5 MG/1
2.5 TABLET ORAL NIGHTLY PRN
Status: DISCONTINUED | OUTPATIENT
Start: 2024-08-12 | End: 2024-08-16 | Stop reason: HOSPADM

## 2024-08-12 RX ORDER — FERROUS SULFATE 325(65) MG
324 TABLET ORAL
Status: DISCONTINUED | OUTPATIENT
Start: 2024-08-13 | End: 2024-08-16 | Stop reason: HOSPADM

## 2024-08-12 RX ORDER — IPRATROPIUM BROMIDE AND ALBUTEROL SULFATE 2.5; .5 MG/3ML; MG/3ML
1 SOLUTION RESPIRATORY (INHALATION) EVERY 4 HOURS PRN
Status: DISCONTINUED | OUTPATIENT
Start: 2024-08-12 | End: 2024-08-16 | Stop reason: HOSPADM

## 2024-08-12 RX ORDER — POTASSIUM CHLORIDE 750 MG/1
40 TABLET, FILM COATED, EXTENDED RELEASE ORAL PRN
Status: DISCONTINUED | OUTPATIENT
Start: 2024-08-12 | End: 2024-08-16 | Stop reason: HOSPADM

## 2024-08-12 RX ORDER — LEVOFLOXACIN 500 MG/1
500 TABLET, FILM COATED ORAL DAILY
Status: ON HOLD | COMMUNITY
Start: 2024-08-07 | End: 2024-08-16 | Stop reason: HOSPADM

## 2024-08-12 RX ORDER — SODIUM CHLORIDE 9 MG/ML
INJECTION, SOLUTION INTRAVENOUS PRN
Status: DISCONTINUED | OUTPATIENT
Start: 2024-08-12 | End: 2024-08-16 | Stop reason: HOSPADM

## 2024-08-12 RX ORDER — GUAIFENESIN, PSEUDOEPHEDRINE HYDROCHLORIDE 600; 60 MG/1; MG/1
1 TABLET, EXTENDED RELEASE ORAL EVERY 12 HOURS
COMMUNITY

## 2024-08-12 RX ORDER — PSEUDOEPHEDRINE HCL 30 MG
60 TABLET ORAL 2 TIMES DAILY
Status: DISCONTINUED | OUTPATIENT
Start: 2024-08-12 | End: 2024-08-16 | Stop reason: HOSPADM

## 2024-08-12 RX ORDER — ONDANSETRON 4 MG/1
4 TABLET, ORALLY DISINTEGRATING ORAL EVERY 8 HOURS PRN
Status: DISCONTINUED | OUTPATIENT
Start: 2024-08-12 | End: 2024-08-16 | Stop reason: HOSPADM

## 2024-08-12 RX ORDER — LIDOCAINE 4 G/G
1 PATCH TOPICAL DAILY
Status: DISCONTINUED | OUTPATIENT
Start: 2024-08-13 | End: 2024-08-16 | Stop reason: HOSPADM

## 2024-08-12 RX ADMIN — SODIUM CHLORIDE 30 ML: 9 INJECTION, SOLUTION INTRAVENOUS at 22:17

## 2024-08-12 RX ADMIN — DOCUSATE SODIUM 100 MG: 100 CAPSULE, LIQUID FILLED ORAL at 19:49

## 2024-08-12 RX ADMIN — LORAZEPAM 0.5 MG: 2 INJECTION, SOLUTION INTRAMUSCULAR; INTRAVENOUS at 18:49

## 2024-08-12 RX ADMIN — OXYCODONE HYDROCHLORIDE AND ACETAMINOPHEN 1 TABLET: 5; 325 TABLET ORAL at 23:16

## 2024-08-12 RX ADMIN — DEXAMETHASONE 6 MG: 4 TABLET ORAL at 20:38

## 2024-08-12 RX ADMIN — DIAZEPAM 2.5 MG: 5 TABLET ORAL at 19:48

## 2024-08-12 RX ADMIN — PSEUDOEPHEDRINE HCL 60 MG: 30 TABLET, FILM COATED ORAL at 19:48

## 2024-08-12 RX ADMIN — REMDESIVIR 200 MG: 100 INJECTION, POWDER, LYOPHILIZED, FOR SOLUTION INTRAVENOUS at 21:29

## 2024-08-12 RX ADMIN — DILTIAZEM HYDROCHLORIDE 10 MG: 5 INJECTION, SOLUTION INTRAVENOUS at 11:59

## 2024-08-12 RX ADMIN — SODIUM BICARBONATE 650 MG: 650 TABLET ORAL at 19:48

## 2024-08-12 RX ADMIN — ACETAMINOPHEN 650 MG: 325 TABLET, FILM COATED ORAL at 17:24

## 2024-08-12 RX ADMIN — POTASSIUM CHLORIDE 20 MEQ: 750 TABLET, FILM COATED, EXTENDED RELEASE ORAL at 19:48

## 2024-08-12 RX ADMIN — ENOXAPARIN SODIUM 40 MG: 100 INJECTION SUBCUTANEOUS at 15:15

## 2024-08-12 RX ADMIN — LORAZEPAM 0.5 MG: 2 INJECTION, SOLUTION INTRAMUSCULAR; INTRAVENOUS at 12:01

## 2024-08-12 RX ADMIN — SODIUM CHLORIDE, PRESERVATIVE FREE 10 ML: 5 INJECTION INTRAVENOUS at 19:56

## 2024-08-12 RX ADMIN — DULOXETINE HYDROCHLORIDE 30 MG: 30 CAPSULE, DELAYED RELEASE ORAL at 19:48

## 2024-08-12 RX ADMIN — GUAIFENESIN 600 MG: 600 TABLET ORAL at 19:49

## 2024-08-12 ASSESSMENT — PAIN SCALES - GENERAL
PAINLEVEL_OUTOF10: 7
PAINLEVEL_OUTOF10: 3
PAINLEVEL_OUTOF10: 0

## 2024-08-12 ASSESSMENT — PAIN DESCRIPTION - DESCRIPTORS: DESCRIPTORS: ACHING

## 2024-08-12 ASSESSMENT — PAIN DESCRIPTION - LOCATION
LOCATION: BACK
LOCATION: HEAD

## 2024-08-12 NOTE — PROGRESS NOTES
Admitted to floor via stretcher.  Bedbound.  Continuous pulse ox and bedside telemetry on, sinus tachycardia.  Alert and oriented with  at bedside with some prompting from him.  Oxygen 3 L per NC.  Call light in reach.

## 2024-08-12 NOTE — VIRTUAL HEALTH
Megan A Heabler, was evaluated through a synchronous (real-time) audio-video encounter. The patient (and/or guardian if applicable) is aware that this is a billable service, which includes applicable co-pays. This virtual visit was conducted with patient's (and/or legal guardian's) consent. Patient identification was verified, and a caregiver was present when appropriate.  The patient was located at Facility (Appt Department): [facility]  [facility] MED SURG TELEMETRY  [address] [facility] [address]  Loc: [phone]  The provider was located at Facility (Login Dept): [facility]  [address] [facility] [id number] [phone]  Confirm you are appropriately licensed, registered, or certified to deliver care in the state where the patient is located as indicated above. If you are not or unsure, please re-schedule the visit: Yes, I confirm.   Consults     Total time spent on this encounter: Not billed by time    --Nathan Holliday MD on 8/12/2024 at 7:33 PM    An electronic signature was used to authenticate this note.      [facility] - Initial Consult Note COVID 19 Patient  Today's Date and Time: 8/12/2024, 7:34 PM    Impression :     COVID 19 Confirmed Infection  Covid tests:  Positive Covid Test Date: 8-11-24 at [facility]  Vaccination Status: Received 6 vaccines  Hypoxia  CAD with prior CABG x 3 vx and 8 stents  Essential HTN  Hypothyroidism  Squamous cell carcinoma of parotid gland   Macular degeneration Rt eye      Recommendations:   Antibiotic treatment:  Monitor off antibiotics  Covid Rx:    Remdesivir: Requested 8-12-24  Decadron: Will likely require it  Actemra: Not necessary at this point  Paxlovid: Uncertain time of onset  Monitor CRP:      Medical Decision Making/Summary/Discussion:8/12/2024     Patient admitted with COVID 19 infection    Infection Control Recommendations   [place] Precautions  Airborne isolation  Droplet Plus Isolation. Stop date    Component Value Date/Time    CREATININE 1.3 08/12/2024 11:45 AM    GLUCOSE 167 08/12/2024 11:45 AM       Medical Decision Making-Imaging:   XR CHEST PORTABLE    Result Date: 8/12/2024  EXAM: XR CHEST PORTABLE HISTORY: shortness of brath COMPARISON: 6/14/2024     FINDINGS/IMPRESSION: 1. Interval significant worsening in appearance of the chest most suggestive of pulmonary edema, bilaterally. 2. Likely new small pleural effusions. 3. Heart size mildly enlarged, slightly increased.       Medical Decision Phxaia-Tddfdzjr-Lphls:     Results       ** No results found for the last 336 hours. **              Medical Decision Making-Other:     Note:      Nathan Holliday MD  Office: [phone]    Daily Elements of Decision Making provided by Consulting Physician:  Note: I have independently performed the steps listed below as part of the medical decision making and evaluation.    Review of current Problems:  COVID 19 Confirmed Infection  Covid tests:  Positive Covid Test Date: 8-11-24 at [facility]  Vaccination Status: Received 6 vaccines  Hypoxia  CAD with prior CABG x 3 vx and 8 stents  Essential HTN  Hypothyroidism  Squamous cell carcinoma of parotid gland   Macular degeneration Rt eye  Evaluation of Patient:  Patient evaluated by Telemedicine.  Requesting Institution: [facility]  Provider Institution: [facility]  Intermediary Person at [facility]: Hospitalist Physician, Charge Nurse  Please see daily details in Interval Changes Section  Changes in physical exam:  Hypoxic   Tachycardic  Tachypneic  Please see daily details in Physical Exam section  Changes in ROS:  Unchanged  Please see daily details in Review of Symptoms section  Discussion with Referring Physician or Service  Dr. Sharpe  Laboratory and Radiologic Studies with personal review of radiologic studies  Laboratory  Radiology  Microbiology  Covid +  Please see Details in daily Interval Changes Section devoted to Lab, Micro and

## 2024-08-12 NOTE — ED PROVIDER NOTES
EMERGENCY DEPARTMENT ENCOUNTER      CHIEF COMPLAINT    Chief Complaint   Patient presents with    Shortness of Breath     Patient brought in by [facility] for respiratory distress. Patient brought in on BiPap. Patient has pneumonia and is COVID positive. Patient is JON NASSAR    Megan Healyr is a 84 y.o. female who presentsto ED with shortness of breath via EMS..  Patient developed acute shortness of breath this morning.  Patient is a nursing home resident.  Patient was diagnosed with COVID-19 infection yesterday.    PAST MEDICAL HISTORY    Past Medical History:   Diagnosis Date    CAD (coronary artery disease) 2008    CABG x 3    Chronic back pain     History of malignant neoplasm of parotid gland 2020    Dr. Pappas in [place], Squamous cell cancer    Hyperlipidemia     red yeast rice    Hypertension     onset after open heart surgery    Hypothyroidism     acquired. hx neck radiation on L    Macular degeneration 2022    right eye injections Dr. Salcido    Post PTCA     cardiac stents x 8    Transfusion history        SURGICAL HISTORY    Past Surgical History:   Procedure Laterality Date    ANGIOPLASTY  12/07/2016    Dr. Almonte @ [facility] x 1     ANGIOPLASTY  07/29/2022    Dr. Almonte @ [facility]--Stenting X 2--    BACK INJECTION Bilateral 12/7/2023    BILATERAL SACROILIAC JOINT INJECTION performed by Chapito Gutierrez DO at [facility] OR    COLONOSCOPY  06/11/2013    Sandy GERONIMO    CORONARY ANGIOPLASTY Left 09/02/2022    Dr. Almonte @ [facility]--Stenting X 2-also Cardiac rehab    CORONARY ANGIOPLASTY WITH STENT PLACEMENT  2006,11/14/2012    CORONARY ARTERY BYPASS GRAFT  2006    X3    FEMUR SURGERY Right 6/3/2024    FEMUR INTRAMEDULLARY NAIL RENATA INSERTION ANTEGRADE RIGHT performed by Dany Lunsford MD at [facility] OR    HEMORRHOID SURGERY      OVARY REMOVAL Right     PAROTIDECTOMY Left 2020    squamous cell cancer , surgical removal and radiation    MN NJX DX/THER SBST INTRLMNR LMBR/SAC W/IMG GDN N/A 08/28/2018  (*)     All other components within normal limits   COMPREHENSIVE METABOLIC PANEL - Abnormal; Notable for the following components:    CO2 19 (*)     Anion Gap 18 (*)     Glucose 167 (*)     Creatinine 1.3 (*)     Est, Glom Filt Rate 40 (*)     Total Bilirubin 0.2 (*)     Alkaline Phosphatase 319 (*)     ALT 73 (*)     AST 37 (*)     All other components within normal limits   TROPONIN - Abnormal; Notable for the following components:    Troponin, High Sensitivity 64 (*)     All other components within normal limits   BRAIN NATRIURETIC PEPTIDE - Abnormal; Notable for the following components:    Pro-BNP 8,359 (*)     All other components within normal limits       MIPS  Not applicable      Total Critical Care time was: 30 minutes    EMERGENCY DEPARTMENT COURSE and DIFFERENTIAL DIAGNOSIS/MDM:    Patient Course: Patient is 84-year-old female that presents to ED with acute shortness of breath.  Patient has atrial flutter with CHF.  Patient also has 19 infection/COVID-19 pneumonia..  Patient was not started on BiPAP.  Patient is given Cardizem 10 mg IV with improvement of the heart rate and cardioversion.  Patient given Ativan 0.5 mg IV.  Patient feels better.  Patient is discussed with the hospitalist Dr. Sharpe.  Patient will be admitted for further evaluation and treatment    Number and complexity of problems:    Differential Diagnosis: CHF, COPD, COVID-pneumonia.    Pertinent Comorbid Conditions: History of coronary artery disease and hypertension, history of recent COVID-19 infection.    2)  Data Reviewed    Decision Rules/Scores utilized:      Labs/tests: Reviewed    EKG/rhythm strips: #1 atrial flutter   #2 sinus tachycardia    Radiology interpretation/review: CHF, bilateral pulmonary edema with infiltrates          3)  Treatment and Disposition    Patient repeat assessment: On repeat assessment patient feels better    Disposition discussion with patient/family: Patient will be admitted    Shared Decision

## 2024-08-12 NOTE — PROGRESS NOTES
Agitation increases, patient yelling out, states \"Help me, help me!\" Pulls at lines, continually asks what they are for despite education.  Reports all over pain, Tylenol given with little effect.  Repositioned, alarms on and functioning.

## 2024-08-12 NOTE — PROGRESS NOTES
Beginning to show confusion.  Asks what to do with wires.  Reoriented.  Urine collected.  Will continue to monitor.

## 2024-08-13 ENCOUNTER — APPOINTMENT (OUTPATIENT)
Age: 84
DRG: 177 | End: 2024-08-13
Payer: MEDICARE

## 2024-08-13 LAB
ANION GAP SERPL CALCULATED.3IONS-SCNC: 14 MMOL/L (ref 9–17)
BASOPHILS # BLD: 0.01 K/UL (ref 0–0.2)
BASOPHILS NFR BLD: 0 % (ref 0–2)
BUN SERPL-MCNC: 20 MG/DL (ref 8–23)
BUN/CREAT SERPL: 18 (ref 9–20)
CALCIUM SERPL-MCNC: 8.5 MG/DL (ref 8.6–10.4)
CHLORIDE SERPL-SCNC: 104 MMOL/L (ref 98–107)
CO2 SERPL-SCNC: 19 MMOL/L (ref 20–31)
CREAT SERPL-MCNC: 1.1 MG/DL (ref 0.5–0.9)
CRP SERPL HS-MCNC: 99.6 MG/L (ref 0–5)
ECHO AO ASC DIAM: 2.3 CM
ECHO AO ASCENDING AORTA INDEX: 1.28 CM/M2
ECHO AO ROOT DIAM: 2.9 CM
ECHO AO ROOT INDEX: 1.62 CM/M2
ECHO AR MAX VEL PISA: 1.4 M/S
ECHO AV AREA PEAK VELOCITY: 1.6 CM2
ECHO AV AREA/BSA PEAK VELOCITY: 0.9 CM2/M2
ECHO AV CUSP MM: 1.5 CM
ECHO AV PEAK GRADIENT: 6 MMHG
ECHO AV PEAK VELOCITY: 1.2 M/S
ECHO AV REGURGITANT PHT: 376.2 MILLISECOND
ECHO AV VELOCITY RATIO: 0.58
ECHO BSA: 1.79 M2
ECHO EST RA PRESSURE: 5 MMHG
ECHO LA DIAMETER INDEX: 1.96 CM/M2
ECHO LA DIAMETER: 3.5 CM
ECHO LA TO AORTIC ROOT RATIO: 1.21
ECHO LA VOL A-L A4C: 34 ML (ref 22–52)
ECHO LA VOL MOD A4C: 32 ML (ref 22–52)
ECHO LA VOLUME INDEX A-L A4C: 19 ML/M2 (ref 16–34)
ECHO LA VOLUME INDEX MOD A4C: 18 ML/M2 (ref 16–34)
ECHO LV EDV A4C: 107 ML
ECHO LV EDV INDEX A4C: 60 ML/M2
ECHO LV EJECTION FRACTION A4C: 52 %
ECHO LV ESV A4C: 51 ML
ECHO LV ESV INDEX A4C: 28 ML/M2
ECHO LV FRACTIONAL SHORTENING: 13 % (ref 28–44)
ECHO LV INTERNAL DIMENSION DIASTOLE INDEX: 2.63 CM/M2
ECHO LV INTERNAL DIMENSION DIASTOLIC: 4.7 CM (ref 3.9–5.3)
ECHO LV INTERNAL DIMENSION SYSTOLIC INDEX: 2.29 CM/M2
ECHO LV INTERNAL DIMENSION SYSTOLIC: 4.1 CM
ECHO LV IVSD: 0.8 CM (ref 0.6–0.9)
ECHO LV MASS 2D: 142.7 G (ref 67–162)
ECHO LV MASS INDEX 2D: 79.7 G/M2 (ref 43–95)
ECHO LV POSTERIOR WALL DIASTOLIC: 1 CM (ref 0.6–0.9)
ECHO LV RELATIVE WALL THICKNESS RATIO: 0.43
ECHO LVOT AREA: 2.8 CM2
ECHO LVOT DIAM: 1.9 CM
ECHO LVOT MEAN GRADIENT: 1 MMHG
ECHO LVOT PEAK GRADIENT: 2 MMHG
ECHO LVOT PEAK VELOCITY: 0.7 M/S
ECHO LVOT STROKE VOLUME INDEX: 24.4 ML/M2
ECHO LVOT SV: 43.6 ML
ECHO LVOT VTI: 15.4 CM
ECHO MV A VELOCITY: 0.69 M/S
ECHO MV AREA PHT: 3.1 CM2
ECHO MV E DECELERATION TIME (DT): 245.6 MS
ECHO MV E VELOCITY: 1.17 M/S
ECHO MV E/A RATIO: 1.7
ECHO MV PRESSURE HALF TIME (PHT): 71.2 MS
ECHO PV MAX VELOCITY: 0.9 M/S
ECHO PV PEAK GRADIENT: 3 MMHG
ECHO RIGHT VENTRICULAR SYSTOLIC PRESSURE (RVSP): 25 MMHG
ECHO RV INTERNAL DIMENSION: 2.7 CM
ECHO TV REGURGITANT MAX VELOCITY: 2.23 M/S
ECHO TV REGURGITANT PEAK GRADIENT: 20 MMHG
EKG ATRIAL RATE: 114 BPM
EKG P AXIS: 95 DEGREES
EKG P-R INTERVAL: 136 MS
EKG Q-T INTERVAL: 320 MS
EKG Q-T INTERVAL: 350 MS
EKG QRS DURATION: 130 MS
EKG QRS DURATION: 92 MS
EKG QTC CALCULATION (BAZETT): 482 MS
EKG QTC CALCULATION (BAZETT): 518 MS
EKG R AXIS: 52 DEGREES
EKG R AXIS: 73 DEGREES
EKG T AXIS: -99 DEGREES
EKG T AXIS: 100 DEGREES
EKG VENTRICULAR RATE: 114 BPM
EKG VENTRICULAR RATE: 158 BPM
EOSINOPHIL # BLD: 0 K/UL (ref 0–0.4)
EOSINOPHILS RELATIVE PERCENT: 0 % (ref 0–5)
ERYTHROCYTE [DISTWIDTH] IN BLOOD BY AUTOMATED COUNT: 16 % (ref 12.1–15.2)
GFR, ESTIMATED: 49 ML/MIN/1.73M2
GLUCOSE SERPL-MCNC: 133 MG/DL (ref 70–99)
HCT VFR BLD AUTO: 33.3 % (ref 36–46)
HGB BLD-MCNC: 10.3 G/DL (ref 12–16)
IMM GRANULOCYTES # BLD AUTO: 0.07 K/UL (ref 0–0.3)
IMM GRANULOCYTES NFR BLD: 1 % (ref 0–5)
LYMPHOCYTES NFR BLD: 0.44 K/UL (ref 1–4.8)
LYMPHOCYTES RELATIVE PERCENT: 8 % (ref 15–40)
MCH RBC QN AUTO: 28.4 PG (ref 26–34)
MCHC RBC AUTO-ENTMCNC: 30.9 G/DL (ref 31–37)
MCV RBC AUTO: 91.7 FL (ref 80–100)
MONOCYTES NFR BLD: 0.21 K/UL (ref 0–1)
MONOCYTES NFR BLD: 4 % (ref 4–8)
NEUTROPHILS NFR BLD: 88 % (ref 47–75)
NEUTS SEG NFR BLD: 5.1 K/UL (ref 2.5–7)
PLATELET # BLD AUTO: 386 K/UL (ref 140–450)
PMV BLD AUTO: 9.2 FL (ref 6–12)
POTASSIUM SERPL-SCNC: 4.3 MMOL/L (ref 3.7–5.3)
RBC # BLD AUTO: 3.63 M/UL (ref 4–5.2)
SODIUM SERPL-SCNC: 137 MMOL/L (ref 135–144)
WBC OTHER # BLD: 5.8 K/UL (ref 3.5–11)

## 2024-08-13 PROCEDURE — 99223 1ST HOSP IP/OBS HIGH 75: CPT | Performed by: INTERNAL MEDICINE

## 2024-08-13 PROCEDURE — 94640 AIRWAY INHALATION TREATMENT: CPT

## 2024-08-13 PROCEDURE — 6360000002 HC RX W HCPCS: Performed by: INTERNAL MEDICINE

## 2024-08-13 PROCEDURE — 86140 C-REACTIVE PROTEIN: CPT

## 2024-08-13 PROCEDURE — 94761 N-INVAS EAR/PLS OXIMETRY MLT: CPT

## 2024-08-13 PROCEDURE — 2580000003 HC RX 258: Performed by: INTERNAL MEDICINE

## 2024-08-13 PROCEDURE — 6370000000 HC RX 637 (ALT 250 FOR IP): Performed by: INTERNAL MEDICINE

## 2024-08-13 PROCEDURE — 93010 ELECTROCARDIOGRAM REPORT: CPT | Performed by: INTERNAL MEDICINE

## 2024-08-13 PROCEDURE — 1200000000 HC SEMI PRIVATE

## 2024-08-13 PROCEDURE — 85025 COMPLETE CBC W/AUTO DIFF WBC: CPT

## 2024-08-13 PROCEDURE — 93306 TTE W/DOPPLER COMPLETE: CPT | Performed by: INTERNAL MEDICINE

## 2024-08-13 PROCEDURE — 94669 MECHANICAL CHEST WALL OSCILL: CPT

## 2024-08-13 PROCEDURE — 80048 BASIC METABOLIC PNL TOTAL CA: CPT

## 2024-08-13 PROCEDURE — 93306 TTE W/DOPPLER COMPLETE: CPT

## 2024-08-13 PROCEDURE — 36415 COLL VENOUS BLD VENIPUNCTURE: CPT

## 2024-08-13 RX ORDER — ZIPRASIDONE HYDROCHLORIDE 20 MG/1
20 CAPSULE ORAL ONCE
Status: COMPLETED | OUTPATIENT
Start: 2024-08-13 | End: 2024-08-13

## 2024-08-13 RX ADMIN — DOCUSATE SODIUM 100 MG: 100 CAPSULE, LIQUID FILLED ORAL at 20:46

## 2024-08-13 RX ADMIN — ENOXAPARIN SODIUM 40 MG: 100 INJECTION SUBCUTANEOUS at 07:56

## 2024-08-13 RX ADMIN — POTASSIUM CHLORIDE 20 MEQ: 750 TABLET, FILM COATED, EXTENDED RELEASE ORAL at 20:46

## 2024-08-13 RX ADMIN — ASPIRIN 81 MG: 81 TABLET, COATED ORAL at 07:57

## 2024-08-13 RX ADMIN — DULOXETINE HYDROCHLORIDE 30 MG: 30 CAPSULE, DELAYED RELEASE ORAL at 20:47

## 2024-08-13 RX ADMIN — DOCUSATE SODIUM 100 MG: 100 CAPSULE, LIQUID FILLED ORAL at 07:58

## 2024-08-13 RX ADMIN — POLYETHYLENE GLYCOL 3350 17 G: 17 POWDER, FOR SOLUTION ORAL at 17:30

## 2024-08-13 RX ADMIN — REMDESIVIR 100 MG: 100 INJECTION, POWDER, LYOPHILIZED, FOR SOLUTION INTRAVENOUS at 20:54

## 2024-08-13 RX ADMIN — GUAIFENESIN 600 MG: 600 TABLET ORAL at 07:58

## 2024-08-13 RX ADMIN — POTASSIUM CHLORIDE 20 MEQ: 750 TABLET, FILM COATED, EXTENDED RELEASE ORAL at 07:57

## 2024-08-13 RX ADMIN — SODIUM CHLORIDE, PRESERVATIVE FREE 10 ML: 5 INJECTION INTRAVENOUS at 08:09

## 2024-08-13 RX ADMIN — SODIUM BICARBONATE 650 MG: 650 TABLET ORAL at 07:57

## 2024-08-13 RX ADMIN — METOPROLOL SUCCINATE 25 MG: 25 TABLET, FILM COATED, EXTENDED RELEASE ORAL at 07:58

## 2024-08-13 RX ADMIN — LEVOTHYROXINE SODIUM 100 MCG: 100 TABLET ORAL at 06:28

## 2024-08-13 RX ADMIN — CLOPIDOGREL BISULFATE 75 MG: 75 TABLET ORAL at 07:58

## 2024-08-13 RX ADMIN — PANTOPRAZOLE SODIUM 20 MG: 20 TABLET, DELAYED RELEASE ORAL at 06:28

## 2024-08-13 RX ADMIN — SODIUM CHLORIDE, PRESERVATIVE FREE 10 ML: 5 INJECTION INTRAVENOUS at 21:31

## 2024-08-13 RX ADMIN — SODIUM BICARBONATE 650 MG: 650 TABLET ORAL at 20:46

## 2024-08-13 RX ADMIN — PSEUDOEPHEDRINE HCL 60 MG: 30 TABLET, FILM COATED ORAL at 07:56

## 2024-08-13 RX ADMIN — DULOXETINE HYDROCHLORIDE 30 MG: 30 CAPSULE, DELAYED RELEASE ORAL at 07:57

## 2024-08-13 RX ADMIN — DEXAMETHASONE 6 MG: 4 TABLET ORAL at 07:56

## 2024-08-13 RX ADMIN — GUAIFENESIN 600 MG: 600 TABLET ORAL at 20:47

## 2024-08-13 RX ADMIN — ISOSORBIDE MONONITRATE 30 MG: 30 TABLET, EXTENDED RELEASE ORAL at 07:58

## 2024-08-13 RX ADMIN — FLUOXETINE HYDROCHLORIDE 40 MG: 10 CAPSULE ORAL at 07:56

## 2024-08-13 RX ADMIN — FERROUS SULFATE TAB 325 MG (65 MG ELEMENTAL FE) 324 MG: 325 (65 FE) TAB at 07:57

## 2024-08-13 RX ADMIN — SODIUM CHLORIDE 30 ML: 9 INJECTION, SOLUTION INTRAVENOUS at 21:30

## 2024-08-13 RX ADMIN — FUROSEMIDE 10 MG: 20 TABLET ORAL at 07:56

## 2024-08-13 RX ADMIN — DIAZEPAM 2.5 MG: 5 TABLET ORAL at 20:46

## 2024-08-13 RX ADMIN — PSEUDOEPHEDRINE HCL 60 MG: 30 TABLET, FILM COATED ORAL at 20:46

## 2024-08-13 RX ADMIN — DILTIAZEM HYDROCHLORIDE 120 MG: 120 CAPSULE, COATED, EXTENDED RELEASE ORAL at 07:57

## 2024-08-13 RX ADMIN — ZIPRASIDONE HYDROCHLORIDE 20 MG: 20 CAPSULE ORAL at 00:08

## 2024-08-13 RX ADMIN — BARICITINIB 2 MG: 2 TABLET, FILM COATED ORAL at 08:09

## 2024-08-13 ASSESSMENT — PAIN - FUNCTIONAL ASSESSMENT
PAIN_FUNCTIONAL_ASSESSMENT: NONE - DENIES PAIN

## 2024-08-13 NOTE — CONSULTS
Remdesivir Initiation & Daily Monitoring    Criteria for use (confirm all are met): YES  Suspected/Confirmed COVID-19 positive and requiring hospitalization: 8/11  Patient requires supplemental oxygen: 3 L/min  Baseline ALT: 73    CrCl: Estimated Creatinine Clearance: 37 mL/min (A) (based on SCr of 1.1 mg/dL (H)).    Current ALT: 73    Anticipated stop date: 8/16    Assessment of corticosteroid therapy:  Dexamethasone indicated? Y  If yes, is dexamethasone ordered? 6 mg PO Q24H x 6D  Baseline CRP: ? mg/L  Current CRP: 99.6 mg/L      Consult request from Dr Holliday. Remdesivir 200 mg initiated last night with 100 mg daily scheduled for the next 4 days. Pharmacy will continue to monitor.    Tylor Crystal, PharmD 8/13/2024 7:26 AM

## 2024-08-13 NOTE — VIRTUAL HEALTH
Megan A Heabler, was evaluated through a synchronous (real-time) audio-video encounter. The patient (and/or guardian if applicable) is aware that this is a billable service, which includes applicable co-pays. This virtual visit was conducted with patient's (and/or legal guardian's) consent. Patient identification was verified, and a caregiver was present when appropriate.  The patient was located at Facility (Appt Department): [facility]  [facility] MED SURG TELEMETRY  [address] [facility] [address]  Loc: [phone]  The provider was located at Facility (Login Dept): [facility]  [address] [facility] [id number] [phone]  Confirm you are appropriately licensed, registered, or certified to deliver care in the state where the patient is located as indicated above. If you are not or unsure, please re-schedule the visit: Yes, I confirm.   Consults    Progress Note       Total time spent on this encounter: Not billed by time    --Nathan Holliday MD on 8/13/2024 at 6:44 AM    An electronic signature was used to authenticate this note.      [facility] - Progress Note COVID 19 Patient  Telemedicine  Today's Date and Time: 8/13/2024, 6:44 AM    Impression :     COVID 19 Confirmed Infection  Covid tests:  Positive Covid Test Date: 8-11-24 at [facility]  Vaccination Status: Received 6 vaccines  Hypoxia  CAD with prior CABG x 3 vx and 8 stents  Essential HTN  Hypothyroidism  Squamous cell carcinoma of parotid gland   Macular degeneration Rt eye      Recommendations:   Antibiotic treatment:  Monitor off antibiotics  Covid Rx:    Remdesivir: Requested 8-12-24. Stop date 8-16-24  Decadron: 6 mg po q day. Started 8-12-24. Stop date 8-17-24  Actemra: Not necessary at this point. Baricitinib started 8-13-24 because of very high CRP  Paxlovid: Uncertain time of onset  Monitor CRP: 99.6      Medical Decision Making/Summary/Discussion:8/13/2024     Patient admitted with  clear, without erythema, exudate, or thrush. No tenderness of sinuses. Mouth/throat: mucosa pink and moist. No lesions. Dentition in good repair.  Neck:Supple, without lymphadenopathy. Thyroid normal, No bruits.  Pulmonary/Chest: Decreased breath sounds with bibasilar rales.  No dullness to percussion. Labored breathing  Cardiovascular: Regular rate and rhythm without murmurs, rubs, or gallops. Tachycardia  Abdomen: Soft, non tender. Bowel sounds normal. No organomegaly  All four Extremities: No cyanosis, clubbing, edema, or effusions.  Neurologic: No gross sensory or motor deficits.  Skin: Warm and dry with good turgor.No signs of peripheral arterial or venous insufficiency. No ulcerations. No open wounds.    Medical Decision Making -Laboratory:   I have independently reviewed/ordered the following labs:    CBC with Differential:   Recent Labs     08/12/24  1145 08/13/24  0530   WBC 9.1 5.8   HGB 11.5* 10.3*   HCT 38.3 33.3*   * 386   LYMPHOPCT 20 8*   MONOPCT 5 4   EOSPCT 1 0     BMP:   Recent Labs     08/12/24  1145 08/13/24  0530    137   K 4.8 4.3   CL 99 104   CO2 19* 19*   BUN 15 20   CREATININE 1.3* 1.1*     Hepatic Function Panel:   Recent Labs     08/12/24  1145   BILITOT 0.2*   ALKPHOS 319*   ALT 73*   AST 37*     No results for input(s): \"RPR\" in the last 72 hours.  No results for input(s): \"HIV\" in the last 72 hours.  No results for input(s): \"BC\" in the last 72 hours.  Lab Results   Component Value Date/Time    BACTERIA 1+ 07/08/2024 07:05 PM    MUCUS RARE 08/12/2024 04:45 PM    RBC 3.63 08/13/2024 05:30 AM    TRICHOMONAS NOT REPORTED 09/20/2019 09:44 AM    WBC 5.8 08/13/2024 05:30 AM    YEAST NOT REPORTED 09/20/2019 09:44 AM    TURBIDITY Clear 08/12/2024 04:45 PM     Lab Results   Component Value Date/Time    CREATININE 1.1 08/13/2024 05:30 AM    GLUCOSE 133 08/13/2024 05:30 AM       Medical Decision Making-Imaging:   XR CHEST PORTABLE    Result Date: 8/12/2024  EXAM: XR CHEST PORTABLE

## 2024-08-13 NOTE — CONSULTS
g/dL  Chronically immunosuppressed patients (drug or disease etiology)   Serious Side Effects Venous Thrombosis (including pulmonary embolism)  VTE prophylaxis is recommended unless contraindicated Serious Secondary Infections  Avoid in patients with active tuberculosis   Ordering provider type Not restricted   Route of Administration Oral, Gastrostomy Tube (G-tube), Nasogastric Tube (NG-tube)*   Dosing Patients ? 9 years of age  eGFR ?60: 4 mg once daily  eGFR 30 to <60: 2 mg once daily  eGFR 15 to <30: 1 mg once daily if potential benefit outweighs risk  eGFR ? 15: not recommended  On dialysis, ESRD, or LAZARO: not recommended  Patients age 2 to <9 years  eGFR ?60: 2 mg once daily  eGFR 30 to <60: 1 mg once daily  eGFR <30: not recommended     Lack of Information Has not been studied with other KINJAL inhibitors or biologic [DMARDs] disease modifying anti-rheumatic drugs (biologics targeting cytokines, B- cells, or T-cells). Keep in mind, corticosteroids are technically DMARDs and are a prerequisite to baricitinib administration.   Drug Interactions Strong OAT3 inhibitors like probenecid and benzylpenicillin  In event patient is on these medicines, cut baricitinib dose in half.  If patient is already on only 1 mg, stop OAT3 inhibitor.   Absolute Lymphocyte Count (ALC) ?200 cells/mcL: maintain current dose  <200 cells/mcL: consider interruption until ALC ?200 cells/mcL   Absolute Neutrophil  Count (ANC) ?500 cells/mcL: maintain current dose  <500 cells/mcL: consider interruption until ANC ?500 cells/mcL   Aminotransferases If ALT or AST is elevated and DRUG-INDUCED LIVER INJURY IS SUSPECTED, interrupt baricitinib until drug-induced liver injury is  excluded.     Preparing Alternative Administration   GIVEN WITH OR WITHOUT FOOD   Dispersed Tablets in Water    For patients who are unable to swallow whole tablets, 1-mg and/or 2-mg baricitinib tablet(s), or any combination of tablets necessary to achieve the desired dose up  initiated by Dr Holliday for baricitinib 4 mg daily for 14 days. Based on recommended renal insufficiency dosing, the dose was lowered to 2 mg daily for an eGFR of 49 (Per Tere, eGFR 30 to <60 mL/minute/1.73 m2: 2 mg once daily). Daily CMP ordered per consult requirements. Pharmacy will continue to monitor.    Tylor Crystal, Elin 8/13/2024 8:14 AM

## 2024-08-13 NOTE — CONSULTS
[facility]                [address] [place] [address]                              CONSULTATION      PATIENT NAME: NERI MEDINA             : 1940  MED REC NO: [id number]                          ROOM: 0264  ACCOUNT NO: [id number]                       ADMIT DATE: 2024  PROVIDER: Shaq Almonte MD      CONSULT DATE: 2024    REASON FOR CONSULT:    1. Tachycardia at 170 beats per minute secondary to atrial flutter with 2:1 conduction of SVT with conversion in normal sinus rhythm.  2. Marked shortness of breath secondary to CHF from her tachycardia and from COVID pneumonia.  3. Mild elevation in the troponin secondary to demand ischemia.    HISTORY OF PRESENT ILLNESS:  The patient is an 84-year-old female who resides at the [facility].  She is very complex, has a history of severe CAD.  She had a catheterization by Dr. Reynoso in  that showed 80% disease in the ostium of the right coronary artery with unremarkable circumflex.  The LAD had 80% disease, EF 45%, and she had open-heart surgery by Dr. Jefferson Medrano on May 12, 2006, to the LIMA to LAD, vein graft to diagonal, and vein graft to the right coronary artery.    In 2007, a catheterization showed occluded vein graft to the right coronary artery, occluded vein graft to the diagonal, patent LIMA to LAD, and we stented her right coronary artery with a 3.0 x 16 mm Taxus stent.    On 2012, she had a nonfunctional LIMA to LAD with 90% disease in the LAD and diagonal at the bifurcation site with 90% disease in the right coronary artery with stent placement in the LAD and diagonal placing 3.5 x 24 mm PROMUS stent in LAD and diagonal 2 stent struts.  We brought her back on 2012.  She had a complex angioplasty of the right coronary artery placing 3.0 x 20 mm PROMUS stent.    On 2016, she had restenosis of her proximal right coronary artery which was treated with

## 2024-08-13 NOTE — H&P
History & Physical    Patient:  Megan Smith  YOB: 1940  Date of Service: 8/13/2024  MRN: [id number]   Acct:   [id number]   Primary Care Physician: Jose Maria Contreras MD    Chief Complaint:   Chief Complaint   Patient presents with    Shortness of Breath     Patient brought in by [facility] for respiratory distress. Patient brought in on BiPap. Patient has pneumonia and is COVID positive. Patient is [employer]       History of Present Illness:         The patient is a 84 y.o. female with history of severe CAD/CABG x 3, CKD stage IIIb, chronic anemia, hypertension, chronic metabolic acidosis, chronic back pain due to lumbar spinal stenosis, hypothyroidism, anxiety/depression disorder GERD, recent acute intertrochanteric fracture of the proximal right femur s/p right hip intramedullary nailing on 6/3/2024 for which she was hospitalized at our facility and discharged to the [facility] on 6/5/2024.  Apparently she developed severe shortness of breath yesterday morning, became hypoxic and was brought to the emergency room on BiPAP.  She was diagnosed with COVID infection the day before at [facility].  The patient is very confused, not oriented and unable to provide me with history.  She has no complaints this morning.  Patient's chart, EMR records reviewed.  Workup in the emergency room revealed temp 100.2, respirations 40, heart rate 154, initial blood pressure 175/104, she was 100% on 70% FiO2 BiPAP.  BMP revealed sodium 136, potassium 4.8, chloride 99, carbon dioxide 19, BUN 15, creatinine 1.3.  Glucose 167.  Calcium 8.8.  proBNP was 8, 359, troponin was 64 followed by 122.  CBC revealed WBC count 9.1, H&H 11.5/38.3, platelets 584.  Portable chest x-ray revealed significant worsening in appearance of the chest suggestive of pulmonary edema bilaterally with likely new small pleural effusions.  EKG revealed tachycardia with rate of 158, left bundle branch block.  The patient was noted to be in atrial flutter and was  Z87.81    Acute hypoxemic respiratory failure (HCC) J96.01       DVT prophylaxis:   [x] Lovenox   [] SCDs   [] SQ Heparin   [] Encourage ambulation, low risk for DVT, no chemical or mechanical    prophylaxis necessary      [] Already on Anticoagulation      Documentation of the Current Medications in the Medical Record        ( x)  I have utilized all available immediate resources to obtain, update, or review the patient's current medications (including all prescriptions, over-the-counter products, herbals, cannabis / cannabidiol products, vitamin / mineral / dietary (nutritional) supplements).  (Satisfies MIPS Performance)  If Yes, Stop Here  ( )  The patient is not eligible for medication reconciliation; the patient is in an emergent medical situation where delaying treatment would jeopardize the patient's health.  (MIPS Performance exception / exclusion)  ( )  I did not confirm, update or review the patient's current list of medications today.  (Does not satisfy MIPS Performance)        Advanced Care Plan          (x )  I confirmed that the patient's Advanced Care Plan is present, code status documented, or surrogate decision maker is listed in the patient's medical record.  ( )  The patient's advanced care plan is not present because:  (select)   ( ) I confirmed today that the patient does not wish or was not able to name a surrogate decision maker or provide an Advance Care Plan.   ( ) Hospice care is currently being provided or has been provided this calender year.  ( )  I did not confirm today the presence of an Advance Care Plan or surrogate decision maker documented within the patient's medical record. (Does not satisfy MIPS performance).            Geo Sharpe MD, MD  Admitting Hospitalist

## 2024-08-13 NOTE — PROGRESS NOTES
Telephone contact attempted with Patient's  this a.m. to discuss discharge plans.  Voicemail message not left due to no answering machine picked up.  Patient is an 84 year old  white female, admitted from the [facility] with a diagnosis of Pneumonia and Covid.  Secondary diagnosis of Anxiety and Depression noted as well.  Patient is noted to be oriented to person only.  Spoke with Heydi from the [facility] who states that she is now a long term resident at the [facility] and they will admit her back there when she is medically stable for discharge from the hospital.  [facility] to monitor for needs and be available as appropriate.    JACINDA Gaming  8/13/2024

## 2024-08-13 NOTE — CARE COORDINATION
Case Management Assessment  Initial Evaluation    Date/Time of Evaluation: 8/13/2024 10:22 AM  Assessment Completed by: Tam Shaw RN    If patient is discharged prior to next notation, then this note serves as note for discharge by case management.    Patient Name: Megan Smith                   YOB: 1940  Diagnosis: Shortness of breath [R06.02]  Acute systolic congestive heart failure (HCC) [I50.21]  Acute hypoxemic respiratory failure (HCC) [J96.01]  Atrial flutter, unspecified type (HCC) [I48.92]  COVID-19 virus infection [U07.1]                   Date / Time: 8/12/2024 11:35 AM    Patient Admission Status: Inpatient   Readmission Risk (Low < 19, Mod (19-27), High > 27): Readmission Risk Score: 24.2    Current PCP: Jose Maria Contreras MD  PCP verified by CM? (P) Yes (long term care at [facility])    Chart Reviewed: Yes      History Provided by: (P) Medical Record  Patient Orientation: (P) Person    Patient Cognition: (P) Dementia / Early Alzheimer's    Hospitalization in the last 30 days (Readmission):  No    If yes, Readmission Assessment in  Navigator will be completed.    Advance Directives:      Code Status: DNR-CC   Patient's Primary Decision Maker is: (P) Named in Scanned ACP Document    Primary Decision Maker: Kvng Smith - Oswald - [phone]    Discharge Planning:    Patient lives with: (P) Other (Comment) ([facility]) Type of Home: (P) Long-Term Care  Primary Care Giver: (P) Other (Comment) ([facility] staff)  Patient Support Systems include: (P) Spouse/Significant Other, Family Members, Other (Comment) ([facility] staff)   Current Financial resources: (P) Medicare  Current community resources: (P) None  Current services prior to admission: (P) Extended Care Facility            Current DME:              Type of Home Care services:  (P) None    ADLS  Prior functional level: (P) Assistance with the following:, Mobility, Housework, Cooking, Dressing, Bathing,  (HCC) [J96.01]  Atrial flutter, unspecified type (HCC) [I48.92]  COVID-19 virus infection [U07.1]    IF APPLICABLE: The Patient and/or patient representative Megan and her family were provided with a choice of provider and agrees with the discharge plan. Freedom of choice list with basic dialogue that supports the patient's individualized plan of care/goals and shares the quality data associated with the providers was provided to: (P) Patient   Patient Representative Name:       The Patient and/or Patient Representative Agree with the Discharge Plan? (P) Yes    Tam Shaw RN  Case Management Department  Ph: [phone] Fax: [phone]

## 2024-08-13 NOTE — PROGRESS NOTES
Cardiology  Full note dictated    Tachycardia at 170 bpm secondary to Atrial flutter, which converted to NSR  SOB with CHF secondary to COVID and tachycardia  Hx of EF 55%  Severe CAD  Elevated Trop most likely secondary to demand ischemia.    She has converted back to NSR.  Will be difficult to determine fluid status with COVID pneumonia and CHF.      Its reasonable to continue same meds with low dose Lasix and repeat cxr tomorrow.  If there is no improvement, would give IV lasix 20 mg bid for 3 days, and reassess.    Echo is pending to evaluate LV function.    I don't think her elevated trop is due to NSTEMI, but rather demand ischemia.    Hemodynamically stable at this point.    Thanks, Shaq Almonte MD

## 2024-08-13 NOTE — PLAN OF CARE
Problem: Discharge Planning  Goal: Discharge to home or other facility with appropriate resources  8/13/2024 1215 by Marilyn Bales RN  Outcome: Progressing  8/12/2024 2242 by Misael Talamantes RN  Outcome: Progressing     Problem: ABCDS Injury Assessment  Goal: Absence of physical injury  8/13/2024 1215 by Marilyn Bales RN  Outcome: Progressing  8/12/2024 2242 by Misael Talamantes RN  Outcome: Progressing     Problem: Skin/Tissue Integrity  Goal: Absence of new skin breakdown  Description: 1.  Monitor for areas of redness and/or skin breakdown  2.  Assess vascular access sites hourly  3.  Every 4-6 hours minimum:  Change oxygen saturation probe site  4.  Every 4-6 hours:  If on nasal continuous positive airway pressure, respiratory therapy assess nares and determine need for appliance change or resting period.  8/13/2024 1215 by Marilyn Bales RN  Outcome: Progressing  8/12/2024 2242 by Misael Talamantes RN  Outcome: Progressing     Problem: Safety - Adult  Goal: Free from fall injury  8/13/2024 1215 by Marilyn Bales RN  Outcome: Progressing  8/12/2024 2242 by Misael Talamantes RN  Outcome: Progressing     Problem: Neurosensory - Adult  Goal: Achieves stable or improved neurological status  8/13/2024 1215 by Marilyn Bales RN  Outcome: Progressing  8/12/2024 2242 by Misael Talamantes RN  Outcome: Progressing  Goal: Achieves maximal functionality and self care  8/13/2024 1215 by Marilyn Bales RN  Outcome: Progressing  8/12/2024 2242 by Misael Talamantes RN  Outcome: Progressing     Problem: Skin/Tissue Integrity - Adult  Goal: Skin integrity remains intact  8/13/2024 1215 by Marilyn Bales RN  Outcome: Progressing  8/12/2024 2242 by Misael Talamantes RN  Outcome: Progressing     Problem: Genitourinary - Adult  Goal: Urinary catheter remains patent  8/13/2024 1215 by Marilyn Bales RN  Outcome: Progressing  8/12/2024 2242 by Misael Talamantes RN  Outcome: Progressing     Problem:  Metabolic/Fluid and Electrolytes - Adult  Goal: Electrolytes maintained within normal limits  8/13/2024 1215 by Marilyn Bales, RN  Outcome: Progressing  8/12/2024 2242 by Misael Talamantes, RN  Outcome: Progressing

## 2024-08-13 NOTE — PLAN OF CARE
Problem: Discharge Planning  Goal: Discharge to home or other facility with appropriate resources  Outcome: Progressing     Problem: ABCDS Injury Assessment  Goal: Absence of physical injury  Outcome: Progressing     Problem: Skin/Tissue Integrity  Goal: Absence of new skin breakdown  Description: 1.  Monitor for areas of redness and/or skin breakdown  2.  Assess vascular access sites hourly  3.  Every 4-6 hours minimum:  Change oxygen saturation probe site  4.  Every 4-6 hours:  If on nasal continuous positive airway pressure, respiratory therapy assess nares and determine need for appliance change or resting period.  8/12/2024 2242 by Misael Talamantes RN  Outcome: Progressing  8/12/2024 1541 by Heriberto Aponte RN  Outcome: Progressing     Problem: Safety - Adult  Goal: Free from fall injury  8/12/2024 2242 by Misael Talamantes RN  Outcome: Progressing  8/12/2024 1541 by Heriberto Aponte, RN  Outcome: Progressing     Problem: Neurosensory - Adult  Goal: Achieves stable or improved neurological status  Outcome: Progressing  Goal: Achieves maximal functionality and self care  Outcome: Progressing     Problem: Skin/Tissue Integrity - Adult  Goal: Skin integrity remains intact  Outcome: Progressing     Problem: Genitourinary - Adult  Goal: Urinary catheter remains patent  Outcome: Progressing     Problem: Metabolic/Fluid and Electrolytes - Adult  Goal: Electrolytes maintained within normal limits  Outcome: Progressing

## 2024-08-14 ENCOUNTER — APPOINTMENT (OUTPATIENT)
Dept: GENERAL RADIOLOGY | Age: 84
DRG: 177 | End: 2024-08-14
Payer: MEDICARE

## 2024-08-14 LAB
ALBUMIN SERPL-MCNC: 3 G/DL (ref 3.5–5.2)
ALP SERPL-CCNC: 221 U/L (ref 35–104)
ALT SERPL-CCNC: 46 U/L (ref 5–33)
ANION GAP SERPL CALCULATED.3IONS-SCNC: 13 MMOL/L (ref 9–17)
AST SERPL-CCNC: 29 U/L
BASOPHILS # BLD: 0.01 K/UL (ref 0–0.2)
BASOPHILS NFR BLD: 0 % (ref 0–2)
BILIRUB SERPL-MCNC: 0.2 MG/DL (ref 0.3–1.2)
BUN SERPL-MCNC: 35 MG/DL (ref 8–23)
BUN/CREAT SERPL: 29 (ref 9–20)
CALCIUM SERPL-MCNC: 7.9 MG/DL (ref 8.6–10.4)
CHLORIDE SERPL-SCNC: 107 MMOL/L (ref 98–107)
CO2 SERPL-SCNC: 20 MMOL/L (ref 20–31)
CREAT SERPL-MCNC: 1.2 MG/DL (ref 0.5–0.9)
CRP SERPL HS-MCNC: 57.4 MG/L (ref 0–5)
EOSINOPHIL # BLD: 0 K/UL (ref 0–0.4)
EOSINOPHILS RELATIVE PERCENT: 0 % (ref 0–5)
ERYTHROCYTE [DISTWIDTH] IN BLOOD BY AUTOMATED COUNT: 16 % (ref 12.1–15.2)
GFR, ESTIMATED: 44 ML/MIN/1.73M2
GLUCOSE SERPL-MCNC: 107 MG/DL (ref 70–99)
HCT VFR BLD AUTO: 28 % (ref 36–46)
HGB BLD-MCNC: 8.9 G/DL (ref 12–16)
IMM GRANULOCYTES # BLD AUTO: 0.1 K/UL (ref 0–0.3)
IMM GRANULOCYTES NFR BLD: 2 % (ref 0–5)
LYMPHOCYTES NFR BLD: 0.71 K/UL (ref 1–4.8)
LYMPHOCYTES RELATIVE PERCENT: 12 % (ref 15–40)
MAGNESIUM SERPL-MCNC: 2.1 MG/DL (ref 1.6–2.6)
MCH RBC QN AUTO: 28.9 PG (ref 26–34)
MCHC RBC AUTO-ENTMCNC: 31.8 G/DL (ref 31–37)
MCV RBC AUTO: 90.9 FL (ref 80–100)
MONOCYTES NFR BLD: 0.54 K/UL (ref 0–1)
MONOCYTES NFR BLD: 9 % (ref 4–8)
NEUTROPHILS NFR BLD: 77 % (ref 47–75)
NEUTS SEG NFR BLD: 4.42 K/UL (ref 2.5–7)
PLATELET # BLD AUTO: 405 K/UL (ref 140–450)
PMV BLD AUTO: 9.2 FL (ref 6–12)
POTASSIUM SERPL-SCNC: 4.6 MMOL/L (ref 3.7–5.3)
PROT SERPL-MCNC: 5.6 G/DL (ref 6.4–8.3)
RBC # BLD AUTO: 3.08 M/UL (ref 4–5.2)
SODIUM SERPL-SCNC: 140 MMOL/L (ref 135–144)
WBC OTHER # BLD: 5.8 K/UL (ref 3.5–11)

## 2024-08-14 PROCEDURE — 6370000000 HC RX 637 (ALT 250 FOR IP): Performed by: INTERNAL MEDICINE

## 2024-08-14 PROCEDURE — 1200000000 HC SEMI PRIVATE

## 2024-08-14 PROCEDURE — 94761 N-INVAS EAR/PLS OXIMETRY MLT: CPT

## 2024-08-14 PROCEDURE — 80053 COMPREHEN METABOLIC PANEL: CPT

## 2024-08-14 PROCEDURE — 94669 MECHANICAL CHEST WALL OSCILL: CPT

## 2024-08-14 PROCEDURE — 6360000002 HC RX W HCPCS: Performed by: INTERNAL MEDICINE

## 2024-08-14 PROCEDURE — 86140 C-REACTIVE PROTEIN: CPT

## 2024-08-14 PROCEDURE — 2580000003 HC RX 258: Performed by: INTERNAL MEDICINE

## 2024-08-14 PROCEDURE — 71045 X-RAY EXAM CHEST 1 VIEW: CPT

## 2024-08-14 PROCEDURE — 94640 AIRWAY INHALATION TREATMENT: CPT

## 2024-08-14 PROCEDURE — 83735 ASSAY OF MAGNESIUM: CPT

## 2024-08-14 PROCEDURE — 85025 COMPLETE CBC W/AUTO DIFF WBC: CPT

## 2024-08-14 PROCEDURE — 36415 COLL VENOUS BLD VENIPUNCTURE: CPT

## 2024-08-14 RX ORDER — BENZONATATE 100 MG/1
100 CAPSULE ORAL 3 TIMES DAILY PRN
Status: DISCONTINUED | OUTPATIENT
Start: 2024-08-14 | End: 2024-08-16 | Stop reason: HOSPADM

## 2024-08-14 RX ADMIN — FLUOXETINE HYDROCHLORIDE 40 MG: 10 CAPSULE ORAL at 08:07

## 2024-08-14 RX ADMIN — ENOXAPARIN SODIUM 40 MG: 100 INJECTION SUBCUTANEOUS at 08:08

## 2024-08-14 RX ADMIN — FUROSEMIDE 10 MG: 20 TABLET ORAL at 08:08

## 2024-08-14 RX ADMIN — BENZONATATE 100 MG: 100 CAPSULE ORAL at 08:11

## 2024-08-14 RX ADMIN — PSEUDOEPHEDRINE HCL 60 MG: 30 TABLET, FILM COATED ORAL at 08:08

## 2024-08-14 RX ADMIN — LEVOTHYROXINE SODIUM 100 MCG: 100 TABLET ORAL at 05:58

## 2024-08-14 RX ADMIN — DEXAMETHASONE 6 MG: 4 TABLET ORAL at 09:52

## 2024-08-14 RX ADMIN — IPRATROPIUM BROMIDE AND ALBUTEROL SULFATE 1 DOSE: .5; 3 SOLUTION RESPIRATORY (INHALATION) at 16:14

## 2024-08-14 RX ADMIN — PSEUDOEPHEDRINE HCL 60 MG: 30 TABLET, FILM COATED ORAL at 20:04

## 2024-08-14 RX ADMIN — SODIUM CHLORIDE, PRESERVATIVE FREE 10 ML: 5 INJECTION INTRAVENOUS at 08:09

## 2024-08-14 RX ADMIN — PANTOPRAZOLE SODIUM 20 MG: 20 TABLET, DELAYED RELEASE ORAL at 05:58

## 2024-08-14 RX ADMIN — BENZONATATE 100 MG: 100 CAPSULE ORAL at 13:55

## 2024-08-14 RX ADMIN — SODIUM BICARBONATE 650 MG: 650 TABLET ORAL at 20:03

## 2024-08-14 RX ADMIN — ASPIRIN 81 MG: 81 TABLET, COATED ORAL at 08:08

## 2024-08-14 RX ADMIN — DIAZEPAM 2.5 MG: 5 TABLET ORAL at 20:03

## 2024-08-14 RX ADMIN — ISOSORBIDE MONONITRATE 30 MG: 30 TABLET, EXTENDED RELEASE ORAL at 08:08

## 2024-08-14 RX ADMIN — FERROUS SULFATE TAB 325 MG (65 MG ELEMENTAL FE) 324 MG: 325 (65 FE) TAB at 08:08

## 2024-08-14 RX ADMIN — REMDESIVIR 100 MG: 100 INJECTION, POWDER, LYOPHILIZED, FOR SOLUTION INTRAVENOUS at 21:37

## 2024-08-14 RX ADMIN — ACETAMINOPHEN 650 MG: 325 TABLET, FILM COATED ORAL at 16:29

## 2024-08-14 RX ADMIN — DILTIAZEM HYDROCHLORIDE 120 MG: 120 CAPSULE, COATED, EXTENDED RELEASE ORAL at 08:08

## 2024-08-14 RX ADMIN — IPRATROPIUM BROMIDE AND ALBUTEROL SULFATE 1 DOSE: .5; 3 SOLUTION RESPIRATORY (INHALATION) at 20:54

## 2024-08-14 RX ADMIN — OXYCODONE HYDROCHLORIDE AND ACETAMINOPHEN 1 TABLET: 5; 325 TABLET ORAL at 08:08

## 2024-08-14 RX ADMIN — POTASSIUM CHLORIDE 20 MEQ: 750 TABLET, FILM COATED, EXTENDED RELEASE ORAL at 08:09

## 2024-08-14 RX ADMIN — CLOPIDOGREL BISULFATE 75 MG: 75 TABLET ORAL at 08:09

## 2024-08-14 RX ADMIN — DULOXETINE HYDROCHLORIDE 30 MG: 30 CAPSULE, DELAYED RELEASE ORAL at 08:08

## 2024-08-14 RX ADMIN — DOCUSATE SODIUM 100 MG: 100 CAPSULE, LIQUID FILLED ORAL at 08:08

## 2024-08-14 RX ADMIN — SODIUM BICARBONATE 650 MG: 650 TABLET ORAL at 08:09

## 2024-08-14 RX ADMIN — POTASSIUM CHLORIDE 20 MEQ: 750 TABLET, FILM COATED, EXTENDED RELEASE ORAL at 20:03

## 2024-08-14 RX ADMIN — BARICITINIB 2 MG: 2 TABLET, FILM COATED ORAL at 09:52

## 2024-08-14 RX ADMIN — DOCUSATE SODIUM 100 MG: 100 CAPSULE, LIQUID FILLED ORAL at 20:03

## 2024-08-14 RX ADMIN — GUAIFENESIN 600 MG: 600 TABLET ORAL at 20:03

## 2024-08-14 RX ADMIN — METOPROLOL SUCCINATE 25 MG: 25 TABLET, FILM COATED, EXTENDED RELEASE ORAL at 08:09

## 2024-08-14 RX ADMIN — GUAIFENESIN 600 MG: 600 TABLET ORAL at 08:08

## 2024-08-14 RX ADMIN — LORAZEPAM 0.5 MG: 2 INJECTION, SOLUTION INTRAMUSCULAR; INTRAVENOUS at 16:29

## 2024-08-14 RX ADMIN — SODIUM CHLORIDE, PRESERVATIVE FREE 10 ML: 5 INJECTION INTRAVENOUS at 20:03

## 2024-08-14 RX ADMIN — DULOXETINE HYDROCHLORIDE 30 MG: 30 CAPSULE, DELAYED RELEASE ORAL at 20:03

## 2024-08-14 RX ADMIN — IPRATROPIUM BROMIDE AND ALBUTEROL SULFATE 1 DOSE: .5; 3 SOLUTION RESPIRATORY (INHALATION) at 09:04

## 2024-08-14 ASSESSMENT — PAIN - FUNCTIONAL ASSESSMENT
PAIN_FUNCTIONAL_ASSESSMENT: NONE - DENIES PAIN
PAIN_FUNCTIONAL_ASSESSMENT: 0-10
PAIN_FUNCTIONAL_ASSESSMENT: ACTIVITIES ARE NOT PREVENTED
PAIN_FUNCTIONAL_ASSESSMENT: 0-10

## 2024-08-14 ASSESSMENT — PAIN DESCRIPTION - ORIENTATION
ORIENTATION: RIGHT
ORIENTATION: UPPER;MID

## 2024-08-14 ASSESSMENT — PAIN DESCRIPTION - DESCRIPTORS
DESCRIPTORS: ACHING
DESCRIPTORS: ACHING

## 2024-08-14 ASSESSMENT — PAIN SCALES - GENERAL
PAINLEVEL_OUTOF10: 3
PAINLEVEL_OUTOF10: 0
PAINLEVEL_OUTOF10: 0
PAINLEVEL_OUTOF10: 6

## 2024-08-14 ASSESSMENT — PAIN DESCRIPTION - PAIN TYPE: TYPE: ACUTE PAIN

## 2024-08-14 ASSESSMENT — PAIN DESCRIPTION - LOCATION
LOCATION: HIP
LOCATION: HEAD

## 2024-08-14 NOTE — VIRTUAL HEALTH
Megan A Heabler, was evaluated through a synchronous (real-time) audio-video encounter. The patient (and/or guardian if applicable) is aware that this is a billable service, which includes applicable co-pays. This virtual visit was conducted with patient's (and/or legal guardian's) consent. Patient identification was verified, and a caregiver was present when appropriate.  The patient was located at Facility (Appt Department): [facility]  [facility]  [address] [facility] [address]  Loc: [phone]  The provider was located at Facility (Login Dept): [facility]  [address] [facility] [id number] [phone]  Confirm you are appropriately licensed, registered, or certified to deliver care in the state where the patient is located as indicated above. If you are not or unsure, please re-schedule the visit: Yes, I confirm.   Consults    Progress Note       Total time spent on this encounter: Not billed by time    --Nathan Holliday MD on 8/14/2024 at 11:31 AM    An electronic signature was used to authenticate this note.      [facility] - Progress Note COVID 19 Patient  Telemedicine  Today's Date and Time: 8/14/2024, 11:31 AM    Impression :     COVID 19 Confirmed Infection  Covid tests:  Positive Covid Test Date: 8-11-24 at   Vaccination Status: Received 6 vaccines  Hypoxia  CAD with prior CABG x 3 vx and 8 stents  Essential HTN  Hypothyroidism  Squamous cell carcinoma of parotid gland   Macular degeneration Rt eye      Recommendations:   Antibiotic treatment:  Monitor off antibiotics  Covid Rx:    Remdesivir: Requested 8-12-24. Stop date 8-16-24  Decadron: 6 mg po q day. Started 8-12-24. Stop date 8-17-24  Actemra: Not necessary at this point. Baricitinib started 8-13-24 because of very high CRP  Paxlovid: Uncertain time of onset  Monitor CRP: 99.6-->57 with Decadron and Baricitinib plus treatment of CHF  Would suggest completing  provided by the patient, RN and physicians on site.   General Appearance: Awake, alert, and in respiratory apparent distress  Head:  Normocephalic, no trauma  Eyes: Pupils equal, round, reactive to light; sclera anicteric; conjunctivae pink. No embolic phenomena.  ENT: Oropharynx clear, without erythema, exudate, or thrush. No tenderness of sinuses. Mouth/throat: mucosa pink and moist. No lesions. Dentition in good repair.  Neck:Supple, without lymphadenopathy. Thyroid normal, No bruits.  Pulmonary/Chest: Decreased breath sounds with bibasilar rales.  No dullness to percussion. Labored breathing  Cardiovascular: Regular rate and rhythm without murmurs, rubs, or gallops. Tachycardia  Abdomen: Soft, non tender. Bowel sounds normal. No organomegaly  All four Extremities: No cyanosis, clubbing, edema, or effusions.  Neurologic: No gross sensory or motor deficits.  Skin: Warm and dry with good turgor.No signs of peripheral arterial or venous insufficiency. No ulcerations. No open wounds.    Medical Decision Making -Laboratory:   I have independently reviewed/ordered the following labs:    CBC with Differential:   Recent Labs     08/13/24  0530 08/14/24  0435   WBC 5.8 5.8   HGB 10.3* 8.9*   HCT 33.3* 28.0*    405   LYMPHOPCT 8* 12*   MONOPCT 4 9*   EOSPCT 0 0     BMP:   Recent Labs     08/13/24  0530 08/14/24  0435    140   K 4.3 4.6    107   CO2 19* 20   BUN 20 35*   CREATININE 1.1* 1.2*   MG  --  2.1     Hepatic Function Panel:   Recent Labs     08/12/24  1145 08/14/24  0435   BILITOT 0.2* 0.2*   ALKPHOS 319* 221*   ALT 73* 46*   AST 37* 29     No results for input(s): \"RPR\" in the last 72 hours.  No results for input(s): \"HIV\" in the last 72 hours.  No results for input(s): \"BC\" in the last 72 hours.  Lab Results   Component Value Date/Time    BACTERIA 1+ 07/08/2024 07:05 PM    MUCUS RARE 08/12/2024 04:45 PM    RBC 3.08 08/14/2024 04:35 AM    TRICHOMONAS NOT REPORTED 09/20/2019 09:44 AM    WBC 5.8

## 2024-08-14 NOTE — PLAN OF CARE
Problem: Discharge Planning  Goal: Discharge to home or other facility with appropriate resources  8/13/2024 2123 by Misael Talamantes RN  Outcome: Progressing  Flowsheets (Taken 8/13/2024 1420 by Madyson Vera, RN)  Discharge to home or other facility with appropriate resources:   Identify barriers to discharge with patient and caregiver   Arrange for needed discharge resources and transportation as appropriate   Identify discharge learning needs (meds, wound care, etc)   Refer to discharge planning if patient needs post-hospital services based on physician order or complex needs related to functional status, cognitive ability or social support system  8/13/2024 1215 by Marilyn Bales RN  Outcome: Progressing     Problem: ABCDS Injury Assessment  Goal: Absence of physical injury  8/13/2024 2123 by Misael Talamantes RN  Outcome: Progressing  8/13/2024 1215 by Marilyn Bales RN  Outcome: Progressing     Problem: Skin/Tissue Integrity  Goal: Absence of new skin breakdown  Description: 1.  Monitor for areas of redness and/or skin breakdown  2.  Assess vascular access sites hourly  3.  Every 4-6 hours minimum:  Change oxygen saturation probe site  4.  Every 4-6 hours:  If on nasal continuous positive airway pressure, respiratory therapy assess nares and determine need for appliance change or resting period.  8/13/2024 2123 by Misael Talamantes RN  Outcome: Progressing  8/13/2024 1215 by Marilyn Bales RN  Outcome: Progressing     Problem: Safety - Adult  Goal: Free from fall injury  8/13/2024 2123 by Misael Talamantes RN  Outcome: Progressing  8/13/2024 1215 by Marilyn Bales RN  Outcome: Progressing     Problem: Neurosensory - Adult  Goal: Achieves stable or improved neurological status  8/13/2024 2123 by Misael Talamantes RN  Outcome: Progressing  Flowsheets (Taken 8/13/2024 1420 by Madyson Vera, RN)  Achieves stable or improved neurological status: Assess for and report changes in neurological

## 2024-08-14 NOTE — CARE COORDINATION
08/14/24 1258   IMM Letter   IMM Letter given to Patient/Family/Significant other/Guardian/POA/by: second notice provided to , Kvng Healyr by JACINDA Gaming   IMM Letter date given: 08/14/24   IMM Letter time given: 1159     IMM letter provided to patient.  Patient offered four hours to make informed decision regarding appeal process; patient agreeable to discharge.      JACINDA Gaming  8/14/2024

## 2024-08-14 NOTE — PROGRESS NOTES
Hospitalist Progress Note  8/14/2024 8:11 AM  Subjective:   Admit Date: 8/12/2024  PCP: Jose Maria Contreras MD    Interval History:     The patient is awake this morning, eating breakfast.  Complains of cough, does not feel short of breath.  Has no pain, no nausea or vomiting.  Her vital stable, she does not require oxygen this morning.    Diet: ADULT DIET; Regular  Medications:   Scheduled Meds:   baricitinib  2 mg Oral Daily    aspirin  81 mg Oral Daily    clopidogrel  75 mg Oral Daily    dilTIAZem  120 mg Oral Daily    docusate sodium  100 mg Oral BID    DULoxetine  30 mg Oral BID    ferrous sulfate  324 mg Oral Daily with breakfast    FLUoxetine  40 mg Oral Daily    furosemide  10 mg Oral Daily    isosorbide mononitrate  30 mg Oral Daily    levothyroxine  100 mcg Oral Daily    lidocaine  1 patch TransDERmal Daily    metoprolol succinate  25 mg Oral Daily    pantoprazole  20 mg Oral QAM AC    potassium chloride  20 mEq Oral BID    sodium bicarbonate  650 mg Oral BID    sodium chloride flush  5-40 mL IntraVENous 2 times per day    enoxaparin  40 mg SubCUTAneous Daily    pseudoephedrine  60 mg Oral BID    And    guaiFENesin  600 mg Oral BID    dexAMETHasone  6 mg Oral Daily    remdesivir 100 mg in sodium chloride 0.9 % 250 mL IVPB  100 mg IntraVENous Q24H     Continuous Infusions:   sodium chloride       PRN Medications: benzonatate, diazePAM, ipratropium 0.5 mg-albuterol 2.5 mg, oxyCODONE-acetaminophen, pregabalin, sodium chloride flush, sodium chloride, potassium chloride **OR** potassium alternative oral replacement **OR** potassium chloride, magnesium sulfate, ondansetron **OR** ondansetron, polyethylene glycol, acetaminophen **OR** acetaminophen, LORazepam, sodium chloride    Objective:   Vitals: /83   Pulse 80   Temp 97.6 °F (36.4 °C) (Oral)   Resp 24   Ht 1.702 m (5' 7\")   Wt 68 kg (150 lb)   SpO2 96%   BMI 23.49 kg/m²   BMI: Body mass index is 23.49 kg/m².    CBC:   Recent Labs     08/12/24  1145

## 2024-08-14 NOTE — DISCHARGE INSTR - COC
Continuity of Care Form    Patient Name: Megan Smith   :  1940  MRN:  [id number]    Admit date:  2024  Discharge date:  24    Code Status Order: DNR-CC   Advance Directives:   Advance Care Flowsheet Documentation             Admitting Physician:  Geo Sharpe MD  PCP: Jose Maria Contreras MD    Discharging Nurse: PRIYANKA Louis  Discharging Hospital Unit/Room#: 0264/0264-01  Discharging Unit Phone Number: [phone]    Emergency Contact:   Extended Emergency Contact Information  Primary Emergency Contact: Kvng Smith  Address: [address]  Home Phone: [phone]  Relation: Spouse  Secondary Emergency Contact: Aren Cardona  Home Phone: [phone]  Relation: Niece/Nephew    Past Surgical History:  Past Surgical History:   Procedure Laterality Date    ANGIOPLASTY  2016    Dr. Almonte @ [facility] x 1     ANGIOPLASTY  2022    Dr. Almonte @ [facility]--Stenting X 2--    BACK INJECTION Bilateral 2023    BILATERAL SACROILIAC JOINT INJECTION performed by Chapito Gutierrez DO at [facility] OR    COLONOSCOPY  2013    [facility] GERONIMO    CORONARY ANGIOPLASTY Left 2022    Dr. Almonte @ [facility]--Stenting X 2-also Cardiac rehab    CORONARY ANGIOPLASTY WITH STENT PLACEMENT  ,2012    CORONARY ARTERY BYPASS GRAFT  2006    X3    FEMUR SURGERY Right 6/3/2024    FEMUR INTRAMEDULLARY NAIL RENATA INSERTION ANTEGRADE RIGHT performed by Dany Lunsford MD at [facility] OR    HEMORRHOID SURGERY      OVARY REMOVAL Right     PAROTIDECTOMY Left     squamous cell cancer , surgical removal and radiation    MN NJX DX/THER SBST INTRLMNR LMBR/SAC W/IMG GDN N/A 2018    EPIDURAL STEROID INJECTION-CUADAL performed by Edil Khanna MD at [facility] OR    RADICAL NECK DISSECTION Left     Parotid gland removed due to Squamous cell cancer -radiation    SPINE SURGERY  2010    laminectomy    UPPER GASTROINTESTINAL ENDOSCOPY  2013        Immunization History:   Immunization History   Administered Date(s) Administered    COVID-19, PFIZER Bivalent, DO NOT Dilute, (age 12y+), IM, 30 mcg/0.3 mL 10/04/2022, 05/27/2023    COVID-19, PFIZER GRAY top, DO NOT Dilute, (age 12 y+), IM, 30 mcg/0.3 mL 04/01/2022    COVID-19, PFIZER PURPLE top, DILUTE for use, (age 12 y+), 30mcg/0.3mL 01/22/2021, 02/12/2021, 09/29/2021    Influenza Vaccine, unspecified formulation 09/19/2018    Influenza Virus Vaccine 09/20/2012, 09/19/2013, 09/17/2015, 10/04/2016, 09/27/2017, 09/13/2019, 09/15/2020    Influenza Whole 09/19/2013, 09/17/2015    Influenza, FLUZONE (age 65 y+), High Dose, 0.7mL 09/09/2021, 09/20/2022    Influenza, High Dose (Fluzone 65 yrs and older) 10/04/2016, 09/27/2017, 09/14/2018, 09/13/2019    Pneumococcal, PCV-13, PREVNAR 13, (age 6w+), IM, 0.5mL 10/27/2015    Pneumococcal, PPSV23, PNEUMOVAX 23, (age 2y+), SC/IM, 0.5mL 10/08/2012    TDaP, ADACEL (age 10y-64y), BOOSTRIX (age 10y+), IM, 0.5mL 10/30/2012, 12/31/2022    Zoster Live (Zostavax) 05/21/2013    Zoster Recombinant (Shingrix) 04/12/2018, 06/26/2018       Active Problems:  Patient Active Problem List   Diagnosis Code    CAD (coronary artery disease) I25.10    Chronic back pain M54.9, G89.29    Hypothyroidism E03.9    Hypertension I10    Post PTCA Z98.61    Hot flashes, menopausal N95.1    Hyperlipidemia E78.5    CKD (chronic kidney disease), stage III (HCC) N18.30    Postlaminectomy syndrome of lumbar region M96.1    Primary parotid gland squamous cell carcinoma (HCC) C07    Dysthymia F34.1    Pneumothorax J93.9    S/P right hip fracture Z87.81    Acute hypoxemic respiratory failure (HCC) J96.01       Isolation/Infection:   Isolation            Droplet Plus          Patient Infection Status       Infection Onset Added Last Indicated Last Indicated By Review Planned Expiration Resolved Resolved By    COVID-19  08/13/24 08/13/24 Sumaya Castro RN 08/23/24 08/27/24      Diagnosed at facility

## 2024-08-14 NOTE — PROGRESS NOTES
Comprehensive Nutrition Assessment    Type and Reason for Visit:  Initial    Nutrition Recommendations/Plan:   Encourage oral intakes.  Ensure tid meals      Malnutrition Assessment:  Malnutrition Status:  At risk for malnutrition (Comment) (08/14/24 1209)    Context:  Acute Illness     Findings of the 6 clinical characteristics of malnutrition:  Energy Intake:  Mild decrease in energy intake (Comment) (at least acutely)  Weight Loss:  No significant weight loss     Body Fat Loss:  Unable to assess     Muscle Mass Loss:  Unable to assess    Fluid Accumulation:  No significant fluid accumulation     Strength:  Not Performed    Nutrition Assessment:    Inadequate nutrient intakes r/t altered respiratory status, AEB covid with PO <75%. In isolation and unable to see (receiving care). Recent hip and recovery at [facility]. Corrected Ca++ 8.7 and some glucose excursions with steroid. Will add Ensure to bolster intakes whilst PO is suboptimal.    Nutrition Related Findings:    no edema. active b/s. Wound Type: None       Current Nutrition Intake & Therapies:    Average Meal Intake: 51-75%  Average Supplements Intake: None Ordered  ADULT DIET; Regular    Anthropometric Measures:  Height: 170.2 cm (5' 7\")  Ideal Body Weight (IBW): 135 lbs (61 kg)    Admission Body Weight: 68 kg (150 lb)  Current Body Weight: 68 kg (150 lb), 111.1 % IBW. Weight Source: Bed Scale  Current BMI (kg/m2): 23.5  Usual Body Weight: 66.7 kg (147 lb) (in June with Hip surgery)  % Weight Change (Calculated): 2  Weight Adjustment For: No Adjustment                 BMI Categories: Normal Weight (BMI 18.5-24.9)    Estimated Daily Nutrient Needs:  Energy Requirements Based On: Kcal/kg  Weight Used for Energy Requirements: Current  Energy (kcal/day): 3282-5303 (25-30)  Weight Used for Protein Requirements: Current  Protein (g/day):  (1.3-1.5)  Method Used for Fluid Requirements: 1 ml/kcal  Fluid (ml/day): 2000    Nutrition Diagnosis:   Inadequate  protein-energy intake related to impaired respiratory function as evidenced by intake 51-75%    Lab Results   Component Value Date     08/14/2024    K 4.6 08/14/2024     08/14/2024    CO2 20 08/14/2024    BUN 35 (H) 08/14/2024    CREATININE 1.2 (H) 08/14/2024    GLUCOSE 107 (H) 08/14/2024    CALCIUM 7.9 (L) 08/14/2024    BILITOT 0.2 (L) 08/14/2024    ALKPHOS 221 (H) 08/14/2024    AST 29 08/14/2024    ALT 46 (H) 08/14/2024    LABGLOM 44 (L) 08/14/2024    GFRAA 37 (L) 09/06/2022     Hemoglobin A1C   Date Value Ref Range Status   09/12/2019 5.7 4.8 - 5.9 % Final     Lab Results   Component Value Date    VITD25 52.8 07/13/2023     Nutrition Interventions:   Food and/or Nutrient Delivery: Continue Current Diet, Start Oral Nutrition Supplement  Nutrition Education/Counseling: No recommendation at this time  Coordination of Nutrition Care: Continue to monitor while inpatient  Plan of Care discussed with: no one    Goals:     Goals: Meet at least 75% of estimated needs       Nutrition Monitoring and Evaluation:   Behavioral-Environmental Outcomes: None Identified  Food/Nutrient Intake Outcomes: Food and Nutrient Intake, Supplement Intake  Physical Signs/Symptoms Outcomes: Biochemical Data    Discharge Planning:    No discharge needs at this time     Jonatan Montoya RD, LD  Contact: [id number]

## 2024-08-14 NOTE — PROGRESS NOTES
Pt. Reports that her cough as improved since receiving PRN Tessalon as ordered. Pt is resting comfortably in bed with call light and bedside table within reach. Denies further needs at present. Bed alarm on for safety.

## 2024-08-14 NOTE — PROGRESS NOTES
Cardiology    Her LV function has worsened, with EF now 35-40%.    However, her cxr has improved and renal function is stable.  It is reasonable to continue same medication and monitor.    Shaq Almonte MD

## 2024-08-14 NOTE — PROGRESS NOTES
Pt states that breathing feels much better after breathing treatment. PRN acetaminophen and lorazepam, see eMAR, given for pt headache and anxiety.

## 2024-08-14 NOTE — PROGRESS NOTES
Pt very tearful and anxious this afternoon. Anxious about returning to the [facility]. Pt very focused on getting \"a sleeping medication\" because she has not had sleep since 2 this morning. Educated that dinner is coming soon and that we want her to be awake until around bedtime. Verbalized understanding, although reluctantly. C/o that she is having a hard time breathing. Expiratory wheezes in right upper and middle lobes and left upper lobe. Respiratory at bedside giving PRN breathing treatment at this time.

## 2024-08-14 NOTE — PLAN OF CARE
Problem: Discharge Planning  Goal: Discharge to home or other facility with appropriate resources  8/14/2024 0703 by Marilyn Bales RN  Outcome: Progressing  8/13/2024 2123 by Misael Talamantes RN  Outcome: Progressing  Flowsheets (Taken 8/13/2024 1420 by Madyson Vera, RN)  Discharge to home or other facility with appropriate resources:   Identify barriers to discharge with patient and caregiver   Arrange for needed discharge resources and transportation as appropriate   Identify discharge learning needs (meds, wound care, etc)   Refer to discharge planning if patient needs post-hospital services based on physician order or complex needs related to functional status, cognitive ability or social support system     Problem: ABCDS Injury Assessment  Goal: Absence of physical injury  8/14/2024 0703 by Marilyn Bales RN  Outcome: Progressing  8/13/2024 2123 by Misael Talamantes RN  Outcome: Progressing     Problem: Skin/Tissue Integrity  Goal: Absence of new skin breakdown  Description: 1.  Monitor for areas of redness and/or skin breakdown  2.  Assess vascular access sites hourly  3.  Every 4-6 hours minimum:  Change oxygen saturation probe site  4.  Every 4-6 hours:  If on nasal continuous positive airway pressure, respiratory therapy assess nares and determine need for appliance change or resting period.  8/14/2024 0703 by Marilyn Bales RN  Outcome: Progressing  8/13/2024 2123 by Misael Talamantes RN  Outcome: Progressing     Problem: Safety - Adult  Goal: Free from fall injury  8/14/2024 0703 by Marilyn Bales RN  Outcome: Progressing  8/13/2024 2123 by Misael Talamantes RN  Outcome: Progressing     Problem: Neurosensory - Adult  Goal: Achieves stable or improved neurological status  8/14/2024 0703 by Marilyn Bales RN  Outcome: Progressing  8/13/2024 2123 by Misael Talamantes RN  Outcome: Progressing  Flowsheets (Taken 8/13/2024 1420 by Madyson Vera, RN)  Achieves stable or improved  neurological status: Assess for and report changes in neurological status  Goal: Achieves maximal functionality and self care  8/14/2024 0703 by Marilyn Bales RN  Outcome: Progressing  8/13/2024 2123 by Misael Talamantes RN  Outcome: Progressing     Problem: Skin/Tissue Integrity - Adult  Goal: Skin integrity remains intact  8/14/2024 0703 by Marilyn Bales RN  Outcome: Progressing  8/13/2024 2123 by Misael Talamantes RN  Outcome: Progressing     Problem: Genitourinary - Adult  Goal: Urinary catheter remains patent  8/14/2024 0703 by Marilyn Bales RN  Outcome: Progressing  8/13/2024 2123 by Misael Talamantes RN  Outcome: Progressing  Flowsheets (Taken 8/13/2024 1420 by Madyson Vera RN)  Urinary catheter remains patent: Assess patency of urinary catheter     Problem: Metabolic/Fluid and Electrolytes - Adult  Goal: Electrolytes maintained within normal limits  8/14/2024 0703 by Marilyn Bales RN  Outcome: Progressing  8/13/2024 2123 by Misael Talamantes RN  Outcome: Progressing  Flowsheets (Taken 8/13/2024 1420 by Madyson Vera RN)  Electrolytes maintained within normal limits: Monitor labs and assess patient for signs and symptoms of electrolyte imbalances     Problem: Musculoskeletal - Adult  Goal: Return mobility to safest level of function  8/14/2024 0703 by Marilyn Bales RN  Outcome: Progressing  8/13/2024 2123 by Misael Talamantes RN  Outcome: Progressing  Goal: Return ADL status to a safe level of function  8/14/2024 0703 by Marilyn Bales RN  Outcome: Progressing  8/13/2024 2123 by Misael Talamantes RN  Outcome: Progressing     Problem: Infection - Adult  Goal: Absence of infection at discharge  8/14/2024 0703 by Marilyn Balse RN  Outcome: Progressing  8/13/2024 2123 by Misael Talamantes RN  Outcome: Progressing

## 2024-08-14 NOTE — PROGRESS NOTES
Pt is very tearful this morning; upset that she is coughing a lot and that it's productive, pt upset about living in a nursing home. Breakfast tray provided and emotional support given. Respiratory called for PRN Duoneb for pt's c/o shortness of breath; SPO2 99% on RA. Wheezes auscultated in right upper lung. Pt does have a frequent and productive cough; medicated with PRN Tessalon radha as ordered. Pt also complaining of right hip pain and is medicated with PRN Percocet as ordered. Bed alarm on and bedside table and call light within reach.

## 2024-08-15 LAB
ALBUMIN SERPL-MCNC: 3.3 G/DL (ref 3.5–5.2)
ALP SERPL-CCNC: 214 U/L (ref 35–104)
ALT SERPL-CCNC: 55 U/L (ref 5–33)
ANION GAP SERPL CALCULATED.3IONS-SCNC: 15 MMOL/L (ref 9–17)
AST SERPL-CCNC: 44 U/L
BILIRUB SERPL-MCNC: 0.2 MG/DL (ref 0.3–1.2)
BUN SERPL-MCNC: 34 MG/DL (ref 8–23)
BUN/CREAT SERPL: 31 (ref 9–20)
CALCIUM SERPL-MCNC: 8.5 MG/DL (ref 8.6–10.4)
CHLORIDE SERPL-SCNC: 103 MMOL/L (ref 98–107)
CO2 SERPL-SCNC: 20 MMOL/L (ref 20–31)
CREAT SERPL-MCNC: 1.1 MG/DL (ref 0.5–0.9)
CRP SERPL HS-MCNC: 36.6 MG/L (ref 0–5)
GFR, ESTIMATED: 49 ML/MIN/1.73M2
GLUCOSE SERPL-MCNC: 107 MG/DL (ref 70–99)
POTASSIUM SERPL-SCNC: 4.4 MMOL/L (ref 3.7–5.3)
PROT SERPL-MCNC: 5.8 G/DL (ref 6.4–8.3)
SODIUM SERPL-SCNC: 138 MMOL/L (ref 135–144)

## 2024-08-15 PROCEDURE — 2580000003 HC RX 258: Performed by: INTERNAL MEDICINE

## 2024-08-15 PROCEDURE — 94669 MECHANICAL CHEST WALL OSCILL: CPT

## 2024-08-15 PROCEDURE — 6370000000 HC RX 637 (ALT 250 FOR IP): Performed by: INTERNAL MEDICINE

## 2024-08-15 PROCEDURE — 94761 N-INVAS EAR/PLS OXIMETRY MLT: CPT

## 2024-08-15 PROCEDURE — 6360000002 HC RX W HCPCS: Performed by: INTERNAL MEDICINE

## 2024-08-15 PROCEDURE — 80053 COMPREHEN METABOLIC PANEL: CPT

## 2024-08-15 PROCEDURE — 86140 C-REACTIVE PROTEIN: CPT

## 2024-08-15 PROCEDURE — 36415 COLL VENOUS BLD VENIPUNCTURE: CPT

## 2024-08-15 PROCEDURE — 1200000000 HC SEMI PRIVATE

## 2024-08-15 PROCEDURE — 94640 AIRWAY INHALATION TREATMENT: CPT

## 2024-08-15 RX ORDER — HALOPERIDOL 1 MG/1
0.5 TABLET ORAL EVERY 6 HOURS PRN
Status: DISCONTINUED | OUTPATIENT
Start: 2024-08-15 | End: 2024-08-16 | Stop reason: HOSPADM

## 2024-08-15 RX ADMIN — CLOPIDOGREL BISULFATE 75 MG: 75 TABLET ORAL at 08:40

## 2024-08-15 RX ADMIN — SODIUM BICARBONATE 650 MG: 650 TABLET ORAL at 20:15

## 2024-08-15 RX ADMIN — HALOPERIDOL 0.5 MG: 1 TABLET ORAL at 09:43

## 2024-08-15 RX ADMIN — POTASSIUM CHLORIDE 20 MEQ: 750 TABLET, FILM COATED, EXTENDED RELEASE ORAL at 08:40

## 2024-08-15 RX ADMIN — DOCUSATE SODIUM 100 MG: 100 CAPSULE, LIQUID FILLED ORAL at 20:16

## 2024-08-15 RX ADMIN — ASPIRIN 81 MG: 81 TABLET, COATED ORAL at 08:40

## 2024-08-15 RX ADMIN — FERROUS SULFATE TAB 325 MG (65 MG ELEMENTAL FE) 324 MG: 325 (65 FE) TAB at 08:40

## 2024-08-15 RX ADMIN — FUROSEMIDE 10 MG: 20 TABLET ORAL at 08:40

## 2024-08-15 RX ADMIN — HALOPERIDOL 0.5 MG: 1 TABLET ORAL at 16:25

## 2024-08-15 RX ADMIN — LEVOTHYROXINE SODIUM 100 MCG: 100 TABLET ORAL at 05:45

## 2024-08-15 RX ADMIN — SODIUM CHLORIDE 30 ML: 9 INJECTION, SOLUTION INTRAVENOUS at 22:39

## 2024-08-15 RX ADMIN — METOPROLOL SUCCINATE 25 MG: 25 TABLET, FILM COATED, EXTENDED RELEASE ORAL at 08:40

## 2024-08-15 RX ADMIN — GUAIFENESIN 600 MG: 600 TABLET ORAL at 08:40

## 2024-08-15 RX ADMIN — DEXAMETHASONE 6 MG: 4 TABLET ORAL at 08:47

## 2024-08-15 RX ADMIN — PANTOPRAZOLE SODIUM 20 MG: 20 TABLET, DELAYED RELEASE ORAL at 05:45

## 2024-08-15 RX ADMIN — PREGABALIN 25 MG: 25 CAPSULE ORAL at 20:16

## 2024-08-15 RX ADMIN — DIAZEPAM 2.5 MG: 5 TABLET ORAL at 20:14

## 2024-08-15 RX ADMIN — OXYCODONE HYDROCHLORIDE AND ACETAMINOPHEN 1 TABLET: 5; 325 TABLET ORAL at 08:40

## 2024-08-15 RX ADMIN — OXYCODONE HYDROCHLORIDE AND ACETAMINOPHEN 1 TABLET: 5; 325 TABLET ORAL at 15:22

## 2024-08-15 RX ADMIN — ISOSORBIDE MONONITRATE 30 MG: 30 TABLET, EXTENDED RELEASE ORAL at 08:40

## 2024-08-15 RX ADMIN — DOCUSATE SODIUM 100 MG: 100 CAPSULE, LIQUID FILLED ORAL at 08:40

## 2024-08-15 RX ADMIN — BARICITINIB 2 MG: 2 TABLET, FILM COATED ORAL at 08:46

## 2024-08-15 RX ADMIN — POTASSIUM CHLORIDE 20 MEQ: 750 TABLET, FILM COATED, EXTENDED RELEASE ORAL at 20:15

## 2024-08-15 RX ADMIN — PSEUDOEPHEDRINE HCL 60 MG: 30 TABLET, FILM COATED ORAL at 08:40

## 2024-08-15 RX ADMIN — ENOXAPARIN SODIUM 40 MG: 100 INJECTION SUBCUTANEOUS at 08:48

## 2024-08-15 RX ADMIN — SODIUM CHLORIDE 30 ML: 9 INJECTION, SOLUTION INTRAVENOUS at 01:39

## 2024-08-15 RX ADMIN — OXYCODONE HYDROCHLORIDE AND ACETAMINOPHEN 1 TABLET: 5; 325 TABLET ORAL at 01:33

## 2024-08-15 RX ADMIN — SODIUM CHLORIDE, PRESERVATIVE FREE 10 ML: 5 INJECTION INTRAVENOUS at 20:17

## 2024-08-15 RX ADMIN — HALOPERIDOL 0.5 MG: 1 TABLET ORAL at 22:46

## 2024-08-15 RX ADMIN — SODIUM CHLORIDE, PRESERVATIVE FREE 10 ML: 5 INJECTION INTRAVENOUS at 08:49

## 2024-08-15 RX ADMIN — DULOXETINE HYDROCHLORIDE 30 MG: 30 CAPSULE, DELAYED RELEASE ORAL at 20:16

## 2024-08-15 RX ADMIN — DULOXETINE HYDROCHLORIDE 30 MG: 30 CAPSULE, DELAYED RELEASE ORAL at 08:40

## 2024-08-15 RX ADMIN — PSEUDOEPHEDRINE HCL 60 MG: 30 TABLET, FILM COATED ORAL at 20:15

## 2024-08-15 RX ADMIN — FLUOXETINE HYDROCHLORIDE 40 MG: 10 CAPSULE ORAL at 08:41

## 2024-08-15 RX ADMIN — SODIUM BICARBONATE 650 MG: 650 TABLET ORAL at 08:40

## 2024-08-15 RX ADMIN — REMDESIVIR 100 MG: 100 INJECTION, POWDER, LYOPHILIZED, FOR SOLUTION INTRAVENOUS at 22:41

## 2024-08-15 RX ADMIN — DILTIAZEM HYDROCHLORIDE 120 MG: 120 CAPSULE, COATED, EXTENDED RELEASE ORAL at 08:40

## 2024-08-15 RX ADMIN — GUAIFENESIN 600 MG: 600 TABLET ORAL at 20:16

## 2024-08-15 ASSESSMENT — PAIN DESCRIPTION - ORIENTATION
ORIENTATION: MID;UPPER;LOWER
ORIENTATION: ANTERIOR;MID
ORIENTATION: MID

## 2024-08-15 ASSESSMENT — PAIN SCALES - GENERAL
PAINLEVEL_OUTOF10: 4
PAINLEVEL_OUTOF10: 8
PAINLEVEL_OUTOF10: 0
PAINLEVEL_OUTOF10: 0
PAINLEVEL_OUTOF10: 6
PAINLEVEL_OUTOF10: 0
PAINLEVEL_OUTOF10: 8

## 2024-08-15 ASSESSMENT — PAIN DESCRIPTION - FREQUENCY: FREQUENCY: CONTINUOUS

## 2024-08-15 ASSESSMENT — PAIN DESCRIPTION - LOCATION
LOCATION: HEAD
LOCATION: HEAD
LOCATION: BACK

## 2024-08-15 ASSESSMENT — PAIN DESCRIPTION - DESCRIPTORS
DESCRIPTORS: ACHING;DISCOMFORT
DESCRIPTORS: ACHING

## 2024-08-15 ASSESSMENT — PAIN - FUNCTIONAL ASSESSMENT
PAIN_FUNCTIONAL_ASSESSMENT: PREVENTS OR INTERFERES SOME ACTIVE ACTIVITIES AND ADLS
PAIN_FUNCTIONAL_ASSESSMENT: PREVENTS OR INTERFERES SOME ACTIVE ACTIVITIES AND ADLS
PAIN_FUNCTIONAL_ASSESSMENT: ACTIVITIES ARE NOT PREVENTED
PAIN_FUNCTIONAL_ASSESSMENT: PREVENTS OR INTERFERES SOME ACTIVE ACTIVITIES AND ADLS

## 2024-08-15 ASSESSMENT — PAIN DESCRIPTION - PAIN TYPE: TYPE: CHRONIC PAIN

## 2024-08-15 ASSESSMENT — PAIN DESCRIPTION - ONSET: ONSET: ON-GOING

## 2024-08-15 NOTE — PROGRESS NOTES
Pt actively eating at 1710 and 1735. Will recheck for therapy administration.   Labs:                          13.5   7.7   )-----------( 289      ( 08 Feb 2018 09:02 )             40.4       02-08    140  |  107  |  11  ----------------------------<  98  4.0   |  27  |  0.70    Ca    8.8      08 Feb 2018 09:02    TPro  7.1  /  Alb  3.8  /  TBili  0.4  /  DBili  x   /  AST  12<L>  /  ALT  20  /  AlkPhos  118  02-08      PT/INR - ( 08 Feb 2018 09:02 )   PT: 12.2 sec;   INR: 1.13 ratio         PTT - ( 08 Feb 2018 09:02 )  PTT:28.9 sec    < from: CT Abdomen and Pelvis w/ IV Cont (02.08.18 @ 09:20) >    FINDINGS:    LOWER CHEST: Visualized lung bases, heart and pericardium are   unremarkable.    LIVER: Within normal limits.  SPLEEN: Within normal limits.  PANCREAS: Within normal limits.  GALLBLADDER: Within normal limits.  BILE DUCTS: Normal caliber.  ADRENALS: Within normal limits.  KIDNEYS/URETERS: No mass, stone or hydronephrosis.    RETROPERITONEUM: No lymphadenopathy.    VESSELS:  Within normal limits.    BOWEL:Appendix is dilated and thick-walled at the tip, measuring 9 mm.   There is mild infiltration of the fat surrounding the distal appendix.   There is no extraluminal fluid collection.   PERITONEUM: No ascites or pneumoperitoneum.    REPRODUCTIVE ORGANS: Prostate and seminal vesicles are unremarkable.  BLADDER: Within normal limits.    ABDOMINAL WALL: Within normal limits.  BONES: No acute bony abnormality.    IMPRESSION: Acute uncomplicated appendicitis        < end of copied text >

## 2024-08-15 NOTE — PROGRESS NOTES
Pt is sound asleep at this time.  I will call her decision makers to check with them as she has a history of confusion.  Raya Ramirez RN  [facility] stepan Mann   Palliative Care Nurse Coordinator  8/15/2024 4:18 PM

## 2024-08-15 NOTE — PROGRESS NOTES
Patient does okay over night.     Was very anxious and sad at the beginning of this nurses shift (8/14/24, 1900). Patient cries at bedside with concerns about returning to [facility]. Concerns addressed. PRN valium given before bed.     Patient didn't sleep well and did not tolerate purewick. Purewick discontinued. Requests the bed pan often through out the night.     Only complaint of pain was a 8/10 headache. Percocet given, patient tolerates well.     Patient pleasant this morning. Voiding in the bed pan. Bed alarm remains on.

## 2024-08-15 NOTE — PROGRESS NOTES
Hospitalist Progress Note  8/15/2024 8:25 AM  Subjective:   Admit Date: 8/12/2024  PCP: Jose Maria Contreras MD    Interval History:     Patient is awake, eating breakfast.  Has no complaints.  States feels better, cough is subsiding.  Reports no shortness of breath, no nausea or vomiting.  Vital stable.  Per nursing staff the patient develops frequent episodes of agitations, attempting to climb out of bed.    Diet: ADULT DIET; Regular  ADULT ORAL NUTRITION SUPPLEMENT; Breakfast, Lunch, Dinner; Standard High Calorie/High Protein Oral Supplement  Medications:   Scheduled Meds:   baricitinib  2 mg Oral Daily    aspirin  81 mg Oral Daily    clopidogrel  75 mg Oral Daily    dilTIAZem  120 mg Oral Daily    docusate sodium  100 mg Oral BID    DULoxetine  30 mg Oral BID    ferrous sulfate  324 mg Oral Daily with breakfast    FLUoxetine  40 mg Oral Daily    furosemide  10 mg Oral Daily    isosorbide mononitrate  30 mg Oral Daily    levothyroxine  100 mcg Oral Daily    lidocaine  1 patch TransDERmal Daily    metoprolol succinate  25 mg Oral Daily    pantoprazole  20 mg Oral QAM AC    potassium chloride  20 mEq Oral BID    sodium bicarbonate  650 mg Oral BID    sodium chloride flush  5-40 mL IntraVENous 2 times per day    enoxaparin  40 mg SubCUTAneous Daily    pseudoephedrine  60 mg Oral BID    And    guaiFENesin  600 mg Oral BID    dexAMETHasone  6 mg Oral Daily    remdesivir 100 mg in sodium chloride 0.9 % 250 mL IVPB  100 mg IntraVENous Q24H     Continuous Infusions:   sodium chloride       PRN Medications: haloperidol, benzonatate, diazePAM, ipratropium 0.5 mg-albuterol 2.5 mg, oxyCODONE-acetaminophen, pregabalin, sodium chloride flush, sodium chloride, potassium chloride **OR** potassium alternative oral replacement **OR** potassium chloride, magnesium sulfate, ondansetron **OR** ondansetron, polyethylene glycol, acetaminophen **OR** acetaminophen, LORazepam, sodium chloride    Objective:   Vitals: BP (!) 149/87   Pulse 85

## 2024-08-15 NOTE — PROGRESS NOTES
Noted restlessness, anxiety, trying to crawl out of bed, calling out repeatedly asking for help and when asks what she needs does not specify.  Bedding changed, per care given, haldol given after multiple attempts to calm down.

## 2024-08-16 VITALS
HEIGHT: 67 IN | TEMPERATURE: 98.2 F | WEIGHT: 155.42 LBS | HEART RATE: 80 BPM | OXYGEN SATURATION: 96 % | RESPIRATION RATE: 18 BRPM | BODY MASS INDEX: 24.39 KG/M2 | SYSTOLIC BLOOD PRESSURE: 149 MMHG | DIASTOLIC BLOOD PRESSURE: 90 MMHG

## 2024-08-16 LAB
ALBUMIN SERPL-MCNC: 3.3 G/DL (ref 3.5–5.2)
ALP SERPL-CCNC: 225 U/L (ref 35–104)
ALT SERPL-CCNC: 67 U/L (ref 5–33)
ANION GAP SERPL CALCULATED.3IONS-SCNC: 13 MMOL/L (ref 9–17)
AST SERPL-CCNC: 44 U/L
BILIRUB SERPL-MCNC: 0.2 MG/DL (ref 0.3–1.2)
BUN SERPL-MCNC: 32 MG/DL (ref 8–23)
BUN/CREAT SERPL: 32 (ref 9–20)
CALCIUM SERPL-MCNC: 8.5 MG/DL (ref 8.6–10.4)
CHLORIDE SERPL-SCNC: 101 MMOL/L (ref 98–107)
CO2 SERPL-SCNC: 21 MMOL/L (ref 20–31)
CREAT SERPL-MCNC: 1 MG/DL (ref 0.5–0.9)
GFR, ESTIMATED: 55 ML/MIN/1.73M2
GLUCOSE SERPL-MCNC: 111 MG/DL (ref 70–99)
POTASSIUM SERPL-SCNC: 4.5 MMOL/L (ref 3.7–5.3)
PROT SERPL-MCNC: 6.1 G/DL (ref 6.4–8.3)
SODIUM SERPL-SCNC: 135 MMOL/L (ref 135–144)

## 2024-08-16 PROCEDURE — 2580000003 HC RX 258: Performed by: INTERNAL MEDICINE

## 2024-08-16 PROCEDURE — 6370000000 HC RX 637 (ALT 250 FOR IP): Performed by: INTERNAL MEDICINE

## 2024-08-16 PROCEDURE — 94669 MECHANICAL CHEST WALL OSCILL: CPT

## 2024-08-16 PROCEDURE — 36415 COLL VENOUS BLD VENIPUNCTURE: CPT

## 2024-08-16 PROCEDURE — 6360000002 HC RX W HCPCS: Performed by: INTERNAL MEDICINE

## 2024-08-16 PROCEDURE — 94640 AIRWAY INHALATION TREATMENT: CPT

## 2024-08-16 PROCEDURE — 94761 N-INVAS EAR/PLS OXIMETRY MLT: CPT

## 2024-08-16 PROCEDURE — 80053 COMPREHEN METABOLIC PANEL: CPT

## 2024-08-16 RX ORDER — GUAIFENESIN 600 MG/1
600 TABLET, EXTENDED RELEASE ORAL 2 TIMES DAILY
DISCHARGE
Start: 2024-08-16

## 2024-08-16 RX ORDER — DEXAMETHASONE 6 MG/1
6 TABLET ORAL DAILY
DISCHARGE
Start: 2024-08-16 | End: 2024-08-18

## 2024-08-16 RX ORDER — BENZONATATE 100 MG/1
100 CAPSULE ORAL 3 TIMES DAILY PRN
DISCHARGE
Start: 2024-08-16 | End: 2024-08-23

## 2024-08-16 RX ADMIN — ENOXAPARIN SODIUM 40 MG: 100 INJECTION SUBCUTANEOUS at 08:55

## 2024-08-16 RX ADMIN — POTASSIUM CHLORIDE 20 MEQ: 750 TABLET, FILM COATED, EXTENDED RELEASE ORAL at 09:02

## 2024-08-16 RX ADMIN — DULOXETINE HYDROCHLORIDE 30 MG: 30 CAPSULE, DELAYED RELEASE ORAL at 09:03

## 2024-08-16 RX ADMIN — LEVOTHYROXINE SODIUM 100 MCG: 100 TABLET ORAL at 05:42

## 2024-08-16 RX ADMIN — ACETAMINOPHEN 650 MG: 325 TABLET, FILM COATED ORAL at 09:02

## 2024-08-16 RX ADMIN — HALOPERIDOL 0.5 MG: 1 TABLET ORAL at 15:27

## 2024-08-16 RX ADMIN — HALOPERIDOL 0.5 MG: 1 TABLET ORAL at 05:42

## 2024-08-16 RX ADMIN — PSEUDOEPHEDRINE HCL 60 MG: 30 TABLET, FILM COATED ORAL at 09:02

## 2024-08-16 RX ADMIN — CLOPIDOGREL BISULFATE 75 MG: 75 TABLET ORAL at 09:03

## 2024-08-16 RX ADMIN — DOCUSATE SODIUM 100 MG: 100 CAPSULE, LIQUID FILLED ORAL at 09:03

## 2024-08-16 RX ADMIN — OXYCODONE HYDROCHLORIDE AND ACETAMINOPHEN 1 TABLET: 5; 325 TABLET ORAL at 12:21

## 2024-08-16 RX ADMIN — FERROUS SULFATE TAB 325 MG (65 MG ELEMENTAL FE) 324 MG: 325 (65 FE) TAB at 09:03

## 2024-08-16 RX ADMIN — METOPROLOL SUCCINATE 25 MG: 25 TABLET, FILM COATED, EXTENDED RELEASE ORAL at 09:02

## 2024-08-16 RX ADMIN — SODIUM BICARBONATE 650 MG: 650 TABLET ORAL at 09:03

## 2024-08-16 RX ADMIN — BARICITINIB 2 MG: 2 TABLET, FILM COATED ORAL at 09:03

## 2024-08-16 RX ADMIN — HALOPERIDOL 0.5 MG: 1 TABLET ORAL at 09:03

## 2024-08-16 RX ADMIN — DEXAMETHASONE 6 MG: 4 TABLET ORAL at 09:03

## 2024-08-16 RX ADMIN — GUAIFENESIN 600 MG: 600 TABLET ORAL at 09:03

## 2024-08-16 RX ADMIN — OXYCODONE HYDROCHLORIDE AND ACETAMINOPHEN 1 TABLET: 5; 325 TABLET ORAL at 00:54

## 2024-08-16 RX ADMIN — FLUOXETINE HYDROCHLORIDE 40 MG: 10 CAPSULE ORAL at 09:02

## 2024-08-16 RX ADMIN — BENZONATATE 100 MG: 100 CAPSULE ORAL at 05:42

## 2024-08-16 RX ADMIN — REMDESIVIR 100 MG: 100 INJECTION, POWDER, LYOPHILIZED, FOR SOLUTION INTRAVENOUS at 13:49

## 2024-08-16 RX ADMIN — ASPIRIN 81 MG: 81 TABLET, COATED ORAL at 09:03

## 2024-08-16 RX ADMIN — ISOSORBIDE MONONITRATE 30 MG: 30 TABLET, EXTENDED RELEASE ORAL at 09:03

## 2024-08-16 RX ADMIN — FUROSEMIDE 10 MG: 20 TABLET ORAL at 09:03

## 2024-08-16 RX ADMIN — SODIUM CHLORIDE, PRESERVATIVE FREE 10 ML: 5 INJECTION INTRAVENOUS at 09:04

## 2024-08-16 RX ADMIN — DILTIAZEM HYDROCHLORIDE 120 MG: 120 CAPSULE, COATED, EXTENDED RELEASE ORAL at 09:02

## 2024-08-16 RX ADMIN — PANTOPRAZOLE SODIUM 20 MG: 20 TABLET, DELAYED RELEASE ORAL at 05:42

## 2024-08-16 ASSESSMENT — PAIN - FUNCTIONAL ASSESSMENT
PAIN_FUNCTIONAL_ASSESSMENT: ACTIVITIES ARE NOT PREVENTED
PAIN_FUNCTIONAL_ASSESSMENT: ACTIVITIES ARE NOT PREVENTED
PAIN_FUNCTIONAL_ASSESSMENT: PREVENTS OR INTERFERES WITH ALL ACTIVE AND SOME PASSIVE ACTIVITIES

## 2024-08-16 ASSESSMENT — PAIN DESCRIPTION - LOCATION
LOCATION: NECK
LOCATION: NECK
LOCATION: HEAD

## 2024-08-16 ASSESSMENT — PAIN SCALES - GENERAL
PAINLEVEL_OUTOF10: 2
PAINLEVEL_OUTOF10: 0
PAINLEVEL_OUTOF10: 0
PAINLEVEL_OUTOF10: 1
PAINLEVEL_OUTOF10: 0
PAINLEVEL_OUTOF10: 6

## 2024-08-16 ASSESSMENT — PAIN DESCRIPTION - DESCRIPTORS
DESCRIPTORS: ACHING

## 2024-08-16 ASSESSMENT — PAIN DESCRIPTION - ORIENTATION: ORIENTATION: MID

## 2024-08-16 NOTE — VIRTUAL HEALTH
Megan A Heabler, was evaluated through a synchronous (real-time) audio-video encounter. The patient (and/or guardian if applicable) is aware that this is a billable service, which includes applicable co-pays. This virtual visit was conducted with patient's (and/or legal guardian's) consent. Patient identification was verified, and a caregiver was present when appropriate.  The patient was located at Facility (Appt Department): [facility]  [facility]  [address] [facility] [address]  Loc: [phone]  The provider was located at Facility (Login Dept): [facility]  [address] [facility] [id number] [phone]  Confirm you are appropriately licensed, registered, or certified to deliver care in the state where the patient is located as indicated above. If you are not or unsure, please re-schedule the visit: Yes, I confirm.   Consults    Progress Note         Total time spent on this encounter: Not billed by time    --Nathan Holliday MD on 8/16/2024 at 10:31 AM    An electronic signature was used to authenticate this note.      [facility] - Progress Note COVID 19 Patient  Telemedicine  Today's Date and Time: 8/16/2024, 10:31 AM    Impression :     COVID 19 Confirmed Infection  Covid tests:  Positive Covid Test Date: 8-11-24 at [facility]  Vaccination Status: Received 6 vaccines  Hypoxia  CAD with prior CABG x 3 vx and 8 stents  Essential HTN  Hypothyroidism  Squamous cell carcinoma of parotid gland   Macular degeneration Rt eye      Recommendations:   Antibiotic treatment:  Monitor off antibiotics  Covid Rx:    Completed Remdesivir: Requested 8-12-24. Stop date 8-16-24  Decadron: 6 mg po q day. Started 8-12-24. Stop date 8-17-24  Actemra: Not necessary at this point. Baricitinib started 8-13-24 because of very high CRP.   CRP has responded  Will D/C Baricitinib  Paxlovid: Uncertain time of onset  Monitor CRP: 99.6-->57 with Decadron and

## 2024-08-16 NOTE — DISCHARGE SUMMARY
Hospitalist Discharge Summary    Patient:  Megan Smith  YOB: 1940    MRN: [id number]   Acct: [id number]    Primary Care Physician: Jose Maria Contreras MD    Admit date:  8/12/2024    Discharge date:  8/16/2024       Discharge Diagnoses:   1.Acute hypoxemic respiratory failure (HCC)  2. Covid - 19 infection  3.Atrial flutter with RVR   4.Elevated troponin  5.Altered mental status  6.CKD stage 3b  7.Chronic anemia  8.Recent acute intertrochanteric fracture of the proximal right femur, s/p intramedullary nailing on 6/3/2024 by Dr. Lunsford   9.History of lumbar spinal stenosis with chronic low back pain   10.Chronic metabolic acidosis   11. Hypothyroidism  12.Anxiety and Depression     Discharge Medications:         Medication List        START taking these medications      benzonatate 100 MG capsule  Commonly known as: TESSALON  Take 1 capsule by mouth 3 times daily as needed for Cough     dexAMETHasone 6 MG tablet  Commonly known as: DECADRON  Take 1 tablet by mouth daily for 2 doses     guaiFENesin 600 MG extended release tablet  Commonly known as: MUCINEX  Take 1 tablet by mouth 2 times daily            CONTINUE taking these medications      Aspirin Low Dose 81 MG EC tablet  Generic drug: aspirin  Take 1 tablet by mouth in the morning.     clopidogrel 75 MG tablet  Commonly known as: PLAVIX  Take 1 tablet by mouth daily     diazePAM 5 MG tablet  Commonly known as: VALIUM     dilTIAZem 120 MG extended release capsule  Commonly known as: CARDIZEM CD  Take 1 capsule by mouth daily     docusate sodium 100 MG capsule  Commonly known as: COLACE     DULoxetine 30 MG extended release capsule  Commonly known as: CYMBALTA  Take 1 capsule by mouth 2 times daily     ferrous sulfate 324 (65 Fe) MG EC tablet  Take 1 tablet by mouth daily (with breakfast)     FLUoxetine 40 MG capsule  Commonly known as: PROzac  TAKE 1 CAPSULE BY MOUTH EVERY DAY     furosemide 20 MG tablet  Commonly known as: LASIX  Take 0.5

## 2024-08-16 NOTE — VIRTUAL HEALTH
Megan A Heabler, was evaluated through a synchronous (real-time) audio-video encounter. The patient (and/or guardian if applicable) is aware that this is a billable service, which includes applicable co-pays. This virtual visit was conducted with patient's (and/or legal guardian's) consent. Patient identification was verified, and a caregiver was present when appropriate.  The patient was located at Facility (Appt Department): [facility]  [facility]  [address] [facility] [address]  Loc: [phone]  The provider was located at Facility (Login Dept): [facility]  [address] [facility] [id number] [phone]  Confirm you are appropriately licensed, registered, or certified to deliver care in the state where the patient is located as indicated above. If you are not or unsure, please re-schedule the visit: Yes, I confirm.   Consults    Progress Note         Total time spent on this encounter: Not billed by time    --Nathan Holliday MD on 8/15/2024 at 8:21 PM    An electronic signature was used to authenticate this note.      [facility] - Progress Note COVID 19 Patient  Telemedicine  Today's Date and Time: 8/15/2024, 8:21 PM    Impression :     COVID 19 Confirmed Infection  Covid tests:  Positive Covid Test Date: 8-11-24 at [facility]  Vaccination Status: Received 6 vaccines  Hypoxia  CAD with prior CABG x 3 vx and 8 stents  Essential HTN  Hypothyroidism  Squamous cell carcinoma of parotid gland   Macular degeneration Rt eye      Recommendations:   Antibiotic treatment:  Monitor off antibiotics  Covid Rx:    Remdesivir: Requested 8-12-24. Stop date 8-16-24  Decadron: 6 mg po q day. Started 8-12-24. Stop date 8-17-24  Actemra: Not necessary at this point. Baricitinib started 8-13-24 because of very high CRP  Paxlovid: Uncertain time of onset  Monitor CRP: 99.6-->57 with Decadron and Baricitinib plus treatment of CHF  Would suggest completing  oxygen. 3 L/min-->room air  RR 20--22-->18  02 sat 95-->84-->97    Severity Screening:    NEWS Score: 0-4 Low risk group; 5-6: Medium risk group; 7 or above: High risk group  Parameters 3 2 1 0 1 2 3   Age    < 65   ? 65   RR ? 8  9-11 12-20  21-24 ? 25   O2 Sats ? 91 92-93 94-95 ? 96      Suppl O2  Yes  No      SBP ? 90  101-110 111-219   ? 220   HR ? 40  41-50 51-90  111-130 ? 131   Consciousness    Alert   Drowsiness, lethargy, or confusion   Temperature ? 35.0 C (95.0 F)  35.1-36.0 C 95.1-96.9 F 36.1-38.0 C 97.0-100.4 F 38.1-39.0 C 100.5-102.3 F ? 39.1 C ? 102.4 F      NEWS Score:  8-12-24: 9 High risk of requiring ICU care     Overall Daily Picture:     Unchanged    Presence of secondary bacterial Infection:    No   Additional antibiotics: no    Labs, X rays reviewed: 8/15/2024    BUN: 15-->20-->34  Cr: 1.3-->1.1-->1.2-->1.1      WBC: 7.1-->5.8  Hb: 11.5-->10.3-->8.9  Plat:  584-->386-->405    Absolute Neutrophils: 6.4  Absolute Lymphocytes: 1.8  Neutrophil/Lymphocyte Ratio:  3.55   High risk for progression of Covid    CRP: 99.6-->57--36  Ferritin:  LDH:     Pro Calcitonin:  Trop 122  Pro BNP 8359    Cultures:  Urine:  7-8-24: E coli  Blood:    Sputum :    Wound:      IMAGING:    CXR:   8-12-24: Interstitial edema, possible pulmonary edema with small effusions  8-14-24: Residual bibasilar changes. Rt pleural effusion better. Upper lung fields have improved.    CAT:      8-14-24:        8-12-24:      I have personally reviewed the past medical history, past surgical history, medications, social history, and family history, and I have updated the database accordingly.  Past Medical History:     Past Medical History:   Diagnosis Date    Anemia     Anxiety     CAD (coronary artery disease) 2008    CABG x 3    Chronic back pain     Chronic metabolic acidosis     Depression     Dorsalgia     Dysthymic disorder     Femur fracture (HCC)     right neck    Functional dyspepsia     GERD (gastroesophageal

## 2024-08-16 NOTE — PROGRESS NOTES
Patient returning to the [facility] this p.m.   aware and in agreement with this plan.  Discharge paperwork FAXED to the [facility] per this writer.      Otilia De La Cruz, JACINDA  8/16/2024

## 2024-08-23 ENCOUNTER — HOSPITAL ENCOUNTER (OUTPATIENT)
Age: 84
Setting detail: SPECIMEN
Discharge: HOME | End: 2024-08-23
Payer: MEDICARE

## 2024-08-23 ENCOUNTER — HOSPITAL ENCOUNTER (OUTPATIENT)
Age: 84
End: 2024-08-23
Payer: MEDICARE

## 2024-08-23 ENCOUNTER — HOSPITAL ENCOUNTER (OUTPATIENT)
Dept: GENERAL RADIOLOGY | Age: 84
End: 2024-08-23
Payer: MEDICARE

## 2024-08-23 DIAGNOSIS — S72.141D DISPLACED INTERTROCHANTERIC FRACTURE OF RIGHT FEMUR, SUBSEQUENT ENCOUNTER FOR CLOSED FRACTURE WITH ROUTINE HEALING: ICD-10-CM

## 2024-08-23 LAB
ANION GAP SERPL CALCULATED.3IONS-SCNC: 13 MMOL/L (ref 9–17)
BILIRUB UR QL STRIP: NEGATIVE
BNP SERPL-MCNC: 3124 PG/ML
BUN SERPL-MCNC: 27 MG/DL (ref 8–23)
BUN/CREAT SERPL: 25 (ref 9–20)
CALCIUM SERPL-MCNC: 8.4 MG/DL (ref 8.6–10.4)
CHLORIDE SERPL-SCNC: 103 MMOL/L (ref 98–107)
CLARITY UR: CLEAR
CO2 SERPL-SCNC: 22 MMOL/L (ref 20–31)
COLOR UR: YELLOW
COMMENT: ABNORMAL
CREAT SERPL-MCNC: 1.1 MG/DL (ref 0.5–0.9)
GFR, ESTIMATED: 49 ML/MIN/1.73M2
GLUCOSE SERPL-MCNC: 101 MG/DL (ref 70–99)
GLUCOSE UR STRIP-MCNC: NEGATIVE MG/DL
HGB UR QL STRIP.AUTO: NEGATIVE
KETONES UR STRIP-MCNC: NEGATIVE MG/DL
LEUKOCYTE ESTERASE UR QL STRIP: NEGATIVE
NITRITE UR QL STRIP: NEGATIVE
PH UR STRIP: 5 [PH] (ref 5–8)
POTASSIUM SERPL-SCNC: 4.3 MMOL/L (ref 3.7–5.3)
PROT UR STRIP-MCNC: ABNORMAL MG/DL
SODIUM SERPL-SCNC: 138 MMOL/L (ref 135–144)
SP GR UR STRIP: 1.02 (ref 1–1.03)
UROBILINOGEN UR STRIP-ACNC: NORMAL EU/DL (ref 0–1)

## 2024-08-23 PROCEDURE — 83880 ASSAY OF NATRIURETIC PEPTIDE: CPT

## 2024-08-23 PROCEDURE — 87086 URINE CULTURE/COLONY COUNT: CPT

## 2024-08-23 PROCEDURE — 73502 X-RAY EXAM HIP UNI 2-3 VIEWS: CPT

## 2024-08-23 PROCEDURE — 81003 URINALYSIS AUTO W/O SCOPE: CPT

## 2024-08-23 PROCEDURE — 80048 BASIC METABOLIC PNL TOTAL CA: CPT

## 2024-08-24 LAB
MICROORGANISM SPEC CULT: NO GROWTH
SPECIMEN DESCRIPTION: NORMAL

## 2024-09-02 ENCOUNTER — HOSPITAL ENCOUNTER (OUTPATIENT)
Age: 84
Setting detail: SPECIMEN
Discharge: HOME | End: 2024-09-02

## 2024-09-02 LAB — BNP SERPL-MCNC: 2100 PG/ML

## 2024-09-02 PROCEDURE — 83880 ASSAY OF NATRIURETIC PEPTIDE: CPT

## 2024-09-04 ENCOUNTER — HOSPITAL ENCOUNTER (OUTPATIENT)
Age: 84
Setting detail: SPECIMEN
Discharge: HOME | End: 2024-09-04

## 2024-09-04 LAB
BASOPHILS # BLD: 0.02 K/UL (ref 0–0.2)
BASOPHILS NFR BLD: 0 % (ref 0–2)
EOSINOPHIL # BLD: 0.09 K/UL (ref 0–0.4)
EOSINOPHILS RELATIVE PERCENT: 1 % (ref 0–5)
ERYTHROCYTE [DISTWIDTH] IN BLOOD BY AUTOMATED COUNT: 15.4 % (ref 12.1–15.2)
HCT VFR BLD AUTO: 25.7 % (ref 36–46)
HGB BLD-MCNC: 8.1 G/DL (ref 12–16)
IMM GRANULOCYTES # BLD AUTO: 0.03 K/UL (ref 0–0.3)
IMM GRANULOCYTES NFR BLD: 0 % (ref 0–5)
LYMPHOCYTES NFR BLD: 0.69 K/UL (ref 1–4.8)
LYMPHOCYTES RELATIVE PERCENT: 10 % (ref 15–40)
MCH RBC QN AUTO: 28.5 PG (ref 26–34)
MCHC RBC AUTO-ENTMCNC: 31.5 G/DL (ref 31–37)
MCV RBC AUTO: 90.5 FL (ref 80–100)
MONOCYTES NFR BLD: 0.64 K/UL (ref 0–1)
MONOCYTES NFR BLD: 10 % (ref 4–8)
NEUTROPHILS NFR BLD: 79 % (ref 47–75)
NEUTS SEG NFR BLD: 5.28 K/UL (ref 2.5–7)
PLATELET # BLD AUTO: 440 K/UL (ref 140–450)
PMV BLD AUTO: 10 FL (ref 6–12)
RBC # BLD AUTO: 2.84 M/UL (ref 4–5.2)
WBC OTHER # BLD: 6.8 K/UL (ref 3.5–11)

## 2024-09-10 ENCOUNTER — HOSPITAL ENCOUNTER (OUTPATIENT)
Age: 84
Setting detail: SPECIMEN
Discharge: HOME | End: 2024-09-10
Payer: MEDICARE

## 2024-09-10 LAB — BNP SERPL-MCNC: 1439 PG/ML

## 2024-09-10 PROCEDURE — 83880 ASSAY OF NATRIURETIC PEPTIDE: CPT

## 2024-09-27 ENCOUNTER — HOSPITAL ENCOUNTER (OUTPATIENT)
Age: 84
Discharge: HOME | End: 2024-09-29
Payer: MEDICARE

## 2024-09-27 ENCOUNTER — HOSPITAL ENCOUNTER (OUTPATIENT)
Dept: GENERAL RADIOLOGY | Age: 84
Discharge: HOME | End: 2024-09-29
Payer: MEDICARE

## 2024-09-27 DIAGNOSIS — S72.141D CLOSED DISPLACED INTERTROCHANTERIC FRACTURE OF RIGHT FEMUR WITH ROUTINE HEALING: ICD-10-CM

## 2024-09-27 PROCEDURE — 73502 X-RAY EXAM HIP UNI 2-3 VIEWS: CPT

## 2024-10-18 ENCOUNTER — HOSPITAL ENCOUNTER (OUTPATIENT)
Age: 84
Discharge: HOME | End: 2024-10-20
Payer: MEDICARE

## 2024-10-18 ENCOUNTER — HOSPITAL ENCOUNTER (OUTPATIENT)
Dept: GENERAL RADIOLOGY | Age: 84
Discharge: HOME | End: 2024-10-20
Payer: MEDICARE

## 2024-10-18 DIAGNOSIS — M25.562 ACUTE PAIN OF LEFT KNEE: ICD-10-CM

## 2024-10-18 PROCEDURE — 73564 X-RAY EXAM KNEE 4 OR MORE: CPT

## 2024-11-05 ENCOUNTER — OFFICE VISIT (OUTPATIENT)
Dept: CARDIOLOGY CLINIC | Age: 84
End: 2024-11-05

## 2024-11-05 VITALS — SYSTOLIC BLOOD PRESSURE: 120 MMHG | HEART RATE: 81 BPM | OXYGEN SATURATION: 94 % | DIASTOLIC BLOOD PRESSURE: 60 MMHG

## 2024-11-05 DIAGNOSIS — E55.9 VITAMIN D DEFICIENCY DISEASE: ICD-10-CM

## 2024-11-05 DIAGNOSIS — I25.10 CORONARY ARTERY DISEASE INVOLVING NATIVE CORONARY ARTERY OF NATIVE HEART WITHOUT ANGINA PECTORIS: ICD-10-CM

## 2024-11-05 DIAGNOSIS — I10 ESSENTIAL HYPERTENSION: ICD-10-CM

## 2024-11-05 DIAGNOSIS — R35.0 URINARY FREQUENCY: Primary | ICD-10-CM

## 2024-11-05 NOTE — PROGRESS NOTES
Ov Dr. Almonte for 6 month follow up  Has been in the [facility] since   Fall/hip fx/covid - June   Hx of MD  Therapy didn't help   In a lot of pain from knee up to help   Wanted to come to ER last night   They would not bring her  Not planning on going home per      Bedside echo done     Will get u/a c&S    Will need to f/u with Dr. Lunsford/  Ophthalmologist and urologist     Follow up in 6 month

## 2024-11-05 NOTE — PATIENT INSTRUCTIONS
Will get u/a c&S    Will need to f/u with Dr. Lunsford/  Ophthalmologist and urologist     Follow up in 6 month

## 2024-11-12 NOTE — PROGRESS NOTES
Alfredo Almonte MD  [facility]  [facility]  [address] [place]  [address] [phone]        2024        Rita Hair, CNP  [address] [place] [address]     RE:  NERI MEDINA  :  1940    Dear Danika:    CHIEF COMPLAINT:    Depression.  Status post fracture of the right hip on 2024, with intramedullary nail candy insertion by Dr. Lunsford on Anastasiia 3, 2024.  Shortness of breath.    HISTORY OF PRESENT ILLNESS:  I had the pleasure of seeing Neri Gonzalez along with her  in our office on 2024.  As you know, she had a catheterization done by Dr. Reynoso in  that showed 80% disease in the ostium of the right coronary artery with unremarkable circumflex, LAD 80% disease, EF 45% with subsequent open-heart surgery by Dr. Jefferson Medrano on May 12, 2006 with LIMA to LAD, vein graft to the diagonal, vein graft to the right coronary artery.    In 2007 a catheterization showed occluded vein graft to the right coronary artery, occluded vein graft to the diagonal, patent LIMA to LAD with stent to the right coronary artery with a 3.0 x 16 Taxus stent.    On 2012, she had a catheterization that showed a nonfunctional LIMA to LAD and therefore all grafts were occluded.  She had 90% disease in the LAD and diagonal with 90% disease in the ostium of the right coronary artery with 40% circumflex.  We stented her LAD and diagonal placing 2.5 x 24 Promus stent in the LAD and dilated the diagonal through the stent struts.  We brought her back on 2012 and did complex angio of the right coronary artery, placing 3.0 x 20 mm Promus stent.    On 2016, she had a 95% to 99% lesion in the proximal right coronary artery with dilated with balloon alone.  She had 90% disease in the LAD beyond the stent, and we stented the LAD with a 2.75 x 18 mm Xience stent.  Her EF was normal at

## 2025-04-23 PROBLEM — I48.92 ATRIAL FLUTTER, PAROXYSMAL (HCC): Status: ACTIVE | Noted: 2025-04-23

## 2025-04-23 PROBLEM — M47.816 SPONDYLOSIS OF LUMBAR REGION WITHOUT MYELOPATHY OR RADICULOPATHY: Status: ACTIVE | Noted: 2023-06-12

## 2025-05-01 PROBLEM — N18.32 STAGE 3B CHRONIC KIDNEY DISEASE (HCC): Status: ACTIVE | Noted: 2018-05-15

## 2025-05-22 PROBLEM — N18.4 CRI (CHRONIC RENAL INSUFFICIENCY), STAGE 4 (SEVERE) (HCC): Status: ACTIVE | Noted: 2025-05-22

## 2025-05-22 PROBLEM — I50.9 CONGESTIVE HEART FAILURE, UNSPECIFIED HF CHRONICITY, UNSPECIFIED HEART FAILURE TYPE (HCC): Status: ACTIVE | Noted: 2025-05-22

## 2025-07-03 ENCOUNTER — APPOINTMENT (OUTPATIENT)
Dept: GENERAL RADIOLOGY | Age: 85
End: 2025-07-03
Payer: MEDICARE

## 2025-07-03 ENCOUNTER — HOSPITAL ENCOUNTER (EMERGENCY)
Age: 85
Discharge: HOME | End: 2025-07-04
Payer: MEDICARE
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
## 2025-07-03 DIAGNOSIS — M25.552 PAIN OF LEFT HIP: ICD-10-CM

## 2025-07-03 PROCEDURE — 73502 X-RAY EXAM HIP UNI 2-3 VIEWS: CPT

## 2025-07-03 PROCEDURE — 99283 EMERGENCY DEPT VISIT LOW MDM: CPT

## 2025-07-03 PROCEDURE — 6370000000 HC RX 637 (ALT 250 FOR IP)

## 2025-07-03 RX ORDER — LORAZEPAM 1 MG/1
1 TABLET ORAL NIGHTLY
COMMUNITY

## 2025-07-03 RX ORDER — OXYCODONE AND ACETAMINOPHEN 5; 325 MG/1; MG/1
1 TABLET ORAL ONCE
Refills: 0 | Status: COMPLETED | OUTPATIENT
Start: 2025-07-03 | End: 2025-07-03

## 2025-07-03 RX ADMIN — OXYCODONE AND ACETAMINOPHEN 1 TABLET: 5; 325 TABLET ORAL at 16:32

## 2025-07-03 ASSESSMENT — PAIN SCALES - GENERAL
PAINLEVEL_OUTOF10: 3
PAINLEVEL_OUTOF10: 7

## 2025-07-03 ASSESSMENT — PAIN DESCRIPTION - LOCATION
LOCATION: HIP
LOCATION: HIP

## 2025-07-03 ASSESSMENT — PAIN DESCRIPTION - ORIENTATION
ORIENTATION: LEFT
ORIENTATION: LEFT

## 2025-07-03 ASSESSMENT — PAIN - FUNCTIONAL ASSESSMENT: PAIN_FUNCTIONAL_ASSESSMENT: 0-10

## 2025-07-03 ASSESSMENT — PAIN DESCRIPTION - DESCRIPTORS: DESCRIPTORS: SHARP;DISCOMFORT

## 2025-07-03 NOTE — ED NOTES
Patient able to stand up and take a few steps with a walker. Patient states she has been having left hip pain for past 6 months and has been in therapy for it. Dr. Tabatha negrete.

## 2025-07-03 NOTE — DISCHARGE INSTRUCTIONS
Continue taking Tylenol and Motrin as needed for pain.  Please schedule follow-up visit with primary care doctor  Return to the ER for any worsening symptoms or concerns

## 2025-07-03 NOTE — ED PROVIDER NOTES
[facility]  EMERGENCY DEPARTMENT ENCOUNTER        Pt Name: Megan Smith  MRN: [id number]  Birthdate 1940  Date of evaluation: 7/3/2025  Provider: Tiffany Mays MD  PCP: Jose Maria Contreras MD  Note Started: 6:14 PM EDT 7/3/25    CHIEF COMPLAINT       Chief Complaint   Patient presents with    Fall     Pt had fall while self-transferring from toilet to wheelchair.  Pt c/o left hip/leg pain.  Denies head injury.        HISTORY OF PRESENT ILLNESS: 1 or more Elements     Megan Smith is a 85 y.o. female who presents after mechanical fall while she was transferring from toilet to wheelchair.  Patient states that when she pivoted she lost her balance falling on her left hip.  She states she did not hit her head she was awake the whole time.  Patient is not on any anticoagulation.  She states that she has had a fracture on her right hip before that needed repair.  She is able to move her lower extremities but does have some pain on her left hip.  States she was given some pain medications at the facility and she is now reporting significant improvement of her symptoms.  She states her motility is usually impaired since she has had a right hip fracture and she requires a lot of assistance at baseline.  Denies any fever, chills, n/v, headache, dizziness, vision changes, neck tenderness or stiffness, weakness, cp, palpitations sob, cough, abd pain, dysuria, hematuria, diarrhea, constipation, bloody stools.    Nursing Notes were all reviewed and agreed with or any disagreements were addressed in the HPI.    ROS:   Pertinent positives and negatives are stated within HPI, all other systems reviewed and are negative.      --------------------------------------------- PAST HISTORY ---------------------------------------------  Past Medical History:  has a past medical history of Anemia, Anxiety, CAD (coronary artery disease), Chronic back pain, Chronic metabolic acidosis, Depression, Dorsalgia,  kg/m²   Oxygen Saturation Interpretation: Normal    The patient’s available past medical records and past encounters were reviewed by myself Dr. Mays.        ------------------------------ ED COURSE/MEDICAL DECISION MAKING----------------------    Medical Decision Making/Differential Diagnosis:    CC/HPI Summary, Pertinent Physical Exam Findings, Social Determinants of health, Records Reviewed, DDx, testing done/not done, ED Course, Reassessment, disposition considerations/shared decision making with patient, consults, disposition:      Medical Decision Making/ED COURSE:    I interpreted findings during patient's stay.   Records reviewed as detailed on the note.    History From: Patient    Limitations to history : None  Social Determinants : None    Chronic Conditions affecting care:    has a past medical history of Anemia, Anxiety, CAD (coronary artery disease) (2008), Chronic back pain, Chronic metabolic acidosis, Depression, Dorsalgia, Dysthymic disorder, Femur fracture (HCC), Functional dyspepsia, GERD (gastroesophageal reflux disease), Hearing loss, History of malignant neoplasm of parotid gland (2020), Hyperlipidemia, Hypertension, Hypokalemia, Hypothyroidism, Kidney disease, chronic, stage IV (GFR 15-29 ml/min) ([facility]), Macular degeneration (2022), Macular degeneration, Malignant neoplasm of parotid gland ([facility]), Osteoarthritis of right hip, Pneumothorax, Post PTCA, Postlaminectomy syndrome, Radiculopathy, Spinal stenosis, Tachycardia, and Transfusion history.     Megan Smith is a 85 y.o. female     Vital signs /81   Pulse 77   Temp 98.7 °F (37.1 °C) (Oral)   Resp 16   Wt 70.3 kg (155 lb)   SpO2 96%   BMI 24.28 kg/m²   While in the ED patient was afebrile, nontoxic-appearing, in no respiratory distress.   Physical exam remarkable forLeft hip pain, no obvious shortening.  DP and PT pulses are bounding.  Patient is able to lift leg with some tenderness at the left hip.  No step off deformities,

## 2025-07-04 VITALS
WEIGHT: 155 LBS | SYSTOLIC BLOOD PRESSURE: 167 MMHG | RESPIRATION RATE: 16 BRPM | TEMPERATURE: 98.7 F | BODY MASS INDEX: 24.28 KG/M2 | DIASTOLIC BLOOD PRESSURE: 88 MMHG | OXYGEN SATURATION: 97 % | HEART RATE: 77 BPM

## 2025-07-04 ASSESSMENT — PAIN DESCRIPTION - DESCRIPTORS: DESCRIPTORS: ACHING

## 2025-07-04 ASSESSMENT — PAIN DESCRIPTION - FREQUENCY: FREQUENCY: CONTINUOUS

## 2025-07-04 ASSESSMENT — PAIN DESCRIPTION - PAIN TYPE: TYPE: CHRONIC PAIN

## 2025-07-04 ASSESSMENT — PAIN DESCRIPTION - ORIENTATION: ORIENTATION: LEFT

## 2025-07-04 ASSESSMENT — PAIN - FUNCTIONAL ASSESSMENT
PAIN_FUNCTIONAL_ASSESSMENT: 0-10
PAIN_FUNCTIONAL_ASSESSMENT: ACTIVITIES ARE NOT PREVENTED

## 2025-07-04 ASSESSMENT — PAIN SCALES - GENERAL: PAINLEVEL_OUTOF10: 3

## 2025-07-04 ASSESSMENT — PAIN DESCRIPTION - LOCATION: LOCATION: HIP

## 2025-07-04 ASSESSMENT — PAIN DESCRIPTION - ONSET: ONSET: ON-GOING

## 2025-07-04 NOTE — ED NOTES
[facility] nurse updated on patient condition and of patient's discharge back to [facility].

## 2025-07-28 ENCOUNTER — HOSPITAL ENCOUNTER (EMERGENCY)
Age: 85
Discharge: HOME | End: 2025-07-28
Payer: MEDICARE

## 2025-07-28 ENCOUNTER — APPOINTMENT (OUTPATIENT)
Dept: GENERAL RADIOLOGY | Age: 85
End: 2025-07-28
Payer: MEDICARE

## 2025-07-28 VITALS
HEART RATE: 74 BPM | RESPIRATION RATE: 20 BRPM | OXYGEN SATURATION: 95 % | BODY MASS INDEX: 25.11 KG/M2 | SYSTOLIC BLOOD PRESSURE: 138 MMHG | TEMPERATURE: 98 F | HEIGHT: 67 IN | WEIGHT: 160 LBS | DIASTOLIC BLOOD PRESSURE: 67 MMHG

## 2025-07-28 DIAGNOSIS — R41.0 TRANSIENT CONFUSION: Primary | ICD-10-CM

## 2025-07-28 LAB
ALBUMIN SERPL-MCNC: 4.2 G/DL (ref 3.5–5.2)
ALBUMIN/GLOB SERPL: 1.6 {RATIO} (ref 1–2.5)
ALP SERPL-CCNC: 119 U/L (ref 35–104)
ALT SERPL-CCNC: 9 U/L (ref 10–35)
ANION GAP SERPL CALCULATED.3IONS-SCNC: 14 MMOL/L (ref 9–16)
AST SERPL-CCNC: 16 U/L (ref 10–35)
BASOPHILS # BLD: 0.03 K/UL (ref 0–0.2)
BASOPHILS NFR BLD: 0 % (ref 0–2)
BILIRUB SERPL-MCNC: <0.2 MG/DL (ref 0–1.2)
BILIRUB UR QL STRIP: NEGATIVE
BUN SERPL-MCNC: 20 MG/DL (ref 8–23)
BUN/CREAT SERPL: 14 (ref 9–20)
CALCIUM SERPL-MCNC: 9.2 MG/DL (ref 8.6–10.4)
CHLORIDE SERPL-SCNC: 104 MMOL/L (ref 98–107)
CLARITY UR: CLEAR
CO2 SERPL-SCNC: 23 MMOL/L (ref 20–31)
COLOR UR: YELLOW
CREAT SERPL-MCNC: 1.4 MG/DL (ref 0.5–0.9)
EOSINOPHIL # BLD: <0.03 K/UL (ref 0–0.44)
EOSINOPHILS RELATIVE PERCENT: 0 % (ref 1–4)
ERYTHROCYTE [DISTWIDTH] IN BLOOD BY AUTOMATED COUNT: 15.9 % (ref 11.8–14.4)
GFR, ESTIMATED: 37 ML/MIN/1.73M2
GLUCOSE SERPL-MCNC: 103 MG/DL (ref 74–99)
GLUCOSE UR STRIP-MCNC: NEGATIVE MG/DL
HCT VFR BLD AUTO: 37.6 % (ref 36.3–47.1)
HGB BLD-MCNC: 11.6 G/DL (ref 11.9–15.1)
HGB UR QL STRIP.AUTO: NEGATIVE
IMM GRANULOCYTES # BLD AUTO: 0.03 K/UL (ref 0–0.3)
IMM GRANULOCYTES NFR BLD: 0 %
KETONES UR STRIP-MCNC: NEGATIVE MG/DL
LACTATE BLDV-SCNC: 1.1 MMOL/L (ref 0.5–2.2)
LEUKOCYTE ESTERASE UR QL STRIP: NEGATIVE
LYMPHOCYTES NFR BLD: 0.68 K/UL (ref 1.1–3.7)
LYMPHOCYTES RELATIVE PERCENT: 8 % (ref 24–43)
MCH RBC QN AUTO: 28.4 PG (ref 25.2–33.5)
MCHC RBC AUTO-ENTMCNC: 30.9 G/DL (ref 28.4–34.8)
MCV RBC AUTO: 92.2 FL (ref 82.6–102.9)
MONOCYTES NFR BLD: 0.32 K/UL (ref 0.1–1.2)
MONOCYTES NFR BLD: 4 % (ref 3–12)
NEUTROPHILS NFR BLD: 88 % (ref 36–65)
NEUTS SEG NFR BLD: 7.14 K/UL (ref 1.5–8.1)
NITRITE UR QL STRIP: NEGATIVE
NRBC BLD-RTO: 0 PER 100 WBC
PH UR STRIP: 6 [PH] (ref 5–9)
PLATELET # BLD AUTO: 379 K/UL (ref 138–453)
PMV BLD AUTO: 9.5 FL (ref 8.1–13.5)
POTASSIUM SERPL-SCNC: 4.5 MMOL/L (ref 3.7–5.3)
PROT SERPL-MCNC: 6.9 G/DL (ref 6.6–8.7)
PROT UR STRIP-MCNC: NEGATIVE MG/DL
RBC # BLD AUTO: 4.08 M/UL (ref 3.95–5.11)
SODIUM SERPL-SCNC: 141 MMOL/L (ref 136–145)
SP GR UR STRIP: 1.01 (ref 1.01–1.02)
TROPONIN I SERPL HS-MCNC: 25 NG/L (ref 0–14)
TROPONIN I SERPL HS-MCNC: 29 NG/L (ref 0–14)
UROBILINOGEN UR STRIP-ACNC: NORMAL EU/DL (ref 0–1)
WBC OTHER # BLD: 8.2 K/UL (ref 3.5–11.3)

## 2025-07-28 PROCEDURE — 81003 URINALYSIS AUTO W/O SCOPE: CPT

## 2025-07-28 PROCEDURE — 96360 HYDRATION IV INFUSION INIT: CPT

## 2025-07-28 PROCEDURE — 80053 COMPREHEN METABOLIC PANEL: CPT

## 2025-07-28 PROCEDURE — 84484 ASSAY OF TROPONIN QUANT: CPT

## 2025-07-28 PROCEDURE — 99285 EMERGENCY DEPT VISIT HI MDM: CPT

## 2025-07-28 PROCEDURE — 83605 ASSAY OF LACTIC ACID: CPT

## 2025-07-28 PROCEDURE — 85025 COMPLETE CBC W/AUTO DIFF WBC: CPT

## 2025-07-28 PROCEDURE — 2580000003 HC RX 258: Performed by: PHYSICIAN ASSISTANT

## 2025-07-28 PROCEDURE — 71045 X-RAY EXAM CHEST 1 VIEW: CPT

## 2025-07-28 PROCEDURE — 36415 COLL VENOUS BLD VENIPUNCTURE: CPT

## 2025-07-28 PROCEDURE — 93005 ELECTROCARDIOGRAM TRACING: CPT

## 2025-07-28 RX ORDER — 0.9 % SODIUM CHLORIDE 0.9 %
500 INTRAVENOUS SOLUTION INTRAVENOUS ONCE
Status: COMPLETED | OUTPATIENT
Start: 2025-07-28 | End: 2025-07-28

## 2025-07-28 RX ADMIN — SODIUM CHLORIDE 500 ML: 0.9 INJECTION, SOLUTION INTRAVENOUS at 18:13

## 2025-07-28 ASSESSMENT — PAIN DESCRIPTION - PAIN TYPE: TYPE: CHRONIC PAIN

## 2025-07-28 ASSESSMENT — PAIN DESCRIPTION - LOCATION: LOCATION: ABDOMEN;OTHER (COMMENT)

## 2025-07-28 ASSESSMENT — PAIN - FUNCTIONAL ASSESSMENT
PAIN_FUNCTIONAL_ASSESSMENT: WONG-BAKER FACES
PAIN_FUNCTIONAL_ASSESSMENT: NONE - DENIES PAIN

## 2025-07-28 NOTE — ED PROVIDER NOTES
Emergency Department Encounter    Note to patient: The 21st Century Cures Act requires that medical notes like this one to be available to patients in the interest of transparency. Please be advised, this is a medical document. It is intended as [id number] communication. It is written in medical language and may contain abbreviations and verbiage that may be unfamiliar. It may appear to read blunt or direct. Medical documents are intended to carry relevant medical information, facts as evident and the clinical opinion of the practitioner.      Chief Complaint  Chief Complaint   Patient presents with    Altered Mental Status     Patient to the Emergency Department by EMS from [facility] for Altered Mental Status. It was reported that patient was given Oxycodone this am at 0730. She has been extremely drowsy since. Nurse also reports that patient would not follow commands.         HPI  85-year-old female nursing home patient DNR CC was sent in from the nursing home because they thought she had altered mental status.  She had been given Percocet this morning which she does take on a regular basis but seemed to be more somnolent afterwards and would not follow commands at the nursing home.  [facility] stated the patient did seem more alert and awake for them and was following commands and the patient is conversant now in the emergency department.  The patient states she has been having abdominal pain pointing to the periumbilical area she states that she is comfort care but would except laboratory studies to evaluate her abdomen and heart function.           Past Medical History  Past Medical History:   Diagnosis Date    Anemia     Anxiety     CAD (coronary artery disease) 2008    CABG x 3    Chronic back pain     Chronic metabolic acidosis     Depression     Dorsalgia     Dysthymic disorder     Femur fracture (HCC)     right neck    Functional dyspepsia     GERD (gastroesophageal reflux disease)

## 2025-07-29 LAB
EKG ATRIAL RATE: 76 BPM
EKG P AXIS: 86 DEGREES
EKG P-R INTERVAL: 146 MS
EKG Q-T INTERVAL: 426 MS
EKG QRS DURATION: 88 MS
EKG QTC CALCULATION (BAZETT): 479 MS
EKG R AXIS: 67 DEGREES
EKG T AXIS: 81 DEGREES
EKG VENTRICULAR RATE: 76 BPM

## 2025-07-29 PROCEDURE — 93010 ELECTROCARDIOGRAM REPORT: CPT | Performed by: INTERNAL MEDICINE

## (undated) DEVICE — SYRINGE MED 30ML STD CLR PLAS LUERLOCK TIP N CTRL DISP

## (undated) DEVICE — STAPLER SKIN H3.9MM WIRE DIA0.58MM CRWN 6.9MM 35 STPL ROT

## (undated) DEVICE — GLOVE SURG SZ 75 CRM LTX FREE POLYISOPRENE POLYMER BEAD ANTI

## (undated) DEVICE — SOLUTION IRRIG 1000ML 0.9% SOD CHL USP POUR PLAS BTL

## (undated) DEVICE — SINGLE BASIN PLUS 3-LF: Brand: MEDLINE INDUSTRIES, INC.

## (undated) DEVICE — PADDING UNDERCAST W4INXL4YD COT FBR LO LINTING WYTEX

## (undated) DEVICE — MARKER,SKIN,WI/RULER AND LABELS: Brand: MEDLINE

## (undated) DEVICE — INTENDED FOR TISSUE SEPARATION, AND OTHER PROCEDURES THAT REQUIRE A SHARP SURGICAL BLADE TO PUNCTURE OR CUT.: Brand: BARD-PARKER ® STAINLESS STEEL BLADES

## (undated) DEVICE — SPONGE LAP W18XL18IN WHT COT 4 PLY FLD STRUNG RADPQ DISP ST 2 PER PACK

## (undated) DEVICE — GLOVE SURG SZ 75 L12IN FNGR THK79MIL GRN LTX FREE

## (undated) DEVICE — GOWN,SIRUS,POLYRNF,BRTHSLV,2XL,18/CS: Brand: MEDLINE

## (undated) DEVICE — BIT DRL L L266MM DIA16MM FEM FLX CANN QUIK CPL

## (undated) DEVICE — 4-PORT MANIFOLD: Brand: NEPTUNE 2

## (undated) DEVICE — TOWEL,OR,DSP,ST,NATURAL,DLX,4/PK,20PK/CS: Brand: MEDLINE

## (undated) DEVICE — ELECTRODE PT RET AD L9FT HI MOIST COND ADH HYDRGEL CORDED

## (undated) DEVICE — APPLICATOR MEDICATED 26 CC SOLUTION HI LT ORNG CHLORAPREP

## (undated) DEVICE — PAIN TRAY: Brand: MEDLINE INDUSTRIES, INC.

## (undated) DEVICE — BIT DRL L145MM DIA4.2MM NONSTERILE 3 FLUT NDL PNT QUIK CPL

## (undated) DEVICE — PAD,ABDOMINAL,8"X7.5",ST,LF,20/BX: Brand: MEDLINE INDUSTRIES, INC.

## (undated) DEVICE — PAD,ARMBOARD,CONV,FOAM,2X8X20",12PR/CS: Brand: MEDLINE

## (undated) DEVICE — COVER,MAYO STAND,STERILE: Brand: MEDLINE

## (undated) DEVICE — NEEDLE SPNL 25GA L3.5IN BLU HUB S STL REG WALL FIT STYL W/

## (undated) DEVICE — SUTURE ABSORBABLE BRAIDED 2-0 CT-1 27 IN UD VICRYL J259H

## (undated) DEVICE — SUTURE NONABSORBABLE L38IN 2 BLU L26.5MM 1/2 CIR TAPR NDL AR7200B

## (undated) DEVICE — ELECTRODE ES AD CRD L15FT DISP FOR PT BELOW 30LB REM

## (undated) DEVICE — PAD PERINL POST FOAM DISPOSABLE FOR AMSCO SURG TBL

## (undated) DEVICE — TABLE COVER: Brand: CONVERTORS

## (undated) DEVICE — LIMB HOLDER, WRIST/ANKLE: Brand: DEROYAL

## (undated) DEVICE — TIP SUCT FLNG TIP ON OFF CTRL YANK

## (undated) DEVICE — BIT DRL L500MM DIA6X9MM CANN STP L QUIK CPL FOR DH DC TFN

## (undated) DEVICE — 3M™ STERI-DRAPE™ U-DRAPE, LONG 1019: Brand: STERI-DRAPE™

## (undated) DEVICE — 6617 IOBAN II PATIENT ISOLATION DRAPE 5/BX,4BX/CS: Brand: STERI-DRAPE™ IOBAN™ 2

## (undated) DEVICE — GOWN,SIRUS,POLYRNF,BRTHSLV,XL,30/CS: Brand: MEDLINE

## (undated) DEVICE — GUIDEWIRE ORTH L400MM DIA3.2MM FOR TFN

## (undated) DEVICE — APPLICATOR MEDICATED 10.5 CC SOLUTION HI LT ORNG CHLORAPREP

## (undated) DEVICE — INTENDED FOR TISSUE SEPARATION, AND OTHER PROCEDURES THAT REQUIRE A SHARP SURGICAL BLADE TO PUNCTURE OR CUT.: Brand: BARD-PARKER ® DISPOSABLE SCALPELS

## (undated) DEVICE — Device

## (undated) DEVICE — NDL CNTR 40CT FM MAG: Brand: MEDLINE INDUSTRIES, INC.

## (undated) DEVICE — PENCIL ES L3M BTTN SWCH HOLSTER W/ BLDE ELECTRD EDGE

## (undated) DEVICE — SET EXTN TBNG MINIBOR 20IN

## (undated) DEVICE — DRESSING HYDROFIBER AQUACEL AG ADVANTAGE 3.5X6 IN

## (undated) DEVICE — BANDAGE ELASTIC COBAN COHESIVE 4INX5YD LF

## (undated) DEVICE — GLOVE SURG SZ 8 L12IN FNGR THK87MIL WHT LTX FREE

## (undated) DEVICE — KIT ARMOR C DRP COLLAPSIBLE AND SELF EXP TOP CVR FOR FLUOROSCOPIC